# Patient Record
Sex: FEMALE | Race: WHITE | NOT HISPANIC OR LATINO | Employment: FULL TIME | URBAN - METROPOLITAN AREA
[De-identification: names, ages, dates, MRNs, and addresses within clinical notes are randomized per-mention and may not be internally consistent; named-entity substitution may affect disease eponyms.]

---

## 2017-01-13 ENCOUNTER — HISTORICAL (OUTPATIENT)
Dept: LAB | Facility: HOSPITAL | Age: 47
End: 2017-01-13

## 2017-01-18 ENCOUNTER — HISTORICAL (OUTPATIENT)
Dept: LAB | Facility: HOSPITAL | Age: 47
End: 2017-01-18

## 2017-02-02 ENCOUNTER — HISTORICAL (OUTPATIENT)
Dept: LAB | Facility: HOSPITAL | Age: 47
End: 2017-02-02

## 2017-04-04 ENCOUNTER — HISTORICAL (OUTPATIENT)
Dept: RADIOLOGY | Facility: HOSPITAL | Age: 47
End: 2017-04-04

## 2017-05-01 ENCOUNTER — HISTORICAL (OUTPATIENT)
Dept: RADIOLOGY | Facility: HOSPITAL | Age: 47
End: 2017-05-01

## 2017-06-20 ENCOUNTER — HISTORICAL (OUTPATIENT)
Dept: LAB | Facility: HOSPITAL | Age: 47
End: 2017-06-20

## 2017-06-20 LAB
ALBUMIN SERPL-MCNC: 3.6 GM/DL (ref 3.4–5)
ALBUMIN/GLOB SERPL: 1.1 RATIO (ref 1.1–2)
ALP SERPL-CCNC: 67 UNIT/L (ref 38–126)
ALT SERPL-CCNC: 28 UNIT/L (ref 12–78)
AST SERPL-CCNC: 14 UNIT/L (ref 15–37)
BILIRUB SERPL-MCNC: 0.5 MG/DL (ref 0.2–1)
BILIRUBIN DIRECT+TOT PNL SERPL-MCNC: 0.1 MG/DL (ref 0–0.5)
BILIRUBIN DIRECT+TOT PNL SERPL-MCNC: 0.4 MG/DL (ref 0–0.8)
BUN SERPL-MCNC: 13 MG/DL (ref 7–18)
CALCIUM SERPL-MCNC: 9.2 MG/DL (ref 8.5–10.1)
CHLORIDE SERPL-SCNC: 105 MMOL/L (ref 98–107)
CO2 SERPL-SCNC: 24 MMOL/L (ref 21–32)
CREAT SERPL-MCNC: 0.79 MG/DL (ref 0.55–1.02)
GLOBULIN SER-MCNC: 3.2 GM/DL (ref 2.4–3.5)
GLUCOSE SERPL-MCNC: 101 MG/DL (ref 74–106)
H PYLORI AB SER IA-ACNC: NEGATIVE
POTASSIUM SERPL-SCNC: 4.5 MMOL/L (ref 3.5–5.1)
PROT SERPL-MCNC: 6.8 GM/DL (ref 6.4–8.2)
SODIUM SERPL-SCNC: 141 MMOL/L (ref 136–145)

## 2018-03-28 ENCOUNTER — HISTORICAL (OUTPATIENT)
Dept: RADIOLOGY | Facility: HOSPITAL | Age: 48
End: 2018-03-28

## 2018-05-29 LAB
BILIRUB SERPL-MCNC: NEGATIVE MG/DL
BLOOD URINE, POC: NEGATIVE
CLARITY, POC UA: CLEAR
COLOR, POC UA: YELLOW
GLUCOSE UR QL STRIP: NEGATIVE
KETONES UR QL STRIP: NEGATIVE
LEUKOCYTE EST, POC UA: NEGATIVE
NITRITE, POC UA: NEGATIVE
PH, POC UA: 6
PROTEIN, POC: NEGATIVE
SPECIFIC GRAVITY, POC UA: 1.02
UROBILINOGEN, POC UA: NORMAL

## 2020-02-20 ENCOUNTER — HISTORICAL (OUTPATIENT)
Dept: ADMINISTRATIVE | Facility: HOSPITAL | Age: 50
End: 2020-02-20

## 2020-02-20 LAB
INR PPP: 1.1 (ref 0–1.3)
PROTHROMBIN TIME: 13.7 SECOND(S) (ref 11.1–13.7)

## 2020-02-21 ENCOUNTER — HISTORICAL (OUTPATIENT)
Dept: ADMINISTRATIVE | Facility: HOSPITAL | Age: 50
End: 2020-02-21

## 2020-02-21 LAB
INR PPP: 1.3 (ref 0–1.3)
PROTHROMBIN TIME: 15.6 SECOND(S) (ref 11.1–13.7)

## 2020-02-22 ENCOUNTER — HOSPITAL ENCOUNTER (OUTPATIENT)
Dept: ADMINISTRATIVE | Facility: HOSPITAL | Age: 50
End: 2020-02-24
Attending: INTERNAL MEDICINE | Admitting: INTERNAL MEDICINE

## 2020-02-22 LAB
ABS NEUT (OLG): 4.65 X10(3)/MCL (ref 2.1–9.2)
ALBUMIN SERPL-MCNC: 3.6 GM/DL (ref 3.4–5)
ALBUMIN/GLOB SERPL: 1 RATIO (ref 1.1–2)
ALP SERPL-CCNC: 64 UNIT/L (ref 38–126)
ALT SERPL-CCNC: 69 UNIT/L (ref 12–78)
AST SERPL-CCNC: 76 UNIT/L (ref 15–37)
BASOPHILS # BLD AUTO: 0.1 X10(3)/MCL (ref 0–0.2)
BASOPHILS NFR BLD AUTO: 1 %
BILIRUB SERPL-MCNC: 0.2 MG/DL (ref 0.2–1)
BILIRUBIN DIRECT+TOT PNL SERPL-MCNC: 0.1 MG/DL (ref 0–0.5)
BILIRUBIN DIRECT+TOT PNL SERPL-MCNC: 0.1 MG/DL (ref 0–0.8)
BUN SERPL-MCNC: 9 MG/DL (ref 7–18)
CALCIUM SERPL-MCNC: 8.3 MG/DL (ref 8.5–10.1)
CHLORIDE SERPL-SCNC: 108 MMOL/L (ref 98–107)
CO2 SERPL-SCNC: 25 MMOL/L (ref 21–32)
CREAT SERPL-MCNC: 0.69 MG/DL (ref 0.55–1.02)
EOSINOPHIL # BLD AUTO: 0.1 X10(3)/MCL (ref 0–0.9)
EOSINOPHIL NFR BLD AUTO: 1 %
ERYTHROCYTE [DISTWIDTH] IN BLOOD BY AUTOMATED COUNT: 14 % (ref 11.5–17)
GLOBULIN SER-MCNC: 3.7 GM/DL (ref 2.4–3.5)
GLUCOSE SERPL-MCNC: 90 MG/DL (ref 74–106)
HCT VFR BLD AUTO: 47.8 % (ref 37–47)
HGB BLD-MCNC: 15.3 GM/DL (ref 12–16)
LYMPHOCYTES # BLD AUTO: 1.6 X10(3)/MCL (ref 0.6–4.6)
LYMPHOCYTES NFR BLD AUTO: 23 %
MCH RBC QN AUTO: 29.5 PG (ref 27–31)
MCHC RBC AUTO-ENTMCNC: 32 GM/DL (ref 33–36)
MCV RBC AUTO: 92.1 FL (ref 80–94)
MONOCYTES # BLD AUTO: 0.7 X10(3)/MCL (ref 0.1–1.3)
MONOCYTES NFR BLD AUTO: 10 %
NEUTROPHILS # BLD AUTO: 4.65 X10(3)/MCL (ref 2.1–9.2)
NEUTROPHILS NFR BLD AUTO: 65 %
PLATELET # BLD AUTO: 298 X10(3)/MCL (ref 130–400)
PMV BLD AUTO: 11.8 FL (ref 9.4–12.4)
POC TROPONIN: 0.32 NG/ML (ref 0–0.08)
POTASSIUM SERPL-SCNC: 4.1 MMOL/L (ref 3.5–5.1)
PROT SERPL-MCNC: 7.3 GM/DL (ref 6.4–8.2)
RBC # BLD AUTO: 5.19 X10(6)/MCL (ref 4.2–5.4)
SODIUM SERPL-SCNC: 140 MMOL/L (ref 136–145)
TROPONIN I SERPL-MCNC: 0.46 NG/ML (ref 0.02–0.49)
TROPONIN I SERPL-MCNC: 0.52 NG/ML (ref 0.02–0.49)
WBC # SPEC AUTO: 7.2 X10(3)/MCL (ref 4.5–11.5)

## 2020-02-23 LAB — TROPONIN I SERPL-MCNC: 0.34 NG/ML (ref 0.02–0.49)

## 2020-02-24 LAB
ABS NEUT (OLG): 4.31 X10(3)/MCL (ref 2.1–9.2)
BASOPHILS # BLD AUTO: 0.1 X10(3)/MCL (ref 0–0.2)
BASOPHILS NFR BLD AUTO: 1 %
BUN SERPL-MCNC: 11 MG/DL (ref 7–18)
CALCIUM SERPL-MCNC: 8.3 MG/DL (ref 8.5–10.1)
CHLORIDE SERPL-SCNC: 106 MMOL/L (ref 98–107)
CO2 SERPL-SCNC: 25 MMOL/L (ref 21–32)
CREAT SERPL-MCNC: 0.51 MG/DL (ref 0.55–1.02)
CREAT/UREA NIT SERPL: 21.6
EOSINOPHIL # BLD AUTO: 0.1 X10(3)/MCL (ref 0–0.9)
EOSINOPHIL NFR BLD AUTO: 1 %
ERYTHROCYTE [DISTWIDTH] IN BLOOD BY AUTOMATED COUNT: 13.7 % (ref 11.5–17)
GLUCOSE SERPL-MCNC: 92 MG/DL (ref 74–106)
HCT VFR BLD AUTO: 40.4 % (ref 37–47)
HGB BLD-MCNC: 12.9 GM/DL (ref 12–16)
INR PPP: 1.5 (ref 0–1.3)
LYMPHOCYTES # BLD AUTO: 1.7 X10(3)/MCL (ref 0.6–4.6)
LYMPHOCYTES NFR BLD AUTO: 25 %
MCH RBC QN AUTO: 29.1 PG (ref 27–31)
MCHC RBC AUTO-ENTMCNC: 31.9 GM/DL (ref 33–36)
MCV RBC AUTO: 91.2 FL (ref 80–94)
MONOCYTES # BLD AUTO: 0.6 X10(3)/MCL (ref 0.1–1.3)
MONOCYTES NFR BLD AUTO: 9 %
NEUTROPHILS # BLD AUTO: 4.31 X10(3)/MCL (ref 2.1–9.2)
NEUTROPHILS NFR BLD AUTO: 64 %
PLATELET # BLD AUTO: 254 X10(3)/MCL (ref 130–400)
PMV BLD AUTO: 11.9 FL (ref 9.4–12.4)
POTASSIUM SERPL-SCNC: 4.1 MMOL/L (ref 3.5–5.1)
PROTHROMBIN TIME: 17.2 SECOND(S) (ref 11.1–13.7)
RBC # BLD AUTO: 4.43 X10(6)/MCL (ref 4.2–5.4)
SODIUM SERPL-SCNC: 140 MMOL/L (ref 136–145)
WBC # SPEC AUTO: 6.7 X10(3)/MCL (ref 4.5–11.5)

## 2020-02-26 ENCOUNTER — HISTORICAL (OUTPATIENT)
Dept: ADMINISTRATIVE | Facility: HOSPITAL | Age: 50
End: 2020-02-26

## 2020-02-26 LAB
INR PPP: 1.3 (ref 0–1.3)
PROTHROMBIN TIME: 15.3 SECOND(S) (ref 11.1–13.7)

## 2020-02-28 ENCOUNTER — HISTORICAL (OUTPATIENT)
Dept: ADMINISTRATIVE | Facility: HOSPITAL | Age: 50
End: 2020-02-28

## 2020-02-28 LAB
INR PPP: 1.5 (ref 0–1.3)
PROTHROMBIN TIME: 17.6 SECOND(S) (ref 11.1–13.7)

## 2020-02-29 ENCOUNTER — HISTORICAL (OUTPATIENT)
Dept: LAB | Facility: HOSPITAL | Age: 50
End: 2020-02-29

## 2020-02-29 LAB
INR PPP: 2 (ref 2–3)
PROTHROMBIN TIME: 22.3 SEC (ref 11.7–14.5)

## 2020-03-02 ENCOUNTER — HISTORICAL (OUTPATIENT)
Dept: ADMINISTRATIVE | Facility: HOSPITAL | Age: 50
End: 2020-03-02

## 2020-03-02 LAB
INR PPP: 2.1 (ref 0–1.3)
PROTHROMBIN TIME: 23.3 SECOND(S) (ref 11.1–13.7)

## 2020-03-05 ENCOUNTER — HISTORICAL (OUTPATIENT)
Dept: ADMINISTRATIVE | Facility: HOSPITAL | Age: 50
End: 2020-03-05

## 2020-03-05 LAB
INR PPP: 2.2 (ref 0–1.3)
PROTHROMBIN TIME: 24 SECOND(S) (ref 11.1–13.7)

## 2020-03-09 ENCOUNTER — HISTORICAL (OUTPATIENT)
Dept: ADMINISTRATIVE | Facility: HOSPITAL | Age: 50
End: 2020-03-09

## 2020-03-09 LAB
INR PPP: 2.9 (ref 0–1.3)
PROTHROMBIN TIME: 29.3 SECOND(S) (ref 11.1–13.7)

## 2020-03-12 ENCOUNTER — HISTORICAL (OUTPATIENT)
Dept: ADMINISTRATIVE | Facility: HOSPITAL | Age: 50
End: 2020-03-12

## 2020-03-12 LAB
INR PPP: 2.5 (ref 0–1.3)
PROTHROMBIN TIME: 26 SECOND(S) (ref 11.1–13.7)

## 2020-03-19 ENCOUNTER — HISTORICAL (OUTPATIENT)
Dept: ADMINISTRATIVE | Facility: HOSPITAL | Age: 50
End: 2020-03-19

## 2020-03-19 LAB
INR PPP: 2.6 (ref 0–1.3)
PROTHROMBIN TIME: 27.1 SECOND(S) (ref 11.1–13.7)

## 2020-04-09 ENCOUNTER — HISTORICAL (OUTPATIENT)
Dept: ADMINISTRATIVE | Facility: HOSPITAL | Age: 50
End: 2020-04-09

## 2020-04-09 LAB
INR PPP: 2.1 (ref 0–1.3)
PROTHROMBIN TIME: 23 SECOND(S) (ref 11.1–13.7)

## 2020-06-17 ENCOUNTER — HOSPITAL ENCOUNTER (OUTPATIENT)
Dept: ADMINISTRATIVE | Facility: HOSPITAL | Age: 50
End: 2020-06-18
Attending: INTERNAL MEDICINE | Admitting: INTERNAL MEDICINE

## 2020-06-17 LAB
ABS NEUT (OLG): 3.6 X10(3)/MCL (ref 2.1–9.2)
ALBUMIN SERPL-MCNC: 3.9 GM/DL (ref 3.5–5)
ALBUMIN/GLOB SERPL: 1.3 RATIO (ref 1.1–2)
ALP SERPL-CCNC: 51 UNIT/L (ref 40–150)
ALT SERPL-CCNC: 34 UNIT/L (ref 0–55)
APTT PPP: 37.5 SECOND(S) (ref 23.2–33.7)
AST SERPL-CCNC: 32 UNIT/L (ref 5–34)
BASOPHILS # BLD AUTO: 0.1 X10(3)/MCL (ref 0–0.2)
BASOPHILS NFR BLD AUTO: 1 %
BILIRUB SERPL-MCNC: 0.4 MG/DL
BILIRUBIN DIRECT+TOT PNL SERPL-MCNC: 0.2 MG/DL (ref 0–0.5)
BILIRUBIN DIRECT+TOT PNL SERPL-MCNC: 0.2 MG/DL (ref 0–0.8)
BUN SERPL-MCNC: 7.2 MG/DL (ref 9.8–20.1)
CALCIUM SERPL-MCNC: 9 MG/DL (ref 8.4–10.2)
CHLORIDE SERPL-SCNC: 108 MMOL/L (ref 98–107)
CO2 SERPL-SCNC: 21 MMOL/L (ref 22–29)
CREAT SERPL-MCNC: 0.66 MG/DL (ref 0.55–1.02)
EOSINOPHIL # BLD AUTO: 0.1 X10(3)/MCL (ref 0–0.9)
EOSINOPHIL NFR BLD AUTO: 1 %
ERYTHROCYTE [DISTWIDTH] IN BLOOD BY AUTOMATED COUNT: 15.8 % (ref 11.5–17)
GLOBULIN SER-MCNC: 3 GM/DL (ref 2.4–3.5)
GLUCOSE SERPL-MCNC: 108 MG/DL (ref 74–100)
HCT VFR BLD AUTO: 44 % (ref 37–47)
HGB BLD-MCNC: 14.1 GM/DL (ref 12–16)
INR PPP: 1.1 (ref 0–1.3)
LYMPHOCYTES # BLD AUTO: 1.6 X10(3)/MCL (ref 0.6–4.6)
LYMPHOCYTES NFR BLD AUTO: 28 %
MCH RBC QN AUTO: 29.9 PG (ref 27–31)
MCHC RBC AUTO-ENTMCNC: 32 GM/DL (ref 33–36)
MCV RBC AUTO: 93.2 FL (ref 80–94)
MONOCYTES # BLD AUTO: 0.4 X10(3)/MCL (ref 0.1–1.3)
MONOCYTES NFR BLD AUTO: 7 %
NEUTROPHILS # BLD AUTO: 3.6 X10(3)/MCL (ref 2.1–9.2)
NEUTROPHILS NFR BLD AUTO: 62 %
PLATELET # BLD AUTO: 255 X10(3)/MCL (ref 130–400)
PMV BLD AUTO: 12.6 FL (ref 9.4–12.4)
POTASSIUM SERPL-SCNC: 4.2 MMOL/L (ref 3.5–5.1)
PROT SERPL-MCNC: 6.9 GM/DL (ref 6.4–8.3)
PROTHROMBIN TIME: 14.1 SECOND(S) (ref 11.1–13.7)
RBC # BLD AUTO: 4.72 X10(6)/MCL (ref 4.2–5.4)
SODIUM SERPL-SCNC: 138 MMOL/L (ref 136–145)
TROPONIN I SERPL-MCNC: 0 NG/ML (ref 0–0.04)
WBC # SPEC AUTO: 5.8 X10(3)/MCL (ref 4.5–11.5)

## 2020-06-18 LAB
T4 FREE SERPL-MCNC: 1.09 NG/DL (ref 0.7–1.48)
TROPONIN I SERPL-MCNC: 0 NG/ML (ref 0–0.04)
TSH SERPL-ACNC: 0.78 UIU/ML (ref 0.35–4.94)

## 2020-06-22 ENCOUNTER — HISTORICAL (OUTPATIENT)
Dept: CARDIOLOGY | Facility: HOSPITAL | Age: 50
End: 2020-06-22

## 2020-07-27 ENCOUNTER — HOSPITAL ENCOUNTER (OUTPATIENT)
Dept: MEDSURG UNIT | Facility: HOSPITAL | Age: 50
End: 2020-07-29
Attending: INTERNAL MEDICINE | Admitting: INTERNAL MEDICINE

## 2020-07-27 LAB
ABS NEUT (OLG): 2.45 X10(3)/MCL (ref 2.1–9.2)
ALBUMIN SERPL-MCNC: 3.8 GM/DL (ref 3.5–5)
ALBUMIN/GLOB SERPL: 1.1 RATIO (ref 1.1–2)
ALP SERPL-CCNC: 52 UNIT/L (ref 40–150)
ALT SERPL-CCNC: 24 UNIT/L (ref 0–55)
APTT PPP: 24.6 SECOND(S) (ref 23.2–33.7)
AST SERPL-CCNC: 33 UNIT/L (ref 5–34)
BASOPHILS # BLD AUTO: 0.1 X10(3)/MCL (ref 0–0.2)
BASOPHILS NFR BLD AUTO: 2 %
BILIRUB SERPL-MCNC: 0.4 MG/DL
BILIRUBIN DIRECT+TOT PNL SERPL-MCNC: 0.1 MG/DL (ref 0–0.5)
BILIRUBIN DIRECT+TOT PNL SERPL-MCNC: 0.3 MG/DL (ref 0–0.8)
BUN SERPL-MCNC: 9 MG/DL (ref 9.8–20.1)
CALCIUM SERPL-MCNC: 9.1 MG/DL (ref 8.4–10.2)
CHLORIDE SERPL-SCNC: 107 MMOL/L (ref 98–107)
CO2 SERPL-SCNC: 23 MMOL/L (ref 22–29)
CREAT SERPL-MCNC: 0.78 MG/DL (ref 0.55–1.02)
EOSINOPHIL # BLD AUTO: 0.1 X10(3)/MCL (ref 0–0.9)
EOSINOPHIL NFR BLD AUTO: 2 %
ERYTHROCYTE [DISTWIDTH] IN BLOOD BY AUTOMATED COUNT: 13.8 % (ref 11.5–17)
GLOBULIN SER-MCNC: 3.5 GM/DL (ref 2.4–3.5)
GLUCOSE SERPL-MCNC: 116 MG/DL (ref 74–100)
HCT VFR BLD AUTO: 45 % (ref 37–47)
HGB BLD-MCNC: 15.2 GM/DL (ref 12–16)
INR PPP: 0.9 (ref 0–1.3)
LYMPHOCYTES # BLD AUTO: 1.8 X10(3)/MCL (ref 0.6–4.6)
LYMPHOCYTES NFR BLD AUTO: 38 %
MCH RBC QN AUTO: 30.8 PG (ref 27–31)
MCHC RBC AUTO-ENTMCNC: 33.8 GM/DL (ref 33–36)
MCV RBC AUTO: 91.1 FL (ref 80–94)
MONOCYTES # BLD AUTO: 0.3 X10(3)/MCL (ref 0.1–1.3)
MONOCYTES NFR BLD AUTO: 7 %
NEUTROPHILS # BLD AUTO: 2.45 X10(3)/MCL (ref 2.1–9.2)
NEUTROPHILS NFR BLD AUTO: 51 %
PLATELET # BLD AUTO: 246 X10(3)/MCL (ref 130–400)
PMV BLD AUTO: 12.2 FL (ref 9.4–12.4)
POC TROPONIN: 0 NG/ML (ref 0–0.08)
POTASSIUM SERPL-SCNC: 5 MMOL/L (ref 3.5–5.1)
PROT SERPL-MCNC: 7.3 GM/DL (ref 6.4–8.3)
PROTHROMBIN TIME: 12 SECOND(S) (ref 11.1–13.7)
RBC # BLD AUTO: 4.94 X10(6)/MCL (ref 4.2–5.4)
SODIUM SERPL-SCNC: 139 MMOL/L (ref 136–145)
TROPONIN I SERPL-MCNC: 0.02 NG/ML (ref 0–0.04)
WBC # SPEC AUTO: 4.8 X10(3)/MCL (ref 4.5–11.5)

## 2020-07-28 LAB
CK MB SERPL-MCNC: 0.7 NG/ML
CK MB SERPL-MCNC: 0.8 NG/ML
CK SERPL-CCNC: 45 U/L (ref 29–168)
CK SERPL-CCNC: 49 U/L (ref 29–168)
TROPONIN I SERPL-MCNC: 0.01 NG/ML (ref 0–0.04)
TROPONIN I SERPL-MCNC: <0.01 NG/ML (ref 0–0.04)

## 2021-05-04 ENCOUNTER — HISTORICAL (OUTPATIENT)
Dept: ADMINISTRATIVE | Facility: HOSPITAL | Age: 51
End: 2021-05-04

## 2021-05-04 LAB
FLUAV AG UPPER RESP QL IA.RAPID: NEGATIVE
FLUBV AG UPPER RESP QL IA.RAPID: NEGATIVE
SARS-COV-2 RNA RESP QL NAA+PROBE: NOT DETECTED

## 2021-10-11 ENCOUNTER — HISTORICAL (OUTPATIENT)
Dept: URGENT CARE | Facility: CLINIC | Age: 51
End: 2021-10-11

## 2021-10-11 LAB
BILIRUB SERPL-MCNC: NORMAL MG/DL
BLOOD URINE, POC: NORMAL
CLARITY, POC UA: NORMAL
COLOR, POC UA: NORMAL
GLUCOSE UR QL STRIP: NEGATIVE
KETONES UR QL STRIP: NEGATIVE
LEUKOCYTE EST, POC UA: NORMAL
NITRITE, POC UA: POSITIVE
PH, POC UA: 7
PROTEIN, POC: NORMAL
SPECIFIC GRAVITY, POC UA: 1.01
UROBILINOGEN, POC UA: NORMAL

## 2021-10-21 ENCOUNTER — HISTORICAL (OUTPATIENT)
Dept: ADMINISTRATIVE | Facility: HOSPITAL | Age: 51
End: 2021-10-21

## 2021-10-21 LAB
APPEARANCE, UA: CLEAR
BACTERIA SPEC CULT: NORMAL /HPF
BILIRUB SERPL-MCNC: NORMAL MG/DL
BILIRUB UR QL STRIP: NEGATIVE
BLOOD URINE, POC: NEGATIVE
CLARITY, POC UA: CLEAR
COLOR UR: NORMAL
COLOR, POC UA: NORMAL
GLUCOSE (UA): NEGATIVE
GLUCOSE UR QL STRIP: NEGATIVE
HGB UR QL STRIP: NEGATIVE
KETONES UR QL STRIP: NEGATIVE
KETONES UR QL STRIP: NEGATIVE
LEUKOCYTE EST, POC UA: NEGATIVE
LEUKOCYTE ESTERASE UR QL STRIP: NEGATIVE
NITRITE UR QL STRIP: NEGATIVE
NITRITE, POC UA: NEGATIVE
PH UR STRIP: 5.5 [PH] (ref 5–9)
PH, POC UA: 5
PROT UR QL STRIP: NEGATIVE
PROTEIN, POC: NEGATIVE
RBC #/AREA URNS HPF: NORMAL /[HPF]
SP GR UR STRIP: 1.02 (ref 1–1.03)
SPECIFIC GRAVITY, POC UA: 1.02
SQUAMOUS EPITHELIAL, UA: NORMAL /HPF (ref 0–4)
UROBILINOGEN UR STRIP-ACNC: 0.2
UROBILINOGEN, POC UA: NORMAL
WBC #/AREA URNS HPF: NORMAL /[HPF]

## 2021-11-20 ENCOUNTER — HISTORICAL (OUTPATIENT)
Dept: ADMINISTRATIVE | Facility: HOSPITAL | Age: 51
End: 2021-11-20

## 2021-11-20 LAB — SARS-COV-2 RNA RESP QL NAA+PROBE: NEGATIVE

## 2022-02-08 ENCOUNTER — HISTORICAL (OUTPATIENT)
Dept: ADMINISTRATIVE | Facility: HOSPITAL | Age: 52
End: 2022-02-08

## 2022-04-11 ENCOUNTER — HISTORICAL (OUTPATIENT)
Dept: ADMINISTRATIVE | Facility: HOSPITAL | Age: 52
End: 2022-04-11
Payer: COMMERCIAL

## 2022-04-28 VITALS
HEIGHT: 65 IN | WEIGHT: 200.63 LBS | OXYGEN SATURATION: 99 % | DIASTOLIC BLOOD PRESSURE: 73 MMHG | SYSTOLIC BLOOD PRESSURE: 133 MMHG | BODY MASS INDEX: 33.43 KG/M2

## 2022-04-30 NOTE — ED PROVIDER NOTES
Patient:   Yandy Chaudhry            MRN: [id number]            FIN: [id number]               Age:   50 years     Sex:  Female     :  1970   Associated Diagnoses:   Acute chest pain   Author:   Leann GARCIA, Noah PEREZ      Basic Information   Time seen: Date & time 2020 15:50:00.   History source: Patient.   Arrival mode: Private vehicle, wheelchair.   Additional information: Chief Complaint from Nursing Triage Note : Chief Complaint   2020 15:46 CDT      Chief Complaint           C/o CP to lefft side of chest and left shoulder blade around 1400 today after taking a shower with nausea and SOB. Hx of MI in February. Sees Madhav. Has cardiac stent. No relief with two doses of nitro.  .      History of Present Illness   The patient presents with 51 yo female with hx of MI in february presents wtih left side of chest pain radiating to left shoulder blade around 1400 today. Complains of SOB and nausea. Had stent placement, on plavix. Took 2 nitro without relief. Cardiology. Dr. Corey. FANNY Jackman and History of end STEMI, ischemic cardiomyopathy, hypertension, hyperlipidemia, hypothyroidism, admitted 2020 after she developed chest pain during a stress test sent for an angiogram.  The onset was 3.5  hours ago.  The course/duration of symptoms is constant.  Location: Left chest. Radiating pain: left shoulder. The character of symptoms is sharp.  The degree at present is 7 /10.  The exacerbating factor is none.  Risk factors consist of coronary artery disease.  Prior episodes: coronary artery disease.  Associated symptoms: shortness of breath, denies nausea and denies vomiting.        Review of Systems   Constitutional symptoms:  Negative except as documented in HPI.   Skin symptoms:  Negative except as documented in HPI.   Eye symptoms:  Negative except as documented in HPI.   ENMT symptoms:  Negative except as documented in HPI.   Respiratory symptoms:  Negative except as  documented in HPI.   Cardiovascular symptoms:  Negative except as documented in HPI.   Gastrointestinal symptoms:  Negative except as documented in HPI.   Genitourinary symptoms:  Negative except as documented in HPI.   Musculoskeletal symptoms:  Negative except as documented in HPI.   Neurologic symptoms:  Negative except as documented in HPI.      Health Status   Allergies:    Allergic Reactions (Selected)  No Known Allergies,    Allergies (1) Active Reaction  No Known Allergies None Documented  .   Medications:  (Selected)   Inpatient Medications  Ordered  Zofran 2 mg/mL injectable solution: 4 mg, form: Injection, IV Push, Once, first dose 05/29/18 17:26:00 CDT, stop date 05/29/18 17:26:00 CDT, STAT  Prescriptions  Prescribed  Norvasc 5 mg oral tablet: 5 mg = 1 tab(s), Oral, Daily, # 30 tab(s), 0 Refill(s), [id number], 165, cm, Height/Length Dosing, 06/17/20 23:21:00 CDT, 89.7, kg, Weight Dosing, 06/17/20 23:21:00 CDT  Plavix 75 mg oral tablet: 75 mg = 1 tab(s), Oral, Daily, # 30 tab(s), 6 Refill(s), [id number], 165, cm, Height/Length Dosing, 02/16/20 6:28:00 CST, 93.8, kg, Weight Dosing, 02/16/20 6:28:00 CST  Ranexa 500 mg oral tablet, extended release: 500 mg = 1 tab(s), Oral, BID, # 60 tab(s), 6 Refill(s), [id number], 164, cm, Height/Length Dosing, 06/19/20 15:28:00 CDT, 90, kg, Weight Dosing, 06/19/20 15:28:00 CDT  aspirin 81 mg oral Delayed Release (EC) tablet: 81 mg = 1 tab(s), Oral, Daily, # 30 tab(s), 0 Refill(s), [id number], 165, cm, Height/Length Dosing, 02/16/20 6:28:00 CST, 93.8, kg, Weight Dosing, 02/16/20 6:28:00 CST  atorvastatin 80 mg oral tablet: 80 mg = 1 tab(s), Oral, Daily, # 30 tab(s), 6 Refill(s), Pharmacy: [id number], 165, cm, Height/Length Dosing, 02/16/20 6:28:00 CST, 93.8, kg, Weight Dosing, 02/16/20 6:28:00 CST  nitroglycerin 0.4 mg sublingual TAB: 0.4 mg = 1 tab(s), SL, q5min, PRN PRN angina, # 100 tab(s),  3 Refill(s), Pharmacy: [facility]acy #[id number], 165, cm, Height/Length Dosing, 20 6:28:00 CST, 93.8, kg, Weight Dosing, 20 6:28:00 CST  Documented Medications  Documented  FLUoxetine 40 mg oral capsule: 40 mg = 1 cap(s), Oral, BID, 0 Refill(s)  LEVOTHYROXINE 75 MCG TABLET: 75 mcg = 1 tab(s), Oral, Daily  Pantoprazole 40 mg ORAL EC-Tablet: 40 mg = 1 tab(s), Oral, Daily  losartan 25 mg oral tablet: 25 mg = 1 tab(s), Oral, Daily, # 30 tab(s), 0 Refill(s)  oxybutynin 10 mg/24 hr oral tablet, extended release: 10 mg = 1 tab(s), Oral, Daily, # 30 tab(s), 0 Refill(s).      Past Medical/ Family/ Social History   Medical history:    Active  Anxiety ([id number])  HTN (hypertension) ([id number])  Anemia ([id number]).   Surgical history:    Cholecystectomy ([id number]) in the month of 2017 at 47 Years.  Hysterectomy Laparoscopic Robotic Assist (.) on 2013 at 43 Years.  Comments:  2013 9:26 SUMAYA Conner RN, Lizeth CALDERON  auto-populated from documented surgical case  ankle surgery.  Comments:  2013 12:09 CST - Jeansonne RN, Jamie L.  left   section ([id number]).  Comments:  2013 12:09 CST - Jeansonne RN, Jamie L.  x2  laproscopy.  Comments:  2013 12:09 CST - Jeansonne RN, Jamie L.  x2  D&C - Dilatation and curettage ([id number]).  COLONOSCOPY..   Family history:    Pancreatic cancer.  Mother ()  .   Social history:    Social & Psychosocial Habits    Alcohol  2013  Use: Current    Type: Wine    Frequency: 3-5 times per week    2020  Use: Current    Frequency: 3-5 times per week    2020  Use: Current    Frequency: 3-5 times per week    Has alcohol use interfered with work or home life? No    Has anyone been hurt or at risk by your drinking? No    Concerns about alcohol use in household: No    Substance Use  2013 Risk Assessment: Denies Substance Abuse    2017  Use: Never    2020  Use:  Never    06/19/2020  Use: Never    Has drug use interfered with your work or home life? No    Concerns about substance abuse in household: No    Tobacco  12/26/2013 Risk Assessment: Denies Tobacco Use    05/31/2019  Use: Never (less than 100 in l    Patient Wants Consult For Cessation Counseling N/A    02/22/2020  Use: Never (less than 100 in l    Patient Wants Consult For Cessation Counseling N/A    06/17/2020  Use: Never (less than 100 in l    Patient Wants Consult For Cessation Counseling N/A    06/19/2020  Use: Never (less than 100 in l    Patient Wants Consult For Cessation Counseling No    06/19/2020  Use: Never (less than 100 in l    Patient Wants Consult For Cessation Counseling N/A    06/19/2020  Use: Never (less than 100 in l    Patient Wants Consult For Cessation Counseling N/A    Concerns about tobacco use in household: No    Smokeless tobacco use: Never    07/27/2020  Use: Never (less than 100 in l    Patient Wants Consult For Cessation Counseling N/A    Abuse/Neglect  02/22/2020  SHX Any signs of abuse or neglect No    06/17/2020  SHX Any signs of abuse or neglect No    Feels unsafe at home: No    Safe place to go: Yes    06/19/2020  SHX Any signs of abuse or neglect No    06/19/2020  SHX Any signs of abuse or neglect No    06/19/2020  SHX Any signs of abuse or neglect No    Feels unsafe at home: No    07/27/2020  SHX Any signs of abuse or neglect No  .   Problem list:    Active Problems (4)  Anemia   Anxiety   HTN (hypertension)   Obesity   .      Physical Examination               Vital Signs      Vital Signs (last 24 hrs)_____  Last Charted___________  Temp Oral     36.5 DegC  (JUL 27 15:46)  Heart Rate Peripheral   H 102bpm  (JUL 27 15:46)  Resp Rate         H 26br/min  (JUL 27 15:46)  SBP      121 mmHg  (JUL 27 15:46)  DBP      90 mmHg  (JUL 27 15:46)  SpO2      99 %  (JUL 27 15:46)  .   Oxygen saturation.   General:  Alert, no acute distress.    Skin:  Warm, dry.    Head:  Normocephalic.   Eye    Cardiovascular:  Regular rate and rhythm, Normal peripheral perfusion.    Respiratory:  Lungs are clear to auscultation, respirations are non-labored, breath sounds are equal, Symmetrical chest wall expansion.    Gastrointestinal:  Soft, Nontender, Non distended, Normal bowel sounds, No organomegaly, Guarding: Negative, Rebound: Negative.    Musculoskeletal:  No deformity.   Neurological:  No focal neurological deficit observed, normal speech observed.    Psychiatric:  Cooperative, appropriate mood & affect.       Medical Decision Making   Differential Diagnosis:  Myocardial infarction, non-ST elevation myocardial infarction, unstable angina, angina, anxiety, atypical chest pain, pneumonia, gastroesophageal reflux disease, pleurisy, chest wall pain, congestive heart failure not pulmonary embolism,  not aortic dissection.    Rationale:  Chest pain with shortness of breath feels similar to her previous episodes of chest pain except more lateralized to the left today.   Orders  Launch Orders   Laboratory:  PTT (Order): Stat collect, 7/27/2020 15:52 CDT, Blood, Lab Collect, Print Label By Order Location, 7/27/2020 15:52 CDT  PT (Order): Stat collect, 7/27/2020 15:52 CDT, Blood, Lab Collect, Print Label By Order Location, 7/27/2020 15:52 CDT  POC iSTAT ER Troponin request (Order): Blood, Stat collect, 7/27/2020 15:52 CDT by Mulu Jerome Lab Collect, Print Label By Order Location  Troponin-I (Order): Stat collect, 7/27/2020 15:52 CDT, Blood, Lab Collect, Print Label By Order Location, 7/27/2020 15:52 CDT  CMP (Order): Stat collect, 7/27/2020 15:52 CDT, Blood, Lab Collect, Print Label By Order Location, 7/27/2020 15:52 CDT  CBC w/ Auto Diff (Order): Now collect, 7/27/2020 15:52 CDT, Blood, Lab Collect, Print Label By Order Location, 7/27/2020 15:52 CDT  Radiology:  XR Chest 1 View (Order): Stat, 7/27/2020 15:52 CDT, Chest Pain, None, Stretcher, Patient Has IV?, Rad Type, Not Scheduled  Cardiology:  EKG (Order):  7/27/2020 15:52 CDT, Stat, Once, 7/27/2020 15:52 CDT, Stretcher, Patient Has IV, -1, -1, 7/27/2020 15:52 CDT.   Electrocardiogram:  Time 7/27/2020 15:49:00, rate 93, normal sinus rhythm, Compared to 6/19/2020 no ST elevation or depression.    Results review:  Lab results : Lab View   7/27/2020 16:07 CDT      POC Troponin              0.00 ng/mL    7/27/2020 15:53 CDT      Sodium Lvl                139 mmol/L                             Potassium Lvl             5.0 mmol/L                             Chloride                  107 mmol/L                             CO2                       23 mmol/L                             Calcium Lvl               9.1 mg/dL                             Glucose Lvl               116 mg/dL  HI                             BUN                       9.0 mg/dL  LOW                             Creatinine                0.78 mg/dL                             eGFR-AA                   >60  NA                             eGFR-YONI                  >60  NA                             Bili Total                0.4 mg/dL                             Bili Direct               0.1 mg/dL                             Bili Indirect             0.30 mg/dL                             AST                       33 unit/L                             ALT                       24 unit/L                             Alk Phos                  52 unit/L                             Total Protein             7.3 gm/dL                             Albumin Lvl               3.8 gm/dL                             Globulin                  3.5 gm/dL                             A/G Ratio                 1.1 ratio                             Troponin-I                0.016 ng/mL                             PT                        12.0 second(s)                             INR                       0.9                             PTT                       24.6 second(s)                             WBC                        4.8 x10(3)/mcL                             RBC                       4.94 x10(6)/mcL                             Hgb                       15.2 gm/dL                             Hct                       45.0 %                             Platelet                  246 x10(3)/mcL                             MCV                       91.1 fL                             MCH                       30.8 pg                             MCHC                      33.8 gm/dL                             RDW                       13.8 %                             MPV                       12.2 fL                             Abs Neut                  2.45 x10(3)/mcL                             Neutro Auto               51 %  NA                             Lymph Auto                38 %  NA                             Mono Auto                 7 %  NA                             Eos Auto                  2 %  NA                             Abs Eos                   0.1 x10(3)/mcL                             Basophil Auto             2 %  NA                             Abs Neutro                2.45 x10(3)/mcL                             Abs Lymph                 1.8 x10(3)/mcL                             Abs Mono                  0.3 x10(3)/mcL                             Abs Baso                  0.1 x10(3)/mcL  .   Chest X-Ray:  No acute disease process, interpretation by Emergency Physician.       Impression and Plan   Diagnosis   Acute chest pain ([id number] R07.9)      Calls-Consults   -  Hal BAIG, [place], Discussed with the [facility] of [facility].  They will admit and rule her out.    Plan   Condition: Improved, Stable.    Disposition: Place in Observation Telemetry Unit.    Counseled: Patient, Regarding diagnosis, Regarding diagnostic results, Regarding treatment plan, Patient indicated understanding of instructions.    Notes

## 2022-04-30 NOTE — ED PROVIDER NOTES
Patient:   Yandy Chaudhry            MRN: [id number]            FIN: [id number]               Age:   50 years     Sex:  Female     :  1970   Associated Diagnoses:   Unstable angina; Chest pain, rule out acute myocardial infarction   Author:   Christina Estrada MD      Basic Information   Time seen: Date & time 2020 15:08:00.   History source: Patient.   Arrival mode: Private vehicle, walking.   History limitation: None.   Additional information: Patient's physician(s): Curly Corey MD.      History of Present Illness   The patient presents with 51 yo female presents with worsening chest pain over the past 5 days. Reports left side chest pain radiating to L arm since today. SOB worse with walking. Took 3 SL nitro today with minimal relief. Hx of MI with stent placement in Feb. FANNY Jackman and     I, Dr. Estrada, assumed care of this patient at 1707.    51 y/o CF with hx of HTN, MI, and cardiac stents placed in February presents to ED with complaints of worsening chest pain. It began on Saturday with SOB and chest pressure which was relived with NTG. Yesterday, she reports the pain returned and today it was left chest stabbing pain that radiated into the left jaw and left arm. She took three rounds of NTG prior to arrival. She states the pain is milder than pervious cardiac problems. She denies vomiting.    .  The onset was 2020 .  The course/duration of symptoms is worsening.  Location: Left chest. Radiating pain: left arm.  left side of the jaw. The character of symptoms is heaviness.  The degree at onset was moderate.  The degree at maximum was severe.  The degree at present is moderate.  The exacerbating factor is none.  The relieving factor is none.  Risk factors consist of coronary artery disease and hypertension.  Prior episodes: cardiac and coronary artery disease.  Therapy today Nitroglycerin.  Associated symptoms: shortness of breath, nausea, denies vomiting and  denies diaphoresis.        Review of Systems   Constitutional symptoms:  No fever, no chills.    Skin symptoms:  No jaundice, no rash.    Eye symptoms:  Vision unchanged, No blurred vision,    Respiratory symptoms:  Shortness of breath, no orthopnea, no cough, no sputum production.    Cardiovascular symptoms:  Chest pain, left chest, pressure, radiating to the jaw, radiating to the left arm, no palpitations, no diaphoresis, no peripheral edema.    Gastrointestinal symptoms:  Nausea, no abdominal pain, no vomiting, no diarrhea, no constipation.    Genitourinary symptoms:  No dysuria, no hematuria.    Neurologic symptoms:  No headache, no dizziness.    Hematologic/Lymphatic symptoms:  Bleeding tendency negative,    Allergy/immunologic symptoms:  No impaired immunity,              Additional review of systems information: All other systems reviewed and otherwise negative.      Health Status   Allergies:    Allergic Reactions (Selected)  No Known Allergies.   Medications:  (Selected)   Inpatient Medications  Ordered  Zofran 2 mg/mL injectable solution: 4 mg, form: Injection, IV Push, Once, first dose 05/29/18 17:26:00 CDT, stop date 05/29/18 17:26:00 CDT, STAT  Prescriptions  Prescribed  Coumadin 5 mg oral tablet: 5 mg = 1 tab(s), Oral, Daily, # 30 tab(s), 3 Refill(s), Pharmacy: [facility]/pharmacy #[id number], 165, cm, Height/Length Dosing, 02/16/20 6:28:00 CST, 93.8, kg, Weight Dosing, 02/16/20 6:28:00 CST  Entresto 24 mg-26 mg oral tablet: 1 tab(s), Oral, BID, # 60 tab(s), 6 Refill(s), Pharmacy: [facility]/pharmacy #[id number], 165, cm, Height/Length Dosing, 02/16/20 6:28:00 CST, 93.8, kg, Weight Dosing, 02/16/20 6:28:00 CST  Lasix 20 mg oral tablet: 20 mg = 1 tab(s), Oral, Daily, PRN PRN shortness of breath or wheezing, # 30 tab(s), 2 Refill(s), Pharmacy: [facility]/pharmacy #[id number], 165, cm, Height/Length Dosing, 02/16/20 6:28:00 CST, 93.8, kg, Weight Dosing, 02/16/20 6:28:00 CST  Plavix 75 mg oral tablet: 75 mg = 1 tab(s), Oral, Daily, # 30 tab(s),  6 Refill(s), [id number], 165, cm, Height/Length Dosing, 02/16/20 6:28:00 CST, 93.8, kg, Weight Dosing, 02/16/20 6:28:00 CST  Ranexa 500 mg oral tablet, extended release: 500 mg = 1 tab(s), Oral, BID, # 60 tab(s), 3 Refill(s), [id number], 165, cm, Height/Length Dosing, 02/22/20 22:06:00 CST, 92, kg, Weight Dosing, 02/22/20 22:06:00 CST  Toprol-XL 25 mg oral tablet, extended release: 25 mg = 1 tab(s), Oral, Daily, # 30 tab(s), 6 Refill(s), [id number], 165, cm, Height/Length Dosing, 02/16/20 6:28:00 CST, 93.8, kg, Weight Dosing, 02/16/20 6:28:00 CST  aspirin 81 mg oral Delayed Release (EC) tablet: 81 mg = 1 tab(s), Oral, Daily, # 30 tab(s), 0 Refill(s), [id number], 165, cm, Height/Length Dosing, 02/16/20 6:28:00 CST, 93.8, kg, Weight Dosing, 02/16/20 6:28:00 CST  atorvastatin 80 mg oral tablet: 80 mg = 1 tab(s), Oral, Daily, # 30 tab(s), 6 Refill(s), [id number], 165, cm, Height/Length Dosing, 02/16/20 6:28:00 CST, 93.8, kg, Weight Dosing, 02/16/20 6:28:00 CST  nitroglycerin 0.4 mg sublingual TAB: 0.4 mg = 1 tab(s), SL, q5min, PRN PRN angina, # 100 tab(s), 3 Refill(s), [id number], 165, cm, Height/Length Dosing, 02/16/20 6:28:00 CST, 93.8, kg, Weight Dosing, 02/16/20 6:28:00 CST  Documented Medications  Documented  FLUOXETINE HCL 60 MG TABLET: 60 mg = 1 tab(s), Oral, Daily  LEVOTHYROXINE 75 MCG TABLET: 75 mcg = 1 tab(s), Oral, Daily  Pantoprazole 40 mg ORAL EC-Tablet: 40 mg = 1 tab(s), Oral, Daily  oxybutynin 5 mg oral tablet: 5 mg = 1 tab(s), Oral, Daily.      Past Medical/ Family/ Social History   Medical history:    Active  Anxiety ([id number])  HTN (hypertension) ([id number])  Anemia ([id number]).   Surgical history:    Cholecystectomy ([id number]) in the month of 8/2017 at 47 Years.  Hysterectomy Laparoscopic Robotic Assist (.) on 12/27/2013 at 43  Years.  Comments:  2013 9:26 SUMAYA Conner RN, Lizeth CALDERON  auto-populated from documented surgical case  ankle surgery.  Comments:  2013 12:09 CST - Jeansonne RN, Jamie L.  left   section ([id number]).  Comments:  2013 12:09 CST - Jeansonne RN, Jamie L.  x2  laproscopy.  Comments:  2013 12:09 CST - Jeansonne RN, Jamie L.  x2  D&C - Dilatation and curettage ([id number]).  COLONOSCOPY..   Family history:    Pancreatic cancer.  Mother ()  .   Social history: Alcohol use: Occasionally, Drug use: Denies.   Problem list:    Active Problems (4)  Anemia   Anxiety   HTN (hypertension)   Obesity   .      Physical Examination               Vital Signs   Vital Signs   2020 15:06 CDT      Temperature Oral          36.5 DegC                             Temperature Oral (calculated)             97.70 DegF                             Peripheral Pulse Rate     86 bpm                             SpO2                      98 %                             Oxygen Therapy            Room air                             Systolic Blood Pressure   163 mmHg  HI                             Diastolic Blood Pressure  98 mmHg  HI  .               Per nurse's notes.   Basic Oxygen Information   2020 17:15 CDT      SpO2                      97 %  General:  Alert, no acute distress   Neurological:  Alert and oriented to person, place, time, and situation   Skin:  Warm, dry   Head:  Normocephalic, atraumatic   Neck:  Supple, trachea midline   Eye:  Normal conjunctiva   Ears, nose, mouth and throat:  Oral mucosa moist   Cardiovascular:  Regular rate and rhythm, No murmur, Normal peripheral perfusion, No edema   Respiratory:  Lungs are clear to auscultation, respirations are non-labored   Gastrointestinal:  Soft, Nontender, Non distended, Normal bowel sounds   Psychiatric:  Cooperative      Medical Decision Making   Rationale:  Patient with fluctuating chest pain over the weekend, now with typical anginal  features associated shortness of breath, radiation to her left neck and left posterior shoulder.  No overt ischemic ECG changes.  Initial set of cardiac enzymes are negative.  With Nitropaste, her chest pain is improving.  Discussed with her cardiology group who agrees with admission, trying cardiac enzymes overnight, nothing by mouth in case of possible angiography tomorrow.  Per discussion with cardiology, hold further anticoagulation unless troponin turns positive..   Documents reviewed:  Emergency department nurses' notes.   Orders  Launch Order Profile (Selected)   Inpatient Orders  Ordered  Nitro-Bid 2% transdermal oint: 1 in, form: Ointment, TOP, [id number] ([id number]), first dose 20 17:11:00 CDT  Ordered (Collected)  POC BNP iSTAT request: BLOOD, ROUTINE collect, Collected, 20 15:33:03 CDT, Stop date 20 15:33:00 CDT, Lab Collect, Print Label By Order Location  POC iSTAT ER Troponin request: BLOOD, STAT collect, Collected, 20 15:33:03 CDT, Stop date 20 15:33:00 CDT, Lab Collect, Print Label By Order Location  Ordered (In-Lab)  Troponin-I: STAT collect, 20 15:33:03 CDT, BLOOD, Collected, Stop date 20 15:33:00 CDT, Lab Collect  Completed  Automated Diff: NOW collect, 20 15:33:00 CDT, Blood, Collected, Stop date 20 15:33:00 CDT, Lab Collect, Print Label By Order Location, 20 15:10:00 CDT  CBC w/ Auto Diff: NOW collect, 20 15:33:03 CDT, BLOOD, Collected, Stop date 20 15:33:00 CDT, Lab Collect  CMP: STAT collect, 20 15:33:03 CDT, BLOOD, Collected, Stop date 20 15:33:00 CDT, Lab Collect  EK20 15:10:00 CDT, Stat, Once, 20 15:10:00 CDT, Stretcher, Patient Has IV, -1, -1, 20 15:10:00 CDT  Estimated Glomerular Filtration Rate: STAT collect, 20 15:33:00 CDT, Blood, Collected, Stop date 20 15:33:00 CDT, Lab Collect, Print Label By Order Location, 20 15:10:00 CDT  PT: STAT collect, 20 15:33:03  CDT, BLOOD, Collected, Stop date 06/17/20 15:33:00 CDT, Lab Collect  PTT: STAT collect, 06/17/20 15:33:03 CDT, BLOOD, Collected, Stop date 06/17/20 15:33:00 CDT, Lab Collect  XR Chest 1 View: Stat, 06/17/20 15:10:00 CDT, Chest Pain, None, Stretcher, Patient Has IV?, Rad Type, Not Scheduled, 06/17/20 15:10:00 CDT  aspirin 81 mg oral tablet, CHEWABLE: 324 mg, form: Tab-Chew, Oral, Once, first dose 06/17/20 17:11:00 CDT, stop date 06/17/20 17:11:00 CDT, STAT, 4 chew tab = 5 grain ASA  aspirin: 325 mg, 1 tab(s), Tab, N/A, Once, Stop date 06/17/20 17:14:20 CDT, Physician Stop, 06/17/20 17:14:20 CDT  nitroglycerin: 1 in, Ointment, N/A, Once, Stop date 06/17/20 17:14:05 CDT, Physician Stop, 06/17/20 17:14:05 CDT.   Electrocardiogram:  Time 6/17/2020 15:03:00, rate 94, normal sinus rhythm, no ectopy, normal RI & QRS intervals, EP Interp, STT segments Non specific changes, No STEMI.    Results review:  Lab results : Lab View   6/17/2020 15:33 CDT      Sodium Lvl                138 mmol/L                             Potassium Lvl             4.2 mmol/L                             Chloride                  108 mmol/L  HI                             CO2                       21 mmol/L  LOW                             Calcium Lvl               9.0 mg/dL                             Glucose Lvl               108 mg/dL  HI                             BUN                       7.2 mg/dL  LOW                             Creatinine                0.66 mg/dL                             eGFR-AA                   >60  NA                             eGFR-YONI                  >60  NA                             Bili Total                0.4 mg/dL                             Bili Direct               0.2 mg/dL                             Bili Indirect             0.20 mg/dL                             AST                       32 unit/L                             ALT                       34 unit/L                             Alk Phos                   51 unit/L                             Total Protein             6.9 gm/dL                             Albumin Lvl               3.9 gm/dL                             Globulin                  3.0 gm/dL                             A/G Ratio                 1.3 ratio                             Troponin-I                0.000 ng/mL                             PT                        14.1 second(s)  HI                             INR                       1.1                             PTT                       37.5 second(s)  HI                             WBC                       5.8 x10(3)/mcL                             RBC                       4.72 x10(6)/mcL                             Hgb                       14.1 gm/dL                             Hct                       44.0 %                             Platelet                  255 x10(3)/mcL                             MCV                       93.2 fL                             MCH                       29.9 pg                             MCHC                      32.0 gm/dL  LOW                             RDW                       15.8 %                             MPV                       12.6 fL  HI                             Abs Neut                  3.60 x10(3)/mcL                             Neutro Auto               62 %  NA                             Lymph Auto                28 %  NA                             Mono Auto                 7 %  NA                             Eos Auto                  1 %  NA                             Abs Eos                   0.1 x10(3)/mcL                             Basophil Auto             1 %  NA                             Abs Neutro                3.60 x10(3)/mcL                             Abs Lymph                 1.6 x10(3)/mcL                             Abs Mono                  0.4 x10(3)/mcL                             Abs Baso                  0.1 x10(3)/mcL  .   Radiology  results:  Rad Results (ST)  < 12 hrs   Accession: [id number]  Order: XR Chest 1 View  Report Dt/Tm: 06/17/2020 15:25  Report:      CLINICAL:  Chest pain.     COMPARISON: February 22, 2020.     FINDINGS:  Cardiopericardial silhouette is within normal limits.   Lungs are without dense focal or segmental consolidation, congestion,  pleural effusion or pneumothorax.       IMPRESSION:     No acute cardiopulmonary process identified.         .      Reexamination/ Reevaluation   Vital signs   results included from flowsheet : Vital Signs   6/17/2020 17:25 CDT      Systolic Blood Pressure   156 mmHg  HI                             Diastolic Blood Pressure  80 mmHg     Course: improving.   Pain status: decreased.      Impression and Plan   Diagnosis   Unstable angina (TFR91-LP I20.0)   Chest pain, rule out acute myocardial infarction (XSD78-VK R07.9)   Plan   Condition: Improved.    Disposition: Admit time  6/17/2020 18:23:00, Place in Observation Telemetry Unit, Madhav GARCIA, Curly, case discussed with Boone Hong NP.    Counseled: Patient, Regarding diagnosis, Regarding diagnostic results, Regarding treatment plan, Patient indicated understanding of instructions.    Notes: IGilda, acted solely as a scribe for and in the presence of Dr. Estrada who performed the service.   , I, Christina Estrada, have independently performed the history, physical, medical decision making and procedures as documented above and agree with the scribe's documentation. .

## 2022-04-30 NOTE — ED PROVIDER NOTES
Patient:   Yandy Chaudhry            MRN: [id number]            FIN: [id number]               Age:   50 years     Sex:  Female     :  1970   Associated Diagnoses:   Acute chest pain   Author:   George ISIDRO MD, Neil WATERS      Basic Information   Time seen: Date & time 2020 15:17:00.   History source: Patient.   Arrival mode: Private vehicle, walking.   History limitation: None.   Additional information: Patient's physician(s): Curly Corey MD, Chief Complaint from Nursing Triage Note : Chief Complaint   2020 15:10 CST      Chief Complaint           reports severe midline CP this afternoon while walking. states pain radiates between shoulder blades. also C/O SOB. 2 SL nitro PTA that initally helped but have worn off. currently takes 81mg ASA daily. MI last week, angio gram w/ stent placement.  .      History of Present Illness   The patient presents with chest pain, 50-year-old white female with history of recent MI last week followed by [facility] was stent placed last week.  Presents with midsternal sharp chest pain radiating through to back while walking took 2 Soma one nitro with relief.  Complains of nausea no vomiting no sweating.  Essie Crook NP, SORT NOTE and   I, Dr. Vazquez, assumed care of this patient at 1551.    50 year old female with history of HTN, HLD, anxiety, CAD, and MI presents to ED c/o central chest tightness that started suddenly at 1345 today. Patient states pain radiates to L upper chest and L shoulder. Patient also reports nausea and fatigue. Patient had MI x1 week ago and had angio with stent placement. She reports pain feels exactly like MI. Patient took x2 SL NTG pta with initial relief but states pain has increased. Patient states she felt fine this morning. She denies vomiting, constipation, or hematochezia. Patient currently Rx 81mg ASA, Plavix, Lovenox, and Coumadin. .  The onset was 2020 13:45:00  and abrupt.  The course/duration of symptoms is  constant, not improving.  Location: central. Radiating pain: L upper chest and L shoulder. The character of symptoms is tightness.  The degree at onset was moderate.  The degree at maximum was moderate.  The degree at present is moderate, 8 /10.  The exacerbating factor is none.  The relieving factor is nitroglycerin (initial relief).  Risk factors consist of coronary artery disease, hypertension, hyperlipidemia and recent surgery.  Prior episodes: MI x1 week ago.  Therapy today Nitroglycerin and Aspirin.  Associated symptoms: nausea and denies vomiting.        Review of Systems   Constitutional symptoms:  Fatigue, no fever, no chills, no sweats, no weakness.    Skin symptoms:  No rash,    Eye symptoms:  No recent vision problems,    ENMT symptoms:  No ear pain,    Respiratory symptoms:  No shortness of breath, no orthopnea.    Cardiovascular symptoms:  Chest pain, central, tightness, No palpitations,    Gastrointestinal symptoms:  Nausea, no abdominal pain, no vomiting, no diarrhea, no constipation, no rectal bleeding.    Genitourinary symptoms:  No dysuria, no hematuria.    Musculoskeletal symptoms:  No back pain, no Muscle pain.    Psychiatric symptoms:  No anxiety, no depression.    Allergy/immunologic symptoms:  No seasonal allergies, no food allergies.              Additional review of systems information: All other systems reviewed and otherwise negative.      Health Status   Allergies:    Allergic Reactions (Selected)  No Known Allergies.   Medications: Per nurse's notes,   81mg ASA  Plavix  Lovenox  Coumadin.      Past Medical/ Family/ Social History   Medical history:    Active  Anemia ([id number])  Anxiety ([id number])  HTN (hypertension) ([id number]),   HLD  MI  CAD.   Surgical history:    Cholecystectomy ([id number]) in the month of 8/2017 at 47 Years.  Hysterectomy Laparoscopic Robotic Assist (.) on 12/27/2013 at 43 Years.  Comments:  12/27/2013 9:26 CST  Flako Conner RN, Lizeth CALDERON  auto-populated from documented surgical case  ankle surgery.  Comments:  2013 12:09 CST - Jeansonne RN, Jamie L.  left   section ([id number]).  Comments:  2013 12:09 CST - Jeansonne RN, Jamie L.  x2  COLONOSCOPY.  D&C - Dilatation and curettage ([id number]).  laproscopy.  Comments:  2013 12:09 CST - Jeansonne RN, Jamie L.  x2,   angio with stent placement.   Family history:    Mother:  ()  Cause of Death: cancer  Age at Death: 66 years.   Pancreatic cancer.  .   Social history: Alcohol use: Occasionally, Tobacco use: Denies, Drug use: Denies, Occupation: Unemployed, Family/social situation: .      Physical Examination               Vital Signs   Vital Signs   2020 15:10 CST      Temperature Oral          36.4 DegC                             Temperature Oral (calculated)             97.52 DegF                             Peripheral Pulse Rate     69 bpm                             Respiratory Rate          18 br/min                             SpO2                      100 %                             Oxygen Therapy            Room air                             Systolic Blood Pressure   146 mmHg  HI                             Diastolic Blood Pressure  91 mmHg  HI  .   Measurements   2020 15:10 CST      Weight Dosing             129.5 kg                             Weight Measured and Calculated in Lbs     285.50 lb                             Weight Estimated          129.5 kg                             Height/Length Dosing      165 cm                             Height/Length Estimated   165 cm                             Body Mass Index Estimated 47.57 kg/m2  .   Basic Oxygen Information   2020 15:10 CST      SpO2                      100 %                             Oxygen Therapy            Room air  .   General:  Alert, no acute distress, anxious,   appears moderately uncomfortable.    Skin:  Warm, pink, intact, moist, no rash.  "   Head:  Normocephalic, atraumatic.    Cardiovascular:  Regular rate and rhythm, No murmur, Normal peripheral perfusion, No edema.    Respiratory:  Lungs are clear to auscultation, respirations are non-labored, breath sounds are equal, Symmetrical chest wall expansion.    Gastrointestinal:  Soft, Nontender, Non distended, Normal bowel sounds.    Musculoskeletal:  Normal ROM, normal strength.    Neurological:  Alert and oriented to person, place, time, and situation, No focal neurological deficit observed, CN II-XII intact, normal sensory observed, normal motor observed, normal speech observed.    Lymphatics:  No lymphadenopathy.   Psychiatric:  Cooperative, appropriate mood & affect, normal judgment.       Medical Decision Making   Documents reviewed:  Emergency department nurses' notes.   Orders  Launch Order Profile (Selected)   Inpatient Orders  Ordered  30 Day Readmission: 02/22/20 15:13:29 CST, Stop date 02/22/20 15:13:29 CST, "This patient has had an inpatient, observation, outpatient bedded or emergency visit within the last 30 days."  aspirin 81 mg oral tablet, CHEWABLE: 162 mg, form: Tab-Chew, Oral, Daily, first dose 02/22/20 15:15:00 CST, STAT  Ordered (In-Lab)  POC iSTAT ER Troponin request: Blood, Stat collect, 02/22/20 15:15:00 CST by Armaan FERREIRA NP, Essie POLLARD, Stop date 02/22/20 15:16:00 CST, Lab Collect, Print Label By Order Location  Completed  Automated Diff: Stat collect, 02/22/20 15:28:00 CST, Blood, Collected, Stop date 02/22/20 15:28:00 CST, Lab Collect, Print Label By Order Location, 02/22/20 15:15:00 CST  CBC - includes Diff: Stat collect, 02/22/20 15:15:00 CST, Blood, Stop date 02/22/20 15:16:00 CST, Lab Collect, Print Label By Order Location, 02/22/20 15:15:00 CST  CMP: Stat collect, 02/22/20 15:15:00 CST, Blood, Stop date 02/22/20 15:16:00 CST, Lab Collect, Print Label By Order Location, 02/22/20 15:15:00 CST  CXR 1 View: Routine, 02/22/20 15:15:00 CST, Angina, None, Stretcher, Patient Has " IV?, Rad Type, Not Scheduled, 20 15:15:00 CST  EK20 15:15:00 CST, Stat, Once, 20 15:15:00 CST, Stretcher, Patient Has IV, -1, -1, 20 15:15:00 CST  Estimated Glomerular Filtration Rate: Stat collect, 20 15:28:00 CST, Blood, Collected, Stop date 20 15:28:00 CST, Lab Collect, Print Label By Order Location, 20 15:15:00 CST  POC Istat ER Troponin: Blood, Stat collect, Collected, 20 15:33:01 CST  Troponin-I: Stat collect, 20 15:15:00 CST, Blood, Stop date 20 15:16:00 CST, Lab Collect, Print Label By Order Location, 20 15:15:00 CST  aspirin 81 mg oral tablet, CHEWABLE: 324 mg, form: Tab-Chew, Oral, Once, first dose 20 15:43:00 CST, stop date 20 15:43:00 CST, STAT, 4 chew tab = 5 grain ASA  aspirin: 81 mg, 1 tab(s), Tab-Chew, N/A, Once, Stop date 20 15:22:50 CST, Physician Stop, 20 15:22:50 CST  aspirin: 81 mg, 1 tab(s), Tab-Chew, N/A, Once, Stop date 20 15:39:39 CST, Physician Stop, 20 15:39:39 CST  nitroglycerin 0.4 mg sublingual TAB: 0.4 mg, form: Tab-SL, SL, q5min, Order duration: 3 dose(s), first dose 20 15:54:00 CST, stop date 20 16:08:00 CST, STAT  nitroglycerin: 0.4 mg, 1 tab(s), Tab-SL, N/A, Once, Stop date 20 15:55:05 CST, Physician Stop, 20 15:55:05 CST  nitroglycerin: 1 in, Ointment, N/A, Once, Stop date 20 16:08:39 CST, Physician Stop, 20 16:08:39 CST.   Electrocardiogram:  Time 2020 15:11:00, rate 62, normal sinus rhythm, No ST-T changes, no ectopy, normal UT & QRS intervals, EP Interp.    Results review:  Lab results : Lab View   2020 15:33 CST      POC Troponin              0.32 ng/mL  HI    2020 15:28 CST      Sodium Lvl                140 mmol/L                             Potassium Lvl             4.1 mmol/L                             Chloride                  108 mmol/L  HI                             CO2                       25.0 mmol/L                              Calcium Lvl               8.3 mg/dL  LOW                             Glucose Lvl               90 mg/dL                             BUN                       9.0 mg/dL                             Creatinine                0.69 mg/dL                             eGFR-AA                   >60 mL/min/1.73 m2  NA                             eGFR-YONI                  >60 mL/min/1.73 m2  NA                             Bili Total                0.2 mg/dL                             Bili Direct               0.10 mg/dL                             Bili Indirect             0.10 mg/dL                             AST                       76 unit/L  HI                             ALT                       69 unit/L                             Alk Phos                  64 unit/L                             Total Protein             7.3 gm/dL                             Albumin Lvl               3.60 gm/dL                             Globulin                  3.70 gm/dL  HI                             A/G Ratio                 1.0 ratio  LOW                             Troponin-I                0.52 ng/mL  CRIT                             WBC                       7.2 x10(3)/mcL                             RBC                       5.19 x10(6)/mcL                             Hgb                       15.3 gm/dL                             Hct                       47.8 %  HI                             Platelet                  298 x10(3)/mcL                             MCV                       92.1 fL                             MCH                       29.5 pg                             MCHC                      32.0 gm/dL  LOW                             RDW                       14.0 %                             MPV                       11.8 fL                             Abs Neut                  4.65 x10(3)/mcL                             Neutro Auto               65 %  NA                              Lymph Auto                23 %  NA                             Mono Auto                 10 %  NA                             Eos Auto                  1 %  NA                             Abs Eos                   0.1 x10(3)/mcL                             Basophil Auto             1 %  NA                             Abs Neutro                4.65 x10(3)/mcL                             Abs Lymph                 1.6 x10(3)/mcL                             Abs Mono                  0.7 x10(3)/mcL                             Abs Baso                  0.1 x10(3)/mcL    2/21/2020 9:04 CST       PT                        15.6 second(s)  HI                             INR                       1.3  .   Radiology results:  Rad Results (ST)  < 12 hrs   Accession: [id number]  Order: XR Chest 1 View  Report Dt/Tm: 02/22/2020 15:33  Report:      CLINICAL:  Angina.     COMPARISON: February 16, 2020.     FINDINGS:  Cardiopericardial silhouette is within normal limits.   Lungs are without dense focal or segmental consolidation, congestion,  pleural effusion or pneumothorax.       IMPRESSION:     No acute cardiopulmonary process identified.         .      Reexamination/ Reevaluation   Time: 2/22/2020 18:01:00 .   Interventions: With aspirin and nitroglycerin pain from an 8 to a 3 was given Nitropaste and small dose of morphine pain went to a 0-1.  Discussed case with Betty with [facility] will admit as observation, Minor elevation in troponin troponin was significantly elevated within the last week unsure if it is an acute elevation or carry over from  her myocardial infarction.      Impression and Plan   Diagnosis   Acute chest pain ([id number] R07.9)      Calls-Consults   -  susanna ([facility]) admit.   Plan   Condition: Improved, Stable.    Disposition: Place in Observation Unit.    Counseled: Patient, Family, Regarding diagnosis, Regarding diagnostic results, Regarding treatment plan, Regarding prescription, Patient indicated  understanding of instructions.    Notes: I, Jose Antonio Rodriguez, acted solely as a scribe for and in the presence of Dr. Vazquez who performed the service.,       This scribes note accurately reflects the work done by me I have reviewed the note and personally performed a history and physical and agree with all the documentation and findings.

## 2022-05-04 NOTE — HISTORICAL [facility] CERNER
This is a historical note converted from Ulysses. Formatting and pictures may have been removed.  Please reference [employer] for original formatting and attached multimedia. Admit and Discharge Dates  Admit Date: 06/17/2020  Discharge Date: 06/18/2020  Physicians  Attending Physician - Madhav GARCIA, Curly  Admitting Physician - Madhav GARCIA, Curly  Primary Care Physician - Mattie GARCIA , Fabian STAFFORD  Discharge Diagnosis  Chest pain?[id number]  Chest pain, rule out acute myocardial infarction?R07.9  Unstable angina?I20.0  Surgical Procedures  No procedures recorded for this visit.  Immunizations  No immunizations recorded for this visit.  Admission Information  Mrs. Chaudhry is a 49 y/o female, who is known to [facility] and follows with Dr. Corey outpatient. She has a PMH of NSTEMI, ICMO (improved), HTN, HLD, Hypothyroidism, Depression, Urinary Incontinence. The patient presented to [facility] on 2[facility]16.20 with complaints of chest pressure she was noted to have an NSTEMI (Trop peak: 20.7) and underwent a LHC the same day. ?LHC revealed 99% stenosis to proximal LAD, CECILLE placed in proximal LAD, with LVEF 30%. ?Patient was discharged on 2.19.20. ?Patient re-presented to ER on 2/22/2020 with complaints of midsternal chest pain. Observed overnight and discharged with downtrending troponin that were attributed to her NSTEMI from a week prior. She comes to the ER on 6.17.20 at the direction of [facility]. She reports 1 week hx of intermittent midsternal chest pain that was squeezing 6-8/10. The pain would radiate to her neck and left arm. Associated nausea but no vomiting or diaphoresis. All times relieved by nitroglycerin. She reports that from February until 3 weeks ago she was doing well. 3 weeks ago began to have fatigue and Dr. Corey stopped Ranexa. 2 weeks ago patient had extreme fatigue with HR in 40s, Metoprolol was stopped and Holter monitor placed for 48 hours. She was wearing the Holter at the time of  one episode of her recent chest pain on ?6.13.20 when walking hills in [place], [place]. She has appointment tomorrow with Dr. Corey for Holter monitor results. EKG revealed no change from baseline and troponin was trended overnight x3 and all were 0.00. Patient currently feels well and is resting in hospital bed comfortably.?  Hospital Course  Monitored overnight.? Vital signs remained stable.? Heart rate in the 60s consistently off of beta-blocker.? Troponins trended?x3 and remained 0.00. Initial EKG showed questionable ST depressions V3, V4, V5. We will repeat the EKG prior to discharge. Patient has hypothyroidism and on Levothyroxine 75 mcg daily, reports TFTs not done in approximately 1 year. Will check TFTs prior to discharge. Suspect angina like symptoms are a combination of being off Metoprolol and HR may have been above goal for her CAD. Also, BP has been above goal during her stay on only Losartan 25 mg daily. Suspect elevated BP contributing to angina symptoms. Will add Norvasc 5 mg daily to her medication regimen for discharge.  Significant Findings  Negative troponin x3  Initial EKG showed questionable 1 mm ST depressions V3, V4, V5  Time Spent on discharge  >31 minutes  Objective  Vitals & Measurements  T:?36.7? ?C (Oral)? TMIN:?36.4? ?C (Oral)? TMAX:?36.7? ?C (Oral)? HR:?62(Peripheral)? RR:?20? BP:?162/84? SpO2:?96%? WT:?89.7?kg? BMI:?32.95?  Physical Exam  General: ?No acute distress. ?  Eye: ?Extraocular movements are intact,?No icterus. ?  HENT: ?Normocephalic, Oral mucosa is moist. ?  Neck: ?Supple, No carotid bruit, No jugular venous distention, No lymphadenopathy. ?  Respiratory: ?Lungs are clear to auscultation, Respirations are non-labored, Breath sounds are equal, Symmetrical chest wall expansion. ?  Cardiovascular: ?Normal rate, Regular rhythm, No murmur, No edema. ?  Gastrointestinal: ?Soft, Non-tender, Non-distended. ?  Genitourinary: ?No costovertebral angle tenderness. ?  Integumentary:  ?Warm, Dry, Intact. ?  Neurologic: ?Alert, Oriented. ?  Psychiatric: ?Cooperative.??  Patient Discharge Condition  Stable and Improved  Discharge Disposition  Discharge to home, [facility] as stated to the right  New Medication Norvasc 5 mg daily  F/u with Dr. Corey tomorrow with previously scheduled appointment.   Discharge Medication Reconciliation  Prescribed  amLODIPine (Norvasc 5 mg oral tablet)?5 mg, Oral, Daily  Continue  FLUoxetine (FLUoxetine 40 mg oral capsule)?40 mg, Oral, BID  aspirin (aspirin 81 mg oral Delayed Release (EC) tablet)?81 mg, Oral, Daily  atorvastatin (atorvastatin 80 mg oral tablet)?80 mg, Oral, Daily  clopidogrel (Plavix 75 mg oral tablet)?75 mg, Oral, Daily  levothyroxine (LEVOTHYROXINE 75 MCG TABLET)?75 mcg, Oral, Daily  losartan (losartan 25 mg oral tablet)?25 mg, Oral, Daily  nitroglycerin (nitroglycerin 0.4 mg sublingual TAB)?0.4 mg, SL, q5min, PRN angina  oxybutynin (oxybutynin 10 mg/24 hr oral tablet, extended release)?10 mg, Oral, Daily  pantoprazole (Pantoprazole 40 mg ORAL EC-Tablet)?40 mg, Oral, Daily  Education and Orders Provided  Angina, Easy-to-Read  Follow up  Curly Corey  Report to Emergency Department if symptoms return or worsen      TSH 0.7808, Free T4 1.09

## 2022-06-22 ENCOUNTER — HOSPITAL ENCOUNTER (EMERGENCY)
Facility: HOSPITAL | Age: 52
Discharge: HOME | End: 2022-06-22
Attending: EMERGENCY MEDICINE
Payer: COMMERCIAL

## 2022-06-22 VITALS
BODY MASS INDEX: 34.16 KG/M2 | OXYGEN SATURATION: 97 % | HEIGHT: 65 IN | DIASTOLIC BLOOD PRESSURE: 86 MMHG | SYSTOLIC BLOOD PRESSURE: 137 MMHG | TEMPERATURE: 98 F | WEIGHT: 205 LBS | HEART RATE: 94 BPM | RESPIRATION RATE: 18 BRPM

## 2022-06-22 DIAGNOSIS — N39.0 URINARY TRACT INFECTION WITHOUT HEMATURIA, SITE UNSPECIFIED: Primary | ICD-10-CM

## 2022-06-22 DIAGNOSIS — K59.00 CONSTIPATION, UNSPECIFIED CONSTIPATION TYPE: ICD-10-CM

## 2022-06-22 DIAGNOSIS — R10.9 LEFT FLANK PAIN: ICD-10-CM

## 2022-06-22 LAB
ALBUMIN SERPL-MCNC: 4 GM/DL (ref 3.5–5)
ALBUMIN/GLOB SERPL: 1.3 RATIO (ref 1.1–2)
ALP SERPL-CCNC: 82 UNIT/L (ref 40–150)
ALT SERPL-CCNC: 29 UNIT/L (ref 0–55)
APPEARANCE UR: CLEAR
AST SERPL-CCNC: 32 UNIT/L (ref 5–34)
BACTERIA #/AREA URNS AUTO: ABNORMAL /HPF
BASOPHILS # BLD AUTO: 0.08 X10(3)/MCL (ref 0–0.2)
BASOPHILS NFR BLD AUTO: 1.4 %
BILIRUB UR QL STRIP.AUTO: NEGATIVE MG/DL
BILIRUBIN DIRECT+TOT PNL SERPL-MCNC: 0.6 MG/DL
BUN SERPL-MCNC: 10.1 MG/DL (ref 9.8–20.1)
CALCIUM SERPL-MCNC: 10.1 MG/DL (ref 8.4–10.2)
CHLORIDE SERPL-SCNC: 107 MMOL/L (ref 98–107)
CO2 SERPL-SCNC: 25 MMOL/L (ref 22–29)
COLOR UR AUTO: ABNORMAL
CREAT SERPL-MCNC: 0.78 MG/DL (ref 0.55–1.02)
EOSINOPHIL # BLD AUTO: 0.1 X10(3)/MCL (ref 0–0.9)
EOSINOPHIL NFR BLD AUTO: 1.8 %
ERYTHROCYTE [DISTWIDTH] IN BLOOD BY AUTOMATED COUNT: 14.8 % (ref 11.5–17)
GLOBULIN SER-MCNC: 3 GM/DL (ref 2.4–3.5)
GLUCOSE SERPL-MCNC: 121 MG/DL (ref 74–100)
GLUCOSE UR QL STRIP.AUTO: NEGATIVE MG/DL
HCT VFR BLD AUTO: 46.1 % (ref 37–47)
HGB BLD-MCNC: 14.7 GM/DL (ref 12–16)
IMM GRANULOCYTES # BLD AUTO: 0.01 X10(3)/MCL (ref 0–0.02)
IMM GRANULOCYTES NFR BLD AUTO: 0.2 % (ref 0–0.43)
KETONES UR QL STRIP.AUTO: NEGATIVE MG/DL
LEUKOCYTE ESTERASE UR QL STRIP.AUTO: NEGATIVE UNIT/L
LYMPHOCYTES # BLD AUTO: 1.83 X10(3)/MCL (ref 0.6–4.6)
LYMPHOCYTES NFR BLD AUTO: 32.5 %
MCH RBC QN AUTO: 28.5 PG (ref 27–31)
MCHC RBC AUTO-ENTMCNC: 31.9 MG/DL (ref 33–36)
MCV RBC AUTO: 89.3 FL (ref 80–94)
MONOCYTES # BLD AUTO: 0.5 X10(3)/MCL (ref 0.1–1.3)
MONOCYTES NFR BLD AUTO: 8.9 %
NEUTROPHILS # BLD AUTO: 3.1 X10(3)/MCL (ref 2.1–9.2)
NEUTROPHILS NFR BLD AUTO: 55.2 %
NITRITE UR QL STRIP.AUTO: POSITIVE
NRBC BLD AUTO-RTO: 0 %
PH UR STRIP.AUTO: 6 [PH]
PLATELET # BLD AUTO: 270 X10(3)/MCL (ref 130–400)
PMV BLD AUTO: 12.4 FL (ref 9.4–12.4)
POTASSIUM SERPL-SCNC: 4.5 MMOL/L (ref 3.5–5.1)
PROT SERPL-MCNC: 7 GM/DL (ref 6.4–8.3)
PROT UR QL STRIP.AUTO: NEGATIVE MG/DL
RBC # BLD AUTO: 5.16 X10(6)/MCL (ref 4.2–5.4)
RBC #/AREA URNS AUTO: <5 /HPF
RBC UR QL AUTO: NEGATIVE UNIT/L
SODIUM SERPL-SCNC: 139 MMOL/L (ref 136–145)
SP GR UR STRIP.AUTO: 1.01 (ref 1–1.03)
SQUAMOUS #/AREA URNS AUTO: 7 /HPF
UROBILINOGEN UR STRIP-ACNC: 1 MG/DL
WBC # SPEC AUTO: 5.6 X10(3)/MCL (ref 4.5–11.5)
WBC #/AREA URNS AUTO: <5 /HPF

## 2022-06-22 PROCEDURE — 96361 HYDRATE IV INFUSION ADD-ON: CPT

## 2022-06-22 PROCEDURE — 80053 COMPREHEN METABOLIC PANEL: CPT | Performed by: PHYSICIAN ASSISTANT

## 2022-06-22 PROCEDURE — 36415 COLL VENOUS BLD VENIPUNCTURE: CPT | Performed by: PHYSICIAN ASSISTANT

## 2022-06-22 PROCEDURE — 85025 COMPLETE CBC W/AUTO DIFF WBC: CPT | Performed by: PHYSICIAN ASSISTANT

## 2022-06-22 PROCEDURE — 96374 THER/PROPH/DIAG INJ IV PUSH: CPT

## 2022-06-22 PROCEDURE — 63600175 PHARM REV CODE 636 W HCPCS: Performed by: NURSE PRACTITIONER

## 2022-06-22 PROCEDURE — 81001 URINALYSIS AUTO W/SCOPE: CPT | Performed by: PHYSICIAN ASSISTANT

## 2022-06-22 PROCEDURE — 63600175 PHARM REV CODE 636 W HCPCS: Performed by: PHYSICIAN ASSISTANT

## 2022-06-22 PROCEDURE — 25000003 PHARM REV CODE 250: Performed by: PHYSICIAN ASSISTANT

## 2022-06-22 PROCEDURE — 99285 EMERGENCY DEPT VISIT HI MDM: CPT | Mod: 25

## 2022-06-22 PROCEDURE — 96375 TX/PRO/DX INJ NEW DRUG ADDON: CPT

## 2022-06-22 RX ORDER — LACTULOSE 10 G/15ML
10 SOLUTION ORAL; RECTAL 3 TIMES DAILY
Qty: 135 ML | Refills: 0 | Status: SHIPPED | OUTPATIENT
Start: 2022-06-22 | End: 2022-06-25

## 2022-06-22 RX ORDER — KETOROLAC TROMETHAMINE 30 MG/ML
15 INJECTION, SOLUTION INTRAMUSCULAR; INTRAVENOUS
Status: COMPLETED | OUTPATIENT
Start: 2022-06-22 | End: 2022-06-22

## 2022-06-22 RX ORDER — CEFDINIR 300 MG/1
300 CAPSULE ORAL 2 TIMES DAILY
Qty: 20 CAPSULE | Refills: 0 | Status: SHIPPED | OUTPATIENT
Start: 2022-06-22 | End: 2022-07-02

## 2022-06-22 RX ORDER — MORPHINE SULFATE 4 MG/ML
4 INJECTION, SOLUTION INTRAMUSCULAR; INTRAVENOUS
Status: COMPLETED | OUTPATIENT
Start: 2022-06-22 | End: 2022-06-22

## 2022-06-22 RX ORDER — ONDANSETRON 2 MG/ML
4 INJECTION INTRAMUSCULAR; INTRAVENOUS
Status: COMPLETED | OUTPATIENT
Start: 2022-06-22 | End: 2022-06-22

## 2022-06-22 RX ADMIN — KETOROLAC TROMETHAMINE 15 MG: 30 INJECTION, SOLUTION INTRAMUSCULAR at 02:06

## 2022-06-22 RX ADMIN — SODIUM CHLORIDE 1000 ML: 9 INJECTION, SOLUTION INTRAVENOUS at 02:06

## 2022-06-22 RX ADMIN — ONDANSETRON 4 MG: 2 INJECTION INTRAMUSCULAR; INTRAVENOUS at 02:06

## 2022-06-22 RX ADMIN — MORPHINE SULFATE 4 MG: 4 INJECTION INTRAVENOUS at 06:06

## 2022-06-22 NOTE — ED PROVIDER NOTES
Encounter Date: 6/22/2022       History     Chief Complaint   Patient presents with    Back Pain     Pt complains of left lower back pain. Onset last night. States pain radiates to left lower abd and is intermittent intense. Also complains of dysuria and intermittent trickling urination. Denies fever. Nausea without vomiting.      Patient is a 52-year-old female  that presents with left flank pain that has been present today. Associated symptoms dysuria. Surrounding information is none. Exacerbated by nothing. Relieved by nothing. Patient treatment prior to arrival none. Risk factors include none. Other history pertaining to this complaint nothing.       The history is provided by the patient. No  was used.     Review of patient's allergies indicates:  No Known Allergies  Past Medical History:   Diagnosis Date    Coronary artery disease     Hypertension     Myocardial infarction      Past Surgical History:   Procedure Laterality Date    CHOLECYSTECTOMY      CORONARY ANGIOPLASTY WITH STENT PLACEMENT       History reviewed. No pertinent family history.  Social History     Tobacco Use    Smoking status: Never Smoker    Smokeless tobacco: Never Used   Substance Use Topics    Alcohol use: Not Currently    Drug use: Not Currently     Review of Systems   Constitutional: Negative for fever.   Respiratory: Negative for cough and shortness of breath.    Cardiovascular: Negative for chest pain.   Gastrointestinal: Negative for abdominal pain.        Left flank pain   Genitourinary: Positive for dysuria. Negative for difficulty urinating.   Musculoskeletal: Negative for gait problem.   Skin: Negative for color change.   Neurological: Negative for dizziness, speech difficulty and headaches.   Psychiatric/Behavioral: Negative for hallucinations and suicidal ideas.   All other systems reviewed and are negative.      Physical Exam     Initial Vitals [06/22/22 1415]   BP Pulse Resp Temp SpO2   137/86 94  18 97.5 °F (36.4 °C) 97 %      MAP       --         Physical Exam    Nursing note and vitals reviewed.  Constitutional: She appears well-developed and well-nourished.   HENT:   Head: Normocephalic.   Eyes: EOM are normal.   Neck:   Normal range of motion.  Cardiovascular: Normal rate, regular rhythm, normal heart sounds and intact distal pulses.   Pulmonary/Chest: Breath sounds normal. No respiratory distress.   Abdominal: Abdomen is soft. Bowel sounds are normal. There is no abdominal tenderness.   Musculoskeletal:         General: Normal range of motion.      Cervical back: Normal range of motion.     Neurological: She is alert and oriented to person, place, and time. She has normal strength.   Skin: Skin is warm and dry.   Psychiatric: She has a normal mood and affect. Her behavior is normal. Judgment and thought content normal.         ED Course   Procedures  Labs Reviewed   COMPREHENSIVE METABOLIC PANEL - Abnormal; Notable for the following components:       Result Value    Glucose Level 121 (*)     All other components within normal limits   URINALYSIS, REFLEX TO URINE CULTURE - Abnormal; Notable for the following components:    Color, UA Dark Yellow (*)     Nitrites, UA Positive (*)     All other components within normal limits   CBC WITH DIFFERENTIAL - Abnormal; Notable for the following components:    MCHC 31.9 (*)     All other components within normal limits   URINALYSIS, MICROSCOPIC - Abnormal; Notable for the following components:    Squamous Epithelial Cells, UA 7 (*)     All other components within normal limits   CBC W/ AUTO DIFFERENTIAL    Narrative:     The following orders were created for panel order CBC auto differential.  Procedure                               Abnormality         Status                     ---------                               -----------         ------                     CBC with Differential[[id number]]        Abnormal            Final result                 Please view  results for these tests on the individual orders.          Imaging Results          CT Renal Stone Study ABD Pelvis WO (Final result)  Result time 06/22/22 18:24:09    Final result by Segun Calvillo MD (06/22/22 18:24:09)                 Impression:      No acute abnormality within the abdomen or pelvis within the limits of a noncontrast exam.  Mild constipation is not excluded.    The bladder wall is poorly visualized, however appears somewhat prominent or hazy.  Correlate with urinalysis.      Electronically signed by: Segun Calvillo  Date:    06/22/2022  Time:    18:24             Narrative:    EXAMINATION:  CT RENAL STONE STUDY ABD PELVIS WO    CLINICAL HISTORY:  Flank pain, kidney stone suspected;    TECHNIQUE:  Multidetector non-contrast axial CT images of the abdomen and pelvis were obtained with coronal and sagittal reconstructions.    Automatic exposure control was utilized to reduce the patient's radiation dose.    DLP= 443    COMPARISON:  No prior imaging available for comparison.    FINDINGS:  01. HEPATOBILIARY: No focal hepatic lesion is identified, however evaluation is limited secondary to lack of IV contrast. The gallbladder is surgically absent.    02. SPLEEN: Normal    03. PANCREAS: No focal masses or ductal dilatation.    04. ADRENALS: No adrenal nodules.    05. KIDNEYS: The right kidney demonstrates no stone, hydronephrosis, or hydroureter. No focal mass identified. The left kidney demonstrates no stone, hydronephrosis, or hydroureter. No focal mass identified.    06. LYMPHADENOPATHY/RETROPERITONEUM: There is no retroperitoneal lymphadenopathy. The abdominal aorta is normal in course and caliber.    07. BOWEL: No acute bowel related abnormalities. No evidence of appendiceal inflammation.  Mild amount of stool scattered throughout the colon.    08. PELVIC VISCERA: The bladder wall is poorly visualized, however appears somewhat prominent or hazy.    09. PELVIC LYMPH NODES: No  lymphadenopathy.    10. PERITONEUM/ABDOMINAL WALL: No ascites or implant.    11. SKELETAL: No aggressive appearing lytic/blastic lesion. No acute fractures, subluxations or dislocations.    12. LUNG BASES: The visualized lungs are unremarkable.                                 Medications   sodium chloride 0.9% bolus 1,000 mL (0 mLs Intravenous Stopped 6/22/22 1540)   ondansetron injection 4 mg (4 mg Intravenous Given 6/22/22 1437)   ketorolac injection 15 mg (15 mg Intravenous Given 6/22/22 1437)   morphine injection 4 mg (4 mg Intravenous Given 6/22/22 1800)                          Clinical Impression:   Final diagnoses:  [N39.0] Urinary tract infection without hematuria, site unspecified (Primary)  [R10.9] Left flank pain  [K59.00] Constipation, unspecified constipation type          ED Disposition Condition    Discharge Stable        ED Prescriptions     Medication Sig Dispense Start Date End Date Auth. Provider    cefdinir (OMNICEF) 300 MG capsule Take 1 capsule (300 mg total) by mouth 2 (two) times daily. for 10 days 20 capsule 6/22/2022 7/2/2022 SAFIA Umanzor    lactulose (CHRONULAC) 10 gram/15 mL solution Take 15 mLs (10 g total) by mouth 3 (three) times daily. for 3 days 135 mL 6/22/2022 6/25/2022 SAFIA Umanzor        Follow-up Information     Follow up With Specialties Details Why Contact Info    Your Primary Care Provider  Call in 3 days ed follow up            SAFIA Umanzor  06/22/22 7658

## 2022-06-22 NOTE — FIRST PROVIDER EVALUATION
"Medical screening exam completed.  I have conducted a focused provider triage encounter, findings are as follows:    Brief history of present illness:  52-year-old female presenting to the ED for evaluation of left lower back pain radiating around to her lower abdomen over the last day.  Complains of worsening dysuria and intermittent trickling of urination.  Complains of nausea without vomiting    Vitals:    06/22/22 1415   BP: 137/86   BP Location: Left arm   Patient Position: Sitting   Pulse: 94   Resp: 18   Temp: 97.5 °F (36.4 °C)   TempSrc: Oral   SpO2: 97%   Weight: 93 kg (205 lb)   Height: 5' 5" (1.651 m)       Pertinent physical exam:  Patient awake alert oriented.  Brief workup plan:  Labs, UA, IV fluids Toradol    Preliminary workup initiated; this workup will be continued and followed by the physician or advanced practice provider that is assigned to the patient when roomed.  "

## 2022-06-22 NOTE — ED TRIAGE NOTES
Pt complains of left lower back pain. Onset last night. States pain radiates to left lower abd and is intermittent intense. Also complains of dysuria and intermittent trickling urination. Denies fever. Nausea without vomiting.

## 2022-08-01 ENCOUNTER — HOSPITAL ENCOUNTER (OUTPATIENT)
Facility: HOSPITAL | Age: 52
Discharge: HOME | End: 2022-08-02
Attending: EMERGENCY MEDICINE | Admitting: INTERNAL MEDICINE
Payer: COMMERCIAL

## 2022-08-01 DIAGNOSIS — R07.9 CHEST PAIN: ICD-10-CM

## 2022-08-01 DIAGNOSIS — R07.9 CHEST PAIN, UNSPECIFIED TYPE: Primary | ICD-10-CM

## 2022-08-01 LAB
ALBUMIN SERPL-MCNC: 3.8 GM/DL (ref 3.5–5)
ALBUMIN/GLOB SERPL: 1.4 RATIO (ref 1.1–2)
ALP SERPL-CCNC: 69 UNIT/L (ref 40–150)
ALT SERPL-CCNC: 28 UNIT/L (ref 0–55)
AST SERPL-CCNC: 33 UNIT/L (ref 5–34)
BASOPHILS # BLD AUTO: 0.07 X10(3)/MCL (ref 0–0.2)
BASOPHILS NFR BLD AUTO: 1.5 %
BILIRUBIN DIRECT+TOT PNL SERPL-MCNC: 0.4 MG/DL
BUN SERPL-MCNC: 8.8 MG/DL (ref 9.8–20.1)
CALCIUM SERPL-MCNC: 9.6 MG/DL (ref 8.4–10.2)
CHLORIDE SERPL-SCNC: 106 MMOL/L (ref 98–107)
CK MB SERPL-MCNC: 0.4 NG/ML
CK SERPL-CCNC: 43 U/L (ref 29–168)
CO2 SERPL-SCNC: 25 MMOL/L (ref 22–29)
CREAT SERPL-MCNC: 0.82 MG/DL (ref 0.55–1.02)
EOSINOPHIL # BLD AUTO: 0.13 X10(3)/MCL (ref 0–0.9)
EOSINOPHIL NFR BLD AUTO: 2.7 %
ERYTHROCYTE [DISTWIDTH] IN BLOOD BY AUTOMATED COUNT: 14.8 % (ref 11.5–17)
GLOBULIN SER-MCNC: 2.8 GM/DL (ref 2.4–3.5)
GLUCOSE SERPL-MCNC: 107 MG/DL (ref 74–100)
HCT VFR BLD AUTO: 44.6 % (ref 37–47)
HGB BLD-MCNC: 13.8 GM/DL (ref 12–16)
IMM GRANULOCYTES # BLD AUTO: 0.01 X10(3)/MCL (ref 0–0.04)
IMM GRANULOCYTES NFR BLD AUTO: 0.2 %
LYMPHOCYTES # BLD AUTO: 1.9 X10(3)/MCL (ref 0.6–4.6)
LYMPHOCYTES NFR BLD AUTO: 39.5 %
MAGNESIUM SERPL-MCNC: 1.7 MG/DL (ref 1.6–2.6)
MCH RBC QN AUTO: 28.2 PG (ref 27–31)
MCHC RBC AUTO-ENTMCNC: 30.9 MG/DL (ref 33–36)
MCV RBC AUTO: 91.2 FL (ref 80–94)
MONOCYTES # BLD AUTO: 0.47 X10(3)/MCL (ref 0.1–1.3)
MONOCYTES NFR BLD AUTO: 9.8 %
NEUTROPHILS # BLD AUTO: 2.2 X10(3)/MCL (ref 2.1–9.2)
NEUTROPHILS NFR BLD AUTO: 46.3 %
NRBC BLD AUTO-RTO: 0 %
PLATELET # BLD AUTO: 237 X10(3)/MCL (ref 130–400)
PMV BLD AUTO: 12.3 FL (ref 7.4–10.4)
POTASSIUM SERPL-SCNC: 4.6 MMOL/L (ref 3.5–5.1)
PROT SERPL-MCNC: 6.6 GM/DL (ref 6.4–8.3)
RBC # BLD AUTO: 4.89 X10(6)/MCL (ref 4.2–5.4)
SODIUM SERPL-SCNC: 142 MMOL/L (ref 136–145)
TROPONIN I SERPL-MCNC: <0.01 NG/ML (ref 0–0.04)
TROPONIN I SERPL-MCNC: <0.01 NG/ML (ref 0–0.04)
WBC # SPEC AUTO: 4.8 X10(3)/MCL (ref 4.5–11.5)

## 2022-08-01 PROCEDURE — 96375 TX/PRO/DX INJ NEW DRUG ADDON: CPT

## 2022-08-01 PROCEDURE — 63600175 PHARM REV CODE 636 W HCPCS: Performed by: NURSE PRACTITIONER

## 2022-08-01 PROCEDURE — 99285 EMERGENCY DEPT VISIT HI MDM: CPT | Mod: 25

## 2022-08-01 PROCEDURE — 84484 ASSAY OF TROPONIN QUANT: CPT | Performed by: NURSE PRACTITIONER

## 2022-08-01 PROCEDURE — 83735 ASSAY OF MAGNESIUM: CPT | Performed by: NURSE PRACTITIONER

## 2022-08-01 PROCEDURE — 25000003 PHARM REV CODE 250: Performed by: NURSE PRACTITIONER

## 2022-08-01 PROCEDURE — 93010 EKG 12-LEAD: ICD-10-PCS | Mod: ,,, | Performed by: INTERNAL MEDICINE

## 2022-08-01 PROCEDURE — 36415 COLL VENOUS BLD VENIPUNCTURE: CPT | Performed by: NURSE PRACTITIONER

## 2022-08-01 PROCEDURE — 85025 COMPLETE CBC W/AUTO DIFF WBC: CPT | Performed by: NURSE PRACTITIONER

## 2022-08-01 PROCEDURE — 96365 THER/PROPH/DIAG IV INF INIT: CPT

## 2022-08-01 PROCEDURE — 80053 COMPREHEN METABOLIC PANEL: CPT | Performed by: NURSE PRACTITIONER

## 2022-08-01 PROCEDURE — 93005 ELECTROCARDIOGRAM TRACING: CPT

## 2022-08-01 PROCEDURE — G0378 HOSPITAL OBSERVATION PER HR: HCPCS

## 2022-08-01 PROCEDURE — 93010 ELECTROCARDIOGRAM REPORT: CPT | Mod: ,,, | Performed by: INTERNAL MEDICINE

## 2022-08-01 PROCEDURE — 82550 ASSAY OF CK (CPK): CPT | Performed by: NURSE PRACTITIONER

## 2022-08-01 PROCEDURE — 82553 CREATINE MB FRACTION: CPT | Performed by: NURSE PRACTITIONER

## 2022-08-01 RX ORDER — OXYBUTYNIN CHLORIDE 5 MG/1
10 TABLET ORAL DAILY PRN
Status: DISCONTINUED | OUTPATIENT
Start: 2022-08-01 | End: 2022-08-02 | Stop reason: HOSPADM

## 2022-08-01 RX ORDER — NALTREXONE HYDROCHLORIDE 50 MG/1
50 TABLET, FILM COATED ORAL DAILY
COMMUNITY
End: 2023-09-20 | Stop reason: CLARIF

## 2022-08-01 RX ORDER — PANTOPRAZOLE SODIUM 40 MG/1
40 TABLET, DELAYED RELEASE ORAL DAILY
Status: DISCONTINUED | OUTPATIENT
Start: 2022-08-01 | End: 2022-08-02

## 2022-08-01 RX ORDER — RANOLAZINE 500 MG/1
500 TABLET, EXTENDED RELEASE ORAL 2 TIMES DAILY
COMMUNITY
End: 2022-08-02 | Stop reason: SDUPTHER

## 2022-08-01 RX ORDER — NITROGLYCERIN 0.4 MG/1
TABLET SUBLINGUAL
COMMUNITY
Start: 2022-07-19

## 2022-08-01 RX ORDER — ASPIRIN 81 MG/1
81 TABLET ORAL DAILY
COMMUNITY

## 2022-08-01 RX ORDER — PANTOPRAZOLE SODIUM 40 MG/1
40 TABLET, DELAYED RELEASE ORAL DAILY
COMMUNITY
Start: 2022-06-20

## 2022-08-01 RX ORDER — RANOLAZINE 500 MG/1
1000 TABLET, EXTENDED RELEASE ORAL 2 TIMES DAILY
Status: DISCONTINUED | OUTPATIENT
Start: 2022-08-01 | End: 2022-08-02

## 2022-08-01 RX ORDER — ASPIRIN 81 MG/1
81 TABLET ORAL DAILY
Status: DISCONTINUED | OUTPATIENT
Start: 2022-08-02 | End: 2022-08-02 | Stop reason: HOSPADM

## 2022-08-01 RX ORDER — MAGNESIUM SULFATE 1 G/100ML
1 INJECTION INTRAVENOUS
Status: COMPLETED | OUTPATIENT
Start: 2022-08-01 | End: 2022-08-01

## 2022-08-01 RX ORDER — CELECOXIB 100 MG/1
100 CAPSULE ORAL 2 TIMES DAILY PRN
COMMUNITY
Start: 2022-07-13 | End: 2023-09-20 | Stop reason: CLARIF

## 2022-08-01 RX ORDER — LEVOTHYROXINE SODIUM 75 UG/1
75 TABLET ORAL
COMMUNITY

## 2022-08-01 RX ORDER — NITROGLYCERIN 0.4 MG/1
0.4 TABLET SUBLINGUAL EVERY 5 MIN PRN
Status: DISCONTINUED | OUTPATIENT
Start: 2022-08-01 | End: 2022-08-02 | Stop reason: HOSPADM

## 2022-08-01 RX ORDER — FLUOXETINE HYDROCHLORIDE 20 MG/1
40 CAPSULE ORAL 2 TIMES DAILY
Status: DISCONTINUED | OUTPATIENT
Start: 2022-08-01 | End: 2022-08-02

## 2022-08-01 RX ORDER — OXYBUTYNIN CHLORIDE 10 MG/1
10 TABLET, EXTENDED RELEASE ORAL DAILY
COMMUNITY
Start: 2022-06-01

## 2022-08-01 RX ORDER — METOPROLOL SUCCINATE 25 MG/1
25 TABLET, EXTENDED RELEASE ORAL DAILY
COMMUNITY
End: 2022-08-02 | Stop reason: SDUPTHER

## 2022-08-01 RX ORDER — FLUOXETINE HYDROCHLORIDE 40 MG/1
40 CAPSULE ORAL 2 TIMES DAILY
COMMUNITY

## 2022-08-01 RX ORDER — TALC
6 POWDER (GRAM) TOPICAL NIGHTLY PRN
Status: DISCONTINUED | OUTPATIENT
Start: 2022-08-01 | End: 2022-08-02 | Stop reason: HOSPADM

## 2022-08-01 RX ORDER — ASPIRIN 325 MG
325 TABLET, DELAYED RELEASE (ENTERIC COATED) ORAL
Status: COMPLETED | OUTPATIENT
Start: 2022-08-01 | End: 2022-08-01

## 2022-08-01 RX ORDER — ATORVASTATIN CALCIUM 40 MG/1
40 TABLET, FILM COATED ORAL NIGHTLY
Status: DISCONTINUED | OUTPATIENT
Start: 2022-08-01 | End: 2022-08-02 | Stop reason: HOSPADM

## 2022-08-01 RX ORDER — SODIUM CHLORIDE 0.9 % (FLUSH) 0.9 %
10 SYRINGE (ML) INJECTION
Status: DISCONTINUED | OUTPATIENT
Start: 2022-08-01 | End: 2022-08-02 | Stop reason: HOSPADM

## 2022-08-01 RX ORDER — CLOPIDOGREL BISULFATE 75 MG/1
75 TABLET ORAL DAILY
Status: DISCONTINUED | OUTPATIENT
Start: 2022-08-02 | End: 2022-08-02

## 2022-08-01 RX ORDER — MORPHINE SULFATE 4 MG/ML
4 INJECTION, SOLUTION INTRAMUSCULAR; INTRAVENOUS
Status: COMPLETED | OUTPATIENT
Start: 2022-08-01 | End: 2022-08-01

## 2022-08-01 RX ORDER — GABAPENTIN 100 MG/1
100 CAPSULE ORAL NIGHTLY
COMMUNITY
Start: 2022-06-20 | End: 2023-09-20 | Stop reason: CLARIF

## 2022-08-01 RX ORDER — ROSUVASTATIN CALCIUM 20 MG/1
20 TABLET, COATED ORAL DAILY
COMMUNITY
End: 2023-09-20 | Stop reason: CLARIF

## 2022-08-01 RX ORDER — FLUTICASONE PROPIONATE 50 MCG
1 SPRAY, SUSPENSION (ML) NASAL 2 TIMES DAILY
COMMUNITY
Start: 2022-02-10

## 2022-08-01 RX ORDER — METOPROLOL SUCCINATE 25 MG/1
25 TABLET, EXTENDED RELEASE ORAL DAILY
Status: DISCONTINUED | OUTPATIENT
Start: 2022-08-02 | End: 2022-08-01

## 2022-08-01 RX ORDER — CETIRIZINE HYDROCHLORIDE 10 MG/1
10 TABLET ORAL DAILY
COMMUNITY
Start: 2022-03-25 | End: 2023-10-09 | Stop reason: SDUPTHER

## 2022-08-01 RX ADMIN — ATORVASTATIN CALCIUM 40 MG: 40 TABLET, FILM COATED ORAL at 09:08

## 2022-08-01 RX ADMIN — ASPIRIN 325 MG: 325 TABLET, COATED ORAL at 05:08

## 2022-08-01 RX ADMIN — MAGNESIUM SULFATE IN DEXTROSE 1 G: 10 INJECTION, SOLUTION INTRAVENOUS at 07:08

## 2022-08-01 RX ADMIN — FLUOXETINE 40 MG: 20 CAPSULE ORAL at 09:08

## 2022-08-01 RX ADMIN — RANOLAZINE 1000 MG: 500 TABLET, EXTENDED RELEASE ORAL at 09:08

## 2022-08-01 RX ADMIN — MORPHINE SULFATE 4 MG: 4 INJECTION INTRAVENOUS at 06:08

## 2022-08-01 RX ADMIN — PANTOPRAZOLE SODIUM 40 MG: 40 TABLET, DELAYED RELEASE ORAL at 05:08

## 2022-08-01 NOTE — ED PROVIDER NOTES
Encounter Date: 8/1/2022       History     Chief Complaint   Patient presents with    Chest Pain     Pain started 1 hour ago. States fells like something sitting on chest states having sob and nausea, denies fever cough or vomiting. Took 2 nitro at home with no relief. See's yaa MI 2020     Patient is a 52-year-old female  that presents with chest pain that has been present today. Associated symptoms nothing. Surrounding information is history of CAD with stents. Exacerbated by nothing. Relieved by nothing. Patient treatment prior to arrival nitroglycerin and aspirin. Risk factors include CAD. Other history pertaining to this complaint patient did have a heart catheterization in January 2022 no significant disease was noted.       The history is provided by the patient. No  was used.     Review of patient's allergies indicates:  No Known Allergies  Past Medical History:   Diagnosis Date    Coronary artery disease     Hypertension     Myocardial infarction      Past Surgical History:   Procedure Laterality Date    CHOLECYSTECTOMY      CORONARY ANGIOPLASTY WITH STENT PLACEMENT       History reviewed. No pertinent family history.  Social History     Tobacco Use    Smoking status: Never Smoker    Smokeless tobacco: Never Used   Substance Use Topics    Alcohol use: Not Currently    Drug use: Not Currently     Review of Systems   Constitutional: Negative for fever.   Respiratory: Negative for cough and shortness of breath.    Cardiovascular: Positive for chest pain.   Gastrointestinal: Negative for abdominal pain.   Genitourinary: Negative for difficulty urinating and dysuria.   Musculoskeletal: Negative for gait problem.   Skin: Negative for color change.   Neurological: Negative for dizziness, speech difficulty and headaches.   Psychiatric/Behavioral: Negative for hallucinations and suicidal ideas.   All other systems reviewed and are negative.      Physical Exam     Initial Vitals  [08/01/22 1328]   BP Pulse Resp Temp SpO2   116/77 60 20 99.1 °F (37.3 °C) 99 %      MAP       --         Physical Exam    Nursing note and vitals reviewed.  Constitutional: She appears well-developed and well-nourished.   HENT:   Head: Normocephalic.   Eyes: EOM are normal.   Neck:   Normal range of motion.  Cardiovascular: Normal rate, regular rhythm, normal heart sounds and intact distal pulses.   Pulmonary/Chest: Breath sounds normal. No respiratory distress.   Abdominal: Abdomen is soft. Bowel sounds are normal. There is no abdominal tenderness.   Musculoskeletal:         General: Normal range of motion.      Cervical back: Normal range of motion.     Neurological: She is alert and oriented to person, place, and time. She has normal strength.   Skin: Skin is warm and dry.   Psychiatric: She has a normal mood and affect. Her behavior is normal. Judgment and thought content normal.         ED Course   Procedures  Labs Reviewed   CBC WITH DIFFERENTIAL - Abnormal; Notable for the following components:       Result Value    MCHC 30.9 (*)     MPV 12.3 (*)     All other components within normal limits   COMPREHENSIVE METABOLIC PANEL - Abnormal; Notable for the following components:    Glucose Level 107 (*)     Blood Urea Nitrogen 8.8 (*)     All other components within normal limits   MAGNESIUM - Normal   CK-MB - Normal   CK - Normal   TROPONIN I - Normal   TROPONIN I     EKG Readings: (Independently Interpreted)   Rhythm: Normal Sinus Rhythm. Heart Rate: 64. Ectopy: No Ectopy. Conduction: Normal. ST Segments: Normal ST Segments. T Waves: Normal. Axis: Normal. Clinical Impression: Normal Sinus Rhythm     ECG Results          EKG 12-lead (Final result)  Result time 08/01/22 15:58:09    Final result by Interface, Lab [facility] (08/01/22 15:58:09)                 Narrative:    Test Reason : R07.9,    Vent. Rate : 064 BPM     Atrial Rate : 064 BPM     P-R Int : 138 ms          QRS Dur : 082 ms      QT Int : 438 ms        P-R-T Axes : 045 039 051 degrees     QTc Int : 451 ms    Normal sinus rhythm  Nonspecific ST and T wave abnormality  Abnormal ECG  Confirmed by Chris Wyatt MD ([id number]) on 8/1/2022 3:57:59 PM    Referred By:             Confirmed By:Chris Wyatt MD                            Imaging Results          X-Ray Chest PA And Lateral (Final result)  Result time 08/01/22 14:00:10    Final result by Osei Pugh MD (08/01/22 14:00:10)                 Impression:      No acute thoracic abnormality.    No significant interval change.      Electronically signed by: Osei Pugh  Date:    08/01/2022  Time:    14:00             Narrative:    EXAMINATION:  XR CHEST PA AND LATERAL    CLINICAL HISTORY:  chest pain;    COMPARISON:  02/08/2022.    FINDINGS:  No focal consolidations, pleural effusions or pneumothoraces.    Cardiomediastinal silhouette within normal limits.    No acute bony pathology.    Soft tissues within normal limits.                                 Medications   aspirin EC tablet 325 mg (325 mg Oral Given 8/1/22 7044)     Medical Decision Making:   History:   Old Medical Records: I decided to obtain old medical records.  Old Records Summarized: records from previous admission(s) and records from another hospital.  Clinical Tests:   Lab Tests: Reviewed  The following lab test(s) were unremarkable: CBC, CMP and Troponin  Radiological Study: Reviewed  Medical Tests: Reviewed    Additional MDM:   Heart Score:    History:          Moderately suspicious.  ECG:             Normal  Age:               45-65 years  Risk factors: >= 3 risk factors or history of atherosclerotic disease  Troponin:       Less than or equal to normal limit  Final Score: 4      DAKOTA Score:   Age over 65:                                    0.00   > or = to 3 CAD risk factors:           1.00  Established CAD:                            1.00  > or = to 2 anginal events in the past 24 hours: 1.00  Use of ASA in past 7 days:               1.00  Elevated Enzymes:                         0.00  ST Depression > or = to 0.05 mV:  0.00  DAKOTA score: 4          ED Course as of 08/01/22 1739   Mon Aug 01, 2022   1716 Dr Arlette blanc [CL]   1724 EKG obtained due to HR 30's intermittent. PVC's with compensatory pause [CL]   1732 RT excepts admit. Obs tele and trend enzymes  [CL]      ED Course User Index  [CL] SAFIA Umanzor             Clinical Impression:   Final diagnoses:  [R07.9] Chest pain  [R07.9] Chest pain, unspecified type (Primary)          ED Disposition Condition    Observation               SAFIA Umanzor  08/01/22 6161

## 2022-08-01 NOTE — FIRST PROVIDER EVALUATION
Medical screening exam completed.  I have conducted a focused provider triage encounter, findings are as follows:    Brief history of present illness: 51 y/o female who presents with chest pain/pressure for about an hour. States has taken 2 rounds of nitro without relief. Some sob and nausea. No vomiting. Feels a squeezing type sensation around her chest. Cardiologist is dr. mon    Vitals:    08/01/22 1328   BP: 116/77   Pulse: 60   Resp: 20   Temp: 99.1 °F (37.3 °C)   TempSrc: Oral   SpO2: 99%       Pertinent physical exam:  Alert, nonlabored, ambulatory, cooperative    Brief workup plan:  Labs, ekg, xray.    Preliminary workup initiated; this workup will be continued and followed by the physician or advanced practice provider that is assigned to the patient when roomed.

## 2022-08-02 VITALS
OXYGEN SATURATION: 96 % | HEART RATE: 62 BPM | DIASTOLIC BLOOD PRESSURE: 105 MMHG | TEMPERATURE: 99 F | RESPIRATION RATE: 20 BRPM | SYSTOLIC BLOOD PRESSURE: 164 MMHG

## 2022-08-02 PROBLEM — R07.9 CHEST PAIN: Status: ACTIVE | Noted: 2022-08-02

## 2022-08-02 LAB
BASOPHILS # BLD AUTO: 0.06 X10(3)/MCL (ref 0–0.2)
BASOPHILS NFR BLD AUTO: 1 %
EOSINOPHIL # BLD AUTO: 0.12 X10(3)/MCL (ref 0–0.9)
EOSINOPHIL NFR BLD AUTO: 2.1 %
ERYTHROCYTE [DISTWIDTH] IN BLOOD BY AUTOMATED COUNT: 14.9 % (ref 11.5–17)
HCT VFR BLD AUTO: 40.7 % (ref 37–47)
HGB BLD-MCNC: 13.4 GM/DL (ref 12–16)
IMM GRANULOCYTES # BLD AUTO: 0.01 X10(3)/MCL (ref 0–0.04)
IMM GRANULOCYTES NFR BLD AUTO: 0.2 %
LYMPHOCYTES # BLD AUTO: 2.2 X10(3)/MCL (ref 0.6–4.6)
LYMPHOCYTES NFR BLD AUTO: 38.5 %
MCH RBC QN AUTO: 28.7 PG (ref 27–31)
MCHC RBC AUTO-ENTMCNC: 32.9 MG/DL (ref 33–36)
MCV RBC AUTO: 87.2 FL (ref 80–94)
MONOCYTES # BLD AUTO: 0.66 X10(3)/MCL (ref 0.1–1.3)
MONOCYTES NFR BLD AUTO: 11.5 %
NEUTROPHILS # BLD AUTO: 2.7 X10(3)/MCL (ref 2.1–9.2)
NEUTROPHILS NFR BLD AUTO: 46.7 %
NRBC BLD AUTO-RTO: 0 %
PLATELET # BLD AUTO: 206 X10(3)/MCL (ref 130–400)
PMV BLD AUTO: 12.2 FL (ref 7.4–10.4)
RBC # BLD AUTO: 4.67 X10(6)/MCL (ref 4.2–5.4)
TROPONIN I SERPL-MCNC: <0.01 NG/ML (ref 0–0.04)
TROPONIN I SERPL-MCNC: <0.01 NG/ML (ref 0–0.04)
WBC # SPEC AUTO: 5.7 X10(3)/MCL (ref 4.5–11.5)

## 2022-08-02 PROCEDURE — 25000003 PHARM REV CODE 250: Performed by: NURSE PRACTITIONER

## 2022-08-02 PROCEDURE — 84484 ASSAY OF TROPONIN QUANT: CPT | Performed by: NURSE PRACTITIONER

## 2022-08-02 PROCEDURE — G0378 HOSPITAL OBSERVATION PER HR: HCPCS

## 2022-08-02 PROCEDURE — 25000242 PHARM REV CODE 250 ALT 637 W/ HCPCS: Performed by: NURSE PRACTITIONER

## 2022-08-02 PROCEDURE — 85025 COMPLETE CBC W/AUTO DIFF WBC: CPT | Performed by: NURSE PRACTITIONER

## 2022-08-02 PROCEDURE — 25000003 PHARM REV CODE 250

## 2022-08-02 PROCEDURE — 36415 COLL VENOUS BLD VENIPUNCTURE: CPT | Performed by: NURSE PRACTITIONER

## 2022-08-02 RX ORDER — ASPIRIN 81 MG/1
81 TABLET ORAL DAILY
Status: DISCONTINUED | OUTPATIENT
Start: 2022-08-02 | End: 2022-08-02 | Stop reason: HOSPADM

## 2022-08-02 RX ORDER — GABAPENTIN 100 MG/1
100 CAPSULE ORAL NIGHTLY
Status: DISCONTINUED | OUTPATIENT
Start: 2022-08-02 | End: 2022-08-02 | Stop reason: HOSPADM

## 2022-08-02 RX ORDER — AMLODIPINE BESYLATE 2.5 MG/1
2.5 TABLET ORAL DAILY
Status: DISCONTINUED | OUTPATIENT
Start: 2022-08-02 | End: 2022-08-02 | Stop reason: HOSPADM

## 2022-08-02 RX ORDER — RANOLAZINE 500 MG/1
1000 TABLET, EXTENDED RELEASE ORAL 2 TIMES DAILY
Status: DISCONTINUED | OUTPATIENT
Start: 2022-08-02 | End: 2022-08-02 | Stop reason: HOSPADM

## 2022-08-02 RX ORDER — FLUTICASONE PROPIONATE 50 MCG
1 SPRAY, SUSPENSION (ML) NASAL 2 TIMES DAILY
Status: DISCONTINUED | OUTPATIENT
Start: 2022-08-02 | End: 2022-08-02 | Stop reason: HOSPADM

## 2022-08-02 RX ORDER — METOPROLOL SUCCINATE 25 MG/1
50 TABLET, EXTENDED RELEASE ORAL DAILY
Qty: 30 TABLET | Refills: 11 | Status: SHIPPED | OUTPATIENT
Start: 2022-08-02 | End: 2023-10-08

## 2022-08-02 RX ORDER — CETIRIZINE HYDROCHLORIDE 10 MG/1
10 TABLET ORAL DAILY
Status: DISCONTINUED | OUTPATIENT
Start: 2022-08-02 | End: 2022-08-02 | Stop reason: HOSPADM

## 2022-08-02 RX ORDER — METOPROLOL SUCCINATE 50 MG/1
50 TABLET, EXTENDED RELEASE ORAL DAILY
Status: DISCONTINUED | OUTPATIENT
Start: 2022-08-02 | End: 2022-08-02 | Stop reason: HOSPADM

## 2022-08-02 RX ORDER — CELECOXIB 100 MG/1
100 CAPSULE ORAL 2 TIMES DAILY PRN
Status: DISCONTINUED | OUTPATIENT
Start: 2022-08-02 | End: 2022-08-02 | Stop reason: HOSPADM

## 2022-08-02 RX ORDER — FLUOXETINE HYDROCHLORIDE 20 MG/1
40 CAPSULE ORAL 2 TIMES DAILY
Status: DISCONTINUED | OUTPATIENT
Start: 2022-08-02 | End: 2022-08-02 | Stop reason: HOSPADM

## 2022-08-02 RX ORDER — BUTALBITAL, ACETAMINOPHEN AND CAFFEINE 50; 325; 40 MG/1; MG/1; MG/1
1 TABLET ORAL EVERY 4 HOURS PRN
Status: DISCONTINUED | OUTPATIENT
Start: 2022-08-02 | End: 2022-08-02 | Stop reason: HOSPADM

## 2022-08-02 RX ORDER — AMLODIPINE BESYLATE 2.5 MG/1
2.5 TABLET ORAL DAILY
Qty: 30 TABLET | Refills: 11 | Status: SHIPPED | OUTPATIENT
Start: 2022-08-02 | End: 2023-08-23

## 2022-08-02 RX ORDER — PANTOPRAZOLE SODIUM 40 MG/1
40 TABLET, DELAYED RELEASE ORAL DAILY
Status: DISCONTINUED | OUTPATIENT
Start: 2022-08-02 | End: 2022-08-02 | Stop reason: HOSPADM

## 2022-08-02 RX ORDER — RANOLAZINE 500 MG/1
1000 TABLET, EXTENDED RELEASE ORAL 2 TIMES DAILY
Qty: 60 TABLET | Refills: 11 | Status: SHIPPED | OUTPATIENT
Start: 2022-08-02 | End: 2023-08-23

## 2022-08-02 RX ADMIN — LEVOTHYROXINE SODIUM 75 MCG: 25 TABLET ORAL at 05:08

## 2022-08-02 RX ADMIN — METOPROLOL SUCCINATE 50 MG: 50 TABLET, EXTENDED RELEASE ORAL at 01:08

## 2022-08-02 RX ADMIN — NITROGLYCERIN: 20 OINTMENT TOPICAL at 06:08

## 2022-08-02 RX ADMIN — NITROGLYCERIN 0.4 MG: 0.4 TABLET, ORALLY DISINTEGRATING SUBLINGUAL at 05:08

## 2022-08-02 RX ADMIN — FLUOXETINE 40 MG: 20 CAPSULE ORAL at 09:08

## 2022-08-02 RX ADMIN — PANTOPRAZOLE SODIUM 40 MG: 40 TABLET, DELAYED RELEASE ORAL at 09:08

## 2022-08-02 RX ADMIN — BUTALBITAL, ACETAMINOPHEN AND CAFFEINE 1 TABLET: 50; 325; 40 TABLET ORAL at 10:08

## 2022-08-02 RX ADMIN — AMLODIPINE BESYLATE 2.5 MG: 2.5 TABLET ORAL at 01:08

## 2022-08-02 RX ADMIN — RANOLAZINE 1000 MG: 500 TABLET, EXTENDED RELEASE ORAL at 09:08

## 2022-08-02 RX ADMIN — ASPIRIN 81 MG: 81 TABLET, COATED ORAL at 09:08

## 2022-08-02 NOTE — PROGRESS NOTES
Met with patient to conduct initial cm dc planning assessment. Patient lives at [address] [place] [address] with her  and 2 children. There is running water and electricity in the home. There are no steps to enter the home and no stairs within. Emergency contact  Alex [phone]. PH [phone]. She is not currently working. She completes all ADLs independently including driving. Her  will be transporting her home upon dc. PCP Fabian Phelps. No agency involvement. She uses a BP cuff at home. No glasses or hearing aids. No  hx or VA benefits. She is able to afford food and meds and fills with [facility] on [address].  She does not have a living will, POA or DNR. She denied known needs prior to dc.

## 2022-08-02 NOTE — DISCHARGE SUMMARY
[facility]  Emergency Dept  Cardiology  Discharge Summary      Patient Name: Yandy Chaudhry  MRN: [id number]  Admission Date: 8/1/2022  Hospital Length of Stay: 0 days  Discharge Date and Time:  08/02/2022 12:29 PM  Attending Physician: Deondre Sifuentes MD    Discharging Provider: SAFIA Renner  Primary Care Physician: Fabian Phelps MD    HPI:  Ms. Huffman is a 51y/o female, followed by Dr. Chong, with PMHx significant for CAD/PCI, chronic SHF with recovered EF, HTN, HLD, and fibromyalgia who presented to ED with c/o nonexertional chest pressure occurring while laying in bed unrelieved with nitroglycerin. No exacerbating factors. Denies associated symptoms or radiation. In ED she was treated with NTG sl and NTG paste. Workup included Troponin negative X 4. BMP/CBC unremarkable. EKG with nonspecific ST/T wave abnormalities. Patient currently reported migraine headache from NTG and requesting fioricet.        Indwelling Lines/Drains at time of discharge:  Lines/Drains/Airways     None                 Significant Diagnostic Studies: Labs:   BMP:   Recent Labs   Lab 08/01/22  1354      K 4.6   CO2 25   BUN 8.8*   CREATININE 0.82   CALCIUM 9.6   MG 1.70   , CBC   Recent Labs   Lab 08/01/22  1354 08/02/22  0328   WBC 4.8 5.7   HGB 13.8 13.4   HCT 44.6 40.7    206    and Troponin   Recent Labs   Lab 08/01/22  2051 08/02/22  0328 08/02/22  0825   TROPONINI <0.010 <0.010 <0.010     Chest pain  -- troponin negative x 4  --EKG- SR with nonspecific ST/T wave changes  Migraine  Native CAD  --C 01/22: pLAD 20% stenosis. Patent stent. RCA- 20-30% stenosis  HTN  HLD  Chronic SHF  --recovered EF 55-65%     Plan:  ACS ruled out. Continue toprol 50mg daily and ranexa 1000 mg bid. No Imdur due to migraines. Episodes of bradycardia prohibits uptitration of antianginal. Add Norvasc 2.5mg daily  Continue aspirin and crestor.  OK to dc home and f/u Dr. Chong    Discharged  Condition: stable    Disposition: Home or Self Care    Follow Up:   Follow-up Information     Mau Chong MD Follow up.    Specialties: Cardiovascular Disease, Cardiology  Why: 7-10days post discharge  Contact information:  Ankit AZUL [id number] [phone]                       Patient Instructions:      Diet Cardiac     Activity as tolerated     Medications:  Reconciled Home Medications:      Medication List      START taking these medications    amLODIPine 2.5 MG tablet  Commonly known as: NORVASC  Take 1 tablet (2.5 mg total) by mouth once daily.        CHANGE how you take these medications    metoprolol succinate 25 MG 24 hr tablet  Commonly known as: TOPROL-XL  Take 2 tablets (50 mg total) by mouth once daily.  What changed: how much to take     ranolazine 500 MG Tb12  Commonly known as: RANEXA  Take 2 tablets (1,000 mg total) by mouth 2 (two) times daily.  What changed: how much to take        CONTINUE taking these medications    aspirin 81 MG EC tablet  Commonly known as: ECOTRIN  Take 81 mg by mouth once daily.     celecoxib 100 MG capsule  Commonly known as: CeleBREX  Take 100 mg by mouth 2 (two) times daily as needed.     cetirizine 10 MG tablet  Commonly known as: ZYRTEC  Take 10 mg by mouth once daily.     FLUoxetine 40 MG capsule  Take 40 mg by mouth 2 (two) times daily.     fluticasone propionate 50 mcg/actuation nasal spray  Commonly known as: FLONASE  1 spray by Each Nostril route 2 (two) times daily. Takes as needed     gabapentin 100 MG capsule  Commonly known as: NEURONTIN  Take 100 mg by mouth nightly.     levothyroxine 75 MCG tablet  Commonly known as: SYNTHROID  Take 75 mcg by mouth before breakfast.     naltrexone 50 mg tablet  Commonly known as: DEPADE  Take 50 mg by mouth once daily.     nitroGLYCERIN 0.4 MG SL tablet  Commonly known as: NITROSTAT  PLEASE SEE ATTACHED FOR DETAILED DIRECTIONS     oxybutynin 10 MG 24 hr tablet  Commonly known as: DITROPAN-XL  Take 10  mg by mouth once daily. As needed     pantoprazole 40 MG tablet  Commonly known as: PROTONIX  Take 40 mg by mouth once daily.     rosuvastatin 20 MG tablet  Commonly known as: CRESTOR  Take 20 mg by mouth once daily.            Time spent on the discharge of patient: 30 minutes    SAFIA Renner  Cardiology  [facility] - Emergency Dept

## 2022-08-02 NOTE — H&P
[facility] - Emergency Dept  Cardiology  History and Physical     Patient Name: Yandy Chaudhry  MRN: [id number]  Admission Date: 8/1/2022  Code Status: Full Code   Attending Provider: Deondre Sifuentes MD   Primary Care Physician: Fabian Phelps MD  Principal Problem:<principal problem not specified>    Patient information was obtained from patient and ER records.     Subjective:     Chief Complaint:      HPI:   Ms. Huffman is a 53y/o female, followed by Dr. Chong, with PMHx significant for CAD/PCI, chronic SHF with recovered EF, HTN, HLD, and fibromyalgia who presented to ED with c/o nonexertional chest pressure occurring while laying in bed unrelieved with nitroglycerin. No exacerbating factors. Denies associated symptoms or radiation. In ED she was treated with NTG sl and NTG paste. Workup included Troponin negative X 4. BMP/CBC unremarkable. EKG with nonspecific ST/T wave abnormalities. Patient currently reported migraine headache from NTG and requesting fioricet.       PMH: COVID 19, CAD/PCI, chronic SHF with recovered EF, HTN, HLD, and fibromyalgia   PSH: hysterectomy, colonoscopy, cholecystectomy, ankle surgery, left heart catheterization  Family History: Mother- hyperlipidemia, HTN  Social History: never smoker.     Previous Cardiac Diagnostics:   TTE 01/21/2022:   LVEF 55-65%. Normal LV cavity size/function. Normal LA/RA cavity size. RV cavity mildly dilated. Mild MR. Trace TR.     C 01/21/22:  LM- normal. Bifurcates into large LAD and Cx artery.  LAD proximal 20% stenosis. Stent patent. Diagonal branches small and appears ok  Cx normal.  RCA very large and dominant with 20-30% stenosis    TTE 02/17/2020:  Definity used to enhance myocardial border definition and to rule out LV thrombus to rule out LV thrombus.  LVEF approximately 40%.  Hypokinesis of mid to distal anterior, anteroapical apical and inferior apical segments.  Small apical thrombus.  Normal RV size with  preserved RV function.  Mild MR.  Trace TR the RVSP of 28mmHg.  Trace VT    No current facility-administered medications on file prior to encounter.     Current Outpatient Medications on File Prior to Encounter   Medication Sig    aspirin (ECOTRIN) 81 MG EC tablet Take 81 mg by mouth once daily.    celecoxib (CELEBREX) 100 MG capsule Take 100 mg by mouth 2 (two) times daily as needed.    cetirizine (ZYRTEC) 10 MG tablet Take 10 mg by mouth once daily.    FLUoxetine 40 MG capsule Take 40 mg by mouth 2 (two) times daily.    fluticasone propionate (FLONASE) 50 mcg/actuation nasal spray 1 spray by Each Nostril route 2 (two) times daily. Takes as needed    gabapentin (NEURONTIN) 100 MG capsule Take 100 mg by mouth nightly.    levothyroxine (SYNTHROID) 75 MCG tablet Take 75 mcg by mouth before breakfast.    metoprolol succinate (TOPROL-XL) 25 MG 24 hr tablet Take 25 mg by mouth once daily.    naltrexone (DEPADE) 50 mg tablet Take 50 mg by mouth once daily.    nitroGLYCERIN (NITROSTAT) 0.4 MG SL tablet PLEASE SEE ATTACHED FOR DETAILED DIRECTIONS    oxybutynin (DITROPAN-XL) 10 MG 24 hr tablet Take 10 mg by mouth once daily. As needed    pantoprazole (PROTONIX) 40 MG tablet Take 40 mg by mouth once daily.    ranolazine (RANEXA) 500 MG Tb12 Take 500 mg by mouth 2 (two) times daily.    rosuvastatin (CRESTOR) 20 MG tablet Take 20 mg by mouth once daily.   Review of Systems   Constitutional: Negative.   Eyes: Positive for photophobia.   Respiratory: Negative.    Skin: Negative.    Gastrointestinal: Negative.    Genitourinary: Negative.    Neurological: Positive for headaches.   Psychiatric/Behavioral: Negative.      Objective:     Vital Signs (Most Recent):  Temp: 99.1 °F (37.3 °C) (08/01/22 1328)  Pulse: 65 (08/02/22 1138)  Resp: 20 (08/02/22 1138)  BP: (!) 149/89 (08/02/22 1138)  SpO2: 97 % (08/02/22 1138) Vital Signs (24h Range):  Temp:  [99.1 °F (37.3 °C)] 99.1 °F (37.3 °C)  Pulse:  [51-66] 65  Resp:  [11-20]  20  SpO2:  [95 %-100 %] 97 %  BP: (104-159)/(58-93) 149/89        There is no height or weight on file to calculate BMI.    SpO2: 97 %  O2 Device (Oxygen Therapy): room air    No intake or output data in the 24 hours ending 08/02/22 1202    Lines/Drains/Airways     Peripheral Intravenous Line  Duration                Peripheral IV - Single Lumen 08/01/22 1711 20 G Left Forearm <1 day                Physical Exam  HENT:      Mouth/Throat:      Mouth: Mucous membranes are moist.   Cardiovascular:      Rate and Rhythm: Regular rhythm.      Pulses: Normal pulses.      Comments: PVCs  Pulmonary:      Effort: Pulmonary effort is normal.      Breath sounds: Normal breath sounds.   Musculoskeletal:         General: Normal range of motion.   Skin:     General: Skin is warm.   Neurological:      General: No focal deficit present.      Mental Status: She is alert.   Psychiatric:         Mood and Affect: Mood normal.         Significant Labs:   BMP:   Recent Labs   Lab 08/01/22  1354      K 4.6   CO2 25   BUN 8.8*   CREATININE 0.82   CALCIUM 9.6   MG 1.70   , CBC   Recent Labs   Lab 08/01/22  1354 08/02/22  0328   WBC 4.8 5.7   HGB 13.8 13.4   HCT 44.6 40.7    206    and Troponin   Recent Labs   Lab 08/01/22  2051 08/02/22  0328 08/02/22  0825   TROPONINI <0.010 <0.010 <0.010       Significant Imaging: X-Ray: CXR: X-Ray Chest 1 View (CXR): No results found for this visit on 08/01/22. and X-Ray Chest PA and Lateral (CXR):   Results for orders placed or performed during the hospital encounter of 08/01/22   X-Ray Chest PA And Lateral    Narrative    EXAMINATION:  XR CHEST PA AND LATERAL    CLINICAL HISTORY:  chest pain;    COMPARISON:  02/08/2022.    FINDINGS:  No focal consolidations, pleural effusions or pneumothoraces.    Cardiomediastinal silhouette within normal limits.    No acute bony pathology.    Soft tissues within normal limits.      Impression    No acute thoracic abnormality.    No significant interval  change.      Electronically signed by: Osei Pugh  Date:    08/01/2022  Time:    14:00     Assessment and Plan:   Chest pain  -- troponin negative x 4  --EKG- SR with nonspecific ST/T wave changes  Migraine  Native CAD  --[facility] 01/22: pLAD 20% stenosis. Patent stent. RCA- 20-30% stenosis  HTN  HLD  Chronic SHF  --recovered EF 55-65%    Plan:  ACS ruled out. Continue toprol 50mg daily and ranexa 1000 mg bid. No Imdur due to migraines. Episodes of bradycardia prohibits uptitration of antianginal. Add Norvasc 2.5mg daily  Continue aspirin and crestor.  OK to dc home and f/u Dr. Chong        VTE Risk Mitigation (From admission, onward)         Ordered     IP VTE HIGH RISK PATIENT  Once         08/01/22 1741     Place ABBIE hose  Until discontinued         08/01/22 1741                Mary Grace Monzon, [facility]  Cardiology  [facility] - Emergency Dept  08/02/2022 12:02 PM      I have seen and examined patient my self. I agree with  [place] NP. H and P, physical exam and assessment.  Mrs Huffman is a 51 yo female with PMHx of CAD s/p PCI, HTN, HLD, obesity, migraine headaches and fibromyalgia admitted to hospital with atypical chest pain. Patient reports she has not feeling well, she reports start having chest discomfort while laying in bed, states pain didn't have any exacerbating factors and has some pleuritic characteristics, she also reports it was different from previous episodes of NSTEMI, currently chest pain free but reports has a migraine headache. Cardiac biomarkers are negative and EKG is unremarkable.   She had a [facility] few months ago that documented patent stents. Echocardiogram also documents normal LVEF.   Recommend continue current medications including dual antiplatelet therapy, high potency statins and BB. I will add amlodipine and discharge patient home with instructions to follow with her cardiologist and primary care physician. Please call me with any questions.

## 2022-09-21 ENCOUNTER — HISTORICAL (OUTPATIENT)
Dept: ADMINISTRATIVE | Facility: HOSPITAL | Age: 52
End: 2022-09-21
Payer: COMMERCIAL

## 2022-12-04 ENCOUNTER — HOSPITAL ENCOUNTER (EMERGENCY)
Facility: HOSPITAL | Age: 52
Discharge: HOME | End: 2022-12-04
Attending: STUDENT IN AN ORGANIZED HEALTH CARE EDUCATION/TRAINING PROGRAM
Payer: COMMERCIAL

## 2022-12-04 VITALS
RESPIRATION RATE: 18 BRPM | WEIGHT: 190 LBS | SYSTOLIC BLOOD PRESSURE: 140 MMHG | HEART RATE: 51 BPM | OXYGEN SATURATION: 96 % | HEIGHT: 65 IN | DIASTOLIC BLOOD PRESSURE: 79 MMHG | TEMPERATURE: 98 F | BODY MASS INDEX: 31.65 KG/M2

## 2022-12-04 DIAGNOSIS — R53.1 GENERALIZED WEAKNESS: ICD-10-CM

## 2022-12-04 DIAGNOSIS — R42 DIZZINESS: ICD-10-CM

## 2022-12-04 DIAGNOSIS — R20.0 NUMBNESS: ICD-10-CM

## 2022-12-04 DIAGNOSIS — R51.9 NONINTRACTABLE HEADACHE, UNSPECIFIED CHRONICITY PATTERN, UNSPECIFIED HEADACHE TYPE: Primary | ICD-10-CM

## 2022-12-04 LAB
ALBUMIN SERPL-MCNC: 3.8 GM/DL (ref 3.5–5)
ALBUMIN/GLOB SERPL: 1.3 RATIO (ref 1.1–2)
ALP SERPL-CCNC: 69 UNIT/L (ref 40–150)
ALT SERPL-CCNC: 22 UNIT/L (ref 0–55)
APPEARANCE UR: CLEAR
AST SERPL-CCNC: 28 UNIT/L (ref 5–34)
BACTERIA #/AREA URNS AUTO: NORMAL /HPF
BASOPHILS # BLD AUTO: 0.09 X10(3)/MCL (ref 0–0.2)
BASOPHILS NFR BLD AUTO: 1.7 %
BILIRUB UR QL STRIP.AUTO: NEGATIVE MG/DL
BILIRUBIN DIRECT+TOT PNL SERPL-MCNC: 0.3 MG/DL
BUN SERPL-MCNC: 7.4 MG/DL (ref 9.8–20.1)
CALCIUM SERPL-MCNC: 9.5 MG/DL (ref 8.4–10.2)
CHLORIDE SERPL-SCNC: 109 MMOL/L (ref 98–107)
CO2 SERPL-SCNC: 21 MMOL/L (ref 22–29)
COLOR UR AUTO: YELLOW
CREAT SERPL-MCNC: 0.76 MG/DL (ref 0.55–1.02)
EOSINOPHIL # BLD AUTO: 0.11 X10(3)/MCL (ref 0–0.9)
EOSINOPHIL NFR BLD AUTO: 2.1 %
ERYTHROCYTE [DISTWIDTH] IN BLOOD BY AUTOMATED COUNT: 18.5 % (ref 11.5–17)
GFR SERPLBLD CREATININE-BSD FMLA CKD-EPI: >60 MLS/MIN/1.73/M2
GLOBULIN SER-MCNC: 2.9 GM/DL (ref 2.4–3.5)
GLUCOSE SERPL-MCNC: 107 MG/DL (ref 74–100)
GLUCOSE UR QL STRIP.AUTO: NEGATIVE MG/DL
HCT VFR BLD AUTO: 41.8 % (ref 37–47)
HGB BLD-MCNC: 13.6 GM/DL (ref 12–16)
IMM GRANULOCYTES # BLD AUTO: 0.01 X10(3)/MCL (ref 0–0.04)
IMM GRANULOCYTES NFR BLD AUTO: 0.2 %
INR BLD: 0.97 (ref 0–1.3)
KETONES UR QL STRIP.AUTO: ABNORMAL MG/DL
LEUKOCYTE ESTERASE UR QL STRIP.AUTO: NEGATIVE UNIT/L
LYMPHOCYTES # BLD AUTO: 2.03 X10(3)/MCL (ref 0.6–4.6)
LYMPHOCYTES NFR BLD AUTO: 37.9 %
MAGNESIUM SERPL-MCNC: 1.7 MG/DL (ref 1.6–2.6)
MCH RBC QN AUTO: 27.4 PG (ref 27–31)
MCHC RBC AUTO-ENTMCNC: 32.5 MG/DL (ref 33–36)
MCV RBC AUTO: 84.3 FL (ref 80–94)
MONOCYTES # BLD AUTO: 0.53 X10(3)/MCL (ref 0.1–1.3)
MONOCYTES NFR BLD AUTO: 9.9 %
NEUTROPHILS # BLD AUTO: 2.6 X10(3)/MCL (ref 2.1–9.2)
NEUTROPHILS NFR BLD AUTO: 48.2 %
NITRITE UR QL STRIP.AUTO: NEGATIVE
NRBC BLD AUTO-RTO: 0 %
PH UR STRIP.AUTO: 5.5 [PH]
PLATELET # BLD AUTO: 300 X10(3)/MCL (ref 130–400)
PMV BLD AUTO: 11.5 FL (ref 7.4–10.4)
POTASSIUM SERPL-SCNC: 4.1 MMOL/L (ref 3.5–5.1)
PROT SERPL-MCNC: 6.7 GM/DL (ref 6.4–8.3)
PROT UR QL STRIP.AUTO: NEGATIVE MG/DL
PROTHROMBIN TIME: 12.8 SECONDS (ref 12.5–14.5)
RBC # BLD AUTO: 4.96 X10(6)/MCL (ref 4.2–5.4)
RBC #/AREA URNS AUTO: <5 /HPF
RBC UR QL AUTO: NEGATIVE UNIT/L
SODIUM SERPL-SCNC: 140 MMOL/L (ref 136–145)
SP GR UR STRIP.AUTO: 1.02 (ref 1–1.03)
SQUAMOUS #/AREA URNS AUTO: <5 /HPF
TROPONIN I SERPL-MCNC: <0.01 NG/ML (ref 0–0.04)
TSH SERPL-ACNC: 1.08 UIU/ML (ref 0.35–4.94)
UROBILINOGEN UR STRIP-ACNC: 1 MG/DL
WBC # SPEC AUTO: 5.4 X10(3)/MCL (ref 4.5–11.5)
WBC #/AREA URNS AUTO: <5 /HPF

## 2022-12-04 PROCEDURE — 85025 COMPLETE CBC W/AUTO DIFF WBC: CPT | Performed by: PHYSICIAN ASSISTANT

## 2022-12-04 PROCEDURE — 93010 ELECTROCARDIOGRAM REPORT: CPT | Mod: ,,, | Performed by: STUDENT IN AN ORGANIZED HEALTH CARE EDUCATION/TRAINING PROGRAM

## 2022-12-04 PROCEDURE — 93010 EKG 12-LEAD: ICD-10-PCS | Mod: ,,, | Performed by: STUDENT IN AN ORGANIZED HEALTH CARE EDUCATION/TRAINING PROGRAM

## 2022-12-04 PROCEDURE — 84484 ASSAY OF TROPONIN QUANT: CPT | Performed by: PHYSICIAN ASSISTANT

## 2022-12-04 PROCEDURE — 85610 PROTHROMBIN TIME: CPT | Performed by: PHYSICIAN ASSISTANT

## 2022-12-04 PROCEDURE — 63600175 PHARM REV CODE 636 W HCPCS: Performed by: STUDENT IN AN ORGANIZED HEALTH CARE EDUCATION/TRAINING PROGRAM

## 2022-12-04 PROCEDURE — 99285 EMERGENCY DEPT VISIT HI MDM: CPT | Mod: 25

## 2022-12-04 PROCEDURE — 84443 ASSAY THYROID STIM HORMONE: CPT | Performed by: PHYSICIAN ASSISTANT

## 2022-12-04 PROCEDURE — 83735 ASSAY OF MAGNESIUM: CPT | Performed by: PHYSICIAN ASSISTANT

## 2022-12-04 PROCEDURE — 80053 COMPREHEN METABOLIC PANEL: CPT | Performed by: PHYSICIAN ASSISTANT

## 2022-12-04 PROCEDURE — 93005 ELECTROCARDIOGRAM TRACING: CPT

## 2022-12-04 PROCEDURE — 81003 URINALYSIS AUTO W/O SCOPE: CPT | Performed by: PHYSICIAN ASSISTANT

## 2022-12-04 PROCEDURE — 81001 URINALYSIS AUTO W/SCOPE: CPT | Performed by: PHYSICIAN ASSISTANT

## 2022-12-04 RX ORDER — DIPHENHYDRAMINE HYDROCHLORIDE 50 MG/ML
25 INJECTION INTRAMUSCULAR; INTRAVENOUS
Status: DISCONTINUED | OUTPATIENT
Start: 2022-12-04 | End: 2022-12-04

## 2022-12-04 RX ORDER — ACETAMINOPHEN 10 MG/ML
1000 INJECTION, SOLUTION INTRAVENOUS ONCE
Status: COMPLETED | OUTPATIENT
Start: 2022-12-04 | End: 2022-12-04

## 2022-12-04 RX ORDER — PROCHLORPERAZINE EDISYLATE 5 MG/ML
10 INJECTION INTRAMUSCULAR; INTRAVENOUS
Status: DISCONTINUED | OUTPATIENT
Start: 2022-12-04 | End: 2022-12-04

## 2022-12-04 RX ADMIN — ACETAMINOPHEN 1000 MG: 10 INJECTION, SOLUTION INTRAVENOUS at 04:12

## 2022-12-04 NOTE — ED NOTES
Discharge instructions, return precautions, and follow up information provided to pt. Pt verbalizes understanding. All questions answered. Pt is GCS 15, equal unlabored respirations. Pt ambulated to exit with steady gait accompanied by .

## 2022-12-04 NOTE — FIRST PROVIDER EVALUATION
"Medical screening examination initiated.  I have conducted a focused provider triage encounter, findings are as follows:    Brief history of present illness:  51 yo female presents to ED for evaluation of lip tingling and numbness all over. States unable to stand. Patient currently being treated for inflammation of iris for the past several days. Complains of headaches and dizziness.     Vitals:    12/04/22 1340   BP: (!) 150/80   Pulse: 64   Resp: 18   Temp: 98.1 °F (36.7 °C)   SpO2: 97%   Weight: 86.2 kg (190 lb)   Height: 5' 5" (1.651 m)       Pertinent physical exam:  Patient is awake and alert and oriented.  Ambulatory to triage.  In no acute distress.      Brief workup plan:  Labs, UA, CT head, EKG    Preliminary workup initiated; this workup will be continued and followed by the physician or advanced practice provider that is assigned to the patient when roomed.  "

## 2022-12-04 NOTE — ED PROVIDER NOTES
Encounter Date: 12/4/2022    SCRIBE #1 NOTE: I, Sheryl Arriaga, am scribing for, and in the presence of,  Toribio Wang IV, MD. I have scribed the following portions of the note - Other sections scribed: HPI,ROS,PE.     History     Chief Complaint   Patient presents with    medical problem     C/o  lip tingling, over all numbness, and unable to stand approximately 45 min PTA.. Pt reports an episode yesterday. Seen at Dr. Adams on Thursday, reports worsening vision over the past 10 days. Dx with iris inflammation and Rx ophthalmic steroids. C/o headache that began 2 months ago, sees Dr. Mendoza. Ambulated to triage without problems.      Mrs. Yousif is a 52-year-old female with past medical history of CAD s/p stents, CHF with recovered EF, HTN, and fibromyalgia presenting to ED c/o pins and needle sensation onset today. Pt states she was putting lights on the tree when sudden tingling to the lips came on radiating down the legs. States she felt like she was going to fall therefore went down on to her knees, states she was unable to move for ~30 seconds. 45 minutes later, pt states she just didn't feel right and notes of a similar episode yesterday. Pt also reports of worsening visual disturbances with eye pain on going for some time, states she was recently seen by Dr. Adams; dx with ocular inflammation and put on occular topical steroids.     The history is provided by the patient. No  was used.   Neurologic Problem  The primary symptoms include headaches. Primary symptoms do not include fever, nausea or vomiting. The symptoms began several weeks ago. The symptoms are worsening.   The headache began more than 2 days ago. Headache is a recurrent problem. The headache is associated with eye pain.   Medical issues also include hypertension.   Review of patient's allergies indicates:  No Known Allergies  Past Medical History:   Diagnosis Date    Coronary artery disease     Hypertension     Myocardial  infarction      Past Surgical History:   Procedure Laterality Date    CHOLECYSTECTOMY      CORONARY ANGIOPLASTY WITH STENT PLACEMENT       No family history on file.  Social History     Tobacco Use    Smoking status: Never    Smokeless tobacco: Never   Substance Use Topics    Alcohol use: Not Currently    Drug use: Not Currently     Review of Systems   Constitutional:  Negative for chills and fever.   HENT:  Negative for congestion, rhinorrhea and sore throat.    Eyes:  Positive for pain and visual disturbance.   Respiratory:  Negative for cough and shortness of breath.    Cardiovascular:  Negative for chest pain and leg swelling.   Gastrointestinal:  Negative for abdominal pain, nausea and vomiting.   Genitourinary:  Negative for dysuria, hematuria, vaginal bleeding and vaginal discharge.   Musculoskeletal:  Negative for joint swelling.   Skin:  Negative for rash.   Neurological:  Positive for light-headedness, numbness and headaches.        Pins and needles sensation    Psychiatric/Behavioral:  Negative for confusion.      Physical Exam     Initial Vitals [12/04/22 1340]   BP Pulse Resp Temp SpO2   (!) 150/80 64 18 98.1 °F (36.7 °C) 97 %      MAP       --         Physical Exam    Nursing note and vitals reviewed.  Constitutional: She is not diaphoretic. No distress.   HENT:   Head: Normocephalic and atraumatic.   Eyes: EOM are normal. Pupils are equal, round, and reactive to light.   Neck: Neck supple.   Normal range of motion.  Cardiovascular:  Normal rate and regular rhythm.           No murmur heard.  Pulmonary/Chest: Breath sounds normal. No respiratory distress. She has no wheezes. She has no rales.   Abdominal: Abdomen is soft. She exhibits no distension. There is no abdominal tenderness.   Musculoskeletal:         General: Normal range of motion.      Cervical back: Normal range of motion and neck supple.     Neurological: She is alert and oriented to person, place, and time. She has normal strength.    Skin: Skin is warm. No rash noted.   Psychiatric: She has a normal mood and affect.       ED Course   Procedures  Labs Reviewed   COMPREHENSIVE METABOLIC PANEL - Abnormal; Notable for the following components:       Result Value    Chloride 109 (*)     Carbon Dioxide 21 (*)     Glucose Level 107 (*)     Blood Urea Nitrogen 7.4 (*)     All other components within normal limits   URINALYSIS, REFLEX TO URINE CULTURE - Abnormal; Notable for the following components:    Ketones, UA Trace (*)     All other components within normal limits   CBC WITH DIFFERENTIAL - Abnormal; Notable for the following components:    MCHC 32.5 (*)     RDW 18.5 (*)     MPV 11.5 (*)     All other components within normal limits   TROPONIN I - Normal   TSH - Normal   MAGNESIUM - Normal   PROTIME-INR - Normal   URINALYSIS, MICROSCOPIC - Normal   CBC W/ AUTO DIFFERENTIAL    Narrative:     The following orders were created for panel order CBC auto differential.  Procedure                               Abnormality         Status                     ---------                               -----------         ------                     CBC with Differential[[id number]]        Abnormal            Final result                 Please view results for these tests on the individual orders.     EKG Readings: (Independently Interpreted)   Initial Reading: No STEMI. Rhythm: Sinus Bradycardia. Heart Rate: 50bpm. Ectopy: No Ectopy. Conduction: Normal. ST Segments: Normal ST Segments. T Waves: Normal.   EKG done at 1338:      Imaging Results              CT Head Without Contrast (Final result)  Result time 12/04/22 14:05:43      Final result by Segun Calvillo MD (12/04/22 14:05:43)                   Impression:      No acute intracranial abnormality identified.  Findings of mild microvascular ischemic disease.      Electronically signed by: Segun Calvillo  Date:    12/04/2022  Time:    14:05               Narrative:    EXAMINATION:  CT HEAD WITHOUT  CONTRAST    CLINICAL HISTORY:  Dizziness, persistent/recurrent, cardiac or vascular cause suspected;    TECHNIQUE:  Low dose axial images were obtained through the head.  Coronal and sagittal reformations were also performed. Contrast was not administered.    Automatic exposure control was utilized to reduce the patient's radiation dose.    DLP= 1061    COMPARISON:  None.    FINDINGS:  No acute intracranial hemorrhage, edema or mass. No acute parenchymal abnormality.    Mild cerebral atrophy with concordant ventricular enlargement.    There is normal gray white differentiation.    The osseous structures are normal.    The mastoid air cells are clear.    The auditory canals are patent bilaterally.    The globes and orbital contents are normal bilaterally.    The visualized maxillary, ethmoid and sphenoid sinuses are clear.                                    X-Rays:   Independently Interpreted Readings:   Head CT: No hemorrhage.   Medications   acetaminophen 1,000 mg/100 mL (10 mg/mL) injection 1,000 mg (0 mg Intravenous Stopped 12/4/22 0108)     Medical Decision Making:   History:   Old Medical Records: I decided to obtain old medical records.  Initial Assessment:   51 yo presenitng after episode of generalized weakness, numbness to whole body  Awake and alert in ED, no neuro deficits  Chronic blurry vision and eye pain - follows with Dr. Adams ophthalmology  Workup here reassuring, patient feels back to normal  Normal labs, head CT  WIll discharge with PCP , ophthalmology follow up  Return precautions given   Differential Diagnosis:   Electrolyte derangement, ICH, arrhythmia, seizure, syncope, infection   Independently Interpreted Test(s):   I have ordered and independently interpreted X-rays - see prior notes.  I have ordered and independently interpreted EKG Reading(s) - see prior notes  Clinical Tests:   Lab Tests: Ordered and Reviewed  Radiological Study: Ordered and Reviewed  Medical Tests: Ordered and  Reviewed  ED Management:  Observation           Scribe Attestation:   Scribe #1: I performed the above scribed service and the documentation accurately describes the services I performed. I attest to the accuracy of the note.    Attending Attestation:           Physician Attestation for Scribe:  Physician Attestation Statement for Scribe #1: Toribio MIRELES IV, MD, reviewed documentation, as scribed by Sheryl Arriaga in my presence, and it is both accurate and complete.           ED Course as of 12/04/22 1801   Sun Dec 04, 2022   1705 Headache improved - patient has not had any further episodes of what happened earlier. Neuro intact, well appearing. Her vision she reports has steadily been getting worse, no sudden changes today. She is following with dr man ophthalmology. Will discharge at this time. Return precautions given.  [AC]      ED Course User Index  [AC] Toribio Wang IV, MD                 Clinical Impression:   Final diagnoses:  [R42] Dizziness  [R51.9] Nonintractable headache, unspecified chronicity pattern, unspecified headache type (Primary)  [R20.0] Numbness  [R53.1] Generalized weakness        ED Disposition Condition    Discharge Stable          ED Prescriptions    None       Follow-up Information       Follow up With Specialties Details Why Contact Info    Fabian Phelps MD Internal Medicine Schedule an appointment as soon as possible for a visit   [address]  [facility] [id number] [phone]      Tu Man MD Ophthalmology Schedule an appointment as soon as possible for a visit   [address]  [address]  [facility] [address] [phone]      [facility] - Emergency Dept Emergency Medicine Go to  If symptoms worsen [address] [facility] [id number] [phone]        Toribio MIRELES MD personally performed the history, PE, MDM, and procedures as documented above and agree with the scribe's documentation.        Toribio Wang IV, MD  12/04/22  2853

## 2023-04-05 ENCOUNTER — HOSPITAL ENCOUNTER (EMERGENCY)
Facility: HOSPITAL | Age: 53
Discharge: HOME | End: 2023-04-05
Attending: EMERGENCY MEDICINE
Payer: COMMERCIAL

## 2023-04-05 VITALS
SYSTOLIC BLOOD PRESSURE: 165 MMHG | OXYGEN SATURATION: 96 % | RESPIRATION RATE: 17 BRPM | HEART RATE: 63 BPM | BODY MASS INDEX: 34.16 KG/M2 | DIASTOLIC BLOOD PRESSURE: 94 MMHG | TEMPERATURE: 98 F | HEIGHT: 65 IN | WEIGHT: 205 LBS

## 2023-04-05 DIAGNOSIS — R07.9 CHEST PAIN: Primary | ICD-10-CM

## 2023-04-05 DIAGNOSIS — M45.9 ANKYLOSING SPONDYLITIS, UNSPECIFIED SITE OF SPINE: ICD-10-CM

## 2023-04-05 LAB
ALBUMIN SERPL-MCNC: 4 G/DL (ref 3.5–5)
ALBUMIN/GLOB SERPL: 1.3 RATIO (ref 1.1–2)
ALP SERPL-CCNC: 71 UNIT/L (ref 40–150)
ALT SERPL-CCNC: 19 UNIT/L (ref 0–55)
AST SERPL-CCNC: 24 UNIT/L (ref 5–34)
BASOPHILS # BLD AUTO: 0.06 X10(3)/MCL (ref 0–0.2)
BASOPHILS NFR BLD AUTO: 0.8 %
BILIRUBIN DIRECT+TOT PNL SERPL-MCNC: 0.4 MG/DL
BNP BLD-MCNC: 239.7 PG/ML
BUN SERPL-MCNC: 11.8 MG/DL (ref 9.8–20.1)
CALCIUM SERPL-MCNC: 9.8 MG/DL (ref 8.4–10.2)
CHLORIDE SERPL-SCNC: 107 MMOL/L (ref 98–107)
CO2 SERPL-SCNC: 23 MMOL/L (ref 22–29)
CREAT SERPL-MCNC: 0.66 MG/DL (ref 0.55–1.02)
EOSINOPHIL # BLD AUTO: 0.07 X10(3)/MCL (ref 0–0.9)
EOSINOPHIL NFR BLD AUTO: 1 %
ERYTHROCYTE [DISTWIDTH] IN BLOOD BY AUTOMATED COUNT: 15 % (ref 11.5–17)
GFR SERPLBLD CREATININE-BSD FMLA CKD-EPI: >60 MLS/MIN/1.73/M2
GLOBULIN SER-MCNC: 3.2 GM/DL (ref 2.4–3.5)
GLUCOSE SERPL-MCNC: 97 MG/DL (ref 74–100)
HCT VFR BLD AUTO: 40 % (ref 37–47)
HGB BLD-MCNC: 13.1 G/DL (ref 12–16)
IMM GRANULOCYTES # BLD AUTO: 0.01 X10(3)/MCL (ref 0–0.04)
IMM GRANULOCYTES NFR BLD AUTO: 0.1 %
LYMPHOCYTES # BLD AUTO: 1.63 X10(3)/MCL (ref 0.6–4.6)
LYMPHOCYTES NFR BLD AUTO: 22.7 %
MCH RBC QN AUTO: 27.5 PG (ref 27–31)
MCHC RBC AUTO-ENTMCNC: 32.8 G/DL (ref 33–36)
MCV RBC AUTO: 84 FL (ref 80–94)
MONOCYTES # BLD AUTO: 0.65 X10(3)/MCL (ref 0.1–1.3)
MONOCYTES NFR BLD AUTO: 9.1 %
NEUTROPHILS # BLD AUTO: 4.75 X10(3)/MCL (ref 2.1–9.2)
NEUTROPHILS NFR BLD AUTO: 66.3 %
NRBC BLD AUTO-RTO: 0 %
PLATELET # BLD AUTO: 281 X10(3)/MCL (ref 130–400)
PMV BLD AUTO: 12.5 FL (ref 7.4–10.4)
POTASSIUM SERPL-SCNC: 4.2 MMOL/L (ref 3.5–5.1)
PROT SERPL-MCNC: 7.2 GM/DL (ref 6.4–8.3)
RBC # BLD AUTO: 4.76 X10(6)/MCL (ref 4.2–5.4)
SODIUM SERPL-SCNC: 139 MMOL/L (ref 136–145)
TROPONIN I SERPL-MCNC: <0.01 NG/ML (ref 0–0.04)
TROPONIN I SERPL-MCNC: <0.01 NG/ML (ref 0–0.04)
WBC # SPEC AUTO: 7.2 X10(3)/MCL (ref 4.5–11.5)

## 2023-04-05 PROCEDURE — 84484 ASSAY OF TROPONIN QUANT: CPT | Performed by: EMERGENCY MEDICINE

## 2023-04-05 PROCEDURE — 96374 THER/PROPH/DIAG INJ IV PUSH: CPT

## 2023-04-05 PROCEDURE — 93005 ELECTROCARDIOGRAM TRACING: CPT

## 2023-04-05 PROCEDURE — 63600175 PHARM REV CODE 636 W HCPCS: Performed by: EMERGENCY MEDICINE

## 2023-04-05 PROCEDURE — 80053 COMPREHEN METABOLIC PANEL: CPT | Performed by: NURSE PRACTITIONER

## 2023-04-05 PROCEDURE — 84484 ASSAY OF TROPONIN QUANT: CPT | Performed by: NURSE PRACTITIONER

## 2023-04-05 PROCEDURE — 99285 EMERGENCY DEPT VISIT HI MDM: CPT | Mod: 25

## 2023-04-05 PROCEDURE — 96375 TX/PRO/DX INJ NEW DRUG ADDON: CPT

## 2023-04-05 PROCEDURE — 83880 ASSAY OF NATRIURETIC PEPTIDE: CPT | Performed by: NURSE PRACTITIONER

## 2023-04-05 PROCEDURE — 93010 ELECTROCARDIOGRAM REPORT: CPT | Mod: ,,, | Performed by: INTERNAL MEDICINE

## 2023-04-05 PROCEDURE — 85025 COMPLETE CBC W/AUTO DIFF WBC: CPT | Performed by: NURSE PRACTITIONER

## 2023-04-05 PROCEDURE — 93010 EKG 12-LEAD: ICD-10-PCS | Mod: ,,, | Performed by: INTERNAL MEDICINE

## 2023-04-05 RX ORDER — KETOROLAC TROMETHAMINE 30 MG/ML
30 INJECTION, SOLUTION INTRAMUSCULAR; INTRAVENOUS
Status: COMPLETED | OUTPATIENT
Start: 2023-04-05 | End: 2023-04-05

## 2023-04-05 RX ORDER — METHYLPREDNISOLONE SOD SUCC 125 MG
125 VIAL (EA) INJECTION
Status: COMPLETED | OUTPATIENT
Start: 2023-04-05 | End: 2023-04-05

## 2023-04-05 RX ADMIN — METHYLPREDNISOLONE SODIUM SUCCINATE 125 MG: 125 INJECTION, POWDER, FOR SOLUTION INTRAMUSCULAR; INTRAVENOUS at 05:04

## 2023-04-05 RX ADMIN — KETOROLAC TROMETHAMINE 30 MG: 30 INJECTION, SOLUTION INTRAMUSCULAR; INTRAVENOUS at 05:04

## 2023-04-05 NOTE — DISCHARGE INSTRUCTIONS
I would like you to follow-up with your physician team including your primary doctor, your cardiologist as well as Dr. Vasques.  Return to the emergency department for any worsening, other problems or concerns

## 2023-04-05 NOTE — FIRST PROVIDER EVALUATION
Medical screening examination initiated.  I have conducted a focused provider triage encounter, findings are as follows:    Brief history of present illness:  Patient states chest pain x 1 hour. States SOB. States no relief after Nitro x 2.     There were no vitals filed for this visit.    Pertinent physical exam:  Awake, alert, ambulatory      Brief workup plan:  Labs, EKG, Imaging    Preliminary workup initiated; this workup will be continued and followed by the physician or advanced practice provider that is assigned to the patient when roomed.

## 2023-04-05 NOTE — ED PROVIDER NOTES
Encounter Date: 4/5/2023    SCRIBE #1 NOTE: I, Tayler Balderas, am scribing for, and in the presence of,  Zenia Major MD. I have scribed the following portions of the note - the EKG reading. Other sections scribed: HPI, ROS, PE, MDM.     History     Chief Complaint   Patient presents with    Chest Pain     C/o chest pain that began this morning. Associated sx include shoulder blade pain, LE numbness, SOB, and nausea. Pt tried 2 NTG at home without relief. Hx of MI in 2020 with stent placement by Dr. Chong. Pt reports pain similar to MI.     53 year old white female with a history of coronary artery disease with stent placement in 2020, depression, hypertension as well as being diagnosed with ankylosing spondylitis by her rheumatologist DR Vasques.  She has been seeing him for a year for neck and back pains.  She states that the diagnosis is fairly recent and she is to start Humira as soon as it comes in.  She also says that she will be having neck surgery with Dr. Darling in the next few weeks.  Patient stated that this morning at 11 a.m. timeframe she began to have sharp pains to her sternum that are worse with pressing and moving it and has not gone away. It is NOT c/w same pain when she had her stent 3 years ago (contrary to triage note, and I questioned her about it several times).  She states her  told her she was having a panic attack because she has been concerned about all of her neck and back pains and the recent autoimmune diagnosis.  She states that over the past week she is noted more pains in her lower back and sometimes her legs feel numb.  She actually went to see Dr. Vasques about it yesterday and he gave her a steroid shot but she does not find that it is helping yet and she admits it is making her anxious.  No shortness of breath.  No fevers or chills.      Pt's PCP is Fabian Phelps MD.  Pt's rheumatologist is Sridhar Vasques MD.  Cards: [facility]    The history is  provided by the patient. No  was used.   Chest Pain  The current episode started today. Chest pain occurs constantly. The chest pain is improving. The quality of the pain is described as sharp. The pain does not radiate.   Associated symptoms include numbness.   Her past medical history is significant for CAD, hypertension and MI.   Review of patient's allergies indicates:  No Known Allergies  Past Medical History:   Diagnosis Date    Coronary artery disease     Hypertension     Myocardial infarction      Past Surgical History:   Procedure Laterality Date    CHOLECYSTECTOMY      CORONARY ANGIOPLASTY WITH STENT PLACEMENT       No family history on file.  Social History     Tobacco Use    Smoking status: Never    Smokeless tobacco: Never   Substance Use Topics    Alcohol use: Not Currently    Drug use: Not Currently     Review of Systems   Constitutional: Negative.    Eyes: Negative.    Respiratory: Negative.     Cardiovascular:  Positive for chest pain.   Gastrointestinal: Negative.    Genitourinary: Negative.    Musculoskeletal:  Positive for back pain and neck pain.   Skin: Negative.    Neurological:  Positive for numbness.        [place] feels like she has brain fog   Psychiatric/Behavioral:  The patient is nervous/anxious.      Physical Exam     Initial Vitals [04/05/23 1250]   BP Pulse Resp Temp SpO2   (!) 166/80 61 19 98.4 °F (36.9 °C) 99 %      MAP       --         Physical Exam    Constitutional: She appears well-developed and well-nourished. She is not diaphoretic. No distress.   HENT:   Head: Normocephalic and atraumatic.   Eyes: Conjunctivae and EOM are normal. Pupils are equal, round, and reactive to light.   Neck: Neck supple.   Normal range of motion.  Cardiovascular:  Normal rate, regular rhythm and intact distal pulses.           Pulmonary/Chest: Breath sounds normal.   Abdominal: Abdomen is soft. Bowel sounds are normal.   Genitourinary:    Rectum normal.   Rectum:      No  abnormal anal tone.      Genitourinary Comments: Rectal exam performed. Normal rectal sensation.      Musculoskeletal:         General: Normal range of motion.        Arms:       Cervical back: Normal range of motion and neck supple.      Comments: Midsternal location of her chest pain.  It is reproducible and she flinches and states that is how it felt like when it was palpated     Neurological: She is alert and oriented to person, place, and time. She has normal strength and normal reflexes. GCS eye subscore is 4. GCS verbal subscore is 5. GCS motor subscore is 6.   5/5 strength of upper and lower extremities.    Skin: Skin is warm and dry. Capillary refill takes less than 2 seconds.   Psychiatric: She has a normal mood and affect.       ED Course   Procedures  Labs Reviewed   B-TYPE NATRIURETIC PEPTIDE - Abnormal; Notable for the following components:       Result Value    Natriuretic Peptide 239.7 (*)     All other components within normal limits   CBC WITH DIFFERENTIAL - Abnormal; Notable for the following components:    MCHC 32.8 (*)     MPV 12.5 (*)     All other components within normal limits   TROPONIN I - Normal   TROPONIN I - Normal   COMPREHENSIVE METABOLIC PANEL   CBC W/ AUTO DIFFERENTIAL    Narrative:     The following orders were created for panel order CBC Auto Differential.  Procedure                               Abnormality         Status                     ---------                               -----------         ------                     CBC with Differential[[id number]]        Abnormal            Final result                 Please view results for these tests on the individual orders.     EKG Readings: (Independently Interpreted)   Initial Reading: No STEMI. Previous EKG: Compared with most recent EKG Rhythm: Normal Sinus Rhythm. Heart Rate: 63. Ectopy: No Ectopy. Conduction: Normal. ST Segments: Normal ST Segments. T Waves: Normal. Clinical Impression: Normal Sinus Rhythm   EKG performed  at 1251 on 4/5/2023. No changes from last EKG performed on 12/4/2022.      Imaging Results              X-Ray Chest PA And Lateral (Final result)  Result time 04/05/23 13:09:02      Final result by Segun Calvillo MD (04/05/23 13:09:02)                   Impression:      No acute cardiopulmonary process.      Electronically signed by: Segun Calvillo  Date:    04/05/2023  Time:    13:09               Narrative:    EXAMINATION:  XR CHEST PA AND LATERAL    CLINICAL HISTORY:  chest pain;    TECHNIQUE:  Two views of the chest    COMPARISON:  08/01/2022    FINDINGS:  No focal opacification, pleural effusion, or pneumothorax.    The cardiomediastinal silhouette is within normal limits.    No acute osseous abnormality.                                     Medical Decision Making  Amount and/or Complexity of Data Reviewed  External Data Reviewed: labs, radiology, ECG and notes.  Labs: ordered.  Radiology: ordered and independent interpretation performed.         Medications   ketorolac injection 30 mg (30 mg Intravenous Given 4/5/23 1730)   methylPREDNISolone sodium succinate injection 125 mg (125 mg Intravenous Given 4/5/23 1730)     Medical Decision Making:   History:   Old Medical Records: I decided to obtain old medical records.  Clinical Tests:   Lab Tests: Ordered and Reviewed  Radiological Study: Reviewed  Medical Tests: Ordered and Reviewed  ED Management:  Patient had been waiting for a bed in the emergency department but her workup had begun while she was in triage.  When I picked up the chart her troponin have returned 4 hours ago and was normal.  After talking with her and examining her and looking at her normal EKG and normal troponin and her seemingly musculoskeletal type of chest wall pain that she says is not consistent with her prior cardiac issue where she had to have the stent .  Because it has been 4 hours I am going to go ahead and repeat a troponin.  She is extremely concerned about her recent  autoimmune diagnosis of ankylosing spondylitis.  She is upset that she is feeling more pain in her body and numbness sometimes in her legs which she did see Dr. Vasques about.  Is aware and gave her shot of steroids but she is a bit anxious because it is not better yet.  She does not have bowel or bladder incontinence.  Her rectal tone is normal.  I do not feel she needs urgent imaging of her lumbar spine.  I would like to give her Toradol and a Solu-Medrol injection here now.        Scribe Attestation:   Scribe #1: I performed the above scribed service and the documentation accurately describes the services I performed. I attest to the accuracy of the note.    Attending Attestation:           Physician Attestation for Scribe:  Physician Attestation Statement for Scribe #1: I, Zenia Major MD, reviewed documentation, as scribed by Tayler Balderas in my presence, and it is both accurate and complete.           ED Course as of 04/05/23 1858 Wed Apr 05, 2023   1849 5 hr trop is neg.  She says that she does feel better after the medications.  Her  is now at the bedside and they are both comfortable with being discharged home. [RD]      ED Course User Index  [RD] Zenia Major MD                 Clinical Impression:   Final diagnoses:  [R07.9] Chest pain (Primary)  [M45.9] Ankylosing spondylitis, unspecified site of spine        ED Disposition Condition    Discharge Good          ED Prescriptions    None       Follow-up Information       Follow up With Specialties Details Why Contact Info    Fabian Phelps MD Internal Medicine   [address]  [facility] [address]  [phone]               Zenia Major MD  04/05/23 1859

## 2023-05-14 ENCOUNTER — HOSPITAL ENCOUNTER (EMERGENCY)
Facility: HOSPITAL | Age: 53
Discharge: HOME | End: 2023-05-14
Attending: EMERGENCY MEDICINE
Payer: COMMERCIAL

## 2023-05-14 ENCOUNTER — OFFICE VISIT (OUTPATIENT)
Dept: URGENT CARE | Facility: CLINIC | Age: 53
End: 2023-05-14
Payer: COMMERCIAL

## 2023-05-14 VITALS
OXYGEN SATURATION: 96 % | RESPIRATION RATE: 20 BRPM | WEIGHT: 205 LBS | BODY MASS INDEX: 34.11 KG/M2 | SYSTOLIC BLOOD PRESSURE: 157 MMHG | HEART RATE: 84 BPM | DIASTOLIC BLOOD PRESSURE: 92 MMHG | TEMPERATURE: 98 F

## 2023-05-14 VITALS
DIASTOLIC BLOOD PRESSURE: 84 MMHG | BODY MASS INDEX: 34.16 KG/M2 | OXYGEN SATURATION: 97 % | WEIGHT: 205 LBS | TEMPERATURE: 99 F | HEART RATE: 99 BPM | RESPIRATION RATE: 17 BRPM | HEIGHT: 65 IN | SYSTOLIC BLOOD PRESSURE: 141 MMHG

## 2023-05-14 DIAGNOSIS — R05.9 COUGH, UNSPECIFIED TYPE: Primary | ICD-10-CM

## 2023-05-14 DIAGNOSIS — J20.9 ACUTE BRONCHITIS, UNSPECIFIED ORGANISM: Primary | ICD-10-CM

## 2023-05-14 DIAGNOSIS — R07.89 ATYPICAL CHEST PAIN: ICD-10-CM

## 2023-05-14 DIAGNOSIS — R94.31 ABNORMAL EKG: ICD-10-CM

## 2023-05-14 DIAGNOSIS — R07.89 CHEST DISCOMFORT: ICD-10-CM

## 2023-05-14 LAB
ALBUMIN SERPL-MCNC: 3.8 G/DL (ref 3.5–5)
ALBUMIN/GLOB SERPL: 1.1 RATIO (ref 1.1–2)
ALP SERPL-CCNC: 79 UNIT/L (ref 40–150)
ALT SERPL-CCNC: 22 UNIT/L (ref 0–55)
AST SERPL-CCNC: 26 UNIT/L (ref 5–34)
BASOPHILS # BLD AUTO: 0.1 X10(3)/MCL
BASOPHILS NFR BLD AUTO: 1.2 %
BILIRUBIN DIRECT+TOT PNL SERPL-MCNC: 0.4 MG/DL
BNP BLD-MCNC: 18.5 PG/ML
BUN SERPL-MCNC: 10.6 MG/DL (ref 9.8–20.1)
CALCIUM SERPL-MCNC: 9.9 MG/DL (ref 8.4–10.2)
CHLORIDE SERPL-SCNC: 110 MMOL/L (ref 98–107)
CO2 SERPL-SCNC: 20 MMOL/L (ref 22–29)
CREAT SERPL-MCNC: 0.71 MG/DL (ref 0.55–1.02)
CTP QC/QA: YES
EOSINOPHIL # BLD AUTO: 0.13 X10(3)/MCL (ref 0–0.9)
EOSINOPHIL NFR BLD AUTO: 1.6 %
ERYTHROCYTE [DISTWIDTH] IN BLOOD BY AUTOMATED COUNT: 17 % (ref 11.5–17)
FLUAV AG UPPER RESP QL IA.RAPID: NOT DETECTED
FLUBV AG UPPER RESP QL IA.RAPID: NOT DETECTED
GFR SERPLBLD CREATININE-BSD FMLA CKD-EPI: >60 MLS/MIN/1.73/M2
GLOBULIN SER-MCNC: 3.5 GM/DL (ref 2.4–3.5)
GLUCOSE SERPL-MCNC: 97 MG/DL (ref 74–100)
HCT VFR BLD AUTO: 42.3 % (ref 37–47)
HGB BLD-MCNC: 13.5 G/DL (ref 12–16)
IMM GRANULOCYTES # BLD AUTO: 0.02 X10(3)/MCL (ref 0–0.04)
IMM GRANULOCYTES NFR BLD AUTO: 0.2 %
LYMPHOCYTES # BLD AUTO: 2.63 X10(3)/MCL (ref 0.6–4.6)
LYMPHOCYTES NFR BLD AUTO: 32.8 %
MCH RBC QN AUTO: 27.2 PG (ref 27–31)
MCHC RBC AUTO-ENTMCNC: 31.9 G/DL (ref 33–36)
MCV RBC AUTO: 85.1 FL (ref 80–94)
MONOCYTES # BLD AUTO: 0.68 X10(3)/MCL (ref 0.1–1.3)
MONOCYTES NFR BLD AUTO: 8.5 %
NEUTROPHILS # BLD AUTO: 4.45 X10(3)/MCL (ref 2.1–9.2)
NEUTROPHILS NFR BLD AUTO: 55.7 %
NRBC BLD AUTO-RTO: 0 %
PLATELET # BLD AUTO: 344 X10(3)/MCL (ref 130–400)
PMV BLD AUTO: 10.8 FL (ref 7.4–10.4)
POTASSIUM SERPL-SCNC: 3.9 MMOL/L (ref 3.5–5.1)
PROT SERPL-MCNC: 7.3 GM/DL (ref 6.4–8.3)
RBC # BLD AUTO: 4.97 X10(6)/MCL (ref 4.2–5.4)
SARS-COV-2 RDRP RESP QL NAA+PROBE: NEGATIVE
SARS-COV-2 RNA RESP QL NAA+PROBE: NOT DETECTED
SODIUM SERPL-SCNC: 140 MMOL/L (ref 136–145)
TROPONIN I SERPL-MCNC: 0.02 NG/ML (ref 0–0.04)
WBC # SPEC AUTO: 8.01 X10(3)/MCL (ref 4.5–11.5)

## 2023-05-14 PROCEDURE — 99213 PR OFFICE/OUTPT VISIT, EST, LEVL III, 20-29 MIN: ICD-10-PCS | Mod: ,,, | Performed by: FAMILY MEDICINE

## 2023-05-14 PROCEDURE — 85025 COMPLETE CBC W/AUTO DIFF WBC: CPT | Performed by: NURSE PRACTITIONER

## 2023-05-14 PROCEDURE — 99213 OFFICE O/P EST LOW 20 MIN: CPT | Mod: ,,, | Performed by: FAMILY MEDICINE

## 2023-05-14 PROCEDURE — 87635 SARS-COV-2 COVID-19 AMP PRB: CPT | Mod: QW,,, | Performed by: FAMILY MEDICINE

## 2023-05-14 PROCEDURE — 80053 COMPREHEN METABOLIC PANEL: CPT | Performed by: NURSE PRACTITIONER

## 2023-05-14 PROCEDURE — 99284 EMERGENCY DEPT VISIT MOD MDM: CPT | Mod: 25

## 2023-05-14 PROCEDURE — 83880 ASSAY OF NATRIURETIC PEPTIDE: CPT | Performed by: EMERGENCY MEDICINE

## 2023-05-14 PROCEDURE — 84484 ASSAY OF TROPONIN QUANT: CPT | Performed by: NURSE PRACTITIONER

## 2023-05-14 PROCEDURE — 0240U COVID/FLU A&B PCR: CPT | Performed by: NURSE PRACTITIONER

## 2023-05-14 PROCEDURE — 87635: ICD-10-PCS | Mod: QW,,, | Performed by: FAMILY MEDICINE

## 2023-05-14 PROCEDURE — 96374 THER/PROPH/DIAG INJ IV PUSH: CPT

## 2023-05-14 PROCEDURE — 63600175 PHARM REV CODE 636 W HCPCS: Performed by: EMERGENCY MEDICINE

## 2023-05-14 PROCEDURE — 93005 ELECTROCARDIOGRAM TRACING: CPT

## 2023-05-14 RX ORDER — BENZONATATE 200 MG/1
200 CAPSULE ORAL 3 TIMES DAILY PRN
Qty: 30 CAPSULE | Refills: 0 | Status: SHIPPED | OUTPATIENT
Start: 2023-05-14 | End: 2023-05-24

## 2023-05-14 RX ORDER — PREDNISONE 20 MG/1
60 TABLET ORAL DAILY
Qty: 15 TABLET | Refills: 0 | Status: SHIPPED | OUTPATIENT
Start: 2023-05-14 | End: 2023-05-19

## 2023-05-14 RX ORDER — EVOLOCUMAB 140 MG/ML
INJECTION, SOLUTION SUBCUTANEOUS
COMMUNITY
Start: 2023-04-03

## 2023-05-14 RX ORDER — KETOROLAC TROMETHAMINE 30 MG/ML
30 INJECTION, SOLUTION INTRAMUSCULAR; INTRAVENOUS
Status: COMPLETED | OUTPATIENT
Start: 2023-05-14 | End: 2023-05-14

## 2023-05-14 RX ADMIN — KETOROLAC TROMETHAMINE 30 MG: 30 INJECTION, SOLUTION INTRAMUSCULAR; INTRAVENOUS at 04:05

## 2023-05-14 NOTE — ED PROVIDER NOTES
"Encounter Date: 5/14/2023    SCRIBE #1 NOTE: I, Rupa Lugo, am scribing for, and in the presence of,  Jermaine Ramos MD. I have scribed the following portions of the note - Other sections scribed: HPI, ROS, PE.     History     Chief Complaint   Patient presents with    Chest Pain    Shortness of Breath     Pt presents c/o chest "congestion" with cough/sob/subjective fever. Onset Wednesday. PMH ACDF 12 days post op.       53 year old female with history of HTN, HLD, MI, CAD, and thyroid disease presents to the ED for chest pain. Pt reports that she has been feeling bad for a few days, stating that her chest pain feels like congestion more than when she had an MI. She went to urgent care today and they told her to come to the ED after she had an abnormal EKG. She also complains of fever and cough, and notes that she had a neck fusion 12 days ago.     The history is provided by the patient. No  was used.   Chest Pain  The current episode started several days ago. Chest pain occurs constantly. The chest pain is unchanged. The quality of the pain is described as heavy. The pain does not radiate. Primary symptoms include a fever and cough.   Review of patient's allergies indicates:  No Known Allergies  Past Medical History:   Diagnosis Date    Acid reflux     Anemia, unspecified     Anxiety     Coronary artery disease     Hyperlipidemia     Hypertension     Migraines     Myocardial infarction     Thrombus     apical    Thyroid disease      Past Surgical History:   Procedure Laterality Date    ANKLE SURGERY Left     ANTERIOR CERVICAL DISCECTOMY W/ FUSION      CHOLECYSTECTOMY      CORONARY ANGIOPLASTY WITH STENT PLACEMENT      DILATION AND CURETTAGE OF UTERUS      HYSTERECTOMY       Family History   Problem Relation Age of Onset    No Known Problems Mother     No Known Problems Father     No Known Problems Sister     No Known Problems Brother      Social History     Tobacco Use    Smoking " status: Never    Smokeless tobacco: Never   Substance Use Topics    Alcohol use: Not Currently    Drug use: Never     Review of Systems   Constitutional:  Positive for fever.   Respiratory:  Positive for cough.    Cardiovascular:  Positive for chest pain.     Physical Exam     Initial Vitals [05/14/23 1534]   BP Pulse Resp Temp SpO2   (!) 142/87 77 20 98.1 °F (36.7 °C) 95 %      MAP       --         Physical Exam    Nursing note and vitals reviewed.  Constitutional: No distress.   HENT:   Head: Normocephalic and atraumatic.   Right Ear: Tympanic membrane normal.   Left Ear: Tympanic membrane normal.   Mouth/Throat: Oropharynx is clear and moist. No oropharyngeal exudate or posterior oropharyngeal erythema.   Eyes: EOM are normal.   Neck: Trachea normal. Neck supple. Carotid bruit is not present. No JVD present.   Normal range of motion.  Cardiovascular:  Normal rate and regular rhythm.           No murmur heard.  Pulmonary/Chest: Breath sounds normal. No respiratory distress. She exhibits no tenderness.   Abdominal: Abdomen is soft. Bowel sounds are normal. She exhibits no distension. There is no abdominal tenderness.   Musculoskeletal:         General: Normal range of motion.      Cervical back: Normal range of motion and neck supple.      Lumbar back: Normal. Normal range of motion.     Neurological: She is alert and oriented to person, place, and time. She has normal strength. No cranial nerve deficit or sensory deficit.   Skin:   Healing incision to right anterior neck with no surrounding erythema or drainage.    Psychiatric: She has a normal mood and affect. Judgment normal.       ED Course   Procedures  Labs Reviewed   COMPREHENSIVE METABOLIC PANEL - Abnormal; Notable for the following components:       Result Value    Chloride 110 (*)     Carbon Dioxide 20 (*)     All other components within normal limits   CBC WITH DIFFERENTIAL - Abnormal; Notable for the following components:    MCHC 31.9 (*)     MPV 10.8  (*)     All other components within normal limits   TROPONIN I - Normal   COVID/FLU A&B PCR - Normal    Narrative:     The Xpert Xpress SARS-CoV-2/FLU/RSV plus is a rapid, multiplexed real-time PCR test intended for the simultaneous qualitative detection and differentiation of SARS-CoV-2, Influenza A, Influenza B, and respiratory syncytial virus (RSV) viral RNA in either nasopharyngeal swab or nasal swab specimens.         B-TYPE NATRIURETIC PEPTIDE - Normal   CBC W/ AUTO DIFFERENTIAL    Narrative:     The following orders were created for panel order CBC Auto Differential.  Procedure                               Abnormality         Status                     ---------                               -----------         ------                     CBC with Differential[[id number]]        Abnormal            Final result                 Please view results for these tests on the individual orders.          Imaging Results              X-Ray Chest PA And Lateral (Final result)  Result time 05/14/23 15:45:18      Final result by Poonam Mae MD (05/14/23 15:45:18)                   Impression:      No acute cardiopulmonary abnormality.      Electronically signed by: Poonam Mae  Date:    05/14/2023  Time:    15:45               Narrative:    EXAMINATION:  XR CHEST PA AND LATERAL    CLINICAL HISTORY:  Cough;    TECHNIQUE:  Two views of the chest    COMPARISON:  04/05/2023    FINDINGS:  LINES AND TUBES: None    MEDIASTINUM AND CLAIRE: The cardiac silhouette is normal.    LUNGS: No lobar consolidation. No edema.    PLEURA:No pleural effusion. No pneumothorax.    BONES: Postop ACDF.                                       Medications   ketorolac injection 30 mg (30 mg Intravenous Given 5/14/23 1620)                Attending Attestation:           Physician Attestation for Scribe:  Physician Attestation Statement for Scribe #1: I, Jermaine Ramos MD, reviewed documentation, as scribed by Rupa Lugo in my  presence, and it is both accurate and complete.       Medical Decision Making      Differential diagnosis includes but is not limited to atelectasis, PNA, flu, COVID, bronchitis, CHF    Amount and/or Complexity of Data Reviewed  External Data Reviewed: notes.  Labs: ordered.  Radiology: ordered and independent interpretation performed.  ECG/medicine tests: ordered and independent interpretation performed.    Labs/CXR all unremarkable.  Will treat with antitussive and steroids for acute bronchitis.  Does not appear to be a post-op complication.                     Clinical Impression:   Final diagnoses:  [R94.31] Abnormal EKG  [R07.89] Atypical chest pain  [J20.9] Acute bronchitis, unspecified organism (Primary)        ED Disposition Condition    Discharge Stable          ED Prescriptions       Medication Sig Dispense Start Date End Date Auth. Provider    predniSONE (DELTASONE) 20 MG tablet Take 3 tablets (60 mg total) by mouth once daily. for 5 days 15 tablet 5/14/2023 5/19/2023 Jermaine Ramos MD    benzonatate (TESSALON) 200 MG capsule Take 1 capsule (200 mg total) by mouth 3 (three) times daily as needed for Cough. 30 capsule 5/14/2023 5/24/2023 Jermaine Ramos MD          Follow-up Information       Follow up With Specialties Details Why Contact Info    Follow up with your primary MD in 3-5 days if not improved.  Return to ED for worsening symptoms.                 Jermaine Ramos MD  05/14/23 [id number]

## 2023-05-14 NOTE — FIRST PROVIDER EVALUATION
Medical screening examination initiated.  I have conducted a focused provider triage encounter, findings are as follows:    Brief history of present illness:  Patient states coughing and chest congestion x 5 days. States that she is 2 weeks post ACDF surgery. States that she was seen at Urgent Care and had an EKG done and was told that the EKG was abnormal and to report to the ED. Hx. Of MI and stents.     There were no vitals filed for this visit.    Pertinent physical exam:  Awake, alert, ambulatory      Brief workup plan:  Labs, EKG, Imaging.     Preliminary workup initiated; this workup will be continued and followed by the physician or advanced practice provider that is assigned to the patient when roomed.

## 2023-05-14 NOTE — PROGRESS NOTES
Patient ID: [id number]     Chief Complaint: upper respiratory tract infection symptoms    History of Present Illness:     Yandy Chaudhry is a 53 y.o. female  who presents today for symptoms of Cough (Chest congestion, cough - 12 days post 3 level ACDF surgery )    [age] year old female with a history of is CAD (2020 MI & stent), hypertension, hyperlipidemia, presents cough and chest tightness or chest heaviness in the center of her chest,  12 days after having a 3 level ACDF surgery.  This chest discomfort is unrelated to body position, inspiration, coughing, or exertion.  It is also not radiating.  She reports that she does not feel right.  This chest discomfort and the cough both started 3 days ago.  She reports temperatures of a little over 100, but reports that anything over 98 for her is a fever.    Pt denies experiencing any chills, nausea, vomiting, difficulty breathing, dysphagia, or neck stiffness.    Past Medical History:     ----------------------------  Acid reflux  Anemia, unspecified  Anxiety  Coronary artery disease  Hyperlipidemia  Hypertension  Migraines  Myocardial infarction  Thrombus      Comment:  apical  Thyroid disease     Past Surgical History:   Procedure Laterality Date    ANKLE SURGERY Left     ANTERIOR CERVICAL DISCECTOMY W/ FUSION      CHOLECYSTECTOMY      CORONARY ANGIOPLASTY WITH STENT PLACEMENT      DILATION AND CURETTAGE OF UTERUS      HYSTERECTOMY         Review of patient's allergies indicates:  No Known Allergies    Outpatient Medications Marked as Taking for the 5/14/23 encounter (Office Visit) with Cristian Max MD   Medication Sig Dispense Refill    adalimumab (HUMIRA,CF, PEN SUBQ) Inject into the skin.      aspirin (ECOTRIN) 81 MG EC tablet Take 81 mg by mouth once daily.      cetirizine (ZYRTEC) 10 MG tablet Take 10 mg by mouth once daily.      FLUoxetine 40 MG capsule Take 40 mg by mouth 2 (two) times daily.      fluticasone propionate (FLONASE)  "50 mcg/actuation nasal spray 1 spray by Each Nostril route 2 (two) times daily. Takes as needed      gabapentin (NEURONTIN) 100 MG capsule Take 100 mg by mouth nightly.      levothyroxine (SYNTHROID) 75 MCG tablet Take 75 mcg by mouth before breakfast.      metoprolol succinate (TOPROL-XL) 25 MG 24 hr tablet Take 2 tablets (50 mg total) by mouth once daily. 30 tablet 11    naltrexone (DEPADE) 50 mg tablet Take 50 mg by mouth once daily.      nitroGLYCERIN (NITROSTAT) 0.4 MG SL tablet PLEASE SEE ATTACHED FOR DETAILED DIRECTIONS      oxybutynin (DITROPAN-XL) 10 MG 24 hr tablet Take 10 mg by mouth once daily. As needed      pantoprazole (PROTONIX) 40 MG tablet Take 40 mg by mouth once daily.      REPATHA SURECLICK 140 mg/mL PnIj SMARTSIG:pre-filled pen syringe SUB-Q Every 2 Weeks      rosuvastatin (CRESTOR) 20 MG tablet Take 20 mg by mouth once daily.         Social History     Socioeconomic History    Marital status:    Tobacco Use    Smoking status: Never    Smokeless tobacco: Never   Substance and Sexual Activity    Alcohol use: Not Currently    Drug use: Never        Family History   Problem Relation Age of Onset    No Known Problems Mother     No Known Problems Father     No Known Problems Sister     No Known Problems Brother         Subjective:     Review of Systems   Constitutional:  Negative for chills, fever and malaise/fatigue.   HENT:  Positive for congestion. Negative for ear discharge, ear pain, sinus pain and sore throat.    Respiratory:  Positive for cough. Negative for sputum production, shortness of breath, wheezing and stridor.    Gastrointestinal:  Negative for abdominal pain, diarrhea, nausea and vomiting.   Genitourinary:  Negative for dysuria, frequency and urgency.   Musculoskeletal:  Negative for neck pain.   Skin:  Negative for rash.   Neurological:  Negative for headaches.     Objective:     BP (!) 141/84   Pulse 99   Temp 99.3 °F (37.4 °C)   Resp 17   Ht 5' 5" (1.651 m)   Wt 93 kg " (205 lb)   SpO2 97%   BMI 34.11 kg/m²     Physical Exam  Constitutional:       General: She is not in acute distress.     Appearance: Normal appearance. She is not ill-appearing or toxic-appearing.   HENT:      Head: Normocephalic and atraumatic.      Right Ear: Tympanic membrane and ear canal normal.      Left Ear: Tympanic membrane and ear canal normal.      Nose: No congestion or rhinorrhea.      Mouth/Throat:      Pharynx: Oropharynx is clear. No oropharyngeal exudate or posterior oropharyngeal erythema.   Eyes:      General:         Right eye: No discharge.         Left eye: No discharge.      Extraocular Movements: Extraocular movements intact.      Conjunctiva/sclera: Conjunctivae normal.   Cardiovascular:      Rate and Rhythm: Normal rate and regular rhythm.      Heart sounds: Normal heart sounds. No murmur heard.    No gallop.   Pulmonary:      Effort: Pulmonary effort is normal. No respiratory distress.      Breath sounds: Normal breath sounds. No stridor. No wheezing, rhonchi or rales.   Chest:      Chest wall: No tenderness.   Musculoskeletal:      Cervical back: No rigidity or tenderness.   Lymphadenopathy:      Cervical: No cervical adenopathy.   Neurological:      Mental Status: She is alert and oriented to person, place, and time. Mental status is at baseline.   Psychiatric:         Mood and Affect: Mood normal.         Behavior: Behavior normal.       Assessment & Plan:       ICD-10-CM ICD-9-CM   1. Cough, unspecified type  R05.9 786.2   2. Chest discomfort  R07.89 786.59        1. Cough, unspecified type  -     POCT COVID-19 Rapid Screening    2. Chest discomfort  -     EKG 12-lead  -     Refer to Emergency Dept.       Three 12 lead EKG passes were made.  The 1st and the 3rd were electronically read as probable inferior infarction, and the 2nd was normal.  I do not see any obvious ST segment changes in any lead.  Comparison with EKGs from last month and 2022 shows some QRS axis changes on lead  3 and AVF.  Discussed with patient these findings.  Patient opted to report to the emergency room out of an abundance of caution given her history and symptoms.    Covid negative.     Scanned EKGs can be found on the media tab.

## 2023-08-23 ENCOUNTER — OFFICE VISIT (OUTPATIENT)
Dept: URGENT CARE | Facility: CLINIC | Age: 53
End: 2023-08-23
Payer: COMMERCIAL

## 2023-08-23 VITALS
HEIGHT: 65 IN | WEIGHT: 205 LBS | TEMPERATURE: 98 F | SYSTOLIC BLOOD PRESSURE: 154 MMHG | DIASTOLIC BLOOD PRESSURE: 93 MMHG | HEART RATE: 69 BPM | OXYGEN SATURATION: 99 % | RESPIRATION RATE: 17 BRPM | BODY MASS INDEX: 34.16 KG/M2

## 2023-08-23 DIAGNOSIS — J01.40 ACUTE NON-RECURRENT PANSINUSITIS: Primary | ICD-10-CM

## 2023-08-23 PROCEDURE — 99214 PR OFFICE/OUTPT VISIT, EST, LEVL IV, 30-39 MIN: ICD-10-PCS | Mod: ,,, | Performed by: FAMILY MEDICINE

## 2023-08-23 PROCEDURE — 99214 OFFICE O/P EST MOD 30 MIN: CPT | Mod: ,,, | Performed by: FAMILY MEDICINE

## 2023-08-23 RX ORDER — PREDNISONE 20 MG/1
20 TABLET ORAL 2 TIMES DAILY
Qty: 6 TABLET | Refills: 0 | Status: SHIPPED | OUTPATIENT
Start: 2023-08-23 | End: 2023-08-26

## 2023-08-23 RX ORDER — AMOXICILLIN AND CLAVULANATE POTASSIUM 875; 125 MG/1; MG/1
1 TABLET, FILM COATED ORAL EVERY 12 HOURS
Qty: 14 TABLET | Refills: 0 | Status: SHIPPED | OUTPATIENT
Start: 2023-08-23 | End: 2023-08-30

## 2023-08-23 NOTE — PROGRESS NOTES
"Subjective:      Patient ID: Yandy Chaudhry is a 53 y.o. female.    Vitals:  height is 5' 5" (1.651 m) and weight is 93 kg (205 lb). Her temperature is 98.3 °F (36.8 °C). Her blood pressure is 154/93 (abnormal) and her pulse is 69. Her respiration is 17 and oxygen saturation is 99%.     Chief Complaint: Ear Problem (X last night-rt side ear pain, low grade temp- 100.1, rt side neck pain, runny nose/X 1 week:  sinus congestion)    1 week of sinusitis and rhinorrhea now with otitis and elevated temp Tmax 100.  No chest pain or Sob.          Constitution: Negative for chills, fatigue and fever.   HENT:  Positive for ear pain and sinus pressure. Negative for congestion and trouble swallowing.    Neck: Negative for neck pain and neck stiffness.   Cardiovascular:  Negative for chest pain, leg swelling and sob on exertion.   Respiratory:  Positive for cough. Negative for chest tightness, shortness of breath and wheezing.    Neurological:  Negative for dizziness, disorientation and altered mental status.   Psychiatric/Behavioral:  Negative for altered mental status and disorientation.       Objective:     Physical Exam   Constitutional: She is oriented to person, place, and time. She appears well-developed.  Non-toxic appearance. No distress.   HENT:   Head: Normocephalic.   Ears:   Right Ear: External ear normal.   Left Ear: External ear normal.      Comments: Bulging B Tms without erythema.   Nose: Rhinorrhea present.   Mouth/Throat: Uvula is midline and mucous membranes are normal. No uvula swelling. Posterior oropharyngeal erythema and cobblestoning present. No oropharyngeal exudate or posterior oropharyngeal edema. Tonsils are 0 on the right. Tonsils are 0 on the left. No tonsillar exudate.   Eyes: Pupils are equal, round, and reactive to light. Right eye exhibits no discharge. Left eye exhibits no discharge.   Neck: Neck supple. No tracheal deviation present.   Cardiovascular: Normal rate, regular rhythm " and normal heart sounds.   No murmur heard.  Pulmonary/Chest: Effort normal and breath sounds normal. No stridor. No respiratory distress. She has no wheezes.   Lymphadenopathy:     She has cervical adenopathy.   Neurological: no focal deficit. She is alert and oriented to person, place, and time.   Skin: Skin is warm and dry.   Psychiatric: Mood and thought content normal.   Nursing note and vitals reviewed.      Assessment:     1. Acute non-recurrent pansinusitis        Plan:       Acute non-recurrent pansinusitis  -     predniSONE (DELTASONE) 20 MG tablet; Take 1 tablet (20 mg total) by mouth 2 (two) times daily. for 3 days  Dispense: 6 tablet; Refill: 0  -     amoxicillin-clavulanate 875-125mg (AUGMENTIN) 875-125 mg per tablet; Take 1 tablet by mouth every 12 (twelve) hours. for 7 days  Dispense: 14 tablet; Refill: 0

## 2023-08-23 NOTE — PATIENT INSTRUCTIONS
Flonase and saline nasal spray twice a day, antihistamine at bedtime.  Force fluids.  Augmentin twice a day with food.  In place of ibuprofen you can take Prednisone twice a day with food. Cough may linger a few weeks but should not have fever, chest pain, or shortness of breath.

## 2023-08-29 ENCOUNTER — OFFICE VISIT (OUTPATIENT)
Dept: URGENT CARE | Facility: CLINIC | Age: 53
End: 2023-08-29
Payer: COMMERCIAL

## 2023-08-29 VITALS
BODY MASS INDEX: 34.16 KG/M2 | HEIGHT: 65 IN | HEART RATE: 67 BPM | WEIGHT: 205 LBS | SYSTOLIC BLOOD PRESSURE: 160 MMHG | TEMPERATURE: 99 F | OXYGEN SATURATION: 98 % | RESPIRATION RATE: 16 BRPM | DIASTOLIC BLOOD PRESSURE: 95 MMHG

## 2023-08-29 DIAGNOSIS — I10 HYPERTENSION, UNSPECIFIED TYPE: ICD-10-CM

## 2023-08-29 DIAGNOSIS — H65.21 RIGHT CHRONIC SEROUS OTITIS MEDIA: Primary | ICD-10-CM

## 2023-08-29 DIAGNOSIS — K59.00 CONSTIPATION, UNSPECIFIED CONSTIPATION TYPE: ICD-10-CM

## 2023-08-29 PROCEDURE — 99213 OFFICE O/P EST LOW 20 MIN: CPT | Mod: ,,, | Performed by: FAMILY MEDICINE

## 2023-08-29 PROCEDURE — 99213 PR OFFICE/OUTPT VISIT, EST, LEVL III, 20-29 MIN: ICD-10-PCS | Mod: ,,, | Performed by: FAMILY MEDICINE

## 2023-08-29 NOTE — PROGRESS NOTES
"Subjective:      Patient ID: Yandy Chaudhry is a 53 y.o. female.    Vitals:  height is 5' 5" (1.651 m) and weight is 93 kg (205 lb). Her temperature is 98.8 °F (37.1 °C). Her blood pressure is 160/95 (abnormal) and her pulse is 67. Her respiration is 16 and oxygen saturation is 98%.     Chief Complaint: Ear Problem (Rt ear pain , lymph node pain - seen 8/23 and not better ) and Abdominal Pain (Lower abdominal pain x yesterday )    HPI:  53-year-old female returns to clinic with concerns of right ear pressure.  Seen in the clinic a week ago completed taking Augmentin.  Complains of lower abdominal discomfort, concerns of constipation.  No vomiting.  No fever.    ROS :  Constitutional : No fever  Neck : No pain  HEENT : No sore throat, No difficulty swallowing.  Right ear pain, left ear no pain.  Respiratory : No coughing, no shortness of breath  Cardiovascular : No chest pain  Gastrointestinal:  Lower abdominal pain, no nausea or vomiting.  Concerns of constipation +  Integumentary : No skin rash  Neurological : No tingling, numbness or weakness   Objective:     Physical Exam  General : Alert and Oriented, No apparent distress, afebrile, appears comfortable sitting in the chair, clear speech and appropriate communication  Neck - supple  HENT : Oropharynx no redness or swelling.  Bilateral TMs intact, left TM mild to moderate fluid, light reflex present.  Right TM moderate fluid, appears cloudy, no redness bilateral  Respiratory : Bilateral equal breath sounds, nonlabored respirations  Cardiovascular : Rate, rhythm regular, normal volume pulse, no murmur  Gastrointestinal: Full abdomen, soft, lower quadrants pressure to palpate, no guarding or rigidity  Integumentary : Warm, Dry and no rash    Assessment:     1. Right chronic serous otitis media    2. Hypertension, unspecified type    3. Constipation, unspecified constipation type      Plan:   Discussed in detail on physical findings, clinical diagnosis.  " Considering the chronicity encouraged to rotate antihistamine of choice over-the-counter every day along with Flonase for 2-3 weeks.  With persistent symptoms may need ENT evaluation for possible tubes.  Voiced understanding.  Recently completed Augmentin as prescribed.  Call or return to clinic for any questions.  Avoid sudden change in moments.  Tylenol for pain and discomfort.  Blood pressure elevated in the clinic, history of hypertension, currently on medications.  Encouraged to monitor the blood pressure and maintain the log  For constipation, trial of MiraLax over-the-counter.  Hydration, increase fiber in the diet.  Call or return to clinic for any questions.  ER precautions    Right chronic serous otitis media    Hypertension, unspecified type    Constipation, unspecified constipation type

## 2023-08-29 NOTE — PATIENT INSTRUCTIONS
Discussed in detail on physical findings, clinical diagnosis.  Considering the chronicity encouraged to rotate antihistamine of choice over-the-counter every day along with Flonase for 2-3 weeks.  With persistent symptoms may need ENT evaluation for possible tubes.  Voiced understanding.  Recently completed Augmentin as prescribed.  Call or return to clinic for any questions.  Avoid sudden change in moments.  Tylenol for pain and discomfort.  Blood pressure elevated in the clinic, history of hypertension, currently on medications.  Encouraged to monitor the blood pressure and maintain the log  For constipation, trial of MiraLax over-the-counter.  Hydration, increase fiber in the diet.  Call or return to clinic for any questions.  ER precautions

## 2023-09-19 ENCOUNTER — HOSPITAL ENCOUNTER (OUTPATIENT)
Facility: HOSPITAL | Age: 53
Discharge: HOME | End: 2023-09-20
Attending: STUDENT IN AN ORGANIZED HEALTH CARE EDUCATION/TRAINING PROGRAM | Admitting: INTERNAL MEDICINE
Payer: COMMERCIAL

## 2023-09-19 DIAGNOSIS — G45.9 TIA (TRANSIENT ISCHEMIC ATTACK): Primary | ICD-10-CM

## 2023-09-19 DIAGNOSIS — R29.898 LEFT ARM WEAKNESS: ICD-10-CM

## 2023-09-19 DIAGNOSIS — I63.9 CVA (CEREBRAL VASCULAR ACCIDENT): ICD-10-CM

## 2023-09-19 DIAGNOSIS — R42 DIZZINESS: ICD-10-CM

## 2023-09-19 LAB
ALBUMIN SERPL-MCNC: 4 G/DL (ref 3.5–5)
ALBUMIN/GLOB SERPL: 1.3 RATIO (ref 1.1–2)
ALP SERPL-CCNC: 72 UNIT/L (ref 40–150)
ALT SERPL-CCNC: 25 UNIT/L (ref 0–55)
APPEARANCE UR: ABNORMAL
AST SERPL-CCNC: 34 UNIT/L (ref 5–34)
BACTERIA #/AREA URNS AUTO: ABNORMAL /HPF
BASOPHILS # BLD AUTO: 0.11 X10(3)/MCL
BASOPHILS NFR BLD AUTO: 1.8 %
BILIRUB SERPL-MCNC: 0.3 MG/DL
BILIRUB UR QL STRIP.AUTO: NEGATIVE
BUN SERPL-MCNC: 8.3 MG/DL (ref 9.8–20.1)
CALCIUM SERPL-MCNC: 9.7 MG/DL (ref 8.4–10.2)
CAOX CRY URNS QL MICRO: ABNORMAL /HPF
CHLORIDE SERPL-SCNC: 110 MMOL/L (ref 98–107)
CO2 SERPL-SCNC: 22 MMOL/L (ref 22–29)
COLOR UR: YELLOW
CREAT SERPL-MCNC: 0.7 MG/DL (ref 0.55–1.02)
EOSINOPHIL # BLD AUTO: 0.39 X10(3)/MCL (ref 0–0.9)
EOSINOPHIL NFR BLD AUTO: 6.3 %
ERYTHROCYTE [DISTWIDTH] IN BLOOD BY AUTOMATED COUNT: 16.5 % (ref 11.5–17)
GFR SERPLBLD CREATININE-BSD FMLA CKD-EPI: >60 MLS/MIN/1.73/M2
GLOBULIN SER-MCNC: 3.2 GM/DL (ref 2.4–3.5)
GLUCOSE SERPL-MCNC: 116 MG/DL (ref 74–100)
GLUCOSE UR QL STRIP.AUTO: NEGATIVE
HCT VFR BLD AUTO: 41.6 % (ref 37–47)
HGB BLD-MCNC: 13.1 G/DL (ref 12–16)
IMM GRANULOCYTES # BLD AUTO: 0 X10(3)/MCL (ref 0–0.04)
IMM GRANULOCYTES NFR BLD AUTO: 0 %
KETONES UR QL STRIP.AUTO: ABNORMAL
LEUKOCYTE ESTERASE UR QL STRIP.AUTO: NEGATIVE
LYMPHOCYTES # BLD AUTO: 3.14 X10(3)/MCL (ref 0.6–4.6)
LYMPHOCYTES NFR BLD AUTO: 50.8 %
MCH RBC QN AUTO: 25.9 PG (ref 27–31)
MCHC RBC AUTO-ENTMCNC: 31.5 G/DL (ref 33–36)
MCV RBC AUTO: 82.4 FL (ref 80–94)
MONOCYTES # BLD AUTO: 0.6 X10(3)/MCL (ref 0.1–1.3)
MONOCYTES NFR BLD AUTO: 9.7 %
MUCOUS THREADS URNS QL MICRO: ABNORMAL /LPF
NEUTROPHILS # BLD AUTO: 1.94 X10(3)/MCL (ref 2.1–9.2)
NEUTROPHILS NFR BLD AUTO: 31.4 %
NITRITE UR QL STRIP.AUTO: NEGATIVE
NRBC BLD AUTO-RTO: 0 %
PH UR STRIP.AUTO: 5.5 [PH]
PLATELET # BLD AUTO: 268 X10(3)/MCL (ref 130–400)
PMV BLD AUTO: 12.2 FL (ref 7.4–10.4)
POTASSIUM SERPL-SCNC: 4.4 MMOL/L (ref 3.5–5.1)
PROT SERPL-MCNC: 7.2 GM/DL (ref 6.4–8.3)
PROT UR QL STRIP.AUTO: NEGATIVE
RBC # BLD AUTO: 5.05 X10(6)/MCL (ref 4.2–5.4)
RBC #/AREA URNS AUTO: ABNORMAL /HPF
RBC UR QL AUTO: NEGATIVE
SODIUM SERPL-SCNC: 140 MMOL/L (ref 136–145)
SP GR UR STRIP.AUTO: 1.03 (ref 1–1.03)
SQUAMOUS #/AREA URNS AUTO: ABNORMAL /HPF
TROPONIN I SERPL-MCNC: <0.01 NG/ML (ref 0–0.04)
UROBILINOGEN UR STRIP-ACNC: 0.2
WBC # SPEC AUTO: 6.18 X10(3)/MCL (ref 4.5–11.5)
WBC #/AREA URNS AUTO: ABNORMAL /HPF

## 2023-09-19 PROCEDURE — 25000003 PHARM REV CODE 250: Performed by: INTERNAL MEDICINE

## 2023-09-19 PROCEDURE — 93005 ELECTROCARDIOGRAM TRACING: CPT

## 2023-09-19 PROCEDURE — 84443 ASSAY THYROID STIM HORMONE: CPT | Performed by: INTERNAL MEDICINE

## 2023-09-19 PROCEDURE — G0378 HOSPITAL OBSERVATION PER HR: HCPCS

## 2023-09-19 PROCEDURE — 99285 EMERGENCY DEPT VISIT HI MDM: CPT | Mod: 25

## 2023-09-19 PROCEDURE — 93010 ELECTROCARDIOGRAM REPORT: CPT | Mod: ,,, | Performed by: INTERNAL MEDICINE

## 2023-09-19 PROCEDURE — 81001 URINALYSIS AUTO W/SCOPE: CPT | Performed by: PHYSICIAN ASSISTANT

## 2023-09-19 PROCEDURE — 84484 ASSAY OF TROPONIN QUANT: CPT | Performed by: PHYSICIAN ASSISTANT

## 2023-09-19 PROCEDURE — 80061 LIPID PANEL: CPT | Performed by: INTERNAL MEDICINE

## 2023-09-19 PROCEDURE — 93010 EKG 12-LEAD: ICD-10-PCS | Mod: ,,, | Performed by: INTERNAL MEDICINE

## 2023-09-19 PROCEDURE — 11000001 HC ACUTE MED/SURG PRIVATE ROOM

## 2023-09-19 PROCEDURE — 25000003 PHARM REV CODE 250: Performed by: STUDENT IN AN ORGANIZED HEALTH CARE EDUCATION/TRAINING PROGRAM

## 2023-09-19 PROCEDURE — 85025 COMPLETE CBC W/AUTO DIFF WBC: CPT | Performed by: PHYSICIAN ASSISTANT

## 2023-09-19 PROCEDURE — 80053 COMPREHEN METABOLIC PANEL: CPT | Performed by: PHYSICIAN ASSISTANT

## 2023-09-19 RX ORDER — ACETAMINOPHEN 325 MG/1
650 TABLET ORAL EVERY 6 HOURS PRN
Status: DISCONTINUED | OUTPATIENT
Start: 2023-09-19 | End: 2023-09-20 | Stop reason: HOSPADM

## 2023-09-19 RX ORDER — ASPIRIN 325 MG
325 TABLET, DELAYED RELEASE (ENTERIC COATED) ORAL
Status: COMPLETED | OUTPATIENT
Start: 2023-09-19 | End: 2023-09-19

## 2023-09-19 RX ADMIN — ASPIRIN 325 MG: 325 TABLET, COATED ORAL at 09:09

## 2023-09-19 RX ADMIN — ACETAMINOPHEN 650 MG: 325 TABLET, FILM COATED ORAL at 10:09

## 2023-09-19 NOTE — FIRST PROVIDER EVALUATION
"Medical screening examination initiated.  I have conducted a focused provider triage encounter, findings are as follows:    Chief Complaint   Patient presents with    Hypertension     Elevated blood pressure with headache and left arm weakness at 1130, pt reports arm weakness is better. Dr Chong sent here for evaluation.        Brief history of present illness:  53 y.o. female presents to the E.D. with c/o elevated blood pressure with headaches and left arm weakness at 1130. She reports this has improved. Dr. Chong is cardiologist, sent here for evaluation. Has a history of one cardiac stent    Vitals:    09/19/23 1824   BP: (!) 165/94   Pulse: 65   Resp: 20   Temp: 98.6 °F (37 °C)   TempSrc: Oral   SpO2: 95%   Weight: 93 kg (205 lb)   Height: 5' 5" (1.651 m)       Pertinent physical exam:  Awake, Alert, Oriented, Non labored breathing       Brief workup plan:  labs, ekg, ct     Preliminary workup initiated; this workup will be continued and followed by the physician or advanced practice provider that is assigned to the patient when roomed.  "

## 2023-09-19 NOTE — Clinical Note
Diagnosis: Dizziness [522227]   Future Attending Provider: DENA NAVARRO [[id number]]   Admitting Provider:: LOUIE ONEIL [[id number]]

## 2023-09-20 VITALS
DIASTOLIC BLOOD PRESSURE: 79 MMHG | TEMPERATURE: 98 F | SYSTOLIC BLOOD PRESSURE: 174 MMHG | BODY MASS INDEX: 34.16 KG/M2 | WEIGHT: 205 LBS | HEIGHT: 65 IN | RESPIRATION RATE: 18 BRPM | HEART RATE: 63 BPM | OXYGEN SATURATION: 96 %

## 2023-09-20 PROBLEM — R29.898 LEFT ARM WEAKNESS: Status: RESOLVED | Noted: 2023-09-20 | Resolved: 2023-09-20

## 2023-09-20 PROBLEM — G45.9 TIA (TRANSIENT ISCHEMIC ATTACK): Status: ACTIVE | Noted: 2023-09-20

## 2023-09-20 PROBLEM — R29.898 LEFT ARM WEAKNESS: Status: ACTIVE | Noted: 2023-09-20

## 2023-09-20 LAB
APTT PPP: 23.6 SECONDS (ref 23.2–33.7)
AV INDEX (PROSTH): 0.86
AV MEAN GRADIENT: 3 MMHG
AV PEAK GRADIENT: 6 MMHG
AV VALVE AREA BY VELOCITY RATIO: 2.78 CM²
AV VALVE AREA: 2.7 CM²
AV VELOCITY RATIO: 0.88
BSA FOR ECHO PROCEDURE: 2.06 M2
CHOLEST SERPL-MCNC: 144 MG/DL
CHOLEST/HDLC SERPL: 2 {RATIO} (ref 0–5)
CK MB SERPL-MCNC: <1 NG/ML
CV ECHO LV RWT: 0.37 CM
DOP CALC AO PEAK VEL: 1.21 M/S
DOP CALC AO VTI: 28.1 CM
DOP CALC LVOT AREA: 3.1 CM2
DOP CALC LVOT DIAMETER: 2 CM
DOP CALC LVOT PEAK VEL: 1.07 M/S
DOP CALC LVOT STROKE VOLUME: 75.99 CM3
DOP CALC MV VTI: 33.8 CM
DOP CALCLVOT PEAK VEL VTI: 24.2 CM
E WAVE DECELERATION TIME: 240 MSEC
E/A RATIO: 0.76
E/E' RATIO: 8 M/S
ECHO LV POSTERIOR WALL: 1 CM (ref 0.6–1.1)
EST. AVERAGE GLUCOSE BLD GHB EST-MCNC: 116.9 MG/DL
FRACTIONAL SHORTENING: 41 % (ref 28–44)
HBA1C MFR BLD: 5.7 %
HDLC SERPL-MCNC: 91 MG/DL (ref 35–60)
INR PPP: 0.9
INTERVENTRICULAR SEPTUM: 1 CM (ref 0.6–1.1)
LDLC SERPL CALC-MCNC: 7 MG/DL (ref 50–140)
LEFT ATRIUM SIZE: 3.5 CM
LEFT ATRIUM VOLUME INDEX MOD: 28.4 ML/M2
LEFT ATRIUM VOLUME MOD: 56.8 CM3
LEFT CCA DIST DIAS: 21 CM/S
LEFT CCA DIST SYS: 92 CM/S
LEFT CCA PROX DIAS: 24 CM/S
LEFT CCA PROX SYS: 110 CM/S
LEFT ECA DIAS: 9 CM/S
LEFT ECA SYS: 106 CM/S
LEFT ICA DIST DIAS: 43 CM/S
LEFT ICA DIST SYS: 84 CM/S
LEFT ICA MID DIAS: 39 CM/S
LEFT ICA MID SYS: 121 CM/S
LEFT ICA PROX DIAS: 31 CM/S
LEFT ICA PROX SYS: 109 CM/S
LEFT INTERNAL DIMENSION IN SYSTOLE: 3.2 CM (ref 2.1–4)
LEFT VENTRICLE DIASTOLIC VOLUME INDEX: 70.5 ML/M2
LEFT VENTRICLE DIASTOLIC VOLUME: 141 ML
LEFT VENTRICLE MASS INDEX: 103 G/M2
LEFT VENTRICLE SYSTOLIC VOLUME INDEX: 20.5 ML/M2
LEFT VENTRICLE SYSTOLIC VOLUME: 41 ML
LEFT VENTRICULAR INTERNAL DIMENSION IN DIASTOLE: 5.4 CM (ref 3.5–6)
LEFT VENTRICULAR MASS: 206.74 G
LEFT VERTEBRAL DIAS: 12 CM/S
LEFT VERTEBRAL SYS: 63 CM/S
LV LATERAL E/E' RATIO: 6.91 M/S
LV SEPTAL E/E' RATIO: 9.5 M/S
LVOT MG: 2 MMHG
LVOT MV: 0.72 CM/S
MV MEAN GRADIENT: 2 MMHG
MV PEAK A VEL: 1 M/S
MV PEAK E VEL: 0.76 M/S
MV PEAK GRADIENT: 3 MMHG
MV STENOSIS PRESSURE HALF TIME: 70 MS
MV VALVE AREA BY CONTINUITY EQUATION: 2.25 CM2
MV VALVE AREA P 1/2 METHOD: 3.14 CM2
OHS CV CAROTID RIGHT ICA EDV HIGHEST: 31
OHS CV CAROTID ULTRASOUND LEFT ICA/CCA RATIO: 1.32
OHS CV CAROTID ULTRASOUND RIGHT ICA/CCA RATIO: 1.18
OHS CV PV CAROTID LEFT HIGHEST CCA: 110
OHS CV PV CAROTID LEFT HIGHEST ICA: 121
OHS CV PV CAROTID RIGHT HIGHEST CCA: 90
OHS CV PV CAROTID RIGHT HIGHEST ICA: 104
OHS CV US CAROTID LEFT HIGHEST EDV: 43
OHS LV EJECTION FRACTION SIMPSONS BIPLANE MOD: 62 %
PROTHROMBIN TIME: 11.8 SECONDS (ref 12.5–14.5)
PV PEAK GRADIENT: 3 MMHG
PV PEAK VELOCITY: 0.9 M/S
RIGHT CCA DIST DIAS: 24 CM/S
RIGHT CCA DIST SYS: 88 CM/S
RIGHT CCA PROX DIAS: 15 CM/S
RIGHT CCA PROX SYS: 90 CM/S
RIGHT ECA DIAS: 4 CM/S
RIGHT ECA SYS: 93 CM/S
RIGHT ICA DIST DIAS: 19 CM/S
RIGHT ICA DIST SYS: 67 CM/S
RIGHT ICA MID DIAS: 23 CM/S
RIGHT ICA MID SYS: 89 CM/S
RIGHT ICA PROX DIAS: 31 CM/S
RIGHT ICA PROX SYS: 104 CM/S
RIGHT VERTEBRAL DIAS: 18 CM/S
RIGHT VERTEBRAL SYS: 105 CM/S
TDI LATERAL: 0.11 M/S
TDI SEPTAL: 0.08 M/S
TDI: 0.1 M/S
TRICUSPID ANNULAR PLANE SYSTOLIC EXCURSION: 2.06 CM
TRIGL SERPL-MCNC: 230 MG/DL (ref 37–140)
TROPONIN I SERPL-MCNC: <0.01 NG/ML (ref 0–0.04)
TSH SERPL-ACNC: 3.73 UIU/ML (ref 0.35–4.94)
VLDLC SERPL CALC-MCNC: 46 MG/DL
Z-SCORE OF LEFT VENTRICULAR DIMENSION IN END DIASTOLE: -0.77
Z-SCORE OF LEFT VENTRICULAR DIMENSION IN END SYSTOLE: -0.88

## 2023-09-20 PROCEDURE — 85610 PROTHROMBIN TIME: CPT | Performed by: INTERNAL MEDICINE

## 2023-09-20 PROCEDURE — 97162 PT EVAL MOD COMPLEX 30 MIN: CPT

## 2023-09-20 PROCEDURE — 92523 SPEECH SOUND LANG COMPREHEN: CPT

## 2023-09-20 PROCEDURE — 97165 OT EVAL LOW COMPLEX 30 MIN: CPT

## 2023-09-20 PROCEDURE — 85730 THROMBOPLASTIN TIME PARTIAL: CPT | Performed by: INTERNAL MEDICINE

## 2023-09-20 PROCEDURE — 99204 PR OFFICE/OUTPT VISIT, NEW, LEVL IV, 45-59 MIN: ICD-10-PCS | Mod: ,,, | Performed by: PSYCHIATRY & NEUROLOGY

## 2023-09-20 PROCEDURE — G0378 HOSPITAL OBSERVATION PER HR: HCPCS

## 2023-09-20 PROCEDURE — 25000003 PHARM REV CODE 250: Performed by: INTERNAL MEDICINE

## 2023-09-20 PROCEDURE — 25000003 PHARM REV CODE 250: Performed by: NURSE PRACTITIONER

## 2023-09-20 PROCEDURE — 82553 CREATINE MB FRACTION: CPT | Performed by: INTERNAL MEDICINE

## 2023-09-20 PROCEDURE — 83036 HEMOGLOBIN GLYCOSYLATED A1C: CPT | Performed by: NURSE PRACTITIONER

## 2023-09-20 PROCEDURE — 84484 ASSAY OF TROPONIN QUANT: CPT | Performed by: INTERNAL MEDICINE

## 2023-09-20 PROCEDURE — 99204 OFFICE O/P NEW MOD 45 MIN: CPT | Mod: ,,, | Performed by: PSYCHIATRY & NEUROLOGY

## 2023-09-20 PROCEDURE — 63600175 PHARM REV CODE 636 W HCPCS: Performed by: NURSE PRACTITIONER

## 2023-09-20 RX ORDER — SODIUM CHLORIDE 0.9 % (FLUSH) 0.9 %
10 SYRINGE (ML) INJECTION
Status: DISCONTINUED | OUTPATIENT
Start: 2023-09-20 | End: 2023-09-20 | Stop reason: HOSPADM

## 2023-09-20 RX ORDER — TIZANIDINE 4 MG/1
4 TABLET ORAL EVERY 8 HOURS PRN
COMMUNITY

## 2023-09-20 RX ORDER — CLOPIDOGREL BISULFATE 75 MG/1
75 TABLET ORAL DAILY
Qty: 30 TABLET | Refills: 3 | Status: SHIPPED | OUTPATIENT
Start: 2023-09-20 | End: 2023-11-17

## 2023-09-20 RX ORDER — LABETALOL HYDROCHLORIDE 5 MG/ML
10 INJECTION, SOLUTION INTRAVENOUS
Status: DISCONTINUED | OUTPATIENT
Start: 2023-09-20 | End: 2023-09-20 | Stop reason: HOSPADM

## 2023-09-20 RX ORDER — FLUOXETINE HYDROCHLORIDE 20 MG/1
40 CAPSULE ORAL 2 TIMES DAILY
Status: DISCONTINUED | OUTPATIENT
Start: 2023-09-20 | End: 2023-09-20 | Stop reason: HOSPADM

## 2023-09-20 RX ORDER — CLOPIDOGREL BISULFATE 75 MG/1
75 TABLET ORAL DAILY
Status: DISCONTINUED | OUTPATIENT
Start: 2023-09-20 | End: 2023-09-20

## 2023-09-20 RX ORDER — METOPROLOL SUCCINATE 50 MG/1
50 TABLET, EXTENDED RELEASE ORAL DAILY
Status: DISCONTINUED | OUTPATIENT
Start: 2023-09-20 | End: 2023-09-20 | Stop reason: HOSPADM

## 2023-09-20 RX ORDER — OXYBUTYNIN CHLORIDE 5 MG/1
5 TABLET ORAL DAILY
Status: DISCONTINUED | OUTPATIENT
Start: 2023-09-20 | End: 2023-09-20 | Stop reason: HOSPADM

## 2023-09-20 RX ORDER — GABAPENTIN 600 MG/1
600 TABLET ORAL 3 TIMES DAILY
COMMUNITY
End: 2023-11-17

## 2023-09-20 RX ORDER — CLOPIDOGREL BISULFATE 75 MG/1
75 TABLET ORAL DAILY
Qty: 21 TABLET | Refills: 0 | Status: SHIPPED | OUTPATIENT
Start: 2023-09-20 | End: 2023-09-20 | Stop reason: SDUPTHER

## 2023-09-20 RX ORDER — ATORVASTATIN CALCIUM 40 MG/1
80 TABLET, FILM COATED ORAL DAILY
Status: DISCONTINUED | OUTPATIENT
Start: 2023-09-20 | End: 2023-09-20 | Stop reason: HOSPADM

## 2023-09-20 RX ORDER — ROPINIROLE 0.5 MG/1
0.5 TABLET, FILM COATED ORAL NIGHTLY
COMMUNITY

## 2023-09-20 RX ORDER — ATORVASTATIN CALCIUM 40 MG/1
40 TABLET, FILM COATED ORAL DAILY
Status: DISCONTINUED | OUTPATIENT
Start: 2023-09-20 | End: 2023-09-20

## 2023-09-20 RX ORDER — ONDANSETRON 2 MG/ML
4 INJECTION INTRAMUSCULAR; INTRAVENOUS EVERY 6 HOURS PRN
Status: DISCONTINUED | OUTPATIENT
Start: 2023-09-20 | End: 2023-09-20 | Stop reason: HOSPADM

## 2023-09-20 RX ORDER — ROSUVASTATIN CALCIUM 40 MG/1
40 TABLET, COATED ORAL DAILY
COMMUNITY

## 2023-09-20 RX ORDER — ENOXAPARIN SODIUM 100 MG/ML
40 INJECTION SUBCUTANEOUS EVERY 24 HOURS
Status: DISCONTINUED | OUTPATIENT
Start: 2023-09-20 | End: 2023-09-20 | Stop reason: HOSPADM

## 2023-09-20 RX ORDER — SODIUM CHLORIDE 9 MG/ML
INJECTION, SOLUTION INTRAVENOUS CONTINUOUS
Status: DISCONTINUED | OUTPATIENT
Start: 2023-09-20 | End: 2023-09-20 | Stop reason: HOSPADM

## 2023-09-20 RX ORDER — ASPIRIN 325 MG
325 TABLET, DELAYED RELEASE (ENTERIC COATED) ORAL DAILY
Status: DISCONTINUED | OUTPATIENT
Start: 2023-09-20 | End: 2023-09-20 | Stop reason: HOSPADM

## 2023-09-20 RX ORDER — CLOPIDOGREL BISULFATE 75 MG/1
75 TABLET ORAL DAILY
Status: DISCONTINUED | OUTPATIENT
Start: 2023-09-20 | End: 2023-09-20 | Stop reason: HOSPADM

## 2023-09-20 RX ADMIN — FLUOXETINE 40 MG: 20 CAPSULE ORAL at 08:09

## 2023-09-20 RX ADMIN — ASPIRIN 325 MG: 325 TABLET, COATED ORAL at 08:09

## 2023-09-20 RX ADMIN — SODIUM CHLORIDE: 9 INJECTION, SOLUTION INTRAVENOUS at 02:09

## 2023-09-20 RX ADMIN — ACETAMINOPHEN 650 MG: 325 TABLET, FILM COATED ORAL at 09:09

## 2023-09-20 RX ADMIN — ONDANSETRON 4 MG: 2 INJECTION INTRAMUSCULAR; INTRAVENOUS at 02:09

## 2023-09-20 RX ADMIN — CLOPIDOGREL BISULFATE 75 MG: 75 TABLET ORAL at 01:09

## 2023-09-20 RX ADMIN — ATORVASTATIN CALCIUM 80 MG: 40 TABLET, FILM COATED ORAL at 08:09

## 2023-09-20 RX ADMIN — METOPROLOL SUCCINATE 50 MG: 50 TABLET, EXTENDED RELEASE ORAL at 08:09

## 2023-09-20 RX ADMIN — LEVOTHYROXINE SODIUM 75 MCG: 25 TABLET ORAL at 05:09

## 2023-09-20 NOTE — PT/OT/SLP EVAL
Physical Therapy Evaluation and Discharge Note    Patient Name:  Yandy Chaudhry   MRN:  [id number]    Recommendations:     Discharge Recommendations: home  Discharge Equipment Recommendations: none   Barriers to discharge: None    Assessment:     Yandy Chaudhry is a 53 y.o. female admitted with a medical diagnosis of TIA (transient ischemic attack). .  At this time, patient is functioning at their prior level of function and does not require further acute PT services.     Recent Surgery: * No surgery found *      Plan:     During this hospitalization, patient does not require further acute PT services.  Please re-consult if situation changes.      Subjective     Chief Complaint: headache  Patient/Family Comments/goals: home  Pain/Comfort:  Pain Rating 1: 5/10  Location 1: head  Pain Addressed 1: Distraction  Pain Rating Post-Intervention 1: 5/10    Patients cultural, spiritual, Confucianism conflicts given the current situation:      Living Environment:  Pt lives with her family in a [facility] with no LD  Prior to admission, patients level of function was independent, works as .  Equipment used at home: none.  DME owned (not currently used): none.  Upon discharge, patient will have assistance from [facility].    Objective:     Communicated with RN prior to session.  Patient found HOB elevated with telemetry, blood pressure cuff, pulse ox (continuous) upon PT entry to room.    General Precautions: Standard,      Orthopedic Precautions:    Braces:    Respiratory Status: Room air  Blood Pressure: 170/78    Exams:  Cognitive Exam:  Patient is oriented to Person, Place, Time, and Situation  Fine Motor Coordination:    -       Intact  Rapid alternating ankle DF/PF  RLE Strength: WNL  LLE Strength: WNL    Functional Mobility:  Bed Mobility:     Supine to Sit: independence  Transfers:     Sit to Stand:  supervision with no AD  Gait: 200ft with SBA, reported some feelings of instability. Walked with  excessive supination.     AM-PAC 6 CLICK MOBILITY  Total Score:24       Treatment and Education:      Patient and spouse were provided with verbal education education regarding role of PT in acute setting, PT POC, discharge recs, mobility in hospital, safety precautions.  .  Understanding was verbalized.     Patient left HOB elevated with all lines intact, call button in reach, and RN notified.    GOALS:   Multidisciplinary Problems       Physical Therapy Goals          Problem: Physical Therapy    Goal Priority Disciplines Outcome Goal Variances Interventions   Physical Therapy Goal     PT, PT/OT Ongoing, Progressing     Description: Goals to be met by: 10/20/23     Patient will increase functional independence with mobility by performin. Sit to stand transfer with [place]  2. Gait  x 250 feet with [place] using No Assistive Device.                          History:     Past Medical History:   Diagnosis Date    Acid reflux     Anemia, unspecified     Anxiety     Coronary artery disease     Hyperlipidemia     Hypertension     Migraines     Myocardial infarction     Thrombus     apical    Thyroid disease        Past Surgical History:   Procedure Laterality Date    ANKLE SURGERY Left     ANTERIOR CERVICAL DISCECTOMY W/ FUSION      CHOLECYSTECTOMY      CORONARY ANGIOPLASTY WITH STENT PLACEMENT      DILATION AND CURETTAGE OF UTERUS      HYSTERECTOMY         Time Tracking:     PT Received On: 23  PT Start Time: 1047     PT Stop Time: 1059  PT Total Time (min): 12 min     Billable Minutes: Evaluation MOD      2023

## 2023-09-20 NOTE — CONSULTS
[facility] - Emergency Dept  Neurology  Consult Note    Patient Name: Yandy Chaudhry  MRN: [id number]  Admission Date: 9/19/2023  Hospital Length of Stay: 1 days  Code Status: Full Code   Attending Provider: Ephraim Soares MD   Consulting Provider: Rosanne Urbano NP  Primary Care Physician: Fabian Phelps MD  Principal Problem:Left arm weakness    Inpatient consult to Vascular (Stroke) Neurology  Consult performed by: Rosanne Urbano NP  Consult ordered by: Jermaine Chen MD         Subjective:     Chief Complaint:  Left sided numbness/weakness      HPI:   53 year old female with a past medical history of HTN, CAD, hypothyroidism, and anxiety presented to ED on 9/19 for left sided numbness/weakness and elevated BP. She reported her blood pressure is usually well controlled, but it was elevated at home over the last few days despite BP medications. She reported she was at work yesterday when she didn't feel well, she had a headache. SCHMIDT did not feel like a migraine. She went to lay down and had left arm tingling/numbness then left arm weakness and left facial tingling. She reports the episode lasted about 2 hours and resolved spontaneously. Upon arrival to ED, CT head was negative. /94. Neurology was consulted for stroke workup.          Past Medical History:   Diagnosis Date    Acid reflux     Anemia, unspecified     Anxiety     Coronary artery disease     Hyperlipidemia     Hypertension     Migraines     Myocardial infarction     Thrombus     apical    Thyroid disease        Past Surgical History:   Procedure Laterality Date    ANKLE SURGERY Left     ANTERIOR CERVICAL DISCECTOMY W/ FUSION      CHOLECYSTECTOMY      CORONARY ANGIOPLASTY WITH STENT PLACEMENT      DILATION AND CURETTAGE OF UTERUS      HYSTERECTOMY         Review of patient's allergies indicates:  No Known Allergies    Current Neurological Medications:     No current facility-administered medications on  file prior to encounter.     Current Outpatient Medications on File Prior to Encounter   Medication Sig    adalimumab (HUMIRA,CF, PEN SUBQ) Inject 0.4 mcg into the skin. Every two weeks.    aspirin (ECOTRIN) 81 MG EC tablet Take 81 mg by mouth once daily.    FLUoxetine 40 MG capsule Take 40 mg by mouth 2 (two) times daily.    gabapentin (NEURONTIN) 600 MG tablet Take 600 mg by mouth 3 (three) times daily.    levothyroxine (SYNTHROID) 75 MCG tablet Take 75 mcg by mouth before breakfast.    metoprolol succinate (TOPROL-XL) 25 MG 24 hr tablet Take 2 tablets (50 mg total) by mouth once daily. (Patient taking differently: Take 25 mg by mouth once daily.)    oxybutynin (DITROPAN-XL) 10 MG 24 hr tablet Take 10 mg by mouth once daily. As needed    pantoprazole (PROTONIX) 40 MG tablet Take 40 mg by mouth once daily.    REPATHA SURECLICK 140 mg/mL PnIj SMARTSIG:pre-filled pen syringe SUB-Q Every 2 Weeks    rOPINIRole (REQUIP) 0.5 MG tablet Take 0.5 mg by mouth every evening.    rosuvastatin (CRESTOR) 40 MG Tab Take 40 mg by mouth once daily.    tiZANidine (ZANAFLEX) 4 MG tablet Take 4 mg by mouth every 8 (eight) hours as needed.    cetirizine (ZYRTEC) 10 MG tablet Take 10 mg by mouth once daily.    fluticasone propionate (FLONASE) 50 mcg/actuation nasal spray 1 spray by Each Nostril route 2 (two) times daily. Takes as needed    nitroGLYCERIN (NITROSTAT) 0.4 MG SL tablet PLEASE SEE ATTACHED FOR DETAILED DIRECTIONS    [DISCONTINUED] celecoxib (CELEBREX) 100 MG capsule Take 100 mg by mouth 2 (two) times daily as needed.    [DISCONTINUED] gabapentin (NEURONTIN) 100 MG capsule Take 100 mg by mouth nightly.    [DISCONTINUED] naltrexone (DEPADE) 50 mg tablet Take 50 mg by mouth once daily.    [DISCONTINUED] rosuvastatin (CRESTOR) 20 MG tablet Take 20 mg by mouth once daily.     Family History       Problem Relation (Age of Onset)    No Known Problems Mother, Father, Sister, Brother          Tobacco Use    Smoking status: Never      Passive exposure: Never    Smokeless tobacco: Never   Substance and Sexual Activity    Alcohol use: Not Currently    Drug use: Never    Sexual activity: Not on file     Review of Systems   Neurological:  Positive for headaches (resolved). Negative for dizziness, tremors, seizures, syncope, facial asymmetry, speech difficulty, weakness (LUE weakness (resolved)), light-headedness and numbness (resolved).     Objective:     Vital Signs (Most Recent):  Temp: 97.6 °F (36.4 °C) (09/20/23 0054)  Pulse: 68 (09/20/23 0802)  Resp: 14 (09/20/23 0802)  BP: (!) 174/79 (09/20/23 0802)  SpO2: 98 % (09/20/23 0802) Vital Signs (24h Range):  Temp:  [97.6 °F (36.4 °C)-98.6 °F (37 °C)] 97.6 °F (36.4 °C)  Pulse:  [59-71] 68  Resp:  [11-22] 14  SpO2:  [95 %-98 %] 98 %  BP: (155-186)/(74-94) 174/79     Weight: 93 kg (205 lb)  Body mass index is 34.11 kg/m².     Physical Exam  Vitals and nursing note reviewed.   Constitutional:       General: She is not in acute distress.     Appearance: Normal appearance. She is not toxic-appearing.   HENT:      Head: Normocephalic.   Eyes:      General: No visual field deficit.     Extraocular Movements:      Right eye: Normal extraocular motion and no nystagmus.      Left eye: Normal extraocular motion and no nystagmus.      Pupils: Pupils are equal, round, and reactive to light.   Cardiovascular:      Rate and Rhythm: Normal rate.   Pulmonary:      Effort: Pulmonary effort is normal.      Breath sounds: Normal breath sounds.   Musculoskeletal:         General: No swelling. Normal range of motion.      Cervical back: Normal range of motion.      Right lower leg: No edema.      Left lower leg: No edema.   Skin:     General: Skin is warm and dry.   Neurological:      General: No focal deficit present.      Mental Status: She is alert and oriented to person, place, and time.      Cranial Nerves: No dysarthria or facial asymmetry.      Sensory: Sensation is intact. No sensory deficit.      Motor: Motor  function is intact. No weakness, tremor, atrophy, abnormal muscle tone, seizure activity or pronator drift.      Coordination: Coordination normal. Finger-Nose-Finger Test and Heel to Shin Test normal.   Psychiatric:         Attention and Perception: Attention normal.         Mood and Affect: Affect normal.         Speech: Speech normal.         Behavior: Behavior normal.          NEUROLOGICAL EXAMINATION:     MENTAL STATUS   Oriented to person, place, and time.   Speech: speech is normal     CRANIAL NERVES     CN III, IV, VI   Pupils are equal, round, and reactive to light.    GAIT AND COORDINATION      Coordination   Finger to nose coordination: normal      Significant Labs:   Recent Lab Results  (Last 5 results in the past 24 hours)        09/20/23  0916   09/20/23  0559   09/20/23  0317   09/19/23  1958   09/19/23  1841        Albumin/Globulin Ratio       1.3         Albumin       4.0         Alkaline Phosphatase       72         ALT       25         Ao peak elizabeth 1.21               Ao VTI 28.10               Appearance, UA         Cloudy       aPTT     23.6  Comment: For Minimal Heparin Infusion, the goal aPTT 64-85 seconds corresponds to an anti-Xa of 0.3-0.5.    For Low Intensity and High Intensity Heparin, the goal aPTT  seconds corresponds to an anti-Xa of 0.3-0.7           AST       34         AV valve area 2.70               PAIGE by Velocity Ratio 2.78               AV mean gradient 3               AV index (prosthetic) 0.86               AV peak gradient 6               AV Velocity Ratio 0.88               Bacteria, UA         None Seen       Baso #       0.11         Basophil %       1.8         BILIRUBIN TOTAL       0.3         Bilirubin, UA         Negative       BSA 2.06               BUN       8.3         Ca Oxalate Krista, UA         Moderate       Calcium       9.7         Chloride       110         Cholesterol       144         CO2       22         Color, UA         Yellow       CPK MB      <1.0           Creatinine       0.70         Left Ventricle Relative Wall Thickness 0.37               E/A ratio 0.76               E/E' ratio 8.00               eGFR       >60         Eos #       0.39         Eosinophil %       6.3         Estimated Avg Glucose   116.9             E wave deceleration time 240.00               FS 41               Globulin, Total       3.2         Glucose       116         Glucose, UA         Negative       HDL       91         Hematocrit       41.6         Hemoglobin       13.1         Hemoglobin A1C External   5.7             Immature Grans (Abs)       0.00         Immature Granulocytes       0.0         INR     0.9           IVSd 1.00               Ketones, UA         Trace       LA size 3.50               LA volume 56.80               LA Volume Index (Mod) 28.4               LVOT area 3.1               LDL Cholesterol External       7.00         Leukocytes, UA         Negative       LV LATERAL E/E' RATIO 6.91               LV SEPTAL E/E' RATIO 9.50               LV EDV .00               LV Diastolic Volume Index 70.50               LVIDd 5.40               LVIDs 3.20               LV mass 206.74               LV Mass Index 103               Left Ventricular Outflow Tract Mean Gradient 2.00               Left Ventricular Outflow Tract Mean Velocity 0.72               LVOT diameter 2.00               LVOT peak elizabeth 1.07               LVOT stroke volume 75.99               LVOT peak VTI 24.20               LV ESV BP 41.00               LV Systolic Volume Index 20.5               Lymph #       3.14         LYMPH %       50.8         MCH       25.9         MCHC       31.5         MCV       82.4         Mean e' 0.10               Mono #       0.60         Mono %       9.7         MPV       12.2         Mucous, UA         Trace       MV valve area p 1/2 method 3.14               MV valve area by continuity eq 2.25               MV mean gradient 2               MV peak gradient 3                MV Peak A Merlin 1.00               MV Peak E Merlin 0.76               MV stenosis pressure 1/2 time 70.00               MV VTI 33.8               Neut #       1.94         Neut %       31.4         NITRITE UA         Negative       nRBC       0.0         Occult Blood UA         Negative       Ch[place]'s Biplane MOD Ejection Fraction 62               pH, UA         5.5       Platelets       268         Potassium       4.4         PROTEIN TOTAL       7.2         Protein, UA         Negative       Protime     11.8           PV peak gradient 3               PV PEAK VELOCITY 0.90               Posterior Wall 1.00               RBC       5.05         RBC, UA         3-5       RDW       16.5         Sodium       140         Specific Gravity,UA         1.032       Squam Epithel, UA         8-12       TAPSE 2.06               TDI SEPTAL 0.08               TDI LATERAL 0.11               Thyroid Stimulating Hormone       3.730         Total Cholesterol/HDL Ratio       2         Triglycerides       230         Troponin I     <0.010   <0.010         Urobilinogen, UA         0.2       Very Low Density Lipoprotein       46         WBC, UA         0-2       WBC       6.18         ZLVIDD -0.77               ZLVIDS -0.88                                      Significant Imaging: I have reviewed all pertinent imaging results/findings within the past 24 hours.    Assessment and Plan:     TIA (transient ischemic attack)  Left arm weakness/numbness, left face numbness (resolved)-?TIA    Was on aspirin at home, could consider adding Plavix for 21 days, will discuss with MD during rounds   RF for stroke: HTN, CAD, HLD   Therapy evals     Stroke workup:   -MRI brain: negative  -CT Head: negative  -CTA head and neck: negative for flow limiting stenosis or LVO  -TSH: 3.730  -ECHO:     There was agitated saline (bubble) used. Study is negative for shunt.    Left Ventricle: The left ventricle is normal in size. Normal wall thickness.  Normal wall motion. There is normal systolic function with a visually estimated ejection fraction of 60 - 65%. Grade I diastolic dysfunction.    Right Ventricle: Normal right ventricular cavity size. Systolic function is normal.    Aortic Valve: The aortic valve is a trileaflet valve. There is no stenosis. Aortic valve peak velocity is 1.21 m/s. Mean gradient is 3 mmHg. There is no significant regurgitation.    Mitral Valve: There is no stenosis. The mean pressure gradient across the mitral valve is 2 mmHg at a heart rate of  bpm. There is trace regurgitation.    Tricuspid Valve: There is no stenosis. There is trace regurgitation.    Pulmonic Valve: There is no stenosis.  -LDL: 7   -CUS: negative              VTE Risk Mitigation (From admission, onward)           Ordered     enoxaparin injection 40 mg  Daily         09/20/23 0003     IP VTE HIGH RISK PATIENT  Once         09/20/23 0003     Place sequential compression device  Until discontinued         09/20/23 0003                    Thank you for your consult.  Further recommendations to follow from MD Rosanne Urbano NP  Neurology  [facility] - Emergency Dept      I have seen/examined the patient.  NP was scribe.  I agree with the findings unless outlined below:    Teodoro Ashby MD  [facility]       I reviewed imaging/labs with patient in er  Exam is normal  H/o infrequent migraine and NONE with neuro deficit    Mental Status: Alert and oriented x3. Language is fluent with good comprehension.    Cranial Nerve: Pupils are equal, round, and reactive to light. Visual fields are intact to confrontation. Normal fundi. Ocular movements are intact. Face is symmetric at rest and with activation with intact sensation throughout. Hearing intact to finger rub bilaterally. Muscles of tongue and palate activate symmetrically. No dysarthria. Strength is full in sternocleidomastoid and trapezius bilaterally.    Motor: Muscle bulk and tone  are normal. Strength is 5/5 in all four extremities both proximally and distally. Intact fine motor movements bilaterally. There is no pronator drift or satelliting on arm roll.    Sensory: Sensation is intact to light touch, pinprick, vibration, and proprioception throughout. Romberg is negative.    Reflexes: 2+ and symmetric at the biceps, triceps, brachioradialis, patella, and Achilles bilaterally. Plantar response is flexor bilaterally.    Coordination: No dysmetria on finger-nose-finger or heel-knee-shin. Normal rapid alternating movements. Fast finger tapping with normal amplitude and speed.    Gait: Narrow based with normal stride length and good arm swing bilaterally. Able to walk on heels, toes, and in tandem.    Mri neg  Carotid/echo normal    Imp  TIA  Continue statin  Asa 325  Add plavix x 90 days  Ok to dc home today

## 2023-09-20 NOTE — PT/OT/SLP EVAL
Occupational Therapy   Evaluation and Discharge Note    Name: Yandy Chaudhry  MRN: [id number]  Admitting Diagnosis: TIA (transient ischemic attack)  Recent Surgery: * No surgery found *      Recommendations:     Discharge Recommendations: home  Discharge Equipment Recommendations: none  Barriers to discharge:       Assessment:     Yandy Chaudhry is a 53 y.o. female with a medical diagnosis of TIA (transient ischemic attack). At this time, patient is functioning at their prior level of function and does not require further acute OT services.     Plan:     During this hospitalization, patient does not require further acute OT services.  Please re-consult if situation changes.    Plan of Care Reviewed with: patient    Subjective         Occupational Profile:  Living Environment: pt lives with  and daughter in 1 story home with tub shower   Previous level of function: indep   Roles and Routines: drives/parents/   Equipment Used at Home: none  Assistance upon Discharge:      Pain/Comfort:  Pain Rating 1: 5/10  Location 1: head  Pain Addressed 1: Reposition    Patients cultural, spiritual, Sabianist conflicts given the current situation:      Objective:     OT communicated with nrsg prior to session.      Patient was found HOB elevated with   upon OT entry to room.    General Precautions: Standard,    Orthopedic Precautions:    Braces:      Vital Signs: Blood Pressure: 170/78  HR: 67    Bed Mobility:    Patient completed Supine to Sit with independence  Patient completed Sit to Supine with independence    Functional Mobility/Transfers:  Patient completed Sit <> Stand Transfer with independence  with  no assistive device   Functional Mobility: no LOB    Activities of Daily Living:  Lower Body Dressing: independence    Toileting: independence      AMPAC 6 Click ADL:  AMPAC Total Score:      Functional Cognition:  Intact  Orientation: oriented to Person, Place, Time, and  Situation    Visual Perceptual Skills:  Intact    Upper Extremity Function:  Right Upper Extremity:   WFL  Coordination: WFL  Sensation: WFL    Left Upper Extremity:  WFL  Coordination: WFL  Sensation: WFL    Balance:   Intact    No pressure related skin breakdown noted at this time       Patient Education:  Patient provided with verbal education education regarding OT role/goals/POC, fall prevention, and safety awareness.  Understanding was verbalized.     Patient left HOB elevated with all lines intact and call button in reach    GOALS:   Multidisciplinary Problems       Occupational Therapy Goals       Not on file                    History:     Past Medical History:   Diagnosis Date    Acid reflux     Anemia, unspecified     Anxiety     Coronary artery disease     Hyperlipidemia     Hypertension     Migraines     Myocardial infarction     Thrombus     apical    Thyroid disease          Past Surgical History:   Procedure Laterality Date    ANKLE SURGERY Left     ANTERIOR CERVICAL DISCECTOMY W/ FUSION      CHOLECYSTECTOMY      CORONARY ANGIOPLASTY WITH STENT PLACEMENT      DILATION AND CURETTAGE OF UTERUS      HYSTERECTOMY         Time Tracking:     OT Date of Treatment:    OT Start Time: 1048  OT Stop Time: 1100  OT Total Time (min): 12 min    Billable Minutes:Evaluation Low Complexity     9/20/2023

## 2023-09-20 NOTE — DISCHARGE SUMMARY
[facility] Hospital Medicine Discharge Summary    Admit Date: 9/19/2023  Discharge Date and Time: 9/20/20231:03 PM  Admitting Physician:  Team  Discharging Physician: Ephraim Soares MD.  Primary Care Physician: Fabian Phelps MD  Consults: Neurology    Discharge Diagnoses:  TIA  Hypertension  Dyslipidemia  Hypothyroidism  Known CAD with history of coronary stent 2020    Hospital Course:   53 yr old female whose history includes HTN, CAD, hypothyroidism, anxiety. Reports her blood pressure has been elevated last few days (170/100) when usually well controlled. This morning she woke up with a headache which persisted even after taking her B/P meds. Developed weakness and tingling of left arm and left facial numbness, which lasted two hours before completely resolving. Denies any confusion, syncope, chest pain or SOB. Initial B/P 165/94. Head CT w/o negative for acute processes. ASA 325mg given in ED. At the time of my exam patient AAO x 3 and no complaints.   MRI brain negative for any acute intracranial abnormality  Received aspirin 325 mg, neurology consulted  Echocardiogram shows EF 60-65%, grade 1 diastolic dysfunction  Bilateral carotid ultrasound shows less than 50% stenosis  Patient's symptoms has completely resolved.  Neurology evaluated the patient, cleared patient for discharge home.  Recommended Plavix 75 mg daily.  Patient to follow up in stroke clinic in 2 weeks  Passed bedside swallow, start cardiac diet    Pt was seen and examined on the day of discharge  Vitals:  VITAL SIGNS: 24 HRS MIN & MAX LAST   Temp  Min: 97.6 °F (36.4 °C)  Max: 98.6 °F (37 °C) 97.6 °F (36.4 °C)   BP  Min: 155/81  Max: 186/74 (!) 174/79   Pulse  Min: 59  Max: 71  68   Resp  Min: 11  Max: 22 14   SpO2  Min: 95 %  Max: 98 % 98 %       Physical Exam:  Vitals reviewed.  General: In no acute distress, afebrile  Chest: Clear to auscultation bilaterally  Heart: S1, S2, no appreciable murmur  Abdomen:  Soft, nontender, BS +  MSK: Warm, no lower extremity edema, no clubbing or cyanosis  Neurologic: Alert and oriented x4, moving all extremities with good strength     Recent Labs   Lab 09/19/23 1958   WBC 6.18   RBC 5.05   HGB 13.1   HCT 41.6   MCV 82.4   MCH 25.9*   MCHC 31.5*   RDW 16.5      MPV 12.2*       Recent Labs   Lab 09/19/23 1958      K 4.4   CO2 22   BUN 8.3*   CREATININE 0.70   CALCIUM 9.7   ALBUMIN 4.0   ALKPHOS 72   ALT 25   AST 34   BILITOT 0.3        Microbiology Results (last 7 days)       ** No results found for the last 168 hours. **             Echo    There was agitated saline (bubble) used. Study is negative for shunt.    Left Ventricle: The left ventricle is normal in size. Normal wall   thickness. Normal wall motion. There is normal systolic function with a   visually estimated ejection fraction of 60 - 65%. Grade I diastolic   dysfunction.    Right Ventricle: Normal right ventricular cavity size. Systolic   function is normal.    Aortic Valve: The aortic valve is a trileaflet valve. There is no   stenosis. Aortic valve peak velocity is 1.21 m/s. Mean gradient is 3 mmHg.   There is no significant regurgitation.    Mitral Valve: There is no stenosis. The mean pressure gradient across   the mitral valve is 2 mmHg at a heart rate of  bpm. There is trace   regurgitation.    Tricuspid Valve: There is no stenosis. There is trace regurgitation.    Pulmonic Valve: There is no stenosis.  MRI BRAIN WITHOUT CONTRAST  Narrative: EXAMINATION:  MRI BRAIN WITHOUT CONTRAST    CLINICAL HISTORY:  Stroke, follow up;    TECHNIQUE:  Multiplanar MRI sequences were performed of the brain without contrast.    COMPARISON:  CT brain September 19, 2023.    FINDINGS:  Gray matter and white matter signal characteristics are unremarkable.  There are no    foci of abnormal increased or decreased signal intensity throughout the cerebral hemispheres, cerebellum, basal ganglia or brainstem.  There is cerebral  and cerebellar cortical volume loss.  Gradient echo sequences demonstrate no evidence of de phasing artifact to suggest hemorrhagic byproducts. No evidence of diffusion restriction or ADC map signal drop out to suggest acute infarct.  There is empty sella.    The cerebellar tonsils are normally positioned. There is no acute intracranial hemorrhage, hydrocephalus, midline shift or mass effect. No acute extra axial fluid collections identified. The mastoid air cells are clear.  Impression: No acute intracranial findings identified.    No significant discrepancy with overnight report.    .    Electronically signed by: Doug Salazar  Date:    09/20/2023  Time:    07:42         Medication List        START taking these medications      clopidogreL 75 mg tablet  Commonly known as: PLAVIX  Take 1 tablet (75 mg total) by mouth once daily.            CHANGE how you take these medications      metoprolol succinate 25 MG 24 hr tablet  Commonly known as: TOPROL-XL  Take 2 tablets (50 mg total) by mouth once daily.  What changed: how much to take            CONTINUE taking these medications      aspirin 81 MG EC tablet  Commonly known as: ECOTRIN     cetirizine 10 MG tablet  Commonly known as: ZYRTEC     FLUoxetine 40 MG capsule     fluticasone propionate 50 mcg/actuation nasal spray  Commonly known as: FLONASE     gabapentin 600 MG tablet  Commonly known as: NEURONTIN     HUMIRA(CF) PEN SUBQ     levothyroxine 75 MCG tablet  Commonly known as: SYNTHROID     nitroGLYCERIN 0.4 MG SL tablet  Commonly known as: NITROSTAT     oxybutynin 10 MG 24 hr tablet  Commonly known as: DITROPAN-XL     pantoprazole 40 MG tablet  Commonly known as: PROTONIX     REPATHA SURECLICK 140 mg/mL Pnij  Generic drug: evolocumab     rOPINIRole 0.5 MG tablet  Commonly known as: REQUIP     rosuvastatin 40 MG Tab  Commonly known as: CRESTOR     tiZANidine 4 MG tablet  Commonly known as: ZANAFLEX               Where to Get Your Medications        These  medications were sent to [facility]/pharm[facility] #[id number] - [facility] - [address]  [address] , [place] [address]      Phone: [phone]   clopidogreL 75 mg tablet          Explained in detail to the patient about the discharge plan, medications, and follow-up visits. Pt understands and agrees with the treatment plan  Discharge Disposition: Home or Self Care   Discharged Condition: stable  Diet-   Dietary Orders (From admission, onward)       Start     Ordered    09/20/23 1126  Diet heart healthy  Diet effective now         09/20/23 1125                   Medications Per DC med rec  Activities as tolerated   Follow-up Information       Fabian Phelps MD. Schedule an appointment as soon as possible for a visit in 2 week(s).    Specialty: Internal Medicine  Why: post discharge  Contact information:  [address]  [place] [id number] [phone]               Yury Navarro MD. Schedule an appointment as soon as possible for a visit in 2 week(s).    Specialties: Neurology, Interventional Neurology  Why: post discharge, ote pt on palvix x 21 days only  Contact information:  [facility] [address]  [address]  [place] [id number] [phone]                           For further questions contact hospitalist office    Discharge time 34 minutes    For worsening symptoms, chest pain, shortness of breath, increased abdominal pain, high grade fever, stroke or stroke like symptoms, immediately go to the nearest Emergency Room or call 911 as soon as possible.      Ephraim Soares MD  Department of Hospital Medicine   [facility]   09/20/2023 1:03 PM

## 2023-09-20 NOTE — H&P
[facility] Hospital Medicine - H&P Note    Patient Name: Yandy Chaudhry  : 1970  MRN: [id number]  PCP: Fabian Phelps MD  Admitting Physician:   Admission Class: IP- Inpatient   Code status: --    Allergies   Patient has no known allergies.    Chief Complaint   Headache, left arm numbness/tingling    History of Present Illness   53 yr old female whose history includes HTN, CAD, hypothyroidism, anxiety. Reports her blood pressure has been elevated last few days (170/100) when usually well controlled. This morning she woke up with a headache which persisted even after taking her B/P meds. Developed weakness and tingling of left arm and left facial numbness, which lasted two hours before completely resolving. Denies any confusion, syncope, chest pain or SOB. Initial B/P 165/94. Head CT w/o negative for acute processes. ASA 325mg given in ED. At the time of my exam patient AAO x 3 and no complaints.     ROS   Except as documented, all other systems reviewed and negative     Past Medical History   Hypertension  CAD  Dyslipidemia  Hypothyroidism  GERD  Migraines  Anxiety  Apical thrombus  Ankylosing spondylitis - on Humira    Past Surgical History   Cholecystectomy  Hysterectomy  Coronary stent -   Left ankle fracture repair  Cervical disectomy and fusion    Social History     Social History     Tobacco Use    Smoking status: Never     Passive exposure: Never    Smokeless tobacco: Never   Substance Use Topics    Alcohol use: Not Currently        Family History   Reviewed and negative    Home Medications     Prior to Admission medications    Medication Sig Start Date End Date Taking? Authorizing Provider   adalimumab (HUMIRA,CF, PEN SUBQ) Inject into the skin.    Provider, Historical   aspirin (ECOTRIN) 81 MG EC tablet Take 81 mg by mouth once daily.    Provider, Historical   cetirizine (ZYRTEC) 10 MG tablet Take 10 mg by mouth once daily. 3/25/22   Provider,  Historical   FLUoxetine 40 MG capsule Take 40 mg by mouth 2 (two) times daily.    Provider, Historical   fluticasone propionate (FLONASE) 50 mcg/actuation nasal spray 1 spray by Each Nostril route 2 (two) times daily. Takes as needed 2/10/22   Provider, Historical   gabapentin (NEURONTIN) 600 MG tablet Take 600 mg by mouth 3 (three) times daily.    Provider, Historical   levothyroxine (SYNTHROID) 75 MCG tablet Take 75 mcg by mouth before breakfast.    Provider, Historical   metoprolol succinate (TOPROL-XL) 25 MG 24 hr tablet Take 2 tablets (50 mg total) by mouth once daily. 8/2/22   Mary Grace Monzon, SAFIA   nitroGLYCERIN (NITROSTAT) 0.4 MG SL tablet PLEASE SEE ATTACHED FOR DETAILED DIRECTIONS 7/19/22   Provider, Historical   oxybutynin (DITROPAN-XL) 10 MG 24 hr tablet Take 10 mg by mouth once daily. As needed 6/1/22   Provider, Historical   pantoprazole (PROTONIX) 40 MG tablet Take 40 mg by mouth once daily. 6/20/22   Provider, Historical   REPATHA SURECLICK 140 mg/mL PnIj SMARTSIG:pre-filled pen syringe SUB-Q Every 2 Weeks 4/3/23   Provider, Historical   rOPINIRole (REQUIP) 0.5 MG tablet Take 0.5 mg by mouth every evening.    Provider, Historical   rosuvastatin (CRESTOR) 40 MG Tab Take 40 mg by mouth once daily.    Provider, Historical   tiZANidine (ZANAFLEX) 4 MG tablet Take 4 mg by mouth every 8 (eight) hours as needed.    Provider, Historical   celecoxib (CELEBREX) 100 MG capsule Take 100 mg by mouth 2 (two) times daily as needed. 7/13/22 9/20/23  Provider, Historical   gabapentin (NEURONTIN) 100 MG capsule Take 100 mg by mouth nightly. 6/20/22 9/20/23  Provider, Historical   naltrexone (DEPADE) 50 mg tablet Take 50 mg by mouth once daily.  9/20/23  Provider, Historical   rosuvastatin (CRESTOR) 20 MG tablet Take 20 mg by mouth once daily.  9/20/23  Provider, Historical        Physical Exam   Vital Signs  Temp:  [98.6 °F (37 °C)]   Pulse:  [59-65]   Resp:  [20-22]   BP: (163-186)/(74-94)   SpO2:  [95 %-98 %]   "  General: Appears comfortable  HEENT: NC/AT  Neck:  No JVD  Chest: CTABL  CVS: Regular rhythm. Normal S1/S2.  Abdomen: nondistended, normoactive BS, soft and non-tender.  MSK: No obvious deformity or joint swelling  Skin: Warm and dry  Neuro: AAOx3, no focal neurological deficit  Psych: Cooperative    Labs     Recent Labs     09/19/23 1958   WBC 6.18   RBC 5.05   HGB 13.1   HCT 41.6   MCV 82.4   MCH 25.9*   MCHC 31.5*   RDW 16.5        No results for input(s): "PROTIME", "INR", "PTT", "D-DIMER", "FERRITIN", "IRON", "TRANS", "TIBC", "LABIRON", "[id number]", "FOLATE", "LDH", "HAPTOGLOBIN", "RETICCNTAUTO", "RETABS", "PERIPSMEAREV" in the last 72 hours.   Recent Labs     09/19/23 1958      K 4.4   CHLORIDE 110*   CO2 22   BUN 8.3*   CREATININE 0.70   EGFRNORACEVR >60   GLUCOSE 116*   CALCIUM 9.7   ALBUMIN 4.0   GLOBULIN 3.2   ALKPHOS 72   ALT 25   AST 34   BILITOT 0.3   CHOL 144   TRIG 230*   LDL 7.00*   HDL 91*   TSH 3.730     No results for input(s): "LACTIC" in the last 72 hours.  Recent Labs     09/19/23 1958   TROPONINI <0.010        Microbiology Results (last 7 days)       ** No results found for the last 168 hours. **           Imaging   CT Head Without Contrast  Narrative: EXAMINATION:  CT HEAD WITHOUT CONTRAST    CLINICAL HISTORY:  Neuro deficit, acute, stroke suspected;    TECHNIQUE:  Sequential axial images were performed of the brain without contrast.    Dose product length of 1218 mGycm. Automated exposure control was utilized to minimize radiation dose.    COMPARISON:  December 4, 2022.    FINDINGS:  There is no intracranial mass effect, midline shift, hydrocephalus or hemorrhage. There is no sulcal effacement or low attenuation changes to suggest recent large vessel territory infarction. The ventricular system and sulcal markings prominence is consistent with atrophy. There is no acute extra axial fluid collection. Visualized paranasal sinuses are clear without mucosal thickening, " polypoidal abnormality or air-fluid levels. Mastoid air cells aeration is optimal.  Impression: No acute intracranial findings identified.    Electronically signed by: Doug Salazar  Date:    09/19/2023  Time:    20:45    Assessment & Plan   TIA - R/O CVA  - MRI brain, ECHO, carotid US pending  - ASA 325mg daily  - Neuro consult  - PT/OT/ST  - NPO for now except meds    Hypertension   - allow permissive hypertension for now    Dyslipidemia  - LDL 46  - continue statin    Hypothyroidism  - TSH 3.73  - home synthroid resumed    CAD - coronary stent 2020 - stable    VTE Prophylaxis:  Tamara Hopkins, [facility] have discussed this patients case with Dr. Chen who agrees with the diagnosis and treatment plan.      ________________________________________________________________________  I, Dr. Jermaine Chen assumed care of this patient.  For the patient encounter, I performed the substantive portion of the visit, I reviewed the NPPA documentation, treatment plan, and medical decision making.  I had face to face time with this patient.  I have personally reviewed the labs and test results that are presently available. I have reviewed or attempted to review medical records based upon their availability. If patient was admitted under observational status it is with my approval.      53-year-old female presents with 2 hours of left upper extremity heaviness, paresthesia and left lower facial paresthesia.  Currently on exam she is no appreciable focal neurologic deficits.  She was given aspirin and admitted under stroke protocol with consultation to Neurology.\    Time seen: 12:20AM 9/20/23  Critical care time: 35 min; Critical care diagnosis:  Jermaine Chen MD

## 2023-09-20 NOTE — ASSESSMENT & PLAN NOTE
Left arm weakness/numbness, left face numbness (resolved)-?TIA    Was on aspirin at home, could consider adding Plavix for 21 days, will discuss with MD during rounds   RF for stroke: HTN, CAD, HLD   Therapy evals     Stroke workup:   -MRI brain: negative  -CT Head: negative  -CTA head and neck: negative for flow limiting stenosis or LVO  -TSH: 3.730  -ECHO:     There was agitated saline (bubble) used. Study is negative for shunt.    Left Ventricle: The left ventricle is normal in size. Normal wall thickness. Normal wall motion. There is normal systolic function with a visually estimated ejection fraction of 60 - 65%. Grade I diastolic dysfunction.    Right Ventricle: Normal right ventricular cavity size. Systolic function is normal.    Aortic Valve: The aortic valve is a trileaflet valve. There is no stenosis. Aortic valve peak velocity is 1.21 m/s. Mean gradient is 3 mmHg. There is no significant regurgitation.    Mitral Valve: There is no stenosis. The mean pressure gradient across the mitral valve is 2 mmHg at a heart rate of  bpm. There is trace regurgitation.    Tricuspid Valve: There is no stenosis. There is trace regurgitation.    Pulmonic Valve: There is no stenosis.  -LDL: 7   -CUS: negative

## 2023-09-20 NOTE — SUBJECTIVE & OBJECTIVE
Past Medical History:   Diagnosis Date    Acid reflux     Anemia, unspecified     Anxiety     Coronary artery disease     Hyperlipidemia     Hypertension     Migraines     Myocardial infarction     Thrombus     apical    Thyroid disease        Past Surgical History:   Procedure Laterality Date    ANKLE SURGERY Left     ANTERIOR CERVICAL DISCECTOMY W/ FUSION      CHOLECYSTECTOMY      CORONARY ANGIOPLASTY WITH STENT PLACEMENT      DILATION AND CURETTAGE OF UTERUS      HYSTERECTOMY         Review of patient's allergies indicates:  No Known Allergies    Current Neurological Medications:     No current facility-administered medications on file prior to encounter.     Current Outpatient Medications on File Prior to Encounter   Medication Sig    adalimumab (HUMIRA,CF, PEN SUBQ) Inject 0.4 mcg into the skin. Every two weeks.    aspirin (ECOTRIN) 81 MG EC tablet Take 81 mg by mouth once daily.    FLUoxetine 40 MG capsule Take 40 mg by mouth 2 (two) times daily.    gabapentin (NEURONTIN) 600 MG tablet Take 600 mg by mouth 3 (three) times daily.    levothyroxine (SYNTHROID) 75 MCG tablet Take 75 mcg by mouth before breakfast.    metoprolol succinate (TOPROL-XL) 25 MG 24 hr tablet Take 2 tablets (50 mg total) by mouth once daily. (Patient taking differently: Take 25 mg by mouth once daily.)    oxybutynin (DITROPAN-XL) 10 MG 24 hr tablet Take 10 mg by mouth once daily. As needed    pantoprazole (PROTONIX) 40 MG tablet Take 40 mg by mouth once daily.    REPATHA SURECLICK 140 mg/mL PnIj SMARTSIG:pre-filled pen syringe SUB-Q Every 2 Weeks    rOPINIRole (REQUIP) 0.5 MG tablet Take 0.5 mg by mouth every evening.    rosuvastatin (CRESTOR) 40 MG Tab Take 40 mg by mouth once daily.    tiZANidine (ZANAFLEX) 4 MG tablet Take 4 mg by mouth every 8 (eight) hours as needed.    cetirizine (ZYRTEC) 10 MG tablet Take 10 mg by mouth once daily.    fluticasone propionate (FLONASE) 50 mcg/actuation nasal spray 1 spray by Each Nostril route 2 (two)  times daily. Takes as needed    nitroGLYCERIN (NITROSTAT) 0.4 MG SL tablet PLEASE SEE ATTACHED FOR DETAILED DIRECTIONS    [DISCONTINUED] celecoxib (CELEBREX) 100 MG capsule Take 100 mg by mouth 2 (two) times daily as needed.    [DISCONTINUED] gabapentin (NEURONTIN) 100 MG capsule Take 100 mg by mouth nightly.    [DISCONTINUED] naltrexone (DEPADE) 50 mg tablet Take 50 mg by mouth once daily.    [DISCONTINUED] rosuvastatin (CRESTOR) 20 MG tablet Take 20 mg by mouth once daily.     Family History       Problem Relation (Age of Onset)    No Known Problems Mother, Father, Sister, Brother          Tobacco Use    Smoking status: Never     Passive exposure: Never    Smokeless tobacco: Never   Substance and Sexual Activity    Alcohol use: Not Currently    Drug use: Never    Sexual activity: Not on file     Review of Systems   Neurological:  Positive for headaches (resolved). Negative for dizziness, tremors, seizures, syncope, facial asymmetry, speech difficulty, weakness (LUE weakness (resolved)), light-headedness and numbness (resolved).     Objective:     Vital Signs (Most Recent):  Temp: 97.6 °F (36.4 °C) (09/20/23 0054)  Pulse: 68 (09/20/23 0802)  Resp: 14 (09/20/23 0802)  BP: (!) 174/79 (09/20/23 0802)  SpO2: 98 % (09/20/23 0802) Vital Signs (24h Range):  Temp:  [97.6 °F (36.4 °C)-98.6 °F (37 °C)] 97.6 °F (36.4 °C)  Pulse:  [59-71] 68  Resp:  [11-22] 14  SpO2:  [95 %-98 %] 98 %  BP: (155-186)/(74-94) 174/79     Weight: 93 kg (205 lb)  Body mass index is 34.11 kg/m².     Physical Exam  Vitals and nursing note reviewed.   Constitutional:       General: She is not in acute distress.     Appearance: Normal appearance. She is not toxic-appearing.   HENT:      Head: Normocephalic.   Eyes:      General: No visual field deficit.     Extraocular Movements:      Right eye: Normal extraocular motion and no nystagmus.      Left eye: Normal extraocular motion and no nystagmus.      Pupils: Pupils are equal, round, and reactive to  light.   Cardiovascular:      Rate and Rhythm: Normal rate.   Pulmonary:      Effort: Pulmonary effort is normal.      Breath sounds: Normal breath sounds.   Musculoskeletal:         General: No swelling. Normal range of motion.      Cervical back: Normal range of motion.      Right lower leg: No edema.      Left lower leg: No edema.   Skin:     General: Skin is warm and dry.   Neurological:      General: No focal deficit present.      Mental Status: She is alert and oriented to person, place, and time.      Cranial Nerves: No dysarthria or facial asymmetry.      Sensory: Sensation is intact. No sensory deficit.      Motor: Motor function is intact. No weakness, tremor, atrophy, abnormal muscle tone, seizure activity or pronator drift.      Coordination: Coordination normal. Finger-Nose-Finger Test and Heel to Shin Test normal.   Psychiatric:         Attention and Perception: Attention normal.         Mood and Affect: Affect normal.         Speech: Speech normal.         Behavior: Behavior normal.          NEUROLOGICAL EXAMINATION:     MENTAL STATUS   Oriented to person, place, and time.   Speech: speech is normal     CRANIAL NERVES     CN III, IV, VI   Pupils are equal, round, and reactive to light.    GAIT AND COORDINATION      Coordination   Finger to nose coordination: normal      Significant Labs:   Recent Lab Results  (Last 5 results in the past 24 hours)        09/20/23  0916   09/20/23  0559   09/20/23  0317   09/19/23  1958   09/19/23  1841        Albumin/Globulin Ratio       1.3         Albumin       4.0         Alkaline Phosphatase       72         ALT       25         Ao peak elizabeth 1.21               Ao VTI 28.10               Appearance, UA         Cloudy       aPTT     23.6  Comment: For Minimal Heparin Infusion, the goal aPTT 64-85 seconds corresponds to an anti-Xa of 0.3-0.5.    For Low Intensity and High Intensity Heparin, the goal aPTT  seconds corresponds to an anti-Xa of 0.3-0.7           AST        34         AV valve area 2.70               PAIGE by Velocity Ratio 2.78               AV mean gradient 3               AV index (prosthetic) 0.86               AV peak gradient 6               AV Velocity Ratio 0.88               Bacteria, UA         None Seen       Baso #       0.11         Basophil %       1.8         BILIRUBIN TOTAL       0.3         Bilirubin, UA         Negative       BSA 2.06               BUN       8.3         Ca Oxalate Krista, UA         Moderate       Calcium       9.7         Chloride       110         Cholesterol       144         CO2       22         Color, UA         Yellow       CPK MB     <1.0           Creatinine       0.70         Left Ventricle Relative Wall Thickness 0.37               E/A ratio 0.76               E/E' ratio 8.00               eGFR       >60         Eos #       0.39         Eosinophil %       6.3         Estimated Avg Glucose   116.9             E wave deceleration time 240.00               FS 41               Globulin, Total       3.2         Glucose       116         Glucose, UA         Negative       HDL       91         Hematocrit       41.6         Hemoglobin       13.1         Hemoglobin A1C External   5.7             Immature Grans (Abs)       0.00         Immature Granulocytes       0.0         INR     0.9           IVSd 1.00               Ketones, UA         Trace       LA size 3.50               LA volume 56.80               LA Volume Index (Mod) 28.4               LVOT area 3.1               LDL Cholesterol External       7.00         Leukocytes, UA         Negative       LV LATERAL E/E' RATIO 6.91               LV SEPTAL E/E' RATIO 9.50               LV EDV .00               LV Diastolic Volume Index 70.50               LVIDd 5.40               LVIDs 3.20               LV mass 206.74               LV Mass Index 103               Left Ventricular Outflow Tract Mean Gradient 2.00               Left Ventricular Outflow Tract Mean Velocity 0.72                LVOT diameter 2.00               LVOT peak merlin 1.07               LVOT stroke volume 75.99               LVOT peak VTI 24.20               LV ESV BP 41.00               LV Systolic Volume Index 20.5               Lymph #       3.14         LYMPH %       50.8         MCH       25.9         MCHC       31.5         MCV       82.4         Mean e' 0.10               Mono #       0.60         Mono %       9.7         MPV       12.2         Mucous, UA         Trace       MV valve area p 1/2 method 3.14               MV valve area by continuity eq 2.25               MV mean gradient 2               MV peak gradient 3               MV Peak A Merlin 1.00               MV Peak E Merlin 0.76               MV stenosis pressure 1/2 time 70.00               MV VTI 33.8               Neut #       1.94         Neut %       31.4         NITRITE UA         Negative       nRBC       0.0         Occult Blood UA         Negative       [place]'s Biplane MOD Ejection Fraction 62               pH, UA         5.5       Platelets       268         Potassium       4.4         PROTEIN TOTAL       7.2         Protein, UA         Negative       Protime     11.8           PV peak gradient 3               PV PEAK VELOCITY 0.90               Posterior Wall 1.00               RBC       5.05         RBC, UA         3-5       RDW       16.5         Sodium       140         Specific Gravity,UA         1.032       Squam Epithel, UA         8-12       TAPSE 2.06               TDI SEPTAL 0.08               TDI LATERAL 0.11               Thyroid Stimulating Hormone       3.730         Total Cholesterol/HDL Ratio       2         Triglycerides       230         Troponin I     <0.010   <0.010         Urobilinogen, UA         0.2       Very Low Density Lipoprotein       46         WBC, UA         0-2       WBC       6.18         ZLVIDD -0.77               ZLVIDS -0.88                                      Significant Imaging: I have reviewed all pertinent  imaging results/findings within the past 24 hours.

## 2023-09-20 NOTE — PT/OT/SLP EVAL
[facility]  Speech Language Pathology Department  Cognitive-Communication Evaluation    Patient Name:  Yandy Chaudhry   MRN:  [id number]    Recommendations:     General recommendations:  SLP intervention not indicated  Communication strategies:  none  Barriers to safe discharge: none    History:     Yandy Chaudhry is a/n 53 y.o. female admitted for left sided numbness/weakness and elevated BP.     Past Medical History:   Diagnosis Date    Acid reflux     Anemia, unspecified     Anxiety     Coronary artery disease     Hyperlipidemia     Hypertension     Migraines     Myocardial infarction     Thrombus     apical    Thyroid disease      Past Surgical History:   Procedure Laterality Date    ANKLE SURGERY Left     ANTERIOR CERVICAL DISCECTOMY W/ FUSION      CHOLECYSTECTOMY      CORONARY ANGIOPLASTY WITH STENT PLACEMENT      DILATION AND CURETTAGE OF UTERUS      HYSTERECTOMY         Previous level of Function  Education:doctorate ( )  Occupation: full time job  Lives: with spouse  Handed: Right  Glasses: yes  Hearing Aids: no    Imaging   Results for orders placed during the hospital encounter of 09/19/23    MRI BRAIN WITHOUT CONTRAST    Narrative  EXAMINATION:  MRI BRAIN WITHOUT CONTRAST    CLINICAL HISTORY:  Stroke, follow up;    TECHNIQUE:  Multiplanar MRI sequences were performed of the brain without contrast.    COMPARISON:  CT brain September 19, 2023.    FINDINGS:  Gray matter and white matter signal characteristics are unremarkable.  There are no    foci of abnormal increased or decreased signal intensity throughout the cerebral hemispheres, cerebellum, basal ganglia or brainstem.  There is cerebral and cerebellar cortical volume loss.  Gradient echo sequences demonstrate no evidence of de phasing artifact to suggest hemorrhagic byproducts. No evidence of diffusion restriction or ADC map signal drop out to suggest acute infarct.  There is empty sella.    The  "cerebellar tonsils are normally positioned. There is no acute intracranial hemorrhage, hydrocephalus, midline shift or mass effect. No acute extra axial fluid collections identified. The mastoid air cells are clear.    Impression  No acute intracranial findings identified.  No significant discrepancy with overnight report.  Electronically signed by: Doug Salazar  Date:    2023  Time:    07:42    Subjective     Patient awake, alert, and oriented x4 .  Patient goals: "to go home"   Spiritual/Cultural/Spiritism Beliefs/Practices that affect care: no  Pain/Comfort: Pain Rating 1: 0/10    Objective:     ORAL MUSCULATURE  Dentition: own teeth  Facial Movement: WFL  Buccal Strength & Mobility: WFL  Mandibular Strength & Mobility: WFL    SPEECH PRODUCTION  Phoneme Production: adequate  Voice Quality: adequate  Voice Production: adequate  Speech Rate: appropriate  Loudness: acceptable  Respiration: WFL for speech  Speech Intelligibility  Known Context: Greater that 90%  Unknown Context: Greater that 90%    AUDITORY COMPREHENSION  Identification:  Body parts: 100%  Objects: 100%  Following Directions:  1-Step: 100%  2-Step: 100%  Yes/No Questions:  Biographical: 100%  Environmental: 100%  Simple: 100%  Complex: 100%    VERBAL EXPRESSION  Automatic Speech:  Days of the week: 100%  Months of the year: 100%  Repetition:  Phonemes: 100%  Single syllable words: 100%  Bi-syllabic words: 100%  Phrases: 100%  Phrase Completion: 100  Confrontation Naming  Body Parts: 100%  Objects: 100%  Wh- Questions:  Object name: 100%  Object function: 100%    COGNITION  Orientation:  Person: yes  Place: yes  Time: yes  Situation: yes   Attention:  Focused: 100%  Sustained: 100%  Memory:  Immediate: 100%  Delayed: 100%  Short Term: 100%  Problem Solving  Functional simple: 100%  Functional complex: 100%  Organization:  Convergent thinkin%  Divergent thinkin  Executive Function:  Attention to detail: 100%  Information processing: " 100%  Plannin%    Assessment:     Pt presents with functional speech and language skills, cognitive linguistic skills note to be at baseline. No skilled SLP intervention is warranted at this time.     Patient Education:     Patient provided with verbal education regarding POC.  Understanding was verbalized.     Time Tracking:     SLP Treatment Date:    23  Speech Start Time:   1000  Speech Stop Time:      1015  Speech Total Time (min):   15    Billable minutes:  Evaluation of Speech Sound Production with Comprehension and Expression, 15 minutes     2023

## 2023-09-20 NOTE — PLAN OF CARE
Problem: Physical Therapy  Goal: Physical Therapy Goal  Description: Goals to be met by: 10/20/23     Patient will increase functional independence with mobility by performin. Sit to stand transfer with [place]  2. Gait  x 250 feet with [place] using No Assistive Device.     Outcome: Ongoing, Progressing

## 2023-09-20 NOTE — ED PROVIDER NOTES
Encounter Date: 9/19/2023    SCRIBE #1 NOTE: I, Purnima Baker, am scribing for, and in the presence of,  Toribio Wang IV, MD. I have scribed the entire note.       History     Chief Complaint   Patient presents with    Hypertension     Elevated blood pressure with headache and left arm weakness at 1130, pt reports arm weakness is better. Dr Chong sent here for evaluation.      53 year old female with a hx of HTN, MI, and CAD presents to the ED for hypertension. Pt states she took her blood pressure medication this morning and notes that her pressure never went down. Pt began experiencing left UE weakness and headache around 1130 today. Pt was seen by Dr. Chong and was recommended to come to the ED for further evaluation. Pt is still experiencing some weakness and headache at this time. Pt denies any LE weakness.     The history is provided by the patient. No  was used.     Review of patient's allergies indicates:  No Known Allergies  Past Medical History:   Diagnosis Date    Acid reflux     Anemia, unspecified     Anxiety     Coronary artery disease     Hyperlipidemia     Hypertension     Migraines     Myocardial infarction     Thrombus     apical    Thyroid disease      Past Surgical History:   Procedure Laterality Date    ANKLE SURGERY Left     ANTERIOR CERVICAL DISCECTOMY W/ FUSION      CHOLECYSTECTOMY      CORONARY ANGIOPLASTY WITH STENT PLACEMENT      DILATION AND CURETTAGE OF UTERUS      HYSTERECTOMY       Family History   Problem Relation Age of Onset    No Known Problems Mother     No Known Problems Father     No Known Problems Sister     No Known Problems Brother      Social History     Tobacco Use    Smoking status: Never     Passive exposure: Never    Smokeless tobacco: Never   Substance Use Topics    Alcohol use: Not Currently    Drug use: Never     Review of Systems   Constitutional:  Negative for chills and fever.   HENT:  Negative for congestion, rhinorrhea and sore throat.     Eyes:  Negative for visual disturbance.   Respiratory:  Negative for cough and shortness of breath.    Cardiovascular:  Negative for chest pain and leg swelling.   Gastrointestinal:  Negative for abdominal pain, nausea and vomiting.   Genitourinary:  Negative for dysuria, hematuria, vaginal bleeding and vaginal discharge.   Musculoskeletal:  Negative for joint swelling.   Skin:  Negative for rash.   Neurological:  Positive for weakness (left UE) and headaches.   Psychiatric/Behavioral:  Negative for confusion.        Physical Exam     Initial Vitals [09/19/23 1824]   BP Pulse Resp Temp SpO2   (!) 165/94 65 20 98.6 °F (37 °C) 95 %      MAP       --         Physical Exam    Nursing note and vitals reviewed.  Constitutional: She is not diaphoretic. No distress.   Cardiovascular:  Normal rate and regular rhythm.           No murmur heard.  Pulmonary/Chest: Breath sounds normal. No respiratory distress. She has no wheezes. She has no rales.   Abdominal: Abdomen is soft. She exhibits no distension. There is no abdominal tenderness.     Neurological: She is alert and oriented to person, place, and time. No cranial nerve deficit or sensory deficit. GCS eye subscore is 4. GCS verbal subscore is 5. GCS motor subscore is 6.   Left UE pronator drift    Psychiatric: She has a normal mood and affect.         ED Course   Procedures  Labs Reviewed   URINALYSIS, REFLEX TO URINE CULTURE - Abnormal; Notable for the following components:       Result Value    Appearance, UA Cloudy (*)     Specific Gravity, UA 1.032 (*)     Ketones, UA Trace (*)     All other components within normal limits   URINALYSIS, MICROSCOPIC - Abnormal; Notable for the following components:    Squamous Epithelial Cells, UA 8-12 (*)     Mucous, UA Trace (*)     Calcium Oxalate Crystals, UA Moderate (*)     All other components within normal limits   COMPREHENSIVE METABOLIC PANEL - Abnormal; Notable for the following components:    Chloride 110 (*)     Glucose  Level 116 (*)     Blood Urea Nitrogen 8.3 (*)     All other components within normal limits   CBC WITH DIFFERENTIAL - Abnormal; Notable for the following components:    MCH 25.9 (*)     MCHC 31.5 (*)     MPV 12.2 (*)     Neut # 1.94 (*)     All other components within normal limits   LIPID PANEL - Abnormal; Notable for the following components:    HDL Cholesterol 91 (*)     Triglyceride 230 (*)     LDL Cholesterol 7.00 (*)     All other components within normal limits   TROPONIN I - Normal   TSH - Normal   CBC W/ AUTO DIFFERENTIAL    Narrative:     The following orders were created for panel order CBC auto differential.  Procedure                               Abnormality         Status                     ---------                               -----------         ------                     CBC with Differential[[id number]]       Abnormal            Final result                 Please view results for these tests on the individual orders.   URINALYSIS, REFLEX TO URINE CULTURE   TROPONIN I   CK-MB   APTT   PROTIME-INR     EKG Readings: (Independently Interpreted)   Initial Reading: No STEMI.   EKG NSR at rate of 63, normal axis/intervals, no ischemic changes, time is 1823       Imaging Results              MRI BRAIN WITHOUT CONTRAST (Preliminary result)  Result time 09/20/23 01:42:49      Preliminary result by Kyree France Jr., MD (09/20/23 01:42:49)                   Narrative:    START OF REPORT:  Technique: Multiplanar, multisequence magnetic resonance imaging of the brain was performed without intravenous contrast.    Comparison: Correlation is with CT study dated2023-09-19 20:28:34.    Clinical history: Stroke follow up.    Findings:  Hemorrhage: No acute intracranial hemorrhage is identified.  Stroke: No abnormal signal is identified on the diffusion images to suggest acute infarct.  Extra axial spaces: The ventricles sulci and basal cisterns are within normal limits.  Cerebral, cerebellar, and  brainstem parenchyma: Within normal limits.  Cranial nerves: Normal.  Herniation: None.  Calvarium: Within normal limits.  Vascular system: Normal flow voids.  Visualized paranasal sinuses: Normal.  Visualized orbits: The visualized orbits appear unremarkable.  Sella and skull base: Normal.  Temporal bones and mastoids: Within normal limits.      Impression:  1. No abnormal signal is identified on the diffusion images to suggest acute infarct.  2. No acute intracranial process is identified. Details and other findings as discussed above.                                         CT Head Without Contrast (Final result)  Result time 09/19/23 20:45:36      Final result by Doug Salazar MD (09/19/23 20:45:36)                   Impression:      No acute intracranial findings identified.      Electronically signed by: Doug Salazar  Date:    09/19/2023  Time:    20:45               Narrative:    EXAMINATION:  CT HEAD WITHOUT CONTRAST    CLINICAL HISTORY:  Neuro deficit, acute, stroke suspected;    TECHNIQUE:  Sequential axial images were performed of the brain without contrast.    Dose product length of 1218 mGycm. Automated exposure control was utilized to minimize radiation dose.    COMPARISON:  December 4, 2022.    FINDINGS:  There is no intracranial mass effect, midline shift, hydrocephalus or hemorrhage. There is no sulcal effacement or low attenuation changes to suggest recent large vessel territory infarction. The ventricular system and sulcal markings prominence is consistent with atrophy. There is no acute extra axial fluid collection. Visualized paranasal sinuses are clear without mucosal thickening, polypoidal abnormality or air-fluid levels. Mastoid air cells aeration is optimal.                                       Medications   acetaminophen tablet 650 mg (650 mg Oral Given 9/19/23 2236)   sodium chloride 0.9% flush 10 mL (has no administration in time range)   labetaloL injection 10 mg (has no  administration in time range)   enoxaparin injection 40 mg (has no administration in time range)   aspirin EC tablet 325 mg (has no administration in time range)   FLUoxetine capsule 40 mg (has no administration in time range)   levothyroxine tablet 75 mcg (has no administration in time range)   metoprolol succinate (TOPROL-XL) 24 hr tablet 50 mg (has no administration in time range)   oxybutynin tablet 5 mg (has no administration in time range)   atorvastatin tablet 40 mg (has no administration in time range)   ondansetron injection 4 mg (4 mg Intravenous Given 9/20/23 0209)   0.9%  NaCl infusion ( Intravenous New Bag 9/20/23 0208)   aspirin EC tablet 325 mg (325 mg Oral Given 9/19/23 2153)       Medical Decision Making  Problems Addressed:  Left arm weakness: acute illness or injury that poses a threat to life or bodily functions    Risk  OTC drugs.  Decision regarding hospitalization.      ED assessment:    52 yo emale with a history of CAD hypertension presenting with left upper extremity numbness that started earlier this morning improving.  also reports that her blood pressure has been elevated today despite compliance with her medications.  Does have some pronator to her left upper extremity on exam otherwise no focal neuro deficits will admit for stroke TIA workup    Differential diagnosis (including but not limited to):   Judging by the patient's chief complaint and pertinent history, the patient has the following possible differential diagnoses, including but not limited to the following.  Some of these are deemed to be lower likelihood and some more likely based on my physical exam and history combined with possible lab work and/or imaging studies.   Please see the pertinent studies, and refer to the HPI.  Some of these diagnoses will take further evaluation to fully rule out, perhaps as an outpatient and the patient was encouraged to follow up when discharged for more comprehensive  evaluation.    Generalized weakness: anemia, dehydration, electrolyte derangement, hypoglycemia, infection, intoxication, respiratory failure, toxic ingestion, ACS, thyroid disease, medications, psychiatric, autoimmune, rhabdomyolysis, myositis, peripheral neuropathy   Focal weakness: CVA, ICH, spinal cord lesion, MS, ALS, guillain Baker, transverse myelitis, myasthenia gravis      ED management:   ASA   Admission     My independent radiology interpretation:   Head CT - no obvious bleed or mass       Amount and/or Complexity of Data Reviewed  Independent historian: none  External data reviewed: notes from previous admissions, notes from previous ED visits, notes from clinic visits, and prescription medications   Risk and benefits of testing: discussed   Labs: ordered and reviewed  Radiology: ordered and independent interpretation performed (see above or ED course)  ECG/medicine tests: ordered and independent interpretation performed (see above or ED course)  Discussion of management or test interpretation with external provider(s): discussed with hospitalist physician   Summary of discussion: will admit     Risk  Decision regarding hospitalization    Critical Care  none    Toribio MIRELES MD personally performed the history, PE, MDM, and procedures as documented above and agree with the scribe's documentation.           Scribe Attestation:   Scribe #1: I performed the above scribed service and the documentation accurately describes the services I performed. I attest to the accuracy of the note.    Attending Attestation:           Physician Attestation for Scribe:  Physician Attestation Statement for Scribe #1: Kathleen MIRELES Adolph B IV, MD, reviewed documentation, as scribed by Purnima Baker in my presence, and it is both accurate and complete.             ED Course as of 09/20/23 0229   Tue Sep 19, 2023   2138 Dr. Chen will admit.  [AC]      ED Course User Index  [AC] Toribio Wang IV, MD                    Clinical  Impression:   Final diagnoses:  [R42] Dizziness  [R29.898] Left arm weakness        ED Disposition Condition    Admit                 Toribio Wang IV, MD  09/20/23 3691

## 2023-09-20 NOTE — HPI
53 year old female with a past medical history of HTN, CAD, hypothyroidism, and anxiety presented to ED on 9/19 for left sided numbness/weakness and elevated BP. She reported her blood pressure is usually well controlled, but it was elevated at home over the last few days despite BP medications. She reported she was at work yesterday when she didn't feel well, she had a headache. SCHMIDT did not feel like a migraine. She went to lay down and had left arm tingling/numbness then left arm weakness and left facial tingling. She reports the episode lasted about 2 hours and resolved spontaneously. Upon arrival to ED, CT head was negative. /94. Neurology was consulted for stroke workup.

## 2023-10-08 ENCOUNTER — HOSPITAL ENCOUNTER (OUTPATIENT)
Facility: HOSPITAL | Age: 53
Discharge: HOME | DRG: 103 | End: 2023-10-09
Attending: EMERGENCY MEDICINE | Admitting: INTERNAL MEDICINE
Payer: COMMERCIAL

## 2023-10-08 DIAGNOSIS — I63.9 STROKE: ICD-10-CM

## 2023-10-08 LAB
ALBUMIN SERPL-MCNC: 3.8 G/DL (ref 3.5–5)
ALBUMIN/GLOB SERPL: 1.3 RATIO (ref 1.1–2)
ALP SERPL-CCNC: 62 UNIT/L (ref 40–150)
ALT SERPL-CCNC: 20 UNIT/L (ref 0–55)
ANION GAP SERPL CALC-SCNC: 19 MMOL/L (ref 8–16)
AST SERPL-CCNC: 30 UNIT/L (ref 5–34)
BASOPHILS # BLD AUTO: 0.12 X10(3)/MCL
BASOPHILS NFR BLD AUTO: 2 %
BILIRUB SERPL-MCNC: 0.3 MG/DL
BUN SERPL-MCNC: 9 MG/DL (ref 6–30)
BUN SERPL-MCNC: 9 MG/DL (ref 9.8–20.1)
CALCIUM SERPL-MCNC: 9.4 MG/DL (ref 8.4–10.2)
CHLORIDE SERPL-SCNC: 104 MMOL/L (ref 95–110)
CHLORIDE SERPL-SCNC: 108 MMOL/L (ref 98–107)
CHOLEST SERPL-MCNC: 114 MG/DL
CHOLEST/HDLC SERPL: 1 {RATIO} (ref 0–5)
CO2 SERPL-SCNC: 23 MMOL/L (ref 22–29)
CREAT SERPL-MCNC: 0.7 MG/DL (ref 0.5–1.4)
CREAT SERPL-MCNC: 0.75 MG/DL (ref 0.55–1.02)
EOSINOPHIL # BLD AUTO: 0.42 X10(3)/MCL (ref 0–0.9)
EOSINOPHIL NFR BLD AUTO: 7 %
ERYTHROCYTE [DISTWIDTH] IN BLOOD BY AUTOMATED COUNT: 16.3 % (ref 11.5–17)
GFR SERPLBLD CREATININE-BSD FMLA CKD-EPI: >60 MLS/MIN/1.73/M2
GLOBULIN SER-MCNC: 2.9 GM/DL (ref 2.4–3.5)
GLUCOSE SERPL-MCNC: 102 MG/DL (ref 70–110)
GLUCOSE SERPL-MCNC: 106 MG/DL (ref 74–100)
GLUCOSE SERPL-MCNC: 109 MG/DL (ref 70–110)
HCT VFR BLD AUTO: 39 % (ref 37–47)
HCT VFR BLD CALC: 40 %PCV (ref 36–54)
HDLC SERPL-MCNC: 78 MG/DL (ref 35–60)
HGB BLD-MCNC: 12.7 G/DL (ref 12–16)
HGB BLD-MCNC: 14 G/DL
IMM GRANULOCYTES # BLD AUTO: 0.01 X10(3)/MCL (ref 0–0.04)
IMM GRANULOCYTES NFR BLD AUTO: 0.2 %
INR PPP: 0.9
LDLC SERPL CALC-MCNC: 18 MG/DL (ref 50–140)
LYMPHOCYTES # BLD AUTO: 2.72 X10(3)/MCL (ref 0.6–4.6)
LYMPHOCYTES NFR BLD AUTO: 45.6 %
MCH RBC QN AUTO: 26.3 PG (ref 27–31)
MCHC RBC AUTO-ENTMCNC: 32.6 G/DL (ref 33–36)
MCV RBC AUTO: 80.7 FL (ref 80–94)
MONOCYTES # BLD AUTO: 0.76 X10(3)/MCL (ref 0.1–1.3)
MONOCYTES NFR BLD AUTO: 12.7 %
NEUTROPHILS # BLD AUTO: 1.94 X10(3)/MCL (ref 2.1–9.2)
NEUTROPHILS NFR BLD AUTO: 32.5 %
NRBC BLD AUTO-RTO: 0 %
PLATELET # BLD AUTO: 256 X10(3)/MCL (ref 130–400)
PMV BLD AUTO: 11.5 FL (ref 7.4–10.4)
POC IONIZED CALCIUM: 1.27 MMOL/L (ref 1.06–1.42)
POC PTINR: 1 (ref 0.9–1.2)
POC PTWBT: 11.5 SEC (ref 9.7–14.3)
POC TCO2 (MEASURED): 23 MMOL/L (ref 23–29)
POCT GLUCOSE: 102 MG/DL (ref 70–110)
POTASSIUM BLD-SCNC: 4 MMOL/L (ref 3.5–5.1)
POTASSIUM SERPL-SCNC: 4 MMOL/L (ref 3.5–5.1)
PROT SERPL-MCNC: 6.7 GM/DL (ref 6.4–8.3)
PROTHROMBIN TIME: 12.1 SECONDS (ref 12.5–14.5)
RBC # BLD AUTO: 4.83 X10(6)/MCL (ref 4.2–5.4)
SAMPLE: ABNORMAL
SAMPLE: NORMAL
SODIUM BLD-SCNC: 142 MMOL/L (ref 136–145)
SODIUM SERPL-SCNC: 140 MMOL/L (ref 136–145)
TRIGL SERPL-MCNC: 89 MG/DL (ref 37–140)
TSH SERPL-ACNC: 0.94 UIU/ML (ref 0.35–4.94)
VLDLC SERPL CALC-MCNC: 18 MG/DL
WBC # SPEC AUTO: 5.97 X10(3)/MCL (ref 4.5–11.5)

## 2023-10-08 PROCEDURE — 99215 OFFICE O/P EST HI 40 MIN: CPT | Mod: ,,, | Performed by: PSYCHIATRY & NEUROLOGY

## 2023-10-08 PROCEDURE — 96374 THER/PROPH/DIAG INJ IV PUSH: CPT

## 2023-10-08 PROCEDURE — 63600175 PHARM REV CODE 636 W HCPCS: Performed by: NURSE PRACTITIONER

## 2023-10-08 PROCEDURE — 25000003 PHARM REV CODE 250: Performed by: EMERGENCY MEDICINE

## 2023-10-08 PROCEDURE — 96372 THER/PROPH/DIAG INJ SC/IM: CPT | Mod: 59 | Performed by: NURSE PRACTITIONER

## 2023-10-08 PROCEDURE — 84443 ASSAY THYROID STIM HORMONE: CPT | Performed by: EMERGENCY MEDICINE

## 2023-10-08 PROCEDURE — G0378 HOSPITAL OBSERVATION PER HR: HCPCS

## 2023-10-08 PROCEDURE — 25000003 PHARM REV CODE 250: Performed by: NURSE PRACTITIONER

## 2023-10-08 PROCEDURE — 85025 COMPLETE CBC W/AUTO DIFF WBC: CPT | Performed by: EMERGENCY MEDICINE

## 2023-10-08 PROCEDURE — 11000001 HC ACUTE MED/SURG PRIVATE ROOM

## 2023-10-08 PROCEDURE — 80053 COMPREHEN METABOLIC PANEL: CPT | Performed by: EMERGENCY MEDICINE

## 2023-10-08 PROCEDURE — 99285 EMERGENCY DEPT VISIT HI MDM: CPT | Mod: 25

## 2023-10-08 PROCEDURE — 82962 GLUCOSE BLOOD TEST: CPT

## 2023-10-08 PROCEDURE — 25500020 PHARM REV CODE 255: Performed by: EMERGENCY MEDICINE

## 2023-10-08 PROCEDURE — 85610 PROTHROMBIN TIME: CPT | Performed by: EMERGENCY MEDICINE

## 2023-10-08 PROCEDURE — 93005 ELECTROCARDIOGRAM TRACING: CPT

## 2023-10-08 PROCEDURE — 99215 PR OFFICE/OUTPT VISIT, EST, LEVL V, 40-54 MIN: ICD-10-PCS | Mod: ,,, | Performed by: PSYCHIATRY & NEUROLOGY

## 2023-10-08 PROCEDURE — 80061 LIPID PANEL: CPT | Performed by: EMERGENCY MEDICINE

## 2023-10-08 PROCEDURE — 80047 BASIC METABLC PNL IONIZED CA: CPT

## 2023-10-08 RX ORDER — SODIUM CHLORIDE 0.9 % (FLUSH) 0.9 %
10 SYRINGE (ML) INJECTION
Status: DISCONTINUED | OUTPATIENT
Start: 2023-10-08 | End: 2023-10-09 | Stop reason: HOSPADM

## 2023-10-08 RX ORDER — CLOPIDOGREL BISULFATE 75 MG/1
75 TABLET ORAL DAILY
Status: DISCONTINUED | OUTPATIENT
Start: 2023-10-09 | End: 2023-10-08

## 2023-10-08 RX ORDER — GABAPENTIN 300 MG/1
600 CAPSULE ORAL 3 TIMES DAILY
Status: DISCONTINUED | OUTPATIENT
Start: 2023-10-09 | End: 2023-10-09 | Stop reason: HOSPADM

## 2023-10-08 RX ORDER — ENOXAPARIN SODIUM 100 MG/ML
40 INJECTION SUBCUTANEOUS EVERY 24 HOURS
Status: DISCONTINUED | OUTPATIENT
Start: 2023-10-08 | End: 2023-10-09 | Stop reason: HOSPADM

## 2023-10-08 RX ORDER — ASPIRIN 81 MG/1
81 TABLET ORAL DAILY
Status: DISCONTINUED | OUTPATIENT
Start: 2023-10-09 | End: 2023-10-08

## 2023-10-08 RX ORDER — CLOPIDOGREL BISULFATE 75 MG/1
75 TABLET ORAL DAILY
Status: DISCONTINUED | OUTPATIENT
Start: 2023-10-09 | End: 2023-10-09 | Stop reason: HOSPADM

## 2023-10-08 RX ORDER — METOPROLOL SUCCINATE 25 MG/1
25 TABLET, EXTENDED RELEASE ORAL 2 TIMES DAILY
COMMUNITY

## 2023-10-08 RX ORDER — ASPIRIN 81 MG/1
81 TABLET ORAL DAILY
Status: DISCONTINUED | OUTPATIENT
Start: 2023-10-09 | End: 2023-10-09 | Stop reason: HOSPADM

## 2023-10-08 RX ORDER — ROPINIROLE 0.25 MG/1
0.5 TABLET, FILM COATED ORAL NIGHTLY
Status: DISCONTINUED | OUTPATIENT
Start: 2023-10-08 | End: 2023-10-09 | Stop reason: HOSPADM

## 2023-10-08 RX ORDER — ATORVASTATIN CALCIUM 40 MG/1
80 TABLET, FILM COATED ORAL DAILY
Status: DISCONTINUED | OUTPATIENT
Start: 2023-10-09 | End: 2023-10-09 | Stop reason: HOSPADM

## 2023-10-08 RX ORDER — METOPROLOL SUCCINATE 25 MG/1
25 TABLET, EXTENDED RELEASE ORAL DAILY
Status: DISCONTINUED | OUTPATIENT
Start: 2023-10-09 | End: 2023-10-09 | Stop reason: HOSPADM

## 2023-10-08 RX ORDER — ASPIRIN 325 MG
325 TABLET ORAL
Status: COMPLETED | OUTPATIENT
Start: 2023-10-08 | End: 2023-10-08

## 2023-10-08 RX ORDER — OXYBUTYNIN CHLORIDE 5 MG/1
5 TABLET ORAL DAILY
Status: DISCONTINUED | OUTPATIENT
Start: 2023-10-09 | End: 2023-10-09 | Stop reason: HOSPADM

## 2023-10-08 RX ORDER — ATORVASTATIN CALCIUM 40 MG/1
40 TABLET, FILM COATED ORAL DAILY
Status: DISCONTINUED | OUTPATIENT
Start: 2023-10-09 | End: 2023-10-08

## 2023-10-08 RX ORDER — PANTOPRAZOLE SODIUM 40 MG/1
40 TABLET, DELAYED RELEASE ORAL DAILY
Status: DISCONTINUED | OUTPATIENT
Start: 2023-10-09 | End: 2023-10-09 | Stop reason: HOSPADM

## 2023-10-08 RX ORDER — LABETALOL HYDROCHLORIDE 5 MG/ML
10 INJECTION, SOLUTION INTRAVENOUS
Status: DISCONTINUED | OUTPATIENT
Start: 2023-10-08 | End: 2023-10-09 | Stop reason: HOSPADM

## 2023-10-08 RX ORDER — BUTALBITAL, ACETAMINOPHEN AND CAFFEINE 50; 325; 40 MG/1; MG/1; MG/1
1 TABLET ORAL EVERY 4 HOURS PRN
Status: DISCONTINUED | OUTPATIENT
Start: 2023-10-09 | End: 2023-10-09

## 2023-10-08 RX ORDER — FLUOXETINE HYDROCHLORIDE 20 MG/1
40 CAPSULE ORAL 2 TIMES DAILY
Status: DISCONTINUED | OUTPATIENT
Start: 2023-10-08 | End: 2023-10-09 | Stop reason: HOSPADM

## 2023-10-08 RX ORDER — ASPIRIN 325 MG
325 TABLET, DELAYED RELEASE (ENTERIC COATED) ORAL DAILY
Status: DISCONTINUED | OUTPATIENT
Start: 2023-10-09 | End: 2023-10-08

## 2023-10-08 RX ADMIN — FLUOXETINE 40 MG: 20 CAPSULE ORAL at 11:10

## 2023-10-08 RX ADMIN — ENOXAPARIN SODIUM 40 MG: 40 INJECTION SUBCUTANEOUS at 06:10

## 2023-10-08 RX ADMIN — ROPINIROLE HYDROCHLORIDE 0.5 MG: 0.25 TABLET, FILM COATED ORAL at 11:10

## 2023-10-08 RX ADMIN — LABETALOL HYDROCHLORIDE 10 MG: 5 INJECTION, SOLUTION INTRAVENOUS at 09:10

## 2023-10-08 RX ADMIN — ASPIRIN 325 MG ORAL TABLET 325 MG: 325 PILL ORAL at 04:10

## 2023-10-08 RX ADMIN — IOHEXOL 90 ML: 350 INJECTION, SOLUTION INTRAVENOUS at 03:10

## 2023-10-08 RX ADMIN — BUTALBITAL, ACETAMINOPHEN, AND CAFFEINE 1 TABLET: 50; 325; 40 TABLET ORAL at 11:10

## 2023-10-08 NOTE — SUBJECTIVE & OBJECTIVE
Past Medical History:   Diagnosis Date    Acid reflux     Anemia, unspecified     Anxiety     Coronary artery disease     Hyperlipidemia     Hypertension     Migraines     Myocardial infarction     Thrombus     apical    Thyroid disease        Past Surgical History:   Procedure Laterality Date    ANKLE SURGERY Left     ANTERIOR CERVICAL DISCECTOMY W/ FUSION      CHOLECYSTECTOMY      CORONARY ANGIOPLASTY WITH STENT PLACEMENT      DILATION AND CURETTAGE OF UTERUS      HYSTERECTOMY         Review of patient's allergies indicates:  No Known Allergies    Current Neurological Medications:     No current facility-administered medications on file prior to encounter.     Current Outpatient Medications on File Prior to Encounter   Medication Sig    adalimumab (HUMIRA,CF, PEN SUBQ) Inject 0.4 mcg into the skin. Every two weeks.    aspirin (ECOTRIN) 81 MG EC tablet Take 81 mg by mouth once daily.    cetirizine (ZYRTEC) 10 MG tablet Take 10 mg by mouth once daily.    clopidogreL (PLAVIX) 75 mg tablet Take 1 tablet (75 mg total) by mouth once daily.    FLUoxetine 40 MG capsule Take 40 mg by mouth 2 (two) times daily.    fluticasone propionate (FLONASE) 50 mcg/actuation nasal spray 1 spray by Each Nostril route 2 (two) times daily. Takes as needed    gabapentin (NEURONTIN) 600 MG tablet Take 600 mg by mouth 3 (three) times daily.    levothyroxine (SYNTHROID) 75 MCG tablet Take 75 mcg by mouth before breakfast.    metoprolol succinate (TOPROL-XL) 25 MG 24 hr tablet Take 2 tablets (50 mg total) by mouth once daily.    nitroGLYCERIN (NITROSTAT) 0.4 MG SL tablet PLEASE SEE ATTACHED FOR DETAILED DIRECTIONS    oxybutynin (DITROPAN-XL) 10 MG 24 hr tablet Take 10 mg by mouth once daily. As needed    pantoprazole (PROTONIX) 40 MG tablet Take 40 mg by mouth once daily.    REPATHA SURECLICK 140 mg/mL Shira SMARTSIG:pre-filled pen syringe SUB-Q Every 2 Weeks    rOPINIRole (REQUIP) 0.5 MG tablet Take 0.5 mg by mouth every evening.     rosuvastatin (CRESTOR) 40 MG Tab Take 40 mg by mouth once daily.    tiZANidine (ZANAFLEX) 4 MG tablet Take 4 mg by mouth every 8 (eight) hours as needed.     Family History       Problem Relation (Age of Onset)    No Known Problems Mother, Father, Sister, Brother          Tobacco Use    Smoking status: Never     Passive exposure: Never    Smokeless tobacco: Never   Substance and Sexual Activity    Alcohol use: Not Currently    Drug use: Never    Sexual activity: Not on file     Review of Systems   All other systems reviewed and are negative.    Objective:     Vital Signs (Most Recent):  Temp: 97.8 °F (36.6 °C) (10/08/23 1446)  Pulse: (!) 59 (10/08/23 1646)  Resp: 19 (10/08/23 1646)  BP: (!) 174/93 (10/08/23 1646)  SpO2: 98 % (10/08/23 1646) Vital Signs (24h Range):  Temp:  [97.8 °F (36.6 °C)] 97.8 °F (36.6 °C)  Pulse:  [59-69] 59  Resp:  [11-19] 19  SpO2:  [98 %-99 %] 98 %  BP: (174-195)/(82-93) 174/93     Weight: (S) 95.1 kg (209 lb 8.8 oz)  Body mass index is 34.87 kg/m².     Physical Exam  Constitutional:       Appearance: Normal appearance. She is obese.   HENT:      Head: Normocephalic and atraumatic.   Eyes:      Extraocular Movements: Extraocular movements intact.      Pupils: Pupils are equal, round, and reactive to light.   Pulmonary:      Effort: Pulmonary effort is normal.   Abdominal:      Palpations: Abdomen is soft.   Musculoskeletal:         General: Normal range of motion.      Cervical back: Normal range of motion and neck supple.   Skin:     General: Skin is warm and dry.   Neurological:      Mental Status: She is alert and oriented to person, place, and time.      Comments: Mild left pronator drift. LUE 4/5, LLE 5/5. RUE 5/5, RLe 5/5. FT normal. Decreased sensation to LUE but not to face. Gait untested.          NEUROLOGICAL EXAMINATION:     MENTAL STATUS   Oriented to person, place, and time.     CRANIAL NERVES     CN III, IV, VI   Pupils are equal, round, and reactive to light.      Significant  Labs: CBC:   Recent Labs   Lab 10/08/23  1502 10/08/23  1521   WBC  --  5.97   HGB  --  12.7   HCT 40 39.0   PLT  --  256     CMP:   Recent Labs   Lab 10/08/23  1521      K 4.0   CO2 23   BUN 9.0*   CREATININE 0.75   CALCIUM 9.4   ALBUMIN 3.8   BILITOT 0.3   ALKPHOS 62   AST 30   ALT 20       Significant Imaging: I have reviewed all pertinent imaging results/findings within the past 24 hours.

## 2023-10-08 NOTE — HPI
Yandy Huffman is a 52 y/o female with a history of hypertension, hyperlipidemia, CAD s/p stents, MI, thyroid migraines, and anxiety who presented to the ER with complaints of left arm weakness, speech difficulty, and SCHMIDT than began 45 minutes prior to arrival. Patient was reportedly ambulating upon arrival. Patient diagnosed with TIA and discharged on Plavix on 9/20/23. Code Fast actibated at 2:45 pm. CT head negative for acute abnormality. TNK not recommended r/t antiplatelet. Did not appear to be LVO. NIH 4. CTA unrevealing. MT not indicated. Per report, she did not take Plavix today. She received one time dose  mg. Patient to be admitted to Hospitalist's and Neurology consulted for further evaluation.  Upon my assessment. Patient tells me that she is compliant with home Plavix and has been taking  mg, which is not on her home list. She tells me that she had a mild HA around 12:45 and began to feel some loss of sensation in the left side of her face which then extended to her left arm, along with some mild weakness. She did not have slurred speech but felt she was having difficulty getting her words out.  states, that he did not notice any facial droop or slurred speech at that time. Symptoms are improving but still persist.

## 2023-10-08 NOTE — ASSESSMENT & PLAN NOTE
Rule out stroke verus TIA    Antithrombotics for secondary stroke prevention: Antiplatelets: Aspirin: 81 mg daily  Clopidogrel: 75 mg daily    Statins for secondary stroke prevention and hyperlipidemia, if present:   Statins: Atorvastatin- 40 mg daily    Aggressive risk factor modification: HTN, HLD, Obesity, CAD     Rehab efforts: The patient has been evaluated by a stroke team provider and the therapy needs have been fully considered based off the presenting complaints and exam findings. The following therapy evaluations are needed: PT evaluate and treat, OT evaluate and treat, SLP evaluate and treat    Diagnostics ordered/pending: MRI head without contrast to assess brain parenchyma    VTE prophylaxis: Mechanical prophylaxis: Place SCDs    BP parameters: TIA: SBP <220 until imaging confirmation of no infarct     Ok to resume home ASA 81 mg and Plavix    I do not see the need to complete entire stroke workup as it was just completed on 9/22. Will get MRI to rule out acute stroke and go from there.    Further recommendations to follow.     Current Results      -CT head negative      -CTA unremarkable      -TSH- 0.935       -LDL-18    Previous stroke workup results  -MRI brain: negative  -CT Head: negative  -CTA head and neck: negative for flow limiting stenosis or LVO  -TSH: 3.730  -ECHO:     There was agitated saline (bubble) used. Study is negative for shunt.    Left Ventricle: The left ventricle is normal in size. Normal wall thickness. Normal wall motion. There is normal systolic function with a visually estimated ejection fraction of 60 - 65%. Grade I diastolic dysfunction.    Right Ventricle: Normal right ventricular cavity size. Systolic function is normal.    Aortic Valve: The aortic valve is a trileaflet valve. There is no stenosis. Aortic valve peak velocity is 1.21 m/s. Mean gradient is 3 mmHg. There is no significant regurgitation.    Mitral Valve: There is no stenosis. The mean pressure gradient  across the mitral valve is 2 mmHg at a heart rate of  bpm. There is trace regurgitation.    Tricuspid Valve: There is no stenosis. There is trace regurgitation.    Pulmonic Valve: There is no stenosis.  -LDL: 7   -CUS: negative    CODE FAST Documentation:    Initiated 14:45  CT head 15:10  TNK Decision 15:22   CTA- 15:53

## 2023-10-08 NOTE — CONSULTS
[facility] - Emergency Dept  Neurology  Consult Note    Patient Name: Yandy Chaudhry  MRN: [id number]  Admission Date: 10/8/2023  Hospital Length of Stay: 0 days  Code Status: Full Code   Attending Provider: Adilia Sharp MD   Consulting Provider: Avis Roca NP  Primary Care Physician: Fabina Phelps MD  Principal Problem:<principal problem not specified>    Inpatient consult to Vascular (Stroke) Neurology  Consult performed by: Avis Roca NP  Consult ordered by: Lex Stanley MD      Inpatient consult to Neurology  Consult performed by: Avis Roca NP  Consult ordered by: Lex Stanley MD         Subjective:     Chief Complaint:    Chief Complaint   Patient presents with    Cerebrovascular Accident     LUE and LLE weakness, expressive aphasia, and L facial asymmetry. . Hx of TIA x2 weeks ago. On Plavix            HPI:   Yandy Huffman is a 52 y/o female with a history of hypertension, hyperlipidemia, CAD s/p stents, MI, thyroid migraines, and anxiety who presented to the ER with complaints of left arm weakness, speech difficulty, and SCHMIDT than began 45 minutes prior to arrival. Patient was reportedly ambulating upon arrival. Patient diagnosed with TIA and discharged on Plavix on 9/20/23. Code Fast actibated at 2:45 pm. CT head negative for acute abnormality. TNK not recommended r/t antiplatelet. Did not appear to be LVO. NIH 4. CTA unrevealing. MT not indicated. Per report, she did not take Plavix today. She received one time dose  mg. Patient to be admitted to Hospitalist's and Neurology consulted for further evaluation.  Upon my assessment. Patient tells me that she is compliant with home Plavix and has been taking  mg, which is not on her home list. She tells me that she had a mild HA around 12:45 and began to feel some loss of sensation in the left side of her face which then extended to her left arm, along with some  mild weakness. She did not have slurred speech but felt she was having difficulty getting her words out.  states, that he did not notice any facial droop or slurred speech at that time. Symptoms are improving but still persist.        Past Medical History:   Diagnosis Date    Acid reflux     Anemia, unspecified     Anxiety     Coronary artery disease     Hyperlipidemia     Hypertension     Migraines     Myocardial infarction     Thrombus     apical    Thyroid disease        Past Surgical History:   Procedure Laterality Date    ANKLE SURGERY Left     ANTERIOR CERVICAL DISCECTOMY W/ FUSION      CHOLECYSTECTOMY      CORONARY ANGIOPLASTY WITH STENT PLACEMENT      DILATION AND CURETTAGE OF UTERUS      HYSTERECTOMY         Review of patient's allergies indicates:  No Known Allergies    Current Neurological Medications:     No current facility-administered medications on file prior to encounter.     Current Outpatient Medications on File Prior to Encounter   Medication Sig    adalimumab (HUMIRA,CF, PEN SUBQ) Inject 0.4 mcg into the skin. Every two weeks.    aspirin (ECOTRIN) 81 MG EC tablet Take 81 mg by mouth once daily.    cetirizine (ZYRTEC) 10 MG tablet Take 10 mg by mouth once daily.    clopidogreL (PLAVIX) 75 mg tablet Take 1 tablet (75 mg total) by mouth once daily.    FLUoxetine 40 MG capsule Take 40 mg by mouth 2 (two) times daily.    fluticasone propionate (FLONASE) 50 mcg/actuation nasal spray 1 spray by Each Nostril route 2 (two) times daily. Takes as needed    gabapentin (NEURONTIN) 600 MG tablet Take 600 mg by mouth 3 (three) times daily.    levothyroxine (SYNTHROID) 75 MCG tablet Take 75 mcg by mouth before breakfast.    metoprolol succinate (TOPROL-XL) 25 MG 24 hr tablet Take 2 tablets (50 mg total) by mouth once daily.    nitroGLYCERIN (NITROSTAT) 0.4 MG SL tablet PLEASE SEE ATTACHED FOR DETAILED DIRECTIONS    oxybutynin (DITROPAN-XL) 10 MG 24 hr tablet Take 10 mg by mouth once daily. As needed     pantoprazole (PROTONIX) 40 MG tablet Take 40 mg by mouth once daily.    REPATHA SURECLICK 140 mg/mL PnIj SMARTSIG:pre-filled pen syringe SUB-Q Every 2 Weeks    rOPINIRole (REQUIP) 0.5 MG tablet Take 0.5 mg by mouth every evening.    rosuvastatin (CRESTOR) 40 MG Tab Take 40 mg by mouth once daily.    tiZANidine (ZANAFLEX) 4 MG tablet Take 4 mg by mouth every 8 (eight) hours as needed.     Family History       Problem Relation (Age of Onset)    No Known Problems Mother, Father, Sister, Brother          Tobacco Use    Smoking status: Never     Passive exposure: Never    Smokeless tobacco: Never   Substance and Sexual Activity    Alcohol use: Not Currently    Drug use: Never    Sexual activity: Not on file     Review of Systems   All other systems reviewed and are negative.    Objective:     Vital Signs (Most Recent):  Temp: 97.8 °F (36.6 °C) (10/08/23 1446)  Pulse: (!) 59 (10/08/23 1646)  Resp: 19 (10/08/23 1646)  BP: (!) 174/93 (10/08/23 1646)  SpO2: 98 % (10/08/23 1646) Vital Signs (24h Range):  Temp:  [97.8 °F (36.6 °C)] 97.8 °F (36.6 °C)  Pulse:  [59-69] 59  Resp:  [11-19] 19  SpO2:  [98 %-99 %] 98 %  BP: (174-195)/(82-93) 174/93     Weight: (S) 95.1 kg (209 lb 8.8 oz)  Body mass index is 34.87 kg/m².     Physical Exam  Constitutional:       Appearance: Normal appearance. She is obese.   HENT:      Head: Normocephalic and atraumatic.   Eyes:      Extraocular Movements: Extraocular movements intact.      Pupils: Pupils are equal, round, and reactive to light.   Pulmonary:      Effort: Pulmonary effort is normal.   Abdominal:      Palpations: Abdomen is soft.   Musculoskeletal:         General: Normal range of motion.      Cervical back: Normal range of motion and neck supple.   Skin:     General: Skin is warm and dry.   Neurological:      Mental Status: She is alert and oriented to person, place, and time.      Comments: Mild left pronator drift. LUE 4/5, LLE 5/5. RUE 5/5, RLe 5/5. FT normal. Decreased sensation to  LUE but not to face. Gait untested.          NEUROLOGICAL EXAMINATION:     MENTAL STATUS   Oriented to person, place, and time.     CRANIAL NERVES     CN III, IV, VI   Pupils are equal, round, and reactive to light.      Significant Labs: CBC:   Recent Labs   Lab 10/08/23  1502 10/08/23  1521   WBC  --  5.97   HGB  --  12.7   HCT 40 39.0   PLT  --  256     CMP:   Recent Labs   Lab 10/08/23  1521      K 4.0   CO2 23   BUN 9.0*   CREATININE 0.75   CALCIUM 9.4   ALBUMIN 3.8   BILITOT 0.3   ALKPHOS 62   AST 30   ALT 20       Significant Imaging: I have reviewed all pertinent imaging results/findings within the past 24 hours.    Assessment and Plan:     TIA (transient ischemic attack)  Rule out stroke verus TIA, versus Complex Migraine    Antithrombotics for secondary stroke prevention: Antiplatelets: Aspirin: 81 mg daily  Clopidogrel: 75 mg daily    Statins for secondary stroke prevention and hyperlipidemia, if present:   Statins: Atorvastatin- 40 mg daily    Aggressive risk factor modification: HTN, HLD, Obesity, CAD     Rehab efforts: The patient has been evaluated by a stroke team provider and the therapy needs have been fully considered based off the presenting complaints and exam findings. The following therapy evaluations are needed: PT evaluate and treat, OT evaluate and treat, SLP evaluate and treat    Diagnostics ordered/pending: MRI head without contrast to assess brain parenchyma    VTE prophylaxis: Mechanical prophylaxis: Place SCDs    BP parameters: TIA: SBP <220 until imaging confirmation of no infarct     Ok to resume home ASA 81 mg and Plavix    I do not see the need to complete entire stroke workup as it was just completed on 9/22. Will get MRI to rule out acute stroke and go from there.    Further recommendations to follow.     Current Results      -CT head negative      -CTA unremarkable      -TSH- 0.935       -LDL-18    Previous stroke workup results  -MRI brain: negative  -CT Head:  negative  -CTA head and neck: negative for flow limiting stenosis or LVO  -TSH: 3.730  -ECHO:     There was agitated saline (bubble) used. Study is negative for shunt.    Left Ventricle: The left ventricle is normal in size. Normal wall thickness. Normal wall motion. There is normal systolic function with a visually estimated ejection fraction of 60 - 65%. Grade I diastolic dysfunction.    Right Ventricle: Normal right ventricular cavity size. Systolic function is normal.    Aortic Valve: The aortic valve is a trileaflet valve. There is no stenosis. Aortic valve peak velocity is 1.21 m/s. Mean gradient is 3 mmHg. There is no significant regurgitation.    Mitral Valve: There is no stenosis. The mean pressure gradient across the mitral valve is 2 mmHg at a heart rate of  bpm. There is trace regurgitation.    Tricuspid Valve: There is no stenosis. There is trace regurgitation.    Pulmonic Valve: There is no stenosis.  -LDL: 7   -CUS: negative    CODE FAST Documentation:    Initiated 14:45  CT head 15:10  TNK Decision 15:22   CTA- 15:53                 VTE Risk Mitigation (From admission, onward)           Ordered     enoxaparin injection 40 mg  Daily         10/08/23 1728     IP VTE HIGH RISK PATIENT  Once         10/08/23 1728     Place sequential compression device  Until discontinued         10/08/23 1728                    Thank you for your consult. Will follow-up with patient. Please contact us if you have any additional questions.    vAis Roca NP  Neurology  [facility] - Emergency Dept

## 2023-10-08 NOTE — ED PROVIDER NOTES
Encounter Date: 10/8/2023    SCRIBE #1 NOTE: I, Gt Grant, am scribing for, and in the presence of,  Dr. Stanley. I have scribed the following portions of the note - Other sections scribed: HPI, ROS, Physical Exam, MDM, Attending.       History     Chief Complaint   Patient presents with    Cerebrovascular Accident     LUE and LLE weakness, expressive aphasia, and L facial asymmetry. . Hx of TIA x2 weeks ago. On Plavix       54 y/o female with history of HTN, HLD, CAD s/p stents, MI, thyroid disease, migraines and anxiety presents to ED for L arm weakness and slurred speech onset about 45 minutes to an hour ago.  Pt also complains of HA.  She was able to ambulate without difficulty prior to arrival here.  Pt was seen here 2 weeks ago for a TIA and says her symptoms are worse today than they were at that time.  She denies leg weakness.  After last admission, patient was put on Plavix.  Patient states she last took Plavix yesterday, she has not taken it yet today.      PCP is Dr. Phelps.    The history is provided by the patient.     Review of patient's allergies indicates:  No Known Allergies  Past Medical History:   Diagnosis Date    Acid reflux     Anemia, unspecified     Anxiety     Coronary artery disease     Hyperlipidemia     Hypertension     Migraines     Myocardial infarction     Thrombus     apical    Thyroid disease      Past Surgical History:   Procedure Laterality Date    ANKLE SURGERY Left     ANTERIOR CERVICAL DISCECTOMY W/ FUSION      CHOLECYSTECTOMY      CORONARY ANGIOPLASTY WITH STENT PLACEMENT      DILATION AND CURETTAGE OF UTERUS      HYSTERECTOMY       Family History   Problem Relation Age of Onset    No Known Problems Mother     No Known Problems Father     No Known Problems Sister     No Known Problems Brother      Social History     Tobacco Use    Smoking status: Never     Passive exposure: Never    Smokeless tobacco: Never   Substance Use Topics    Alcohol use: Not Currently    Drug  use: Never     Review of Systems   Constitutional: Negative.    HENT: Negative.     Respiratory: Negative.     Cardiovascular: Negative.    Gastrointestinal: Negative.    Genitourinary: Negative.    Musculoskeletal: Negative.    Skin: Negative.    Neurological:  Positive for speech difficulty, weakness (L arm) and headaches.       Physical Exam     Initial Vitals   BP Pulse Resp Temp SpO2   10/08/23 1521 10/08/23 1521 10/08/23 1446 10/08/23 1446 10/08/23 1521   (!) 195/82 69 19 97.8 °F (36.6 °C) 99 %      MAP       --                Physical Exam    Nursing note and vitals reviewed.  Constitutional: No distress.   HENT:   Head: Normocephalic and atraumatic.   Eyes: EOM are normal.   Neck: Trachea normal. Neck supple.   Normal range of motion.  Cardiovascular:  Normal rate and regular rhythm.           No murmur heard.  Pulmonary/Chest: Breath sounds normal. No respiratory distress.   Abdominal: Abdomen is soft. Bowel sounds are normal. She exhibits no distension. There is no abdominal tenderness. There is no rebound and no guarding.   Musculoskeletal:         General: Normal range of motion.      Cervical back: Normal range of motion and neck supple.      Lumbar back: Normal range of motion.     Neurological: She is alert and oriented to person, place, and time.   Easily lifts bilateral UE against gravity; motor strength weaker in LUE compared to RUE; 5/5 strength in bilateral LE; no facial droop; no aphasia; no slurred speech    Skin: Skin is warm and dry. No rash noted.   Psychiatric: She has a normal mood and affect.         ED Course   Procedures  Labs Reviewed   COMPREHENSIVE METABOLIC PANEL - Abnormal; Notable for the following components:       Result Value    Chloride 108 (*)     Glucose Level 106 (*)     Blood Urea Nitrogen 9.0 (*)     All other components within normal limits   PROTIME-INR - Abnormal; Notable for the following components:    PT 12.1 (*)     All other components within normal limits   LIPID  PANEL - Abnormal; Notable for the following components:    HDL Cholesterol 78 (*)     LDL Cholesterol 18.00 (*)     All other components within normal limits   CBC WITH DIFFERENTIAL - Abnormal; Notable for the following components:    MCH 26.3 (*)     MCHC 32.6 (*)     MPV 11.5 (*)     Neut # 1.94 (*)     All other components within normal limits   ISTAT CHEM8 - Abnormal; Notable for the following components:    POC Anion Gap 19 (*)     All other components within normal limits   TSH - Normal   CBC W/ AUTO DIFFERENTIAL    Narrative:     The following orders were created for panel order CBC W/ AUTO DIFFERENTIAL.  Procedure                               Abnormality         Status                     ---------                               -----------         ------                     CBC with Differential[[id number]]       Abnormal            Final result                 Please view results for these tests on the individual orders.   POCT GLUCOSE, HAND-HELD DEVICE   ISTAT PROCEDURE   POCT GLUCOSE     EKG Readings: (Independently Interpreted)   Initial Reading: No STEMI. Rhythm: Normal Sinus Rhythm. Heart Rate: 67. Ectopy: No Ectopy. Conduction: Normal. ST Segments: Normal ST Segments. T Waves: Normal. Axis: Normal.     ECG Results              ECG 12 lead (Final result)  Result time 10/08/23 16:21:15      Final result by Interface, Lab In [facility] (10/08/23 16:21:15)                   Narrative:    Test Reason : I63.9,    Vent. Rate : 067 BPM     Atrial Rate : 067 BPM     P-R Int : 160 ms          QRS Dur : 098 ms      QT Int : 454 ms       P-R-T Axes : 063 062 058 degrees     QTc Int : 479 ms    Normal sinus rhythm  Normal ECG  When compared with ECG of 19-SEP-2023 18:23,  No significant change was found  Confirmed by Segun Meyer MD ([id number]) on 10/8/2023 4:21:09 PM    Referred By: [email or web address]   SELF           Confirmed By:Segun Meyer MD                                  Imaging Results              CTA STROKE  MULTI-PHASE (Final result)  Result time 10/08/23 15:53:43      Final result by Doug Salazar MD (10/08/23 15:53:43)                   Impression:      No hemodynamically significant flow-limiting stenosis identified.      Electronically signed by: Doug Salazar  Date:    10/08/2023  Time:    15:53               Narrative:    EXAMINATION:  CTA STROKE MULTI-PHASE    CLINICAL HISTORY:  Neuro deficit, acute, stroke suspected    TECHNIQUE:  Brain imaging was performed from skull base to vertex prior to intravenous contrast. CT Angiogram of head was subsequently performed following intravenous contrast administration.  Coronal and sagittal MPR reconstructions were performed in addition to coronal and sagittal MIP reconstructions.    Dose product length of 2129 mGycm. Automated exposure control was utilized to minimize radiation dose.    COMPARISON:  CT brain without contrast same date.    FINDINGS:  Carotid arteries are assessed in accordance with the NASCET criteria.    Aortic arch is without aneurysmal dilatation or dissection.  Brachiocephalic trunk and the right subclavian artery without significant stenosis.  Portion of the left subclavian artery is not well seen due to adjacent intense venous contrast.  There is no significant stenosis, intraluminal thrombus, dissection or aneurysm prominence of the common carotid arteries.  The proximal portion of right internal carotid artery is remarkable formedial aspect dense calcified plaque which results in up to 40% stenosis.  Distal right internal carotid artery is unremarkable.  Mild calcified plaque of the left proximal internal carotid artery is without significant stenosis.  There are mild calcified plaques which involve the left cavernous portion of the internal carotid artery without significant stenosis.  Flow is seen bilaterally within the anterior cerebral arteries and the middle cerebral artery major branches without significant stenosis.    The right vertebral  artery is hypoplastic.  Flow is present bilaterally within the vertebral arteries without flow-limiting stenosis.  The basilar artery is also patent.  Flow is seen bilaterally within the posterior cerebral arteries.    Multilevel pronounced degenerative changes of the cervical spine.                                       CT HEAD FOR STROKE (Final result)  Result time 10/08/23 15:11:46      Final result by Doug Salazar MD (10/08/23 15:11:46)                   Impression:      No acute intracranial findings identified.    Findings were notified to Dr. Stanley October 8, 2023 at 15:10 hours.      Electronically signed by: Doug Salazar  Date:    10/08/2023  Time:    15:11               Narrative:    EXAMINATION:  CT HEAD FOR STROKE    CLINICAL HISTORY:  Neuro deficit, acute, stroke suspected;    TECHNIQUE:  Sequential axial images were performed of the brain without contrast.    Dose product length of 1362 mGycm. Automated exposure control was utilized to minimize radiation dose.    COMPARISON:  September 19, 2023.    FINDINGS:  There is no intracranial mass effect, midline shift, hydrocephalus or hemorrhage. There is no sulcal effacement or low attenuation changes to suggest recent large vessel territory infarction.  The ventricular system and sulcal markings prominence is consistent with atrophy. There is no acute extra axial fluid collection. Visualized paranasal sinuses are clear without mucosal thickening, polypoidal abnormality or air-fluid levels. Mastoid air cells aeration is optimal.                                       Medications   iohexoL (OMNIPAQUE 350) injection 90 mL (90 mLs Intravenous Given 10/8/23 1530)   aspirin tablet 325 mg (325 mg Oral Given 10/8/23 1611)     Medical Decision Making  As per HPI.  Differential diagnosis:  TIA, CVA, conversion disorder    Amount and/or Complexity of Data Reviewed  Labs: ordered.     Details: Labs are all stable  Radiology: ordered and independent interpretation  performed.  Discussion of management or test interpretation with external provider(s): CT of the head and CTAs of the head and neck showed no acute changes per radiologist.  Patient states her symptoms are improving.  Patient is able to fully lift her left upper extremity against gravity.  Neurology on-call was consulted for code fast.  Patient was given aspirin while in the ER.  Will admit for further workup.    Risk  OTC drugs.  Prescription drug management.            Scribe Attestation:   Scribe #1: I performed the above scribed service and the documentation accurately describes the services I performed. I attest to the accuracy of the note.    Attending Attestation:           Physician Attestation for Scribe:  Physician Attestation Statement for Scribe #1: I, reviewed documentation, as scribed by Gt Grant in my presence, and it is both accurate and complete.             ED Course as of 10/08/23 1701   [place] Oct 08, 2023   1522 Dr. Navarro does not recommend TNK [KG]   1612 Patient remains in no apparent distress, she appears comfortable. [KG]   1657 Cheli FERREIRA, OK to admit [KG]      ED Course User Index  [KG] Lex Stanley MD               Medical Decision Making:   Clinical Tests:   Lab Tests: Ordered and Reviewed  Radiological Study: Ordered and Reviewed  Medical Tests: Ordered and Reviewed      Clinical Impression:   Final diagnoses:  [I63.9] Stroke        ED Disposition Condition    Admit                 Lex Stanley MD  10/08/23 1701

## 2023-10-09 VITALS
RESPIRATION RATE: 16 BRPM | BODY MASS INDEX: 34.87 KG/M2 | SYSTOLIC BLOOD PRESSURE: 146 MMHG | TEMPERATURE: 98 F | DIASTOLIC BLOOD PRESSURE: 74 MMHG | WEIGHT: 209.56 LBS | OXYGEN SATURATION: 97 % | HEART RATE: 76 BPM

## 2023-10-09 PROBLEM — G43.909 MIGRAINE: Status: ACTIVE | Noted: 2023-10-09

## 2023-10-09 LAB
ALBUMIN SERPL-MCNC: 3.9 G/DL (ref 3.5–5)
ALBUMIN/GLOB SERPL: 1.5 RATIO (ref 1.1–2)
ALP SERPL-CCNC: 63 UNIT/L (ref 40–150)
ALT SERPL-CCNC: 21 UNIT/L (ref 0–55)
APTT PPP: 26.2 SECONDS (ref 23.2–33.7)
AST SERPL-CCNC: 34 UNIT/L (ref 5–34)
BASOPHILS # BLD AUTO: 0.11 X10(3)/MCL
BASOPHILS NFR BLD AUTO: 1.4 %
BILIRUB SERPL-MCNC: 0.5 MG/DL
BUN SERPL-MCNC: 8 MG/DL (ref 9.8–20.1)
CALCIUM SERPL-MCNC: 9.4 MG/DL (ref 8.4–10.2)
CHLORIDE SERPL-SCNC: 107 MMOL/L (ref 98–107)
CK MB SERPL-MCNC: <1 NG/ML
CO2 SERPL-SCNC: 24 MMOL/L (ref 22–29)
CREAT SERPL-MCNC: 0.69 MG/DL (ref 0.55–1.02)
EOSINOPHIL # BLD AUTO: 0.34 X10(3)/MCL (ref 0–0.9)
EOSINOPHIL NFR BLD AUTO: 4.2 %
ERYTHROCYTE [DISTWIDTH] IN BLOOD BY AUTOMATED COUNT: 16.4 % (ref 11.5–17)
GFR SERPLBLD CREATININE-BSD FMLA CKD-EPI: >60 MLS/MIN/1.73/M2
GLOBULIN SER-MCNC: 2.6 GM/DL (ref 2.4–3.5)
GLUCOSE SERPL-MCNC: 100 MG/DL (ref 74–100)
HCT VFR BLD AUTO: 39.3 % (ref 37–47)
HGB BLD-MCNC: 12.6 G/DL (ref 12–16)
IMM GRANULOCYTES # BLD AUTO: 0.02 X10(3)/MCL (ref 0–0.04)
IMM GRANULOCYTES NFR BLD AUTO: 0.2 %
INR PPP: 1
LYMPHOCYTES # BLD AUTO: 2.8 X10(3)/MCL (ref 0.6–4.6)
LYMPHOCYTES NFR BLD AUTO: 34.7 %
MAGNESIUM SERPL-MCNC: 1.6 MG/DL (ref 1.6–2.6)
MCH RBC QN AUTO: 25.7 PG (ref 27–31)
MCHC RBC AUTO-ENTMCNC: 32.1 G/DL (ref 33–36)
MCV RBC AUTO: 80.2 FL (ref 80–94)
MONOCYTES # BLD AUTO: 0.79 X10(3)/MCL (ref 0.1–1.3)
MONOCYTES NFR BLD AUTO: 9.8 %
NEUTROPHILS # BLD AUTO: 4 X10(3)/MCL (ref 2.1–9.2)
NEUTROPHILS NFR BLD AUTO: 49.7 %
NRBC BLD AUTO-RTO: 0 %
PHOSPHATE SERPL-MCNC: 3.6 MG/DL (ref 2.3–4.7)
PLATELET # BLD AUTO: 256 X10(3)/MCL (ref 130–400)
PMV BLD AUTO: 11.8 FL (ref 7.4–10.4)
POTASSIUM SERPL-SCNC: 3.8 MMOL/L (ref 3.5–5.1)
PROT SERPL-MCNC: 6.5 GM/DL (ref 6.4–8.3)
PROTHROMBIN TIME: 13 SECONDS (ref 12.5–14.5)
RBC # BLD AUTO: 4.9 X10(6)/MCL (ref 4.2–5.4)
SODIUM SERPL-SCNC: 140 MMOL/L (ref 136–145)
TROPONIN I SERPL-MCNC: <0.01 NG/ML (ref 0–0.04)
WBC # SPEC AUTO: 8.06 X10(3)/MCL (ref 4.5–11.5)

## 2023-10-09 PROCEDURE — 99214 OFFICE O/P EST MOD 30 MIN: CPT | Mod: ,,, | Performed by: PSYCHIATRY & NEUROLOGY

## 2023-10-09 PROCEDURE — 82553 CREATINE MB FRACTION: CPT | Performed by: NURSE PRACTITIONER

## 2023-10-09 PROCEDURE — 92523 SPEECH SOUND LANG COMPREHEN: CPT

## 2023-10-09 PROCEDURE — 25000003 PHARM REV CODE 250: Performed by: INTERNAL MEDICINE

## 2023-10-09 PROCEDURE — 84484 ASSAY OF TROPONIN QUANT: CPT | Performed by: NURSE PRACTITIONER

## 2023-10-09 PROCEDURE — 85730 THROMBOPLASTIN TIME PARTIAL: CPT | Performed by: NURSE PRACTITIONER

## 2023-10-09 PROCEDURE — 96375 TX/PRO/DX INJ NEW DRUG ADDON: CPT

## 2023-10-09 PROCEDURE — 63600175 PHARM REV CODE 636 W HCPCS: Performed by: NURSE PRACTITIONER

## 2023-10-09 PROCEDURE — 25000003 PHARM REV CODE 250: Performed by: NURSE PRACTITIONER

## 2023-10-09 PROCEDURE — 85025 COMPLETE CBC W/AUTO DIFF WBC: CPT | Performed by: NURSE PRACTITIONER

## 2023-10-09 PROCEDURE — 84100 ASSAY OF PHOSPHORUS: CPT | Performed by: NURSE PRACTITIONER

## 2023-10-09 PROCEDURE — 83735 ASSAY OF MAGNESIUM: CPT | Performed by: NURSE PRACTITIONER

## 2023-10-09 PROCEDURE — 85610 PROTHROMBIN TIME: CPT | Performed by: NURSE PRACTITIONER

## 2023-10-09 PROCEDURE — G0378 HOSPITAL OBSERVATION PER HR: HCPCS

## 2023-10-09 PROCEDURE — 80053 COMPREHEN METABOLIC PANEL: CPT | Performed by: NURSE PRACTITIONER

## 2023-10-09 PROCEDURE — 99214 PR OFFICE/OUTPT VISIT, EST, LEVL IV, 30-39 MIN: ICD-10-PCS | Mod: ,,, | Performed by: PSYCHIATRY & NEUROLOGY

## 2023-10-09 RX ORDER — BUTALBITAL, ACETAMINOPHEN AND CAFFEINE 50; 325; 40 MG/1; MG/1; MG/1
2 TABLET ORAL EVERY 6 HOURS PRN
Status: DISCONTINUED | OUTPATIENT
Start: 2023-10-09 | End: 2023-10-09 | Stop reason: HOSPADM

## 2023-10-09 RX ORDER — CETIRIZINE HYDROCHLORIDE 10 MG/1
10 TABLET ORAL DAILY PRN
Start: 2023-10-09

## 2023-10-09 RX ORDER — AMLODIPINE BESYLATE 5 MG/1
5 TABLET ORAL DAILY
Qty: 30 TABLET | Refills: 1 | OUTPATIENT
Start: 2023-10-09 | End: 2023-10-13

## 2023-10-09 RX ORDER — BUTALBITAL, ACETAMINOPHEN AND CAFFEINE 50; 325; 40 MG/1; MG/1; MG/1
1 TABLET ORAL EVERY 6 HOURS PRN
Qty: 10 TABLET | Refills: 0 | Status: SHIPPED | OUTPATIENT
Start: 2023-10-09

## 2023-10-09 RX ORDER — SODIUM CHLORIDE 9 MG/ML
INJECTION, SOLUTION INTRAVENOUS ONCE
Status: COMPLETED | OUTPATIENT
Start: 2023-10-09 | End: 2023-10-09

## 2023-10-09 RX ORDER — PROCHLORPERAZINE EDISYLATE 5 MG/ML
5 INJECTION INTRAMUSCULAR; INTRAVENOUS ONCE
Status: COMPLETED | OUTPATIENT
Start: 2023-10-09 | End: 2023-10-09

## 2023-10-09 RX ORDER — MAGNESIUM SULFATE 1 G/100ML
1 INJECTION INTRAVENOUS ONCE
Status: COMPLETED | OUTPATIENT
Start: 2023-10-09 | End: 2023-10-09

## 2023-10-09 RX ADMIN — FLUOXETINE 40 MG: 20 CAPSULE ORAL at 09:10

## 2023-10-09 RX ADMIN — DEXTROSE MONOHYDRATE 500 MG: 50 INJECTION, SOLUTION INTRAVENOUS at 10:10

## 2023-10-09 RX ADMIN — CLOPIDOGREL BISULFATE 75 MG: 75 TABLET ORAL at 09:10

## 2023-10-09 RX ADMIN — BUTALBITAL, ACETAMINOPHEN, AND CAFFEINE 2 TABLET: 50; 325; 40 TABLET ORAL at 03:10

## 2023-10-09 RX ADMIN — ATORVASTATIN CALCIUM 80 MG: 40 TABLET, FILM COATED ORAL at 09:10

## 2023-10-09 RX ADMIN — GABAPENTIN 600 MG: 300 CAPSULE ORAL at 09:10

## 2023-10-09 RX ADMIN — MAGNESIUM SULFATE IN DEXTROSE 1 G: 10 INJECTION, SOLUTION INTRAVENOUS at 11:10

## 2023-10-09 RX ADMIN — METOPROLOL SUCCINATE 25 MG: 25 TABLET, EXTENDED RELEASE ORAL at 09:10

## 2023-10-09 RX ADMIN — PANTOPRAZOLE SODIUM 40 MG: 40 TABLET, DELAYED RELEASE ORAL at 09:10

## 2023-10-09 RX ADMIN — ASPIRIN 81 MG: 81 TABLET, COATED ORAL at 09:10

## 2023-10-09 RX ADMIN — SODIUM CHLORIDE: 9 INJECTION, SOLUTION INTRAVENOUS at 09:10

## 2023-10-09 RX ADMIN — LEVOTHYROXINE SODIUM 75 MCG: 25 TABLET ORAL at 06:10

## 2023-10-09 RX ADMIN — PROCHLORPERAZINE EDISYLATE 5 MG: 5 INJECTION INTRAMUSCULAR; INTRAVENOUS at 10:10

## 2023-10-09 NOTE — SUBJECTIVE & OBJECTIVE
Subjective:     Interval History:   Called by nursing this am for recurrent symptoms around 8:00 am this morning. Reports HA this am with LUE numbness/tingling. Reports symptoms lasted 2 hours total yesterday and completely resolved. She reports her headaches have become more frequent and increased in intensity. She was seeing Dr. Mendoza outpatient who prescribed gabapentin but it has not been helping.     Current Neurological Medications:     Current Facility-Administered Medications   Medication Dose Route Frequency Provider Last Rate Last Admin    0.9%  NaCl infusion   Intravenous Once Rosanne Urbano NP        aspirin EC tablet 81 mg  81 mg Oral Daily Tamara Beltran FNP        atorvastatin tablet 80 mg  80 mg Oral Daily Tamara Beltran FNP        butalbital-acetaminophen-caffeine -40 mg per tablet 2 tablet  2 tablet Oral Q6H PRN Haley Arnett MD   2 tablet at 10/09/23 0322    clopidogreL tablet 75 mg  75 mg Oral Daily Tamara Beltran FNP        enoxaparin injection 40 mg  40 mg Subcutaneous Daily Amie Shanks FNP   40 mg at 10/08/23 1840    FLUoxetine capsule 40 mg  40 mg Oral BID Tamara Beltran FNP   40 mg at 10/08/23 2345    gabapentin capsule 600 mg  600 mg Oral TID Tamara Beltran FNP        labetaloL injection 10 mg  10 mg Intravenous Q2H PRN Amie Shanks FNP   10 mg at 10/08/23 2153    levothyroxine tablet 75 mcg  75 mcg Oral Before breakfast Tamara Beltran FNP   75 mcg at 10/09/23 0600    magnesium sulfate in dextrose IVPB (premix) 1 g  1 g Intravenous Once Rosanne Urbano NP        metoprolol succinate (TOPROL-XL) 24 hr tablet 25 mg  25 mg Oral Daily Tamara Beltran FNP        oxybutynin tablet 5 mg  5 mg Oral Daily Tamara Beltran FNP        pantoprazole EC tablet 40 mg  40 mg Oral Daily Tamara Beltran FNP        prochlorperazine injection Soln 5 mg  5 mg Intravenous Once Rosanne Urbano NP        rOPINIRole tablet 0.5 mg  0.5  mg Oral QHS Tamara Beltran, SAFIA   0.5 mg at 10/08/23 2635    sodium chloride 0.9% flush 10 mL  10 mL Intravenous PRN Amie Shanks FNP         Current Outpatient Medications   Medication Sig Dispense Refill    adalimumab (HUMIRA,CF, PEN SUBQ) Inject 0.4 mcg into the skin. Every two weeks.      aspirin (ECOTRIN) 81 MG EC tablet Take 81 mg by mouth once daily.      cetirizine (ZYRTEC) 10 MG tablet Take 10 mg by mouth once daily.      clopidogreL (PLAVIX) 75 mg tablet Take 1 tablet (75 mg total) by mouth once daily. 30 tablet 3    FLUoxetine 40 MG capsule Take 40 mg by mouth 2 (two) times daily.      fluticasone propionate (FLONASE) 50 mcg/actuation nasal spray 1 spray by Each Nostril route 2 (two) times daily. Takes as needed      gabapentin (NEURONTIN) 600 MG tablet Take 600 mg by mouth 3 (three) times daily.      levothyroxine (SYNTHROID) 75 MCG tablet Take 75 mcg by mouth before breakfast.      metoprolol succinate (TOPROL-XL) 25 MG 24 hr tablet Take 25 mg by mouth once daily.      nitroGLYCERIN (NITROSTAT) 0.4 MG SL tablet PLEASE SEE ATTACHED FOR DETAILED DIRECTIONS      oxybutynin (DITROPAN-XL) 10 MG 24 hr tablet Take 10 mg by mouth once daily. As needed      pantoprazole (PROTONIX) 40 MG tablet Take 40 mg by mouth once daily.      REPATHA SURECLICK 140 mg/mL PnIj SMARTSIG:pre-filled pen syringe SUB-Q Every 2 Weeks      rOPINIRole (REQUIP) 0.5 MG tablet Take 0.5 mg by mouth every evening.      rosuvastatin (CRESTOR) 40 MG Tab Take 40 mg by mouth once daily.      tiZANidine (ZANAFLEX) 4 MG tablet Take 4 mg by mouth every 8 (eight) hours as needed.         Review of Systems   Neurological:  Positive for numbness and headaches. Negative for dizziness, tremors, seizures, syncope, facial asymmetry, speech difficulty and light-headedness.   All other systems reviewed and are negative.    Objective:     Vital Signs (Most Recent):  Temp: 97.8 °F (36.6 °C) (10/08/23 1446)  Pulse: 65 (10/09/23 0829)  Resp: 17  (10/09/23 0829)  BP: (!) 174/95 (10/09/23 0829)  SpO2: 97 % (10/09/23 0829) Vital Signs (24h Range):  Temp:  [97.8 °F (36.6 °C)] 97.8 °F (36.6 °C)  Pulse:  [58-70] 65  Resp:  [11-20] 17  SpO2:  [96 %-99 %] 97 %  BP: (158-195)/() 174/95     Weight: (S) 95.1 kg (209 lb 8.8 oz)  Body mass index is 34.87 kg/m².     Physical Exam  Vitals and nursing note reviewed.   Constitutional:       General: She is not in acute distress.     Appearance: Normal appearance. She is not toxic-appearing.   HENT:      Head: Normocephalic.   Eyes:      General: No visual field deficit.     Extraocular Movements:      Right eye: Normal extraocular motion and no nystagmus.      Left eye: Normal extraocular motion and no nystagmus.      Pupils: Pupils are equal, round, and reactive to light.   Cardiovascular:      Rate and Rhythm: Normal rate.   Pulmonary:      Effort: Pulmonary effort is normal.      Breath sounds: Normal breath sounds.   Musculoskeletal:         General: No swelling. Normal range of motion.      Cervical back: Normal range of motion.      Right lower leg: No edema.      Left lower leg: No edema.   Skin:     General: Skin is warm and dry.   Neurological:      General: No focal deficit present.      Mental Status: She is alert and oriented to person, place, and time.      Cranial Nerves: No dysarthria or facial asymmetry.      Sensory: Sensory deficit (decreased sensation to LUE, left face) present.      Motor: Pronator drift (mild LUE pronator drift) present. No weakness, tremor, atrophy, abnormal muscle tone or seizure activity.      Coordination: Coordination normal. Finger-Nose-Finger Test normal.   Psychiatric:         Mood and Affect: Affect is flat.         Speech: Speech normal.         Behavior: Behavior normal.          NEUROLOGICAL EXAMINATION:     MENTAL STATUS   Oriented to person, place, and time.   Speech: speech is normal     CRANIAL NERVES     CN III, IV, VI   Pupils are equal, round, and reactive to  light.    GAIT AND COORDINATION      Coordination   Finger to nose coordination: normal      Significant Labs:   Recent Lab Results  (Last 5 results in the past 24 hours)        10/09/23  0356   10/08/23  1521   10/08/23  1502   10/08/23  1502   10/08/23  1446        Albumin/Globulin Ratio 1.5   1.3             Albumin 3.9   3.8             ALP 63   62             ALT 21   20             aPTT 26.2  Comment: For Minimal Heparin Infusion, the goal aPTT 64-85 seconds corresponds to an anti-Xa of 0.3-0.5.    For Low Intensity and High Intensity Heparin, the goal aPTT  seconds corresponds to an anti-Xa of 0.3-0.7               AST 34   30             Baso # 0.11   0.12             Basophil % 1.4   2.0             BILIRUBIN TOTAL 0.5   0.3             BUN 8.0   9.0             Calcium 9.4   9.4             Chloride 107   108             Cholesterol Total   114             CO2 24   23             CPK MB <1.0               Creatinine 0.69   0.75             eGFR >60   >60             Eos # 0.34   0.42             Eosinophil % 4.2   7.0             Globulin, Total 2.6   2.9             Glucose 100   106             HDL   78             Hematocrit 39.3   39.0             Hemoglobin 12.6   12.7             Immature Grans (Abs) 0.02   0.01             Immature Granulocytes 0.2   0.2             INR 1.0   0.9             LDL Cholesterol External   18.00             Lymph # 2.80   2.72             LYMPH % 34.7   45.6             Magnesium  1.60               MCH 25.7   26.3             MCHC 32.1   32.6             MCV 80.2   80.7             Mono # 0.79   0.76             Mono % 9.8   12.7             MPV 11.8   11.5             Neut # 4.00   1.94             Neut % 49.7   32.5             nRBC 0.0   0.0             Phosphorus Level 3.6               Platelet Count 256   256             POC Anion Gap       19         POC BUN       9         POC Chloride       104         POC Creatinine       0.7         POC Glucose        109   102       POC Hematocrit       40         POC HEMOGLOBIN       14         POC Ionized Calcium       1.27         Potassium, Blood Gas       4.0         POC PTINR     1.0           POC PTWBT     11.5           Sodium, Blood Gas       142         POC TCO2 (MEASURED)       23         Potassium 3.8   4.0             PROTEIN TOTAL 6.5   6.7             Protime 13.0   12.1             RBC 4.90   4.83             RDW 16.4   16.3             Sample     VENOUS   VENOUS         Sodium 140   140             Total Cholesterol/HDL Ratio   1             Triglycerides   89             Troponin I <0.010               TSH   0.935             Very Low Density Lipoprotein   18             WBC 8.06   5.97                                    Significant Imaging: I have reviewed all pertinent imaging results/findings within the past 24 hours.

## 2023-10-09 NOTE — H&P
[facility] Hospital Medicine - H&P Note    Patient Name: Yandy Chaudhry  : 1970  MRN: [id number]  PCP: Fabian Phelps MD  Admitting Physician:   Admission Class: IP- Inpatient   Code status: --    Allergies   Patient has no known allergies.    Chief Complaint   Headache, left facial numbess, left arm weak and numb    History of Present Illness   53 yr old female whose history includes HTN, CAD (coronary stent ), Hypothyroidism, and migraines. Patient was discharged on  following a one day admission during which time she was worked up for possible TIA vs CVA. Testing showed no evidence of CVA. MRI brain negative for acute findings. Echocardiogram showed an EF of 60-65% with grade 1 diastolic dysfunction, bilateral carotid artery US showed < 50% stenosis bilaterally. Prior to discharge her symptoms were completely resolved. Discharged home on Plavix which she has been taking.  Today around 12:30 she again developed a headache, left facial numbness, left arm and hand numbness and weakness. Denies any left leg numbness or weakness.   Denies any chest pain or SOB. Repeat head CT without contrast, CTA, and MRI brain again negative for acute intracranial findings or hemodynamically significant flow-limiting stenosis. Labs unremarkable. Blood pressure on arrival 195/82 and has remained elevated. Reports symptoms left arm have improved but persist and continues to have a headache.   ROS   Except as documented, all other systems reviewed and negative     Past Medical History   Hypertension  CAD  Dyslipidemia  Hypothyroidism  GERD  Migraines  Anxiety  Apical thrombus  Ankylosing spondylitis - on Humira      Past Surgical History   Cholecystectomy  Hysterectomy  Coronary stent -   Left ankle fracture repair  Cervical disectomy and fusion      Social History   Denies alcohol, tobacco or illicit drug use.     Family History   Reviewed and negative    Home  Medications     Prior to Admission medications    Medication Sig Start Date End Date Taking? Authorizing Provider   adalimumab (HUMIRA,CF, PEN SUBQ) Inject 0.4 mcg into the skin. Every two weeks.    Provider, Historical   aspirin (ECOTRIN) 81 MG EC tablet Take 81 mg by mouth once daily.    Provider, Historical   cetirizine (ZYRTEC) 10 MG tablet Take 10 mg by mouth once daily. 3/25/22   Provider, Historical   clopidogreL (PLAVIX) 75 mg tablet Take 1 tablet (75 mg total) by mouth once daily. 9/20/23   Ephraim Soares MD   FLUoxetine 40 MG capsule Take 40 mg by mouth 2 (two) times daily.    Provider, Historical   fluticasone propionate (FLONASE) 50 mcg/actuation nasal spray 1 spray by Each Nostril route 2 (two) times daily. Takes as needed 2/10/22   Provider, Historical   gabapentin (NEURONTIN) 600 MG tablet Take 600 mg by mouth 3 (three) times daily.    Provider, Historical   levothyroxine (SYNTHROID) 75 MCG tablet Take 75 mcg by mouth before breakfast.    Provider, Historical   metoprolol succinate (TOPROL-XL) 25 MG 24 hr tablet Take 25 mg by mouth once daily.    Provider, Historical   nitroGLYCERIN (NITROSTAT) 0.4 MG SL tablet PLEASE SEE ATTACHED FOR DETAILED DIRECTIONS 7/19/22   Provider, Historical   oxybutynin (DITROPAN-XL) 10 MG 24 hr tablet Take 10 mg by mouth once daily. As needed 6/1/22   Provider, Historical   pantoprazole (PROTONIX) 40 MG tablet Take 40 mg by mouth once daily. 6/20/22   Provider, Historical   REPATHA SURECLICK 140 mg/mL PnIj SMARTSIG:pre-filled pen syringe SUB-Q Every 2 Weeks 4/3/23   Provider, Historical   rOPINIRole (REQUIP) 0.5 MG tablet Take 0.5 mg by mouth every evening.    Provider, Historical   rosuvastatin (CRESTOR) 40 MG Tab Take 40 mg by mouth once daily.    Provider, Historical   tiZANidine (ZANAFLEX) 4 MG tablet Take 4 mg by mouth every 8 (eight) hours as needed.    Provider, Historical        Mary Grace Monzon, SAFIA        Physical Exam   Vital Signs  Temp:  [97.8 °F (36.6 °C)]  "  Pulse:  [58-69]   Resp:  [11-20]   BP: (165-195)/()   SpO2:  [98 %-99 %]    General: Appears comfortable  HEENT: NC/AT  Neck:  No JVD  Chest: CTABL  CVS: Regular rhythm. Normal S1/S2.  Abdomen: nondistended, normoactive BS, soft and non-tender.  MSK: No obvious deformity or joint swelling  Skin: Warm and dry  Neuro: AAOx3, LUE strength 4/5 and slightly weaker when compared to the right  Psych: Cooperative    Labs     Recent Labs     10/08/23  1502 10/08/23  1521   WBC  --  5.97   RBC  --  4.83   HGB  --  12.7   HCT 40 39.0   MCV  --  80.7   MCH  --  26.3*   MCHC  --  32.6*   RDW  --  16.3   PLT  --  256     Recent Labs     10/08/23  1521   PROTIME 12.1*   INR 0.9      Recent Labs     10/08/23  1521      K 4.0   CHLORIDE 108*   CO2 23   BUN 9.0*   CREATININE 0.75   EGFRNORACEVR >60   GLUCOSE 106*   CALCIUM 9.4   ALBUMIN 3.8   GLOBULIN 2.9   ALKPHOS 62   ALT 20   AST 30   BILITOT 0.3   CHOL 114   TRIG 89   LDL 18.00*   HDL 78*   TSH 0.935     No results for input(s): "LACTIC" in the last 72 hours.  No results for input(s): "CPK", "TROPONINI" in the last 72 hours.     Microbiology Results (last 7 days)       ** No results found for the last 168 hours. **           Imaging   MRI Brain Without Contrast  START OF REPORT:  Technique: Multiplanar, multisequence magnetic resonance imaging of the brain was performed without intravenous contrast.    Comparison: Comparison is with study [id number] 01:09:58. Correlation is with CT study dated2023-10-08 14:56:55.    Clinical history: Cva, slurred speech, ext. weakness/numbness.    Findings: There is no significant interval change.  Hemorrhage: No acute intracranial hemorrhage is identified.  Stroke: No abnormal signal is identified on the diffusion images to suggest acute infarct.  Extra axial spaces: The ventricles sulci and basal cisterns are within normal limits.  Cerebral, cerebellar, and brainstem parenchyma: Within normal limits.  Cranial nerves: " Normal.  Herniation: None.  Calvarium: Within normal limits.  Vascular system: Normal flow voids.  Visualized paranasal sinuses: Normal.  Visualized orbits: The visualized orbits appear unremarkable.  Sella and skull base: The sella is prominent and CSF filled, with the pituitary gland layering along the floor of the sella, suggestive of partial empty sella.  Temporal bones and mastoids: Within normal limits.    Impression:  1. No abnormal signal is identified on the diffusion images to suggest acute infarct.  2. No acute intracranial process is identified. Details and other findings as discussed above.  CTA STROKE MULTI-PHASE  Narrative: EXAMINATION:  CTA STROKE MULTI-PHASE    CLINICAL HISTORY:  Neuro deficit, acute, stroke suspected    TECHNIQUE:  Brain imaging was performed from skull base to vertex prior to intravenous contrast. CT Angiogram of head was subsequently performed following intravenous contrast administration.  Coronal and sagittal MPR reconstructions were performed in addition to coronal and sagittal MIP reconstructions.    Dose product length of 2129 mGycm. Automated exposure control was utilized to minimize radiation dose.    COMPARISON:  CT brain without contrast same date.    FINDINGS:  Carotid arteries are assessed in accordance with the NASCET criteria.    Aortic arch is without aneurysmal dilatation or dissection.  Brachiocephalic trunk and the right subclavian artery without significant stenosis.  Portion of the left subclavian artery is not well seen due to adjacent intense venous contrast.  There is no significant stenosis, intraluminal thrombus, dissection or aneurysm prominence of the common carotid arteries.  The proximal portion of right internal carotid artery is remarkable formedial aspect dense calcified plaque which results in up to 40% stenosis.  Distal right internal carotid artery is unremarkable.  Mild calcified plaque of the left proximal internal carotid artery is without  significant stenosis.  There are mild calcified plaques which involve the left cavernous portion of the internal carotid artery without significant stenosis.  Flow is seen bilaterally within the anterior cerebral arteries and the middle cerebral artery major branches without significant stenosis.    The right vertebral artery is hypoplastic.  Flow is present bilaterally within the vertebral arteries without flow-limiting stenosis.  The basilar artery is also patent.  Flow is seen bilaterally within the posterior cerebral arteries.    Multilevel pronounced degenerative changes of the cervical spine.  Impression: No hemodynamically significant flow-limiting stenosis identified.    Electronically signed by: Doug Salazar  Date:    10/08/2023  Time:    15:53  CT HEAD FOR STROKE  Narrative: EXAMINATION:  CT HEAD FOR STROKE    CLINICAL HISTORY:  Neuro deficit, acute, stroke suspected;    TECHNIQUE:  Sequential axial images were performed of the brain without contrast.    Dose product length of 1362 mGycm. Automated exposure control was utilized to minimize radiation dose.    COMPARISON:  September 19, 2023.    FINDINGS:  There is no intracranial mass effect, midline shift, hydrocephalus or hemorrhage. There is no sulcal effacement or low attenuation changes to suggest recent large vessel territory infarction.  The ventricular system and sulcal markings prominence is consistent with atrophy. There is no acute extra axial fluid collection. Visualized paranasal sinuses are clear without mucosal thickening, polypoidal abnormality or air-fluid levels. Mastoid air cells aeration is optimal.  Impression: No acute intracranial findings identified.    Findings were notified to Dr. Stanley October 8, 2023 at 15:10 hours.    Electronically signed by: Doug Salazar  Date:    10/08/2023  Time:    15:11    Assessment & Plan     Possible TIA  - Head CT w/o and MRI brain show no evidence of acute findings  - Seen by neurology who do not  recommend repeating ECHO and carotid US  - PT/OT/ST  - NPO for now except meds  - continue ASA, Plavix, statin  - LDL at goal    Hypertension  - Only on Toprol XL 25 mg at home  - Labetalol PRN    Headache  - Fioricet PRN    Hypothyroidism  - TSH 0.935  - synthroid resumed    PMH: CAD/stent, Dyslipidemia, GERD. Migraines, Anxiety, Apical thrombus Ankylosing spondylitis - on Humira    VTE Prophylaxis: Tamara Hopkins, CARLAP-BC have discussed this patients case with Dr. Arnett who agrees with the diagnosis and treatment plan.

## 2023-10-09 NOTE — CONSULTS
Inpatient consult to Cardiology  Consult performed by: Mary Grace Monzon FNP  Consult ordered by: Tamara Beltran FNP      [facility] - Emergency Dept    Cardiology  Consult Note    Patient Name: Yandy Chaudhry  MRN: [id number]  Admission Date: 10/8/2023  Hospital Length of Stay: 1 days  Code Status: Full Code   Attending Provider: Haley Arnett MD   Consulting Provider: SAFIA Renner  Primary Care Physician: Fabian Phelps MD  Principal Problem:TIA (transient ischemic attack)    Patient information was obtained from patient, past medical records, and ER records.     Subjective:     Chief Complaint:      HPI:   Ms. Chaudhry is a 54 y/o female, followed by Dr. Chong, recently seen in ED for stroke like symptoms- worked up and negative CVA. She was followed up in clinic with application of [employer] monitor. She presented to Ed on 10/8/23 with c/o left facial numbness, left arm/hand numbness and weakness. Repeat head CT, CTA, and MRI negative for acute intracranial findings. BP on arrival 195/82 which has persisted since admit. [facility] has been consulted for interrogation of external cardiac event monitor.       PMH: TIA, atrial thrombus following MI, Native CAD, chronic SHF, HTN, HLD, hypothyroidism, migraines, ankylosing spondylitis, GERD  PSH: LHC/coronary stent 2020, cervical discectomy and fusion, colonoscopy, ankle surgery, C section,   Family History: Mother- HLD, tachycardia, HTN  Social History: Never smoker, Denies ETOH or illicit drug use    Previous Cardiac Diagnostics:   TTE 09.20.23:  There was agitated saline (bubble) used. Study is negative for shunt.    Left Ventricle: The left ventricle is normal in size. Normal wall thickness. Normal wall motion. There is normal systolic function with a visually estimated ejection fraction of 60 - 65%. Grade I diastolic dysfunction.    Right Ventricle: Normal right ventricular cavity size. Systolic function is normal.     Aortic Valve: The aortic valve is a trileaflet valve. There is no stenosis. Aortic valve peak velocity is 1.21 m/s. Mean gradient is 3 mmHg. There is no significant regurgitation.    Mitral Valve: There is no stenosis. The mean pressure gradient across the mitral valve is 2 mmHg at a heart rate of  bpm. There is trace regurgitation.    Tricuspid Valve: There is no stenosis. There is trace regurgitation.    Pulmonic Valve: There is no stenosis.    CUS 09.20.23:  The right internal carotid artery demonstrated less than 50% stenosis.  The left internal carotid artery demonstrated less than 50% stenosis.  Bilateral vertebral arteries were patent with antegrade flow.    TTE 01/21/2022:   LVEF 55-65%. Normal LV cavity size/function. Normal LA/RA cavity size. RV cavity mildly dilated. Mild MR. Trace TR.      [facility] 01/21/22:  LM- normal. Bifurcates into large LAD and Cx artery.  LAD proximal 20% stenosis. Stent patent. Diagonal branches small and appears ok  Cx normal.  RCA very large and dominant with 20-30% stenosis     TTE 02/17/2020:  Definity used to enhance myocardial border definition and to rule out LV thrombus to rule out LV thrombus.  LVEF approximately 40%.  Hypokinesis of mid to distal anterior, anteroapical apical and inferior apical segments.  Small apical thrombus.  Normal RV size with preserved RV function.  Mild MR.  Trace TR the RVSP of 28mmHg.  Trace PA    Past Medical History:   Diagnosis Date    Acid reflux     Anemia, unspecified     Anxiety     Coronary artery disease     Hyperlipidemia     Hypertension     Migraines     Myocardial infarction     Thrombus     apical    Thyroid disease        Past Surgical History:   Procedure Laterality Date    ANKLE SURGERY Left     ANTERIOR CERVICAL DISCECTOMY W/ FUSION      CHOLECYSTECTOMY      CORONARY ANGIOPLASTY WITH STENT PLACEMENT      DILATION AND CURETTAGE OF UTERUS      HYSTERECTOMY         Review of patient's allergies indicates:  No Known Allergies    No  current facility-administered medications on file prior to encounter.     Current Outpatient Medications on File Prior to Encounter   Medication Sig    adalimumab (HUMIRA,CF, PEN SUBQ) Inject 0.4 mcg into the skin. Every two weeks.    aspirin (ECOTRIN) 81 MG EC tablet Take 81 mg by mouth once daily.    cetirizine (ZYRTEC) 10 MG tablet Take 10 mg by mouth once daily.    clopidogreL (PLAVIX) 75 mg tablet Take 1 tablet (75 mg total) by mouth once daily.    FLUoxetine 40 MG capsule Take 40 mg by mouth 2 (two) times daily.    fluticasone propionate (FLONASE) 50 mcg/actuation nasal spray 1 spray by Each Nostril route 2 (two) times daily. Takes as needed    gabapentin (NEURONTIN) 600 MG tablet Take 600 mg by mouth 3 (three) times daily.    levothyroxine (SYNTHROID) 75 MCG tablet Take 75 mcg by mouth before breakfast.    metoprolol succinate (TOPROL-XL) 25 MG 24 hr tablet Take 25 mg by mouth once daily.    nitroGLYCERIN (NITROSTAT) 0.4 MG SL tablet PLEASE SEE ATTACHED FOR DETAILED DIRECTIONS    oxybutynin (DITROPAN-XL) 10 MG 24 hr tablet Take 10 mg by mouth once daily. As needed    pantoprazole (PROTONIX) 40 MG tablet Take 40 mg by mouth once daily.    REPATHA SURECLICK 140 mg/mL PnIj SMARTSIG:pre-filled pen syringe SUB-Q Every 2 Weeks    rOPINIRole (REQUIP) 0.5 MG tablet Take 0.5 mg by mouth every evening.    rosuvastatin (CRESTOR) 40 MG Tab Take 40 mg by mouth once daily.    tiZANidine (ZANAFLEX) 4 MG tablet Take 4 mg by mouth every 8 (eight) hours as needed.     Family History       Problem Relation (Age of Onset)    No Known Problems Mother, Father, Sister, Brother          Tobacco Use    Smoking status: Never     Passive exposure: Never    Smokeless tobacco: Never   Substance and Sexual Activity    Alcohol use: Not Currently    Drug use: Never    Sexual activity: Not on file       Review of Systems   Constitutional: Negative.    Respiratory: Negative.     Cardiovascular: Negative.    Gastrointestinal: Negative.     Musculoskeletal: Negative.    Skin: Negative.    Neurological:  Positive for headaches.   Psychiatric/Behavioral: Negative.         Objective:     Vital Signs (Most Recent):  Temp: 97.8 °F (36.6 °C) (10/08/23 1446)  Pulse: 67 (10/09/23 0401)  Resp: 15 (10/09/23 0401)  BP: (!) 158/86 (10/09/23 0401)  SpO2: 97 % (10/09/23 0401) Vital Signs (24h Range):  Temp:  [97.8 °F (36.6 °C)] 97.8 °F (36.6 °C)  Pulse:  [58-69] 67  Resp:  [11-20] 15  SpO2:  [97 %-99 %] 97 %  BP: (158-195)/() 158/86     Weight: (S) 95.1 kg (209 lb 8.8 oz)  Body mass index is 34.87 kg/m².    SpO2: 97 %       No intake or output data in the 24 hours ending 10/09/23 0753    Lines/Drains/Airways       Peripheral Intravenous Line  Duration                  Peripheral IV - Single Lumen 10/08/23 1500 20 G Anterior;Left Upper Arm <1 day                    Significant Labs:  Recent Results (from the past 72 hour(s))   POCT glucose    Collection Time: 10/08/23  2:42 PM   Result Value Ref Range    POCT Glucose 102 70 - 110 mg/dL   POCT glucose    Collection Time: 10/08/23  2:46 PM   Result Value Ref Range    POC Glucose 102 70 - 110 MG/DL   ISTAT CHEM8    Collection Time: 10/08/23  3:02 PM   Result Value Ref Range    POC Glucose 109 70 - 110 mg/dL    POC BUN 9 6 - 30 mg/dL    POC Creatinine 0.7 0.5 - 1.4 mg/dL    POC Sodium 142 136 - 145 mmol/L    POC Potassium 4.0 3.5 - 5.1 mmol/L    POC Chloride 104 95 - 110 mmol/L    POC TCO2 (MEASURED) 23 23 - 29 mmol/L    POC Anion Gap 19 (H) 8 - 16 mmol/L    POC Ionized Calcium 1.27 1.06 - 1.42 mmol/L    POC Hematocrit 40 36 - 54 %PCV    POC HEMOGLOBIN 14 g/dL    Sample VENOUS    ISTAT PROCEDURE    Collection Time: 10/08/23  3:02 PM   Result Value Ref Range    POC PTWBT 11.5 9.7 - 14.3 sec    POC PTINR 1.0 0.9 - 1.2    Sample VENOUS    Comprehensive metabolic panel    Collection Time: 10/08/23  3:21 PM   Result Value Ref Range    Sodium Level 140 136 - 145 mmol/L    Potassium Level 4.0 3.5 - 5.1 mmol/L     Chloride 108 (H) 98 - 107 mmol/L    Carbon Dioxide 23 22 - 29 mmol/L    Glucose Level 106 (H) 74 - 100 mg/dL    Blood Urea Nitrogen 9.0 (L) 9.8 - 20.1 mg/dL    Creatinine 0.75 0.55 - 1.02 mg/dL    Calcium Level Total 9.4 8.4 - 10.2 mg/dL    Protein Total 6.7 6.4 - 8.3 gm/dL    Albumin Level 3.8 3.5 - 5.0 g/dL    Globulin 2.9 2.4 - 3.5 gm/dL    Albumin/Globulin Ratio 1.3 1.1 - 2.0 ratio    Bilirubin Total 0.3 <=1.5 mg/dL    Alkaline Phosphatase 62 40 - 150 unit/L    Alanine Aminotransferase 20 0 - 55 unit/L    Aspartate Aminotransferase 30 5 - 34 unit/L    eGFR >60 mls/min/1.73/m2   Protime-INR    Collection Time: 10/08/23  3:21 PM   Result Value Ref Range    PT 12.1 (L) 12.5 - 14.5 seconds    INR 0.9 <=1.3   TSH    Collection Time: 10/08/23  3:21 PM   Result Value Ref Range    TSH 0.935 0.350 - 4.940 uIU/mL   LDL - Lipid Panel    Collection Time: 10/08/23  3:21 PM   Result Value Ref Range    Cholesterol Total 114 <=200 mg/dL    HDL Cholesterol 78 (H) 35 - 60 mg/dL    Triglyceride 89 37 - 140 mg/dL    Cholesterol/HDL Ratio 1 0 - 5    Very Low Density Lipoprotein 18     LDL Cholesterol 18.00 (L) 50.00 - 140.00 mg/dL   CBC with Differential    Collection Time: 10/08/23  3:21 PM   Result Value Ref Range    WBC 5.97 4.50 - 11.50 x10(3)/mcL    RBC 4.83 4.20 - 5.40 x10(6)/mcL    Hgb 12.7 12.0 - 16.0 g/dL    Hct 39.0 37.0 - 47.0 %    MCV 80.7 80.0 - 94.0 fL    MCH 26.3 (L) 27.0 - 31.0 pg    MCHC 32.6 (L) 33.0 - 36.0 g/dL    RDW 16.3 11.5 - 17.0 %    Platelet 256 130 - 400 x10(3)/mcL    MPV 11.5 (H) 7.4 - 10.4 fL    Neut % 32.5 %    Lymph % 45.6 %    Mono % 12.7 %    Eos % 7.0 %    Basophil % 2.0 %    Lymph # 2.72 0.6 - 4.6 x10(3)/mcL    Neut # 1.94 (L) 2.1 - 9.2 x10(3)/mcL    Mono # 0.76 0.1 - 1.3 x10(3)/mcL    Eos # 0.42 0 - 0.9 x10(3)/mcL    Baso # 0.12 <=0.2 x10(3)/mcL    IG# 0.01 0 - 0.04 x10(3)/mcL    IG% 0.2 %    NRBC% 0.0 %   Comprehensive metabolic panel    Collection Time: 10/09/23  3:56 AM   Result Value Ref Range     Sodium Level 140 136 - 145 mmol/L    Potassium Level 3.8 3.5 - 5.1 mmol/L    Chloride 107 98 - 107 mmol/L    Carbon Dioxide 24 22 - 29 mmol/L    Glucose Level 100 74 - 100 mg/dL    Blood Urea Nitrogen 8.0 (L) 9.8 - 20.1 mg/dL    Creatinine 0.69 0.55 - 1.02 mg/dL    Calcium Level Total 9.4 8.4 - 10.2 mg/dL    Protein Total 6.5 6.4 - 8.3 gm/dL    Albumin Level 3.9 3.5 - 5.0 g/dL    Globulin 2.6 2.4 - 3.5 gm/dL    Albumin/Globulin Ratio 1.5 1.1 - 2.0 ratio    Bilirubin Total 0.5 <=1.5 mg/dL    Alkaline Phosphatase 63 40 - 150 unit/L    Alanine Aminotransferase 21 0 - 55 unit/L    Aspartate Aminotransferase 34 5 - 34 unit/L    eGFR >60 mls/min/1.73/m2   Magnesium    Collection Time: 10/09/23  3:56 AM   Result Value Ref Range    Magnesium Level 1.60 1.60 - 2.60 mg/dL   Phosphorus    Collection Time: 10/09/23  3:56 AM   Result Value Ref Range    Phosphorus Level 3.6 2.3 - 4.7 mg/dL   Troponin I    Collection Time: 10/09/23  3:56 AM   Result Value Ref Range    Troponin-I <0.010 0.000 - 0.045 ng/mL   CK-MB    Collection Time: 10/09/23  3:56 AM   Result Value Ref Range    Creatine Kinase MB <1.0 <=3.4 ng/mL   APTT    Collection Time: 10/09/23  3:56 AM   Result Value Ref Range    PTT 26.2 23.2 - 33.7 seconds   Protime-INR    Collection Time: 10/09/23  3:56 AM   Result Value Ref Range    PT 13.0 12.5 - 14.5 seconds    INR 1.0 <=1.3   CBC with Differential    Collection Time: 10/09/23  3:56 AM   Result Value Ref Range    WBC 8.06 4.50 - 11.50 x10(3)/mcL    RBC 4.90 4.20 - 5.40 x10(6)/mcL    Hgb 12.6 12.0 - 16.0 g/dL    Hct 39.3 37.0 - 47.0 %    MCV 80.2 80.0 - 94.0 fL    MCH 25.7 (L) 27.0 - 31.0 pg    MCHC 32.1 (L) 33.0 - 36.0 g/dL    RDW 16.4 11.5 - 17.0 %    Platelet 256 130 - 400 x10(3)/mcL    MPV 11.8 (H) 7.4 - 10.4 fL    Neut % 49.7 %    Lymph % 34.7 %    Mono % 9.8 %    Eos % 4.2 %    Basophil % 1.4 %    Lymph # 2.80 0.6 - 4.6 x10(3)/mcL    Neut # 4.00 2.1 - 9.2 x10(3)/mcL    Mono # 0.79 0.1 - 1.3 x10(3)/mcL    Eos #  0.34 0 - 0.9 x10(3)/mcL    Baso # 0.11 <=0.2 x10(3)/mcL    IG# 0.02 0 - 0.04 x10(3)/mcL    IG% 0.2 %    NRBC% 0.0 %       Significant Imaging:  Imaging Results              MRI Brain Without Contrast (Final result)  Result time 10/09/23 06:47:51      Final result by Doug Salazar MD (10/09/23 06:47:51)                   Impression:    Impression:    1. No abnormal signal is identified on the diffusion images to suggest acute infarct.    2. No acute intracranial process is identified. Details and other findings as discussed above.    No significant discrepancy with overnight report.      Electronically signed by: Doug Salazar  Date:    10/09/2023  Time:    06:47               Narrative:      Technique:Multiplanar, multisequence magnetic resonance imaging of the brain was performed without intravenous contrast.    Comparison:Comparison is with study dated 2023-09-20 01:09:58. Correlation is with CT study dated 2023-10-08 14:56:55.    Clinical history:Cva, slurred speech, ext. weakness/numbness.    Findings:There is no significant interval change.    Hemorrhage:No acute intracranial hemorrhage is identified.    Stroke:No abnormal signal is identified on the diffusion images to suggest acute infarct.    Extra axial spaces:The ventricles sulci and basal cisterns are within normal limits.    Cerebral, cerebellar, and brainstem parenchyma:Within normal limits.    Sella and skull base:The sella is prominent and CSF filled, with the pituitary gland layering along the floor of the sella, suggestive of partial empty sella.    Temporal bones and mastoids:Within normal limits.                        Preliminary result by Doug Salazar MD (10/08/23 22:26:44)                   Narrative:    START OF REPORT:  Technique: Multiplanar, multisequence magnetic resonance imaging of the brain was performed without intravenous contrast.    Comparison: Comparison is with study [id number] 01:09:58. Correlation is with CT study  [id number] 14:56:55.    Clinical history: Cva, slurred speech, ext. weakness/numbness.    Findings: There is no significant interval change.  Hemorrhage: No acute intracranial hemorrhage is identified.  Stroke: No abnormal signal is identified on the diffusion images to suggest acute infarct.  Extra axial spaces: The ventricles sulci and basal cisterns are within normal limits.  Cerebral, cerebellar, and brainstem parenchyma: Within normal limits.  Cranial nerves: Normal.  Herniation: None.  Calvarium: Within normal limits.  Vascular system: Normal flow voids.  Visualized paranasal sinuses: Normal.  Visualized orbits: The visualized orbits appear unremarkable.  Sella and skull base: The sella is prominent and CSF filled, with the pituitary gland layering along the floor of the sella, suggestive of partial empty sella.  Temporal bones and mastoids: Within normal limits.      Impression:  1. No abnormal signal is identified on the diffusion images to suggest acute infarct.  2. No acute intracranial process is identified. Details and other findings as discussed above.                                         CTA STROKE MULTI-PHASE (Final result)  Result time 10/08/23 15:53:43      Final result by Doug Salazar MD (10/08/23 15:53:43)                   Impression:      No hemodynamically significant flow-limiting stenosis identified.      Electronically signed by: Doug Salazar  Date:    10/08/2023  Time:    15:53               Narrative:    EXAMINATION:  CTA STROKE MULTI-PHASE    CLINICAL HISTORY:  Neuro deficit, acute, stroke suspected    TECHNIQUE:  Brain imaging was performed from skull base to vertex prior to intravenous contrast. CT Angiogram of head was subsequently performed following intravenous contrast administration.  Coronal and sagittal MPR reconstructions were performed in addition to coronal and sagittal MIP reconstructions.    Dose product length of 2129 mGycm. Automated exposure control was  utilized to minimize radiation dose.    COMPARISON:  CT brain without contrast same date.    FINDINGS:  Carotid arteries are assessed in accordance with the NASCET criteria.    Aortic arch is without aneurysmal dilatation or dissection.  Brachiocephalic trunk and the right subclavian artery without significant stenosis.  Portion of the left subclavian artery is not well seen due to adjacent intense venous contrast.  There is no significant stenosis, intraluminal thrombus, dissection or aneurysm prominence of the common carotid arteries.  The proximal portion of right internal carotid artery is remarkable formedial aspect dense calcified plaque which results in up to 40% stenosis.  Distal right internal carotid artery is unremarkable.  Mild calcified plaque of the left proximal internal carotid artery is without significant stenosis.  There are mild calcified plaques which involve the left cavernous portion of the internal carotid artery without significant stenosis.  Flow is seen bilaterally within the anterior cerebral arteries and the middle cerebral artery major branches without significant stenosis.    The right vertebral artery is hypoplastic.  Flow is present bilaterally within the vertebral arteries without flow-limiting stenosis.  The basilar artery is also patent.  Flow is seen bilaterally within the posterior cerebral arteries.    Multilevel pronounced degenerative changes of the cervical spine.                                       CT HEAD FOR STROKE (Final result)  Result time 10/08/23 15:11:46      Final result by Doug Salazar MD (10/08/23 15:11:46)                   Impression:      No acute intracranial findings identified.    Findings were notified to Dr. Stanley October 8, 2023 at 15:10 hours.      Electronically signed by: Doug Salazar  Date:    10/08/2023  Time:    15:11               Narrative:    EXAMINATION:  CT HEAD FOR STROKE    CLINICAL HISTORY:  Neuro deficit, acute, stroke  suspected;    TECHNIQUE:  Sequential axial images were performed of the brain without contrast.    Dose product length of 1362 mGycm. Automated exposure control was utilized to minimize radiation dose.    COMPARISON:  September 19, 2023.    FINDINGS:  There is no intracranial mass effect, midline shift, hydrocephalus or hemorrhage. There is no sulcal effacement or low attenuation changes to suggest recent large vessel territory infarction.  The ventricular system and sulcal markings prominence is consistent with atrophy. There is no acute extra axial fluid collection. Visualized paranasal sinuses are clear without mucosal thickening, polypoidal abnormality or air-fluid levels. Mastoid air cells aeration is optimal.                                      EKG:    Results for orders placed or performed during the hospital encounter of 10/08/23   ECG 12 lead    Narrative    Test Reason : I63.9,    Vent. Rate : 067 BPM     Atrial Rate : 067 BPM     P-R Int : 160 ms          QRS Dur : 098 ms      QT Int : 454 ms       P-R-T Axes : 063 062 058 degrees     QTc Int : 479 ms    Normal sinus rhythm  Normal ECG  When compared with ECG of 19-SEP-2023 18:23,  No significant change was found  Confirmed by Segun Meyer MD ([id number]) on 10/8/2023 4:21:09 PM    Referred By: [email or web address]   SELF           Confirmed By:Segun Meyer MD           Physical Exam  Constitutional:       Appearance: She is obese.   Cardiovascular:      Rate and Rhythm: Normal rate and regular rhythm.      Pulses: Normal pulses.      Heart sounds: Normal heart sounds.   Pulmonary:      Effort: Pulmonary effort is normal.      Breath sounds: Normal breath sounds.   Abdominal:      Palpations: Abdomen is soft.   Musculoskeletal:         General: Normal range of motion.   Skin:     General: Skin is warm.      Capillary Refill: Capillary refill takes less than 2 seconds.   Neurological:      General: No focal deficit present.      Mental Status: She is alert.    Psychiatric:         Mood and Affect: Mood normal.         Home Medications:   No current facility-administered medications on file prior to encounter.     Current Outpatient Medications on File Prior to Encounter   Medication Sig Dispense Refill    adalimumab (HUMIRA,CF, PEN SUBQ) Inject 0.4 mcg into the skin. Every two weeks.      aspirin (ECOTRIN) 81 MG EC tablet Take 81 mg by mouth once daily.      cetirizine (ZYRTEC) 10 MG tablet Take 10 mg by mouth once daily.      clopidogreL (PLAVIX) 75 mg tablet Take 1 tablet (75 mg total) by mouth once daily. 30 tablet 3    FLUoxetine 40 MG capsule Take 40 mg by mouth 2 (two) times daily.      fluticasone propionate (FLONASE) 50 mcg/actuation nasal spray 1 spray by Each Nostril route 2 (two) times daily. Takes as needed      gabapentin (NEURONTIN) 600 MG tablet Take 600 mg by mouth 3 (three) times daily.      levothyroxine (SYNTHROID) 75 MCG tablet Take 75 mcg by mouth before breakfast.      metoprolol succinate (TOPROL-XL) 25 MG 24 hr tablet Take 25 mg by mouth once daily.      nitroGLYCERIN (NITROSTAT) 0.4 MG SL tablet PLEASE SEE ATTACHED FOR DETAILED DIRECTIONS      oxybutynin (DITROPAN-XL) 10 MG 24 hr tablet Take 10 mg by mouth once daily. As needed      pantoprazole (PROTONIX) 40 MG tablet Take 40 mg by mouth once daily.      REPATHA SURECLICK 140 mg/mL PnIj SMARTSIG:pre-filled pen syringe SUB-Q Every 2 Weeks      rOPINIRole (REQUIP) 0.5 MG tablet Take 0.5 mg by mouth every evening.      rosuvastatin (CRESTOR) 40 MG Tab Take 40 mg by mouth once daily.      tiZANidine (ZANAFLEX) 4 MG tablet Take 4 mg by mouth every 8 (eight) hours as needed.         Current Inpatient Medications:    Current Facility-Administered Medications:     aspirin EC tablet 81 mg, 81 mg, Oral, Daily, Tamara Beltran FNP    atorvastatin tablet 80 mg, 80 mg, Oral, Daily, Tamara Beltran FNP    butalbital-acetaminophen-caffeine -40 mg per tablet 2 tablet, 2 tablet, Oral, Q6H PRN,  Haley Arnett MD, 2 tablet at 10/09/23 0322    clopidogreL tablet 75 mg, 75 mg, Oral, Daily, Tamara Beltran, FNP    enoxaparin injection 40 mg, 40 mg, Subcutaneous, Daily, Amie Shanks, FNP, 40 mg at 10/08/23 1840    FLUoxetine capsule 40 mg, 40 mg, Oral, BID, Tamara Beltran, FNP, 40 mg at 10/08/23 2345    gabapentin capsule 600 mg, 600 mg, Oral, TID, Tamara Beltran, FNP    labetaloL injection 10 mg, 10 mg, Intravenous, Q2H PRN, Amie Shanks, FNP, 10 mg at 10/08/23 2153    levothyroxine tablet 75 mcg, 75 mcg, Oral, Before breakfast, Tamara Beltran FNP, 75 mcg at 10/09/23 0600    metoprolol succinate (TOPROL-XL) 24 hr tablet 25 mg, 25 mg, Oral, Daily, Tamara Beltran, FNP    oxybutynin tablet 5 mg, 5 mg, Oral, Daily, Tamara Beltran, FNP    pantoprazole EC tablet 40 mg, 40 mg, Oral, Daily, Tamara Beltran, FNP    rOPINIRole tablet 0.5 mg, 0.5 mg, Oral, QHS, Tamara Beltran, CARLAP, 0.5 mg at 10/08/23 2345    sodium chloride 0.9% flush 10 mL, 10 mL, Intravenous, PRN, Amie Shanks, FNP    Current Outpatient Medications:     adalimumab (HUMIRA,CF, PEN SUBQ), Inject 0.4 mcg into the skin. Every two weeks., Disp: , Rfl:     aspirin (ECOTRIN) 81 MG EC tablet, Take 81 mg by mouth once daily., Disp: , Rfl:     cetirizine (ZYRTEC) 10 MG tablet, Take 10 mg by mouth once daily., Disp: , Rfl:     clopidogreL (PLAVIX) 75 mg tablet, Take 1 tablet (75 mg total) by mouth once daily., Disp: 30 tablet, Rfl: 3    FLUoxetine 40 MG capsule, Take 40 mg by mouth 2 (two) times daily., Disp: , Rfl:     fluticasone propionate (FLONASE) 50 mcg/actuation nasal spray, 1 spray by Each Nostril route 2 (two) times daily. Takes as needed, Disp: , Rfl:     gabapentin (NEURONTIN) 600 MG tablet, Take 600 mg by mouth 3 (three) times daily., Disp: , Rfl:     levothyroxine (SYNTHROID) 75 MCG tablet, Take 75 mcg by mouth before breakfast., Disp: , Rfl:     metoprolol succinate (TOPROL-XL) 25 MG 24 hr tablet,  Take 25 mg by mouth once daily., Disp: , Rfl:     nitroGLYCERIN (NITROSTAT) 0.4 MG SL tablet, PLEASE SEE ATTACHED FOR DETAILED DIRECTIONS, Disp: , Rfl:     oxybutynin (DITROPAN-XL) 10 MG 24 hr tablet, Take 10 mg by mouth once daily. As needed, Disp: , Rfl:     pantoprazole (PROTONIX) 40 MG tablet, Take 40 mg by mouth once daily., Disp: , Rfl:     REPATHA SURECLICK 140 mg/mL PnIj, SMARTSIG:pre-filled pen syringe SUB-Q Every 2 Weeks, Disp: , Rfl:     rOPINIRole (REQUIP) 0.5 MG tablet, Take 0.5 mg by mouth every evening., Disp: , Rfl:     rosuvastatin (CRESTOR) 40 MG Tab, Take 40 mg by mouth once daily., Disp: , Rfl:     tiZANidine (ZANAFLEX) 4 MG tablet, Take 4 mg by mouth every 8 (eight) hours as needed., Disp: , Rfl:          VTE Risk Mitigation (From admission, onward)           Ordered     enoxaparin injection 40 mg  Daily         10/08/23 1728     IP VTE HIGH RISK PATIENT  Once         10/08/23 1728     Place sequential compression device  Until discontinued         10/08/23 1728                    Assessment:   TIA  --no acute findings on CT head/MRI brain  --bubble study negative  Uncontrolled HTN  --/82 on admit  HLD  --LDL 18 -- on crestor and repatha  B PALAK  --CUS 9.20.23: < 50% stenosis to bilateral ICAs  Chronic combined systolic and diastolic HF  --EF 60-65% with grade I LVDD (up from 40% EF 2.17.20)  --appears compensated    Plan:   Continue aspirin, plavix, and statin  BP remains above goal- resume metoprolol succinate 25mg daily and Norvasc 5 mg daily  Spoke with [facility]- Preventice heart monitor reviewed- no episodes of afib noted--patient instructed to reapply preventice prior to DC so she can complete 2 week monitoring.     Thank you for your consult. CIS signing off.     Mary Grace Monzon P  Cardiology  [facility] - Emergency Dept  10/09/2023 7:53 AM

## 2023-10-09 NOTE — PROGRESS NOTES
[facility] Dept  Neurology  Progress Note    Patient Name: Yandy Chaudhry  MRN: [id number]  Admission Date: 10/8/2023  Hospital Length of Stay: 1 days  Code Status: Full Code   Attending Provider: Haley Arnett MD  Primary Care Physician: Fabian Phelps MD   Principal Problem:TIA (transient ischemic attack)      Subjective:     Interval History:   Called by nursing this am for recurrent symptoms around 8:00 am this morning. Reports HA this am with LUE numbness/tingling. Reports symptoms lasted 2 hours total yesterday and completely resolved. She reports her headaches have become more frequent and increased in intensity. She was seeing Dr. Mendoza outpatient who prescribed gabapentin but it has not been helping.     Current Neurological Medications:     Current Facility-Administered Medications   Medication Dose Route Frequency Provider Last Rate Last Admin    0.9%  NaCl infusion   Intravenous Once Rosanne Urbano, NP        aspirin EC tablet 81 mg  81 mg Oral Daily Tamara Beltran FNP        atorvastatin tablet 80 mg  80 mg Oral Daily Tamara Beltran FNP        butalbital-acetaminophen-caffeine -40 mg per tablet 2 tablet  2 tablet Oral Q6H PRN Haley Arnett MD   2 tablet at 10/09/23 0322    clopidogreL tablet 75 mg  75 mg Oral Daily Tamara Beltran FNP        enoxaparin injection 40 mg  40 mg Subcutaneous Daily Amie Shanks FNP   40 mg at 10/08/23 1840    FLUoxetine capsule 40 mg  40 mg Oral BID Tamara Beltran FNP   40 mg at 10/08/23 2345    gabapentin capsule 600 mg  600 mg Oral TID Tamara Beltran FNP        labetaloL injection 10 mg  10 mg Intravenous Q2H PRN Amie Shanks FNP   10 mg at 10/08/23 2153    levothyroxine tablet 75 mcg  75 mcg Oral Before breakfast Tamara Beltran FNP   75 mcg at 10/09/23 0600    magnesium sulfate in dextrose IVPB (premix) 1 g  1 g Intravenous Once Rosanne Urbano NP         metoprolol succinate (TOPROL-XL) 24 hr tablet 25 mg  25 mg Oral Daily Ruby Tamara OWUSU, FNP        oxybutynin tablet 5 mg  5 mg Oral Daily Ruby Tamara OWUSU, FNP        pantoprazole EC tablet 40 mg  40 mg Oral Daily Ruby Tamara OWUSU, FNP        prochlorperazine injection Soln 5 mg  5 mg Intravenous Once Yolie, Rosanne W, NP        rOPINIRole tablet 0.5 mg  0.5 mg Oral QHS Ruby Tamara OWUSU, CARLAP   0.5 mg at 10/08/23 2345    sodium chloride 0.9% flush 10 mL  10 mL Intravenous PRN Amie Shanks, CARLAP         Current Outpatient Medications   Medication Sig Dispense Refill    adalimumab (HUMIRA,CF, PEN SUBQ) Inject 0.4 mcg into the skin. Every two weeks.      aspirin (ECOTRIN) 81 MG EC tablet Take 81 mg by mouth once daily.      cetirizine (ZYRTEC) 10 MG tablet Take 10 mg by mouth once daily.      clopidogreL (PLAVIX) 75 mg tablet Take 1 tablet (75 mg total) by mouth once daily. 30 tablet 3    FLUoxetine 40 MG capsule Take 40 mg by mouth 2 (two) times daily.      fluticasone propionate (FLONASE) 50 mcg/actuation nasal spray 1 spray by Each Nostril route 2 (two) times daily. Takes as needed      gabapentin (NEURONTIN) 600 MG tablet Take 600 mg by mouth 3 (three) times daily.      levothyroxine (SYNTHROID) 75 MCG tablet Take 75 mcg by mouth before breakfast.      metoprolol succinate (TOPROL-XL) 25 MG 24 hr tablet Take 25 mg by mouth once daily.      nitroGLYCERIN (NITROSTAT) 0.4 MG SL tablet PLEASE SEE ATTACHED FOR DETAILED DIRECTIONS      oxybutynin (DITROPAN-XL) 10 MG 24 hr tablet Take 10 mg by mouth once daily. As needed      pantoprazole (PROTONIX) 40 MG tablet Take 40 mg by mouth once daily.      REPATHA SURECLICK 140 mg/mL PnIj SMARTSIG:pre-filled pen syringe SUB-Q Every 2 Weeks      rOPINIRole (REQUIP) 0.5 MG tablet Take 0.5 mg by mouth every evening.      rosuvastatin (CRESTOR) 40 MG Tab Take 40 mg by mouth once daily.      tiZANidine (ZANAFLEX) 4 MG tablet Take 4 mg by mouth  every 8 (eight) hours as needed.         Review of Systems   Neurological:  Positive for numbness and headaches. Negative for dizziness, tremors, seizures, syncope, facial asymmetry, speech difficulty and light-headedness.   All other systems reviewed and are negative.    Objective:     Vital Signs (Most Recent):  Temp: 97.8 °F (36.6 °C) (10/08/23 1446)  Pulse: 65 (10/09/23 0829)  Resp: 17 (10/09/23 0829)  BP: (!) 174/95 (10/09/23 0829)  SpO2: 97 % (10/09/23 0829) Vital Signs (24h Range):  Temp:  [97.8 °F (36.6 °C)] 97.8 °F (36.6 °C)  Pulse:  [58-70] 65  Resp:  [11-20] 17  SpO2:  [96 %-99 %] 97 %  BP: (158-195)/() 174/95     Weight: (S) 95.1 kg (209 lb 8.8 oz)  Body mass index is 34.87 kg/m².     Physical Exam  Vitals and nursing note reviewed.   Constitutional:       General: She is not in acute distress.     Appearance: Normal appearance. She is not toxic-appearing.   HENT:      Head: Normocephalic.   Eyes:      General: No visual field deficit.     Extraocular Movements:      Right eye: Normal extraocular motion and no nystagmus.      Left eye: Normal extraocular motion and no nystagmus.      Pupils: Pupils are equal, round, and reactive to light.   Cardiovascular:      Rate and Rhythm: Normal rate.   Pulmonary:      Effort: Pulmonary effort is normal.      Breath sounds: Normal breath sounds.   Musculoskeletal:         General: No swelling. Normal range of motion.      Cervical back: Normal range of motion.      Right lower leg: No edema.      Left lower leg: No edema.   Skin:     General: Skin is warm and dry.   Neurological:      General: No focal deficit present.      Mental Status: She is alert and oriented to person, place, and time.      Cranial Nerves: No dysarthria or facial asymmetry.      Sensory: Sensory deficit (decreased sensation to LUE, left face) present.      Motor: Pronator drift (mild LUE pronator drift) present. No weakness, tremor, atrophy, abnormal muscle tone or seizure activity.       Coordination: Coordination normal. Finger-Nose-Finger Test normal.   Psychiatric:         Mood and Affect: Affect is flat.         Speech: Speech normal.         Behavior: Behavior normal.          NEUROLOGICAL EXAMINATION:     MENTAL STATUS   Oriented to person, place, and time.   Speech: speech is normal     CRANIAL NERVES     CN III, IV, VI   Pupils are equal, round, and reactive to light.    GAIT AND COORDINATION      Coordination   Finger to nose coordination: normal      Significant Labs:   Recent Lab Results  (Last 5 results in the past 24 hours)        10/09/23  0356   10/08/23  1521   10/08/23  1502   10/08/23  1502   10/08/23  1446        Albumin/Globulin Ratio 1.5   1.3             Albumin 3.9   3.8             ALP 63   62             ALT 21   20             aPTT 26.2  Comment: For Minimal Heparin Infusion, the goal aPTT 64-85 seconds corresponds to an anti-Xa of 0.3-0.5.    For Low Intensity and High Intensity Heparin, the goal aPTT  seconds corresponds to an anti-Xa of 0.3-0.7               AST 34   30             Baso # 0.11   0.12             Basophil % 1.4   2.0             BILIRUBIN TOTAL 0.5   0.3             BUN 8.0   9.0             Calcium 9.4   9.4             Chloride 107   108             Cholesterol Total   114             CO2 24   23             CPK MB <1.0               Creatinine 0.69   0.75             eGFR >60   >60             Eos # 0.34   0.42             Eosinophil % 4.2   7.0             Globulin, Total 2.6   2.9             Glucose 100   106             HDL   78             Hematocrit 39.3   39.0             Hemoglobin 12.6   12.7             Immature Grans (Abs) 0.02   0.01             Immature Granulocytes 0.2   0.2             INR 1.0   0.9             LDL Cholesterol External   18.00             Lymph # 2.80   2.72             LYMPH % 34.7   45.6             Magnesium  1.60               MCH 25.7   26.3             MCHC 32.1   32.6             MCV 80.2   80.7              Mono # 0.79   0.76             Mono % 9.8   12.7             MPV 11.8   11.5             Neut # 4.00   1.94             Neut % 49.7   32.5             nRBC 0.0   0.0             Phosphorus Level 3.6               Platelet Count 256   256             POC Anion Gap       19         POC BUN       9         POC Chloride       104         POC Creatinine       0.7         POC Glucose       109   102       POC Hematocrit       40         POC HEMOGLOBIN       14         POC Ionized Calcium       1.27         Potassium, Blood Gas       4.0         POC PTINR     1.0           POC PTWBT     11.5           Sodium, Blood Gas       142         POC TCO2 (MEASURED)       23         Potassium 3.8   4.0             PROTEIN TOTAL 6.5   6.7             Protime 13.0   12.1             RBC 4.90   4.83             RDW 16.4   16.3             Sample     VENOUS   VENOUS         Sodium 140   140             Total Cholesterol/HDL Ratio   1             Triglycerides   89             Troponin I <0.010               TSH   0.935             Very Low Density Lipoprotein   18             WBC 8.06   5.97                                    Significant Imaging: I have reviewed all pertinent imaging results/findings within the past 24 hours.    Assessment and Plan:       Migraine  Complex migraine, less likely TIA     MRI brain negative for acute infarct  CTA head and neck negative for LVO or flow limiting stenosis      She has had recurrent symptoms without stroke evidence on MRI, her symptoms are consistently in conjunction with a severe HA, this is likely complex migraine  -would recommend Nurtec 75 mg once daily as needed for HA  -would recommend continuing aspirin and Plavix  -will do migraine cocktail for now:   --1L IVF bolus  --depacon 500 mg IVPB   --compazine 5 mg   --mag 1 g     Once her HA is better controlled, she can likely be discharged, with a follow up within 2-4 weeks    Previous stroke workup results  -MRI brain: negative  -CT Head:  negative  -CTA head and neck: negative for flow limiting stenosis or LVO  -TSH: 3.730  -ECHO:     There was agitated saline (bubble) used. Study is negative for shunt.    Left Ventricle: The left ventricle is normal in size. Normal wall thickness. Normal wall motion. There is normal systolic function with a visually estimated ejection fraction of 60 - 65%. Grade I diastolic dysfunction.    Right Ventricle: Normal right ventricular cavity size. Systolic function is normal.    Aortic Valve: The aortic valve is a trileaflet valve. There is no stenosis. Aortic valve peak velocity is 1.21 m/s. Mean gradient is 3 mmHg. There is no significant regurgitation.    Mitral Valve: There is no stenosis. The mean pressure gradient across the mitral valve is 2 mmHg at a heart rate of  bpm. There is trace regurgitation.    Tricuspid Valve: There is no stenosis. There is trace regurgitation.    Pulmonic Valve: There is no stenosis.  -LDL: 7   -CUS: negative    Further recommendations to follow from MD      VTE Risk Mitigation (From admission, onward)         Ordered     enoxaparin injection 40 mg  Daily         10/08/23 1728     IP VTE HIGH RISK PATIENT  Once         10/08/23 1728     Place sequential compression device  Until discontinued         10/08/23 1728                Rosanne Urbano NP  Neurology  [facility] - Emergency Dept

## 2023-10-09 NOTE — ASSESSMENT & PLAN NOTE
Complex migraine, less likely TIA     MRI brain negative for acute infarct  CTA head and neck negative for LVO or flow limiting stenosis      She has had recurrent symptoms without stroke evidence on MRI, her symptoms are consistently in conjunction with a severe HA, this is likely complex migraine  -would recommend Nurtec 75 mg once daily as needed for HA  -would recommend continuing aspirin and Plavix  -will do migraine cocktail for now:   --1L IVF bolus  --depacon 500 mg IVPB   --compazine 5 mg   --mag 1 g     Once her HA is better controlled, she can likely be discharged, with a follow up within 2-4 weeks    Previous stroke workup results  -MRI brain: negative  -CT Head: negative  -CTA head and neck: negative for flow limiting stenosis or LVO  -TSH: 3.730  -ECHO:     There was agitated saline (bubble) used. Study is negative for shunt.    Left Ventricle: The left ventricle is normal in size. Normal wall thickness. Normal wall motion. There is normal systolic function with a visually estimated ejection fraction of 60 - 65%. Grade I diastolic dysfunction.    Right Ventricle: Normal right ventricular cavity size. Systolic function is normal.    Aortic Valve: The aortic valve is a trileaflet valve. There is no stenosis. Aortic valve peak velocity is 1.21 m/s. Mean gradient is 3 mmHg. There is no significant regurgitation.    Mitral Valve: There is no stenosis. The mean pressure gradient across the mitral valve is 2 mmHg at a heart rate of  bpm. There is trace regurgitation.    Tricuspid Valve: There is no stenosis. There is trace regurgitation.    Pulmonic Valve: There is no stenosis.  -LDL: 7   -CUS: negative

## 2023-10-09 NOTE — PT/OT/SLP EVAL
[facility]  Speech Language Pathology Department  Cognitive-Communication Evaluation    Patient Name:  Yandy Chaudhry   MRN:  [id number]    Recommendations:     General recommendations:  SLP intervention not indicated  Communication strategies:  none  Barriers to safe discharge: none    History:     Yandy Chaudhry is a/n 53 y.o. female admitted with LUE numbness and tingling.    Past Medical History:   Diagnosis Date    Acid reflux     Anemia, unspecified     Anxiety     Coronary artery disease     Hyperlipidemia     Hypertension     Migraines     Myocardial infarction     Thrombus     apical    Thyroid disease      Past Surgical History:   Procedure Laterality Date    ANKLE SURGERY Left     ANTERIOR CERVICAL DISCECTOMY W/ FUSION      CHOLECYSTECTOMY      CORONARY ANGIOPLASTY WITH STENT PLACEMENT      DILATION AND CURETTAGE OF UTERUS      HYSTERECTOMY         Previous level of Function  Education:doctorate ( )  Occupation: full time job  Lives:  with family  Handed: Right  Glasses: yes  Hearing Aids: no    Imaging   Results for orders placed during the hospital encounter of 10/08/23    MRI Brain Without Contrast    Narrative  Technique:Multiplanar, multisequence magnetic resonance imaging of the brain was performed without intravenous contrast.    Comparison:Comparison is with study dated 2023-09-20 01:09:58. Correlation is with CT study dated 2023-10-08 14:56:55.    Clinical history:Cva, slurred speech, ext. weakness/numbness.    Findings:There is no significant interval change.    Hemorrhage:No acute intracranial hemorrhage is identified.    Stroke:No abnormal signal is identified on the diffusion images to suggest acute infarct.    Extra axial spaces:The ventricles sulci and basal cisterns are within normal limits.    Cerebral, cerebellar, and brainstem parenchyma:Within normal limits.    Sella and skull base:The sella is prominent and CSF filled, with the  pituitary gland layering along the floor of the sella, suggestive of partial empty sella.    Temporal bones and mastoids:Within normal limits.    Impression  Impression:    1. No abnormal signal is identified on the diffusion images to suggest acute infarct.    2. No acute intracranial process is identified. Details and other findings as discussed above.  Electronically signed by: Doug Salazar  Date:    10/09/2023  Time:    06:47    Subjective     Patient awake, alert, and oriented x4 .  Spiritual/Cultural/Jewish Beliefs/Practices that affect care: no  Pain/Comfort: Pain Rating 1: 0/10    Objective:     ORAL MUSCULATURE  Dentition: own teeth  Facial Movement: WFL  Buccal Strength & Mobility: WFL  Mandibular Strength & Mobility: WFL    SPEECH PRODUCTION  Phoneme Production: adequate  Voice Quality: adequate  Voice Production: adequate  Speech Rate: appropriate  Loudness: acceptable  Speech Intelligibility  Known Context: Greater that 90%  Unknown Context: Greater that 90%    AUDITORY COMPREHENSION  Identification:  Body parts: 1005  Objects: 100%  Following Directions:  1-Step: 100%  2-Step: 100%  Yes/No Questions:  Biographical: 100%  Environmental: 100%  Simple: 100%  Complex: 100%    VERBAL EXPRESSION  Automatic Speech:  Functional needs: 100%  Days of the week: 100%  Months of the year: 100%  Repetition:  Phonemes: 100%  Single syllable words: 100%  Multi-syllabic words: 100%  Phrases: 100%  Phrase Completion: 100%  Confrontation Naming  Body Parts: 100%  Objects: 100%  Wh- Questions:  Object name: 100%  Object function: 100%    COGNITION  Orientation:  Person: yes  Place: yes  Time: yes  Situation: yes   Attention:  Focused: WNL  Sustained: WNL  Memory:  Immediate: WNL  Delayed: WNL  Short Term: WNL  Long Term: WNL  Problem Solving  Functional simple: WNL  Functional complex: WNL  Organization:  Convergent thinking: WNL  Divergent thinking: WNL  Sequencing: WNL  Executive Function:  Attention to detail:  WNL  Awareness: WNL    Assessment:     Pt presents with functional speech and language skills, cognitive linguistic skills note to be at baseline. No skilled SLP intervention is warranted at this time.     Patient Education:     Patient provided with verbal education regarding POC.  Understanding was verbalized.     Time Tracking:     SLP Treatment Date:    10/9/23  Speech Start Time:   1100  Speech Stop Time:    1115    Speech Total Time (min):   15    Billable minutes:  Evaluation of Speech Sound Production with Comprehension and Expression, 15 minutes     10/09/2023

## 2023-10-11 ENCOUNTER — PATIENT MESSAGE (OUTPATIENT)
Dept: ADMINISTRATIVE | Facility: OTHER | Age: 53
End: 2023-10-11
Payer: COMMERCIAL

## 2023-10-11 DIAGNOSIS — G43.E09 CHRONIC MIGRAINE WITH AURA WITHOUT STATUS MIGRAINOSUS, NOT INTRACTABLE: Primary | ICD-10-CM

## 2023-10-12 ENCOUNTER — PATIENT MESSAGE (OUTPATIENT)
Dept: ADMINISTRATIVE | Facility: OTHER | Age: 53
End: 2023-10-12
Payer: COMMERCIAL

## 2023-10-13 ENCOUNTER — HOSPITAL ENCOUNTER (EMERGENCY)
Facility: HOSPITAL | Age: 53
Discharge: HOME | End: 2023-10-13
Attending: EMERGENCY MEDICINE
Payer: COMMERCIAL

## 2023-10-13 ENCOUNTER — TELEPHONE (OUTPATIENT)
Dept: NEUROLOGY | Facility: CLINIC | Age: 53
End: 2023-10-13
Payer: COMMERCIAL

## 2023-10-13 VITALS
HEART RATE: 67 BPM | SYSTOLIC BLOOD PRESSURE: 138 MMHG | TEMPERATURE: 98 F | WEIGHT: 205 LBS | RESPIRATION RATE: 18 BRPM | DIASTOLIC BLOOD PRESSURE: 70 MMHG | BODY MASS INDEX: 34.11 KG/M2 | OXYGEN SATURATION: 97 %

## 2023-10-13 DIAGNOSIS — M79.602 LEFT ARM PAIN: ICD-10-CM

## 2023-10-13 DIAGNOSIS — I63.9 STROKE: ICD-10-CM

## 2023-10-13 DIAGNOSIS — I10 HYPERTENSION, UNSPECIFIED TYPE: ICD-10-CM

## 2023-10-13 DIAGNOSIS — G43.409 HEMIPLEGIC MIGRAINE WITHOUT STATUS MIGRAINOSUS, NOT INTRACTABLE: ICD-10-CM

## 2023-10-13 DIAGNOSIS — I80.8 SUPERFICIAL THROMBOPHLEBITIS OF LEFT UPPER EXTREMITY: Primary | ICD-10-CM

## 2023-10-13 LAB
ALBUMIN SERPL-MCNC: 4.2 G/DL (ref 3.5–5)
ALBUMIN/GLOB SERPL: 1.4 RATIO (ref 1.1–2)
ALP SERPL-CCNC: 68 UNIT/L (ref 40–150)
ALT SERPL-CCNC: 22 UNIT/L (ref 0–55)
ANION GAP SERPL CALC-SCNC: 17 MMOL/L (ref 8–16)
AST SERPL-CCNC: 34 UNIT/L (ref 5–34)
BASOPHILS # BLD AUTO: 0.12 X10(3)/MCL
BASOPHILS NFR BLD AUTO: 1.8 %
BILIRUB SERPL-MCNC: 0.4 MG/DL
BUN SERPL-MCNC: 7.3 MG/DL (ref 9.8–20.1)
BUN SERPL-MCNC: 8 MG/DL (ref 6–30)
CALCIUM SERPL-MCNC: 9.4 MG/DL (ref 8.4–10.2)
CHLORIDE SERPL-SCNC: 103 MMOL/L (ref 95–110)
CHLORIDE SERPL-SCNC: 107 MMOL/L (ref 98–107)
CHOLEST SERPL-MCNC: 140 MG/DL
CHOLEST/HDLC SERPL: 2 {RATIO} (ref 0–5)
CO2 SERPL-SCNC: 21 MMOL/L (ref 22–29)
CREAT SERPL-MCNC: 0.5 MG/DL (ref 0.5–1.4)
CREAT SERPL-MCNC: 0.65 MG/DL (ref 0.55–1.02)
EOSINOPHIL # BLD AUTO: 0.31 X10(3)/MCL (ref 0–0.9)
EOSINOPHIL NFR BLD AUTO: 4.5 %
ERYTHROCYTE [DISTWIDTH] IN BLOOD BY AUTOMATED COUNT: 16.7 % (ref 11.5–17)
GFR SERPLBLD CREATININE-BSD FMLA CKD-EPI: >60 MLS/MIN/1.73/M2
GLOBULIN SER-MCNC: 2.9 GM/DL (ref 2.4–3.5)
GLUCOSE SERPL-MCNC: 113 MG/DL (ref 74–100)
GLUCOSE SERPL-MCNC: 115 MG/DL (ref 70–110)
HCT VFR BLD AUTO: 40.5 % (ref 37–47)
HCT VFR BLD CALC: 43 %PCV (ref 36–54)
HDLC SERPL-MCNC: 81 MG/DL (ref 35–60)
HGB BLD-MCNC: 13 G/DL (ref 12–16)
HGB BLD-MCNC: 15 G/DL
IMM GRANULOCYTES # BLD AUTO: 0.01 X10(3)/MCL (ref 0–0.04)
IMM GRANULOCYTES NFR BLD AUTO: 0.1 %
INR PPP: 0.9
LDLC SERPL CALC-MCNC: 42 MG/DL (ref 50–140)
LYMPHOCYTES # BLD AUTO: 3.07 X10(3)/MCL (ref 0.6–4.6)
LYMPHOCYTES NFR BLD AUTO: 44.9 %
MCH RBC QN AUTO: 25.9 PG (ref 27–31)
MCHC RBC AUTO-ENTMCNC: 32.1 G/DL (ref 33–36)
MCV RBC AUTO: 80.7 FL (ref 80–94)
MONOCYTES # BLD AUTO: 0.93 X10(3)/MCL (ref 0.1–1.3)
MONOCYTES NFR BLD AUTO: 13.6 %
NEUTROPHILS # BLD AUTO: 2.39 X10(3)/MCL (ref 2.1–9.2)
NEUTROPHILS NFR BLD AUTO: 35.1 %
NRBC BLD AUTO-RTO: 0 %
PLATELET # BLD AUTO: 247 X10(3)/MCL (ref 130–400)
PMV BLD AUTO: 11.9 FL (ref 7.4–10.4)
POC IONIZED CALCIUM: 1.14 MMOL/L (ref 1.06–1.42)
POC TCO2 (MEASURED): 24 MMOL/L (ref 23–29)
POCT GLUCOSE: 100 MG/DL (ref 70–110)
POTASSIUM BLD-SCNC: 3.8 MMOL/L (ref 3.5–5.1)
POTASSIUM SERPL-SCNC: 4 MMOL/L (ref 3.5–5.1)
PROT SERPL-MCNC: 7.1 GM/DL (ref 6.4–8.3)
PROTHROMBIN TIME: 11.6 SECONDS (ref 12.5–14.5)
RBC # BLD AUTO: 5.02 X10(6)/MCL (ref 4.2–5.4)
SAMPLE: ABNORMAL
SODIUM BLD-SCNC: 139 MMOL/L (ref 136–145)
SODIUM SERPL-SCNC: 140 MMOL/L (ref 136–145)
TRIGL SERPL-MCNC: 84 MG/DL (ref 37–140)
TSH SERPL-ACNC: 1.86 UIU/ML (ref 0.35–4.94)
VLDLC SERPL CALC-MCNC: 17 MG/DL
WBC # SPEC AUTO: 6.83 X10(3)/MCL (ref 4.5–11.5)

## 2023-10-13 PROCEDURE — 25000003 PHARM REV CODE 250: Performed by: PHYSICIAN ASSISTANT

## 2023-10-13 PROCEDURE — 84443 ASSAY THYROID STIM HORMONE: CPT | Performed by: NURSE PRACTITIONER

## 2023-10-13 PROCEDURE — 80048 BASIC METABOLIC PNL TOTAL CA: CPT | Mod: XB

## 2023-10-13 PROCEDURE — 85025 COMPLETE CBC W/AUTO DIFF WBC: CPT | Performed by: NURSE PRACTITIONER

## 2023-10-13 PROCEDURE — 96374 THER/PROPH/DIAG INJ IV PUSH: CPT

## 2023-10-13 PROCEDURE — 80061 LIPID PANEL: CPT | Performed by: NURSE PRACTITIONER

## 2023-10-13 PROCEDURE — 85610 PROTHROMBIN TIME: CPT | Performed by: NURSE PRACTITIONER

## 2023-10-13 PROCEDURE — 93010 EKG 12-LEAD: ICD-10-PCS | Mod: ,,, | Performed by: INTERNAL MEDICINE

## 2023-10-13 PROCEDURE — 99285 EMERGENCY DEPT VISIT HI MDM: CPT | Mod: 25

## 2023-10-13 PROCEDURE — 93005 ELECTROCARDIOGRAM TRACING: CPT

## 2023-10-13 PROCEDURE — 80053 COMPREHEN METABOLIC PANEL: CPT | Performed by: NURSE PRACTITIONER

## 2023-10-13 PROCEDURE — 82962 GLUCOSE BLOOD TEST: CPT

## 2023-10-13 PROCEDURE — 63600175 PHARM REV CODE 636 W HCPCS: Performed by: EMERGENCY MEDICINE

## 2023-10-13 PROCEDURE — 93010 ELECTROCARDIOGRAM REPORT: CPT | Mod: ,,, | Performed by: INTERNAL MEDICINE

## 2023-10-13 RX ORDER — CLONIDINE HYDROCHLORIDE 0.1 MG/1
0.1 TABLET ORAL
Status: COMPLETED | OUTPATIENT
Start: 2023-10-13 | End: 2023-10-13

## 2023-10-13 RX ORDER — HYDRALAZINE HYDROCHLORIDE 20 MG/ML
20 INJECTION INTRAMUSCULAR; INTRAVENOUS
Status: COMPLETED | OUTPATIENT
Start: 2023-10-13 | End: 2023-10-13

## 2023-10-13 RX ORDER — AMLODIPINE BESYLATE 10 MG/1
10 TABLET ORAL DAILY
Qty: 30 TABLET | Refills: 11 | Status: SHIPPED | OUTPATIENT
Start: 2023-10-13 | End: 2023-10-18

## 2023-10-13 RX ORDER — IBUPROFEN 800 MG/1
800 TABLET ORAL 3 TIMES DAILY
Qty: 30 TABLET | Refills: 0 | Status: SHIPPED | OUTPATIENT
Start: 2023-10-13 | End: 2023-10-18

## 2023-10-13 RX ADMIN — CLONIDINE HYDROCHLORIDE 0.1 MG: 0.1 TABLET ORAL at 06:10

## 2023-10-13 RX ADMIN — HYDRALAZINE HYDROCHLORIDE 20 MG: 20 INJECTION INTRAMUSCULAR; INTRAVENOUS at 09:10

## 2023-10-13 NOTE — TELEPHONE ENCOUNTER
Pt was evaluated by Dr Navarro while in the hospital & is scheduled for hospital f/u on 10/18/23.  Pt reports inner left forearm is red & swollen, & tender to the touch. She reports it feels as if the vein is swollen & hard. Pt reports she is on Plavix. Pt inquiring on if we have any recommendations.    [phone]# [phone]

## 2023-10-13 NOTE — FIRST PROVIDER EVALUATION
Medical screening examination initiated.  I have conducted a focused provider triage encounter, findings are as follows:    Brief history of present illness:  53 year old female presents to ER with c/o left arm pain. States that she was admitted here last week due to TIA. She states that she has been having pain in the arm since last night. States she had IV in her left AC     There were no vitals filed for this visit.    Pertinent physical exam:  awake and alert, nad    Brief workup plan:  imaging     Preliminary workup initiated; this workup will be continued and followed by the physician or advanced practice provider that is assigned to the patient when roomed.

## 2023-10-13 NOTE — TELEPHONE ENCOUNTER
Advised patient no recommendations at this time due to provider being out until Monday. Advised patient if pain becomes unbearable report to the ED. Will discuss with  when in clinic. Verbalized understanding.

## 2023-10-14 NOTE — ED PROVIDER NOTES
Encounter Date: 10/13/2023    SCRIBE #1 NOTE: I, Cheli Carrasco, am scribing for, and in the presence of,  Jermaine Yan MD. I have scribed the following portions of the note - Other sections scribed: HPI, ROS, PE.       History     Chief Complaint   Patient presents with    Rash     Patient reports with rash to left forearm area of arm, was seen here for possible TIA on Sunday , had IV placed left ac area where rash is present. Complains of pain and weakness to left arm. On Plavix. Denies any fevers.     A 53 year old female with a history of HLD, HTN, CAD, and an MI is presenting to the ED with c/o rash. While waiting to be seen by a provider, the pt states that she began experiencing left-sided weakness. A code FAST was called. She notes that she was seen here Sunday for a possible TIA and was diagnosed with a hemiplegic migraine.     The history is provided by the patient. No  was used.     Review of patient's allergies indicates:  No Known Allergies  Past Medical History:   Diagnosis Date    Acid reflux     Anemia, unspecified     Anxiety     Coronary artery disease     Hyperlipidemia     Hypertension     Migraines     Myocardial infarction     Thrombus     apical    Thyroid disease      Past Surgical History:   Procedure Laterality Date    ANKLE SURGERY Left     ANTERIOR CERVICAL DISCECTOMY W/ FUSION      CHOLECYSTECTOMY      CORONARY ANGIOPLASTY WITH STENT PLACEMENT      DILATION AND CURETTAGE OF UTERUS      HYSTERECTOMY       Family History   Problem Relation Age of Onset    No Known Problems Mother     No Known Problems Father     No Known Problems Sister     No Known Problems Brother      Social History     Tobacco Use    Smoking status: Never     Passive exposure: Never    Smokeless tobacco: Never   Substance Use Topics    Alcohol use: Not Currently    Drug use: Never     Review of Systems   Constitutional: Negative.    HENT: Negative.     Eyes: Negative.    Respiratory:  Negative.     Cardiovascular: Negative.    Gastrointestinal: Negative.    Endocrine: Negative.    Genitourinary: Negative.    Musculoskeletal: Negative.    Skin: Negative.    Allergic/Immunologic: Negative.    Neurological:  Positive for weakness (left-sided).   Hematological: Negative.    Psychiatric/Behavioral: Negative.     All other systems reviewed and are negative.      Physical Exam     Initial Vitals [10/13/23 1326]   BP Pulse Resp Temp SpO2   (!) 174/96 75 20 98.3 °F (36.8 °C) 98 %      MAP       --         Physical Exam    Nursing note and vitals reviewed.  Constitutional: She is Obese . No distress.   HENT:   Head: Normocephalic and atraumatic.   Right Ear: Tympanic membrane normal.   Left Ear: Tympanic membrane normal.   Mouth/Throat: Oropharynx is clear and moist. No oropharyngeal exudate or posterior oropharyngeal erythema.   Eyes: EOM are normal.   Neck: Trachea normal. Neck supple. Carotid bruit is not present. No JVD present.   Normal range of motion.  Cardiovascular:  Normal rate and regular rhythm.           No murmur heard.  Pulmonary/Chest: Breath sounds normal. No respiratory distress. She exhibits no tenderness.   Abdominal: Abdomen is soft. Bowel sounds are normal. She exhibits no distension. There is no abdominal tenderness.   Musculoskeletal:         General: Normal range of motion.      Cervical back: Normal range of motion and neck supple.      Lumbar back: Normal. Normal range of motion.     Neurological: She is alert and oriented to person, place, and time. No cranial nerve deficit or sensory deficit.   Slightly decreased  strength in the left hand.  Decreased plantar flexion in the left leg with questionable effort.    Psychiatric: Judgment normal. Her mood appears anxious.         ED Course   Procedures  Labs Reviewed   COMPREHENSIVE METABOLIC PANEL - Abnormal; Notable for the following components:       Result Value    Carbon Dioxide 21 (*)     Glucose Level 113 (*)     Blood  Urea Nitrogen 7.3 (*)     All other components within normal limits   PROTIME-INR - Abnormal; Notable for the following components:    PT 11.6 (*)     All other components within normal limits   LIPID PANEL - Abnormal; Notable for the following components:    HDL Cholesterol 81 (*)     LDL Cholesterol 42.00 (*)     All other components within normal limits   CBC WITH DIFFERENTIAL - Abnormal; Notable for the following components:    MCH 25.9 (*)     MCHC 32.1 (*)     MPV 11.9 (*)     All other components within normal limits   ISTAT CHEM8 - Abnormal; Notable for the following components:    POC Glucose 115 (*)     POC Anion Gap 17 (*)     All other components within normal limits   TSH - Normal   CBC W/ AUTO DIFFERENTIAL    Narrative:     The following orders were created for panel order CBC W/ AUTO DIFFERENTIAL.  Procedure                               Abnormality         Status                     ---------                               -----------         ------                     CBC with Differential[[id number]]       Abnormal            Final result                 Please view results for these tests on the individual orders.   POCT GLUCOSE, HAND-HELD DEVICE   POCT GLUCOSE          Imaging Results              CT HEAD FOR STROKE (Final result)  Result time 10/13/23 19:32:11   Procedure changed from CT Head Without Contrast     Final result by Katherine Mcelroy MD (10/13/23 19:32:11)                   Impression:      No acute intracranial abnormality.    Code fast finding given to Amy PRADO at the time of dictation      Electronically signed by: Katherine Mcelroy  Date:    10/13/2023  Time:    19:32               Narrative:    EXAMINATION:  CT HEAD FOR STROKE    CLINICAL HISTORY:  Neuro deficit, acute, stroke suspected;    TECHNIQUE:  Axial scans were obtained from skull base to the vertex.    Coronal and sagittal reconstructions obtained from the axial data.    Automatic exposure control was  utilized to limit radiation dose.    Contrast: None    Radiation Dose:    Total DLP: 10 40 mGy*cm    COMPARISON:  MRI brain dated 10/08/2023    FINDINGS:  There is no acute intracranial hemorrhage or edema. The gray-white matter differentiation is preserved.    There is no mass effect or midline shift. The ventricles and sulci are normal in size. The basal cisterns are patent. There is no abnormal extra-axial fluid collection.    The calvarium and skull base are intact. The visualized paranasal sinuses and the mastoid air cells are clear.                                       Medications   hydrALAZINE injection 20 mg (has no administration in time range)   cloNIDine tablet 0.1 mg (0.1 mg Oral Given 10/13/23 1819)     Medical Decision Making  Differential diagnosis include but are not limited to: hemiplegic migraine, CVA, TIA, and hypertensive emergency.    Amount and/or Complexity of Data Reviewed  Labs: ordered. Decision-making details documented in ED Course.    Risk  Prescription drug management.            Scribe Attestation:   Scribe #1: I performed the above scribed service and the documentation accurately describes the services I performed. I attest to the accuracy of the note.    Attending Attestation:           Physician Attestation for Scribe:  Physician Attestation Statement for Scribe #1: I, Jermaine Ramos MD, reviewed documentation, as scribed by Cheli Carrasco in my presence, and it is both accurate and complete.             ED Course as of 10/13/23 2113   Fri Oct 13, 2023   2058 I spoke with Dr. Hernandez (neurology) who feels re-admission unnecessary.  Recommends tighten BP control and he will have Dr. Navarro's office arrange cardiology F/U for possible LINQ placement as an outpatient. [CL]   2111 Feeling better, though BP creeping back up.  Patient agreeable with outpatient treatment.  She is established with Dr. Chong. [CL]      ED Course User Index  [CL] Jermaine Ramos MD                     Clinical Impression:   Final diagnoses:  [M79.602] Left arm pain  [I63.9] Stroke  [I80.8] Superficial thrombophlebitis of left upper extremity (Primary)  [I10] Hypertension, unspecified type  [G43.409] Hemiplegic migraine without status migrainosus, not intractable        ED Disposition Condition    Discharge Stable          ED Prescriptions       Medication Sig Dispense Start Date End Date Auth. Provider    amLODIPine (NORVASC) 10 MG tablet Take 1 tablet (10 mg total) by mouth once daily. 30 tablet 10/13/2023 10/12/2024 Jermaine Ramos MD    ibuprofen (ADVIL,MOTRIN) 800 MG tablet Take 1 tablet (800 mg total) by mouth 3 (three) times daily. for 10 days 30 tablet 10/13/2023 10/23/2023 Jermaine Ramos MD          Follow-up Information       Follow up With Specialties Details Why Contact Info    Fabian Phelps MD Internal Medicine In 1 week  [address] [facility] [address]  [phone]               Jermaine Ramos MD  10/13/23 2112       Jermaine Ramos MD  10/13/23 2113

## 2023-10-14 NOTE — DISCHARGE SUMMARY
[facility] Hospital Medicine Discharge Summary    Admit Date: 10/8/2023  Discharge Date and Time: 10/9/23  Admitting Physician:  Team  Discharging Physician: Brittney Austin MD.  Primary Care Physician: Fabian Phelps MD  Consults: Cardiology and Neurology    Discharge Diagnoses:  Complex migraine, less likely TIA   Uncontrolled HTN    Hospital Course:   53 yr old female whose history includes HTN, CAD (coronary stent 2020), Hypothyroidism, and migraines. Patient was discharged on 9/20 following a one day admission during which time she was worked up for possible TIA vs CVA. Testing showed no evidence of CVA. MRI brain negative for acute findings. Echocardiogram showed an EF of 60-65% with grade 1 diastolic dysfunction, bilateral carotid artery US showed < 50% stenosis bilaterally. Prior to discharge her symptoms were completely resolved. Discharged home on Plavix which she has been taking.  Today around 12:30 she again developed a headache, left facial numbness, left arm and hand numbness and weakness. Denies any left leg numbness or weakness.   Denies any chest pain or SOB. Repeat head CT without contrast, CTA, and MRI brain again negative for acute intracranial findings or hemodynamically significant flow-limiting stenosis. Labs unremarkable. Blood pressure on arrival 195/82 and has remained elevated. Reports symptoms left arm have improved but persist and continues to have a headache.     MRI brain negative for acute infarct  .CTA head and neck negative for LVO or flow limiting stenosis.Seen by Neurology, opines it is a complex migraine, medication recommendations were given.    Cardiology saw the patient, recommended to continue aspirin, Plavix, statin, metoprolol succinate and Norvasc. Preventice heart monitor reviewed- no episodes of afib noted--patient instructed to reapply preventice prior to DC so she can complete 2 week monitoring    Pt was seen and examined on the day  of discharge. Patient was stable on discharge,all questions were answered and emphasized the importance of follow ups.   Vitals:  VITAL SIGNS: 24 HRS MIN & MAX LAST   No data recorded 97.8 °F (36.6 °C)   No data recorded (!) 146/74   No data recorded  76   No data recorded 16   No data recorded 97 %       Physical Exam:  General: In no acute distress, afebrile  Chest: Clear to auscultation bilaterally  Heart: RRR, +S1, S2, no appreciable murmur  Abdomen: Soft, nontender, BS +  MSK: Warm, no lower extremity edema, no clubbing or cyanosis  Neurologic: Alert and oriented x4, Cranial nerve II-XII intact, Strength 5/5 in all 4 extremities    Procedures Performed:See full note.    Significant Diagnostic Studies: See Full reports for all details    Recent Labs   Lab 10/08/23  1521 10/09/23  0356 10/13/23  1858   WBC 5.97 8.06  --    RBC 4.83 4.90  --    HGB 12.7 12.6  --    HCT 39.0 39.3 43   MCV 80.7 80.2  --    MCH 26.3* 25.7*  --    MCHC 32.6* 32.1*  --    RDW 16.3 16.4  --     256  --    MPV 11.5* 11.8*  --        Recent Labs   Lab 10/08/23  1521 10/09/23  0356    140   K 4.0 3.8   CO2 23 24   BUN 9.0* 8.0*   CREATININE 0.75 0.69   CALCIUM 9.4 9.4   MG  --  1.60   ALBUMIN 3.8 3.9   ALKPHOS 62 63   ALT 20 21   AST 30 34   BILITOT 0.3 0.5        Microbiology Results (last 7 days)       ** No results found for the last 168 hours. **             MRI Brain Without Contrast  Narrative: Technique:Multiplanar, multisequence magnetic resonance imaging of the brain was performed without intravenous contrast.    Comparison:Comparison is with study dated 2023-09-20 01:09:58. Correlation is with CT study dated 2023-10-08 14:56:55.    Clinical history:Cva, slurred speech, ext. weakness/numbness.    Findings:There is no significant interval change.    Hemorrhage:No acute intracranial hemorrhage is identified.    Stroke:No abnormal signal is identified on the diffusion images to suggest acute infarct.    Extra axial  spaces:The ventricles sulci and basal cisterns are within normal limits.    Cerebral, cerebellar, and brainstem parenchyma:Within normal limits.    Sella and skull base:The sella is prominent and CSF filled, with the pituitary gland layering along the floor of the sella, suggestive of partial empty sella.    Temporal bones and mastoids:Within normal limits.  Impression: Impression:    1. No abnormal signal is identified on the diffusion images to suggest acute infarct.    2. No acute intracranial process is identified. Details and other findings as discussed above.    No significant discrepancy with overnight report.    Electronically signed by: Doug Salazar  Date:    10/09/2023  Time:    06:47         Medication List        START taking these medications      amLODIPine 5 MG tablet  Commonly known as: NORVASC  Take 1 tablet (5 mg total) by mouth once daily.     butalbital-acetaminophen-caffeine -40 mg -40 mg per tablet  Commonly known as: FIORICET, ESGIC  Take 1 tablet by mouth every 6 (six) hours as needed for Headaches.     rimegepant 75 mg odt  Take 1 tablet (75 mg total) by mouth once daily. Place ODT tablet on the tongue; alternatively the ODT tablet may be placed under the tongue            CHANGE how you take these medications      cetirizine 10 MG tablet  Commonly known as: ZYRTEC  Take 1 tablet (10 mg total) by mouth daily as needed for Allergies.  What changed:   when to take this  reasons to take this            CONTINUE taking these medications      aspirin 81 MG EC tablet  Commonly known as: ECOTRIN     clopidogreL 75 mg tablet  Commonly known as: PLAVIX  Take 1 tablet (75 mg total) by mouth once daily.     FLUoxetine 40 MG capsule     fluticasone propionate 50 mcg/actuation nasal spray  Commonly known as: FLONASE     gabapentin 600 MG tablet  Commonly known as: NEURONTIN     HUMIRA(CF) PEN SUBQ     levothyroxine 75 MCG tablet  Commonly known as: SYNTHROID     metoprolol succinate 25 MG 24  hr tablet  Commonly known as: TOPROL-XL     nitroGLYCERIN 0.4 MG SL tablet  Commonly known as: NITROSTAT     oxybutynin 10 MG 24 hr tablet  Commonly known as: DITROPAN-XL     pantoprazole 40 MG tablet  Commonly known as: PROTONIX     REPATHA SURECLICK 140 mg/mL Pnij  Generic drug: evolocumab     rOPINIRole 0.5 MG tablet  Commonly known as: REQUIP     rosuvastatin 40 MG Tab  Commonly known as: CRESTOR     tiZANidine 4 MG tablet  Commonly known as: ZANAFLEX               Where to Get Your Medications        These medications were sent to [facility]/pharmacy #[id number] - [place], [place] - [address]  [address] [facility] [address]      Phone: [phone]   amLODIPine 5 MG tablet  rimegepant 75 mg odt       Information about where to get these medications is not yet available    Ask your nurse or doctor about these medications  butalbital-acetaminophen-caffeine -40 mg -40 mg per tablet  cetirizine 10 MG tablet          Explained in detail to the patient about the discharge plan, medications, and follow-up visits. Pt understands and agrees with the treatment plan  Discharge Disposition: Home or Self Care   Discharged Condition: stable  Diet-    Medications Per DC med rec  Activities as tolerated   Follow-up Information       Yury Navarro MD Follow up in 2 week(s).    Specialties: Neurology, Interventional Neurology  Why: Follow up in stroke clinic within 2-4 weeks with Dr. Navarro  Contact information:  [address] [place] [address]  [address]  [facility] [address]  [phone]               Alex Abreu FNP Follow up on 10/18/2023.    Specialty: Cardiology  Why: @ 9:15AM  Contact information:  [address]on  [facility] [address]  [phone]                           For further questions contact hospitalist office    Discharge time 33 minutes    For worsening symptoms, chest pain, shortness of breath, increased abdominal pain, high grade fever, stroke or stroke like symptoms, immediately go to the  nearest Emergency Room or call 911 as soon as possible.      Brittney Alcantara M.D, on 10/13/2023. at 7:19 PM.

## 2023-10-16 NOTE — TELEPHONE ENCOUNTER
Attempted to call patient. Per  patient can discontinue Plavix and continue low dose ASA. Left detailed message.

## 2023-10-18 ENCOUNTER — OFFICE VISIT (OUTPATIENT)
Dept: NEUROLOGY | Facility: CLINIC | Age: 53
End: 2023-10-18
Payer: COMMERCIAL

## 2023-10-18 VITALS
BODY MASS INDEX: 34.16 KG/M2 | DIASTOLIC BLOOD PRESSURE: 97 MMHG | WEIGHT: 205 LBS | HEIGHT: 65 IN | SYSTOLIC BLOOD PRESSURE: 149 MMHG

## 2023-10-18 DIAGNOSIS — R51.9 CHRONIC DAILY HEADACHE: Primary | ICD-10-CM

## 2023-10-18 PROCEDURE — 1159F MED LIST DOCD IN RCRD: CPT | Mod: CPTII,S$GLB,, | Performed by: PSYCHIATRY & NEUROLOGY

## 2023-10-18 PROCEDURE — 99999 PR PBB SHADOW E&M-EST. PATIENT-LVL III: ICD-10-PCS | Mod: PBBFAC,,, | Performed by: PSYCHIATRY & NEUROLOGY

## 2023-10-18 PROCEDURE — 3080F DIAST BP >= 90 MM HG: CPT | Mod: CPTII,S$GLB,, | Performed by: PSYCHIATRY & NEUROLOGY

## 2023-10-18 PROCEDURE — 3077F SYST BP >= 140 MM HG: CPT | Mod: CPTII,S$GLB,, | Performed by: PSYCHIATRY & NEUROLOGY

## 2023-10-18 PROCEDURE — 3077F PR MOST RECENT SYSTOLIC BLOOD PRESSURE >= 140 MM HG: ICD-10-PCS | Mod: CPTII,S$GLB,, | Performed by: PSYCHIATRY & NEUROLOGY

## 2023-10-18 PROCEDURE — 3008F PR BODY MASS INDEX (BMI) DOCUMENTED: ICD-10-PCS | Mod: CPTII,S$GLB,, | Performed by: PSYCHIATRY & NEUROLOGY

## 2023-10-18 PROCEDURE — 99214 PR OFFICE/OUTPT VISIT, EST, LEVL IV, 30-39 MIN: ICD-10-PCS | Mod: S$GLB,,, | Performed by: PSYCHIATRY & NEUROLOGY

## 2023-10-18 PROCEDURE — 1159F PR MEDICATION LIST DOCUMENTED IN MEDICAL RECORD: ICD-10-PCS | Mod: CPTII,S$GLB,, | Performed by: PSYCHIATRY & NEUROLOGY

## 2023-10-18 PROCEDURE — 3044F PR MOST RECENT HEMOGLOBIN A1C LEVEL <7.0%: ICD-10-PCS | Mod: CPTII,S$GLB,, | Performed by: PSYCHIATRY & NEUROLOGY

## 2023-10-18 PROCEDURE — 99999 PR PBB SHADOW E&M-EST. PATIENT-LVL III: CPT | Mod: PBBFAC,,, | Performed by: PSYCHIATRY & NEUROLOGY

## 2023-10-18 PROCEDURE — 3008F BODY MASS INDEX DOCD: CPT | Mod: CPTII,S$GLB,, | Performed by: PSYCHIATRY & NEUROLOGY

## 2023-10-18 PROCEDURE — 3080F PR MOST RECENT DIASTOLIC BLOOD PRESSURE >= 90 MM HG: ICD-10-PCS | Mod: CPTII,S$GLB,, | Performed by: PSYCHIATRY & NEUROLOGY

## 2023-10-18 PROCEDURE — 99214 OFFICE O/P EST MOD 30 MIN: CPT | Mod: S$GLB,,, | Performed by: PSYCHIATRY & NEUROLOGY

## 2023-10-18 PROCEDURE — 3044F HG A1C LEVEL LT 7.0%: CPT | Mod: CPTII,S$GLB,, | Performed by: PSYCHIATRY & NEUROLOGY

## 2023-10-18 RX ORDER — HYDROCORTISONE 25 MG/G
CREAM TOPICAL
COMMUNITY
Start: 2023-07-14 | End: 2023-11-17

## 2023-10-18 RX ORDER — ADALIMUMAB 40MG/0.4ML
40 KIT SUBCUTANEOUS
COMMUNITY
Start: 2023-08-29

## 2023-10-18 RX ORDER — TOPIRAMATE 50 MG/1
50 TABLET, FILM COATED ORAL 2 TIMES DAILY
Qty: 60 TABLET | Refills: 11 | Status: SHIPPED | OUTPATIENT
Start: 2023-10-18 | End: 2023-11-17

## 2023-10-18 RX ORDER — KETOCONAZOLE 20 MG/G
1 CREAM TOPICAL 2 TIMES DAILY
COMMUNITY
Start: 2023-07-14

## 2023-10-18 NOTE — PROGRESS NOTES
Neurology Office Visit  Neurology    Yandy Bernie Chaudhry is a 53 y.o. female for hosp f/u.  Still with daily headache.  Has not taken prophylaxis in the past.    ROS:  Review of Systems   All other systems reviewed and are negative.     Past Medical History:   Diagnosis Date    Acid reflux     Anemia, unspecified     Anxiety     Coronary artery disease     Hyperlipidemia     Hypertension     Migraines     Myocardial infarction     Thrombus     apical    Thyroid disease      Past Surgical History:   Procedure Laterality Date    ANKLE SURGERY Left     ANTERIOR CERVICAL DISCECTOMY W/ FUSION       SECTION  2003     SECTION      CHOLECYSTECTOMY      CORONARY ANGIOPLASTY WITH STENT PLACEMENT      DILATION AND CURETTAGE OF UTERUS      HYSTERECTOMY       Family History   Problem Relation Age of Onset    Cancer Mother         Pancriatic cancer    Hydrocephalus Mother     No Known Problems Father     No Known Problems Sister     No Known Problems Brother     Hydrocephalus Maternal Grandmother     Parkinsonism Maternal Grandfather      Review of patient's allergies indicates:  No Known Allergies    Current Outpatient Medications:     aspirin (ECOTRIN) 81 MG EC tablet, Take 81 mg by mouth once daily., Disp: , Rfl:     butalbital-acetaminophen-caffeine -40 mg (FIORICET, ESGIC) -40 mg per tablet, Take 1 tablet by mouth every 6 (six) hours as needed for Headaches., Disp: 10 tablet, Rfl: 0    cetirizine (ZYRTEC) 10 MG tablet, Take 1 tablet (10 mg total) by mouth daily as needed for Allergies., Disp: , Rfl:     FLUoxetine 40 MG capsule, Take 40 mg by mouth 2 (two) times daily., Disp: , Rfl:     fluticasone propionate (FLONASE) 50 mcg/actuation nasal spray, 1 spray by Each Nostril route 2 (two) times daily. Takes as needed, Disp: , Rfl:     gabapentin (NEURONTIN) 600 MG tablet, Take 600 mg by mouth 3 (three) times daily., Disp: , Rfl:     HUMIRA,CF, PEN 40 mg/0.4 mL PnKt, Inject 40 mg into the  skin., Disp: , Rfl:     hydrocortisone 2.5 % cream, Apply topically., Disp: , Rfl:     ketoconazole (NIZORAL) 2 % cream, Apply 1 application  topically 2 (two) times daily., Disp: , Rfl:     levothyroxine (SYNTHROID) 75 MCG tablet, Take 75 mcg by mouth before breakfast., Disp: , Rfl:     metoprolol succinate (TOPROL-XL) 25 MG 24 hr tablet, Take 25 mg by mouth once daily., Disp: , Rfl:     nitroGLYCERIN (NITROSTAT) 0.4 MG SL tablet, PLEASE SEE ATTACHED FOR DETAILED DIRECTIONS, Disp: , Rfl:     oxybutynin (DITROPAN-XL) 10 MG 24 hr tablet, Take 10 mg by mouth once daily. As needed, Disp: , Rfl:     pantoprazole (PROTONIX) 40 MG tablet, Take 40 mg by mouth once daily., Disp: , Rfl:     REPATHA SURECLICK 140 mg/mL PnIj, SMARTSIG:pre-filled pen syringe SUB-Q Every 2 Weeks, Disp: , Rfl:     rimegepant 75 mg odt, Take 1 tablet (75 mg total) by mouth once daily. Place ODT tablet on the tongue; alternatively the ODT tablet may be placed under the tongue, Disp: 30 tablet, Rfl: 1    rOPINIRole (REQUIP) 0.5 MG tablet, Take 0.5 mg by mouth every evening., Disp: , Rfl:     rosuvastatin (CRESTOR) 40 MG Tab, Take 40 mg by mouth once daily., Disp: , Rfl:     tiZANidine (ZANAFLEX) 4 MG tablet, Take 4 mg by mouth every 8 (eight) hours as needed., Disp: , Rfl:     clopidogreL (PLAVIX) 75 mg tablet, Take 1 tablet (75 mg total) by mouth once daily. (Patient not taking: Reported on 10/18/2023), Disp: 30 tablet, Rfl: 3  No current facility-administered medications for this visit.  Outpatient Medications Marked as Taking for the 10/18/23 encounter (Office Visit) with Yury Navarro MD   Medication Sig Dispense Refill    aspirin (ECOTRIN) 81 MG EC tablet Take 81 mg by mouth once daily.      butalbital-acetaminophen-caffeine -40 mg (FIORICET, ESGIC) -40 mg per tablet Take 1 tablet by mouth every 6 (six) hours as needed for Headaches. 10 tablet 0    cetirizine (ZYRTEC) 10 MG tablet Take 1 tablet (10 mg total) by mouth daily as  needed for Allergies.      FLUoxetine 40 MG capsule Take 40 mg by mouth 2 (two) times daily.      fluticasone propionate (FLONASE) 50 mcg/actuation nasal spray 1 spray by Each Nostril route 2 (two) times daily. Takes as needed      gabapentin (NEURONTIN) 600 MG tablet Take 600 mg by mouth 3 (three) times daily.      HUMIRA,CF, PEN 40 mg/0.4 mL PnKt Inject 40 mg into the skin.      hydrocortisone 2.5 % cream Apply topically.      ketoconazole (NIZORAL) 2 % cream Apply 1 application  topically 2 (two) times daily.      levothyroxine (SYNTHROID) 75 MCG tablet Take 75 mcg by mouth before breakfast.      metoprolol succinate (TOPROL-XL) 25 MG 24 hr tablet Take 25 mg by mouth once daily.      nitroGLYCERIN (NITROSTAT) 0.4 MG SL tablet PLEASE SEE ATTACHED FOR DETAILED DIRECTIONS      oxybutynin (DITROPAN-XL) 10 MG 24 hr tablet Take 10 mg by mouth once daily. As needed      pantoprazole (PROTONIX) 40 MG tablet Take 40 mg by mouth once daily.      REPATHA SURECLICK 140 mg/mL PnIj SMARTSIG:pre-filled pen syringe SUB-Q Every 2 Weeks      rimegepant 75 mg odt Take 1 tablet (75 mg total) by mouth once daily. Place ODT tablet on the tongue; alternatively the ODT tablet may be placed under the tongue 30 tablet 1    rOPINIRole (REQUIP) 0.5 MG tablet Take 0.5 mg by mouth every evening.      rosuvastatin (CRESTOR) 40 MG Tab Take 40 mg by mouth once daily.      tiZANidine (ZANAFLEX) 4 MG tablet Take 4 mg by mouth every 8 (eight) hours as needed.       Social History     Tobacco Use    Smoking status: Never     Passive exposure: Never    Smokeless tobacco: Never   Substance Use Topics    Alcohol use: Not Currently    Drug use: Never         Vitals:    10/18/23 0925   BP: (!) 149/97     Gen NAD  HEENT NC/AT  CV RRR, no carotid bruits  Resp clear  GI soft  Ext no C/C/E  Neuro  AAOx4  Speech fluent, appropriate  EOMI, PERRLA, VFF  Normal facial strength, sensation  Tongue and palate midline  Motor 5/5  Sensation intact  DTRs  symmetric  Coord intact  Gait Normal    Assessment: Chronic Daily Headache    Plan: Trial of TPX 50mg BID.  If no improvement, consider LP, in light of partial empty sella on MRI.

## 2023-10-19 ENCOUNTER — TELEPHONE (OUTPATIENT)
Dept: NEUROLOGY | Facility: CLINIC | Age: 53
End: 2023-10-19
Payer: COMMERCIAL

## 2023-10-19 NOTE — TELEPHONE ENCOUNTER
Pt called reporting she was advised to d/c Plavix but is inquiring on if she should also d/c 325 mg Aspirin & resume baby aspirin.    Pt also has questions on if she should continue taking Gabapentin 600 mg TID while taking Topamax 50 mg BID.  Pt is being prescribed Gabapentin by Dr. Ashley Mendoza.    Pt was advised that Dr. Navarro is out of the office today.    # [phone]

## 2023-10-20 NOTE — TELEPHONE ENCOUNTER
Spoke with Dr. Navarro, he advised to d/c ASA 325mg and resume ASA 81mg.   Patient can continue taking both gabapentin and topamax.   Patient notified, voiced understanding

## 2023-10-23 ENCOUNTER — TELEPHONE (OUTPATIENT)
Dept: NEUROLOGY | Facility: CLINIC | Age: 53
End: 2023-10-23
Payer: COMMERCIAL

## 2023-10-23 NOTE — TELEPHONE ENCOUNTER
Pt called to let Dr. Navarro know that her headaches have not improved since starting Topamax on 10/19/2023. Pt reports headaches have worsened.  She also reports she forgot to mention to Dr. Navarro during her visit that both her grandmother and her mother have/had normal pressure hydrocephalus. Pt inquiring on if the headaches she has been experiencing could be from normal pressure hydrocephalus.    # [phone]

## 2023-10-23 NOTE — TELEPHONE ENCOUNTER
Spoke with . States would patient to come in for a visit to further discuss. Will fwd message to scheduling.

## 2023-11-02 ENCOUNTER — TELEPHONE (OUTPATIENT)
Dept: NEUROLOGY | Facility: CLINIC | Age: 53
End: 2023-11-02
Payer: COMMERCIAL

## 2023-11-06 ENCOUNTER — TELEPHONE (OUTPATIENT)
Dept: NEUROLOGY | Facility: CLINIC | Age: 53
End: 2023-11-06
Payer: COMMERCIAL

## 2023-11-06 DIAGNOSIS — G43.901 MIGRAINE WITH STATUS MIGRAINOSUS, NOT INTRACTABLE, UNSPECIFIED MIGRAINE TYPE: Primary | ICD-10-CM

## 2023-11-06 DIAGNOSIS — R51.9 CHRONIC DAILY HEADACHE: ICD-10-CM

## 2023-11-06 RX ORDER — KETOROLAC TROMETHAMINE 10 MG/1
10 TABLET, FILM COATED ORAL EVERY 6 HOURS
Qty: 20 TABLET | Refills: 0 | Status: SHIPPED | OUTPATIENT
Start: 2023-11-06 | End: 2023-11-11

## 2023-11-06 RX ORDER — ONDANSETRON HYDROCHLORIDE 8 MG/1
8 TABLET, FILM COATED ORAL EVERY 8 HOURS PRN
Qty: 15 TABLET | Refills: 0 | Status: SHIPPED | OUTPATIENT
Start: 2023-11-06 | End: 2023-11-11

## 2023-11-06 NOTE — TELEPHONE ENCOUNTER
Patient called reporting that she did not receive prescription of Toradol or Zofran that she was advised would have been sent in on 11/3/23. Pt's pharmacy is [facility] on [address].  Pt also requesting to schedule lumbar puncture.    CB# [phone]

## 2023-11-07 ENCOUNTER — TELEPHONE (OUTPATIENT)
Dept: NEUROLOGY | Facility: CLINIC | Age: 53
End: 2023-11-07
Payer: COMMERCIAL

## 2023-11-07 NOTE — TELEPHONE ENCOUNTER
Pt called to let Dr. Navarro know that her vision is getting dimmer. Pt is scheduled for LP tomorrow.

## 2023-11-07 NOTE — TELEPHONE ENCOUNTER
Patient scheduled for LP on 11/8/23. Patient notified.   
Per Dr Navarro patient to start zofran 4mg Q8 hour and toradol 10mg Q8 hour. Rupa to call patient Monday and schedule a lumbar puncture to be done by Dr Navarro  
Pt called reporting that headaches are getting worse. She reports the longer she sits up or stands up the headaches worsen. Pt is not scheduled to come back in the office until 11/17/23. Pt inquiring on if we have any advice/recommendations.    CB# [phone]  
Spoke with patient. Patient states Topiramate did not help HA neither did Nurtec. Informed patient that  is in procedures today and will be back in clinic on tomorrow. Will further discuss recommendations Patient verbalized understanding.   
unknown

## 2023-11-08 ENCOUNTER — TELEPHONE (OUTPATIENT)
Dept: NEUROLOGY | Facility: CLINIC | Age: 53
End: 2023-11-08
Payer: COMMERCIAL

## 2023-11-08 ENCOUNTER — HOSPITAL ENCOUNTER (OUTPATIENT)
Dept: INTERVENTIONAL RADIOLOGY/VASCULAR | Facility: HOSPITAL | Age: 53
Discharge: HOME | End: 2023-11-08
Attending: PSYCHIATRY & NEUROLOGY
Payer: COMMERCIAL

## 2023-11-08 VITALS
HEART RATE: 54 BPM | OXYGEN SATURATION: 96 % | WEIGHT: 203.5 LBS | SYSTOLIC BLOOD PRESSURE: 121 MMHG | HEIGHT: 65 IN | TEMPERATURE: 98 F | DIASTOLIC BLOOD PRESSURE: 74 MMHG | BODY MASS INDEX: 33.91 KG/M2

## 2023-11-08 DIAGNOSIS — G43.901 MIGRAINE WITH STATUS MIGRAINOSUS, NOT INTRACTABLE, UNSPECIFIED MIGRAINE TYPE: ICD-10-CM

## 2023-11-08 DIAGNOSIS — R51.9 CHRONIC DAILY HEADACHE: ICD-10-CM

## 2023-11-08 DIAGNOSIS — G43.901 MIGRAINE WITH STATUS MIGRAINOSUS, NOT INTRACTABLE, UNSPECIFIED MIGRAINE TYPE: Primary | ICD-10-CM

## 2023-11-08 PROCEDURE — 62328 DX LMBR SPI PNXR W/FLUOR/CT: CPT

## 2023-11-08 PROCEDURE — 62328 PR SPINAL PUNCTURE, LUMBAR, DX, W/FLUORO/CT GUIDANCE: ICD-10-PCS | Mod: ,,, | Performed by: PSYCHIATRY & NEUROLOGY

## 2023-11-08 PROCEDURE — 25000003 PHARM REV CODE 250: Performed by: PSYCHIATRY & NEUROLOGY

## 2023-11-08 PROCEDURE — 62328 DX LMBR SPI PNXR W/FLUOR/CT: CPT | Performed by: PSYCHIATRY & NEUROLOGY

## 2023-11-08 PROCEDURE — 62328 DX LMBR SPI PNXR W/FLUOR/CT: CPT | Mod: ,,, | Performed by: PSYCHIATRY & NEUROLOGY

## 2023-11-08 RX ORDER — ACETAZOLAMIDE 500 MG/1
500 CAPSULE, EXTENDED RELEASE ORAL 2 TIMES DAILY
Qty: 60 CAPSULE | Refills: 11 | Status: SHIPPED | OUTPATIENT
Start: 2023-11-08 | End: 2023-11-17

## 2023-11-08 RX ORDER — LIDOCAINE HYDROCHLORIDE 20 MG/ML
INJECTION, SOLUTION INFILTRATION; PERINEURAL
Status: COMPLETED | OUTPATIENT
Start: 2023-11-08 | End: 2023-11-08

## 2023-11-08 RX ADMIN — LIDOCAINE HYDROCHLORIDE 5 ML: 20 INJECTION, SOLUTION INFILTRATION; PERINEURAL at 01:11

## 2023-11-08 NOTE — OP NOTE
"DATE OF SURGERY:    11/08/23    SURGEON:  Yury Navarro MD    PREOPERATIVE DIAGNOSIS:  Pseudotumor Cerebri.    POSTOPERATIVE DIAGNOSIS:  Pseudotumor Cerebri.    PROCEDURE PERFORMED:  Fluoroscopically-guided diagnostic lumbar puncture.    EQUIPMENT USED:  Lumbar Puncture Tray.    DESCRIPTION OF PROCEDURE:  Following informed consent, the patient was prepped and draped in the usual sterile fashion.  I infiltrated the tissue overlying L2-3 with local anesthetic.  Under fluoroscopic guidance, I advanced a 22g 3.5" B-D spinal needle intrathecally.  Opening pressure 21.5 cm H2O.  Removed 7 cc clear, colorless CSF.  Closing pressure 13 cm H2O.  Needle removed and sterile dressing applied.  The patient tolerated the procedure well, without apparent complication.    IMPRESSION:  Successful fluoroscopically-guided diagnostic lumbar puncture.  "

## 2023-11-08 NOTE — H&P
Pre-Operative History and Physical   Interventional Neurology    Yandy Chaudhry is a 53 y.o. female here for LP.    ROS:  Review of Systems   All other systems reviewed and are negative.       Past Medical History:   Diagnosis Date    Acid reflux     Anemia, unspecified     Anxiety     Coronary artery disease     Hyperlipidemia     Hypertension     Migraines     Myocardial infarction     Thrombus     apical    Thyroid disease      Past Surgical History:   Procedure Laterality Date    ANKLE SURGERY Left     ANTERIOR CERVICAL DISCECTOMY W/ FUSION       SECTION       SECTION      CHOLECYSTECTOMY      CORONARY ANGIOPLASTY WITH STENT PLACEMENT      DILATION AND CURETTAGE OF UTERUS      HYSTERECTOMY       Family History   Problem Relation Age of Onset    Cancer Mother         Pancriatic cancer    Hydrocephalus Mother     No Known Problems Father     No Known Problems Sister     No Known Problems Brother     Hydrocephalus Maternal Grandmother     Parkinsonism Maternal Grandfather      Review of patient's allergies indicates:  No Known Allergies    Current Outpatient Medications:     aspirin (ECOTRIN) 81 MG EC tablet, Take 81 mg by mouth once daily., Disp: , Rfl:     butalbital-acetaminophen-caffeine -40 mg (FIORICET, ESGIC) -40 mg per tablet, Take 1 tablet by mouth every 6 (six) hours as needed for Headaches., Disp: 10 tablet, Rfl: 0    cetirizine (ZYRTEC) 10 MG tablet, Take 1 tablet (10 mg total) by mouth daily as needed for Allergies., Disp: , Rfl:     FLUoxetine 40 MG capsule, Take 40 mg by mouth 2 (two) times daily., Disp: , Rfl:     fluticasone propionate (FLONASE) 50 mcg/actuation nasal spray, 1 spray by Each Nostril route 2 (two) times daily. Takes as needed, Disp: , Rfl:     gabapentin (NEURONTIN) 600 MG tablet, Take 600 mg by mouth 3 (three) times daily., Disp: , Rfl:     ketorolac (TORADOL) 10 mg tablet, Take 1 tablet (10 mg total) by mouth every 6 (six) hours.  for 5 days, Disp: 20 tablet, Rfl: 0    levothyroxine (SYNTHROID) 75 MCG tablet, Take 75 mcg by mouth before breakfast., Disp: , Rfl:     metoprolol succinate (TOPROL-XL) 25 MG 24 hr tablet, Take 25 mg by mouth once daily., Disp: , Rfl:     nitroGLYCERIN (NITROSTAT) 0.4 MG SL tablet, PLEASE SEE ATTACHED FOR DETAILED DIRECTIONS, Disp: , Rfl:     ondansetron (ZOFRAN) 8 MG tablet, Take 1 tablet (8 mg total) by mouth every 8 (eight) hours as needed for Nausea., Disp: 15 tablet, Rfl: 0    oxybutynin (DITROPAN-XL) 10 MG 24 hr tablet, Take 10 mg by mouth once daily. As needed, Disp: , Rfl:     pantoprazole (PROTONIX) 40 MG tablet, Take 40 mg by mouth once daily., Disp: , Rfl:     rimegepant 75 mg odt, Take 1 tablet (75 mg total) by mouth once daily. Place ODT tablet on the tongue; alternatively the ODT tablet may be placed under the tongue, Disp: 30 tablet, Rfl: 1    rOPINIRole (REQUIP) 0.5 MG tablet, Take 0.5 mg by mouth every evening., Disp: , Rfl:     rosuvastatin (CRESTOR) 40 MG Tab, Take 40 mg by mouth once daily., Disp: , Rfl:     topiramate (TOPAMAX) 50 MG tablet, Take 1 tablet (50 mg total) by mouth 2 (two) times daily., Disp: 60 tablet, Rfl: 11    clopidogreL (PLAVIX) 75 mg tablet, Take 1 tablet (75 mg total) by mouth once daily. (Patient not taking: Reported on 10/18/2023), Disp: 30 tablet, Rfl: 3    HUMIRA,CF, PEN 40 mg/0.4 mL PnKt, Inject 40 mg into the skin., Disp: , Rfl:     hydrocortisone 2.5 % cream, Apply topically., Disp: , Rfl:     ketoconazole (NIZORAL) 2 % cream, Apply 1 application  topically 2 (two) times daily., Disp: , Rfl:     REPATHA SURECLICK 140 mg/mL PnIj, SMARTSIG:pre-filled pen syringe SUB-Q Every 2 Weeks, Disp: , Rfl:     tiZANidine (ZANAFLEX) 4 MG tablet, Take 4 mg by mouth every 8 (eight) hours as needed., Disp: , Rfl:   No current facility-administered medications for this encounter.  Outpatient Medications Marked as Taking for the 11/8/23 encounter (Hospital Encounter) with [facility]    Medication Sig Dispense Refill    aspirin (ECOTRIN) 81 MG EC tablet Take 81 mg by mouth once daily.      butalbital-acetaminophen-caffeine -40 mg (FIORICET, ESGIC) -40 mg per tablet Take 1 tablet by mouth every 6 (six) hours as needed for Headaches. 10 tablet 0    cetirizine (ZYRTEC) 10 MG tablet Take 1 tablet (10 mg total) by mouth daily as needed for Allergies.      FLUoxetine 40 MG capsule Take 40 mg by mouth 2 (two) times daily.      fluticasone propionate (FLONASE) 50 mcg/actuation nasal spray 1 spray by Each Nostril route 2 (two) times daily. Takes as needed      gabapentin (NEURONTIN) 600 MG tablet Take 600 mg by mouth 3 (three) times daily.      ketorolac (TORADOL) 10 mg tablet Take 1 tablet (10 mg total) by mouth every 6 (six) hours. for 5 days 20 tablet 0    levothyroxine (SYNTHROID) 75 MCG tablet Take 75 mcg by mouth before breakfast.      metoprolol succinate (TOPROL-XL) 25 MG 24 hr tablet Take 25 mg by mouth once daily.      nitroGLYCERIN (NITROSTAT) 0.4 MG SL tablet PLEASE SEE ATTACHED FOR DETAILED DIRECTIONS      ondansetron (ZOFRAN) 8 MG tablet Take 1 tablet (8 mg total) by mouth every 8 (eight) hours as needed for Nausea. 15 tablet 0    oxybutynin (DITROPAN-XL) 10 MG 24 hr tablet Take 10 mg by mouth once daily. As needed      pantoprazole (PROTONIX) 40 MG tablet Take 40 mg by mouth once daily.      rimegepant 75 mg odt Take 1 tablet (75 mg total) by mouth once daily. Place ODT tablet on the tongue; alternatively the ODT tablet may be placed under the tongue 30 tablet 1    rOPINIRole (REQUIP) 0.5 MG tablet Take 0.5 mg by mouth every evening.      rosuvastatin (CRESTOR) 40 MG Tab Take 40 mg by mouth once daily.      topiramate (TOPAMAX) 50 MG tablet Take 1 tablet (50 mg total) by mouth 2 (two) times daily. 60 tablet 11     Social History     Tobacco Use    Smoking status: Never     Passive exposure: Never    Smokeless tobacco: Never   Substance Use Topics    Alcohol use: Not Currently     Drug use: Never         Vitals:    11/08/23 1220   BP: (!) 145/84   Pulse: (!) 58   Temp: 98.1 °F (36.7 °C)     Gen NAD  HEENT NC/AT  CV RRR, no carotid bruits  Resp clear  GI soft  Ext no C/C/E  Neuro  AAOx4  Speech fluent, appropriate  EOMI, PERRLA, VFF  Normal facial strength, sensation  Tongue and palate midline  Motor 5/5  Sensation intact  DTRs symmetric  Coord intact  Gait Normal        Assessment: IIH    Plan: LP    I have discussed the risks, benefits, indications, and alternatives of the procedure in detail.  The patient verbalizes her understanding.  All questions answered.  Consents signed.  The patient agrees to proceed to proceed as planned.

## 2023-11-08 NOTE — TELEPHONE ENCOUNTER
Pt called reporting she has not heard from the hospital regarding her lumbar puncture that is scheduled for today. Pt is unsure which department of the hospital she is to report to.    CB# [phone]

## 2023-11-08 NOTE — SEDATION DOCUMENTATION
Needle inserted into L2,L3 by physician  Opening pressure 21.5 cm H20   Removed 7cc of CSF fluid   Closing pressure 13 cm H20   Needle out at 1316

## 2023-11-08 NOTE — DISCHARGE SUMMARY
[facility] - Interventional Radiology  Discharge Note  Short Stay    IR LUMBAR PUNCTURE DIAGNOSTIC WITH IMAGING      OUTCOME: Patient tolerated treatment/procedure well without complication and is now ready for discharge.    DISPOSITION: Home or Self Care    FINAL DIAGNOSIS:  <principal problem not specified>    FOLLOWUP: In clinic    DISCHARGE INSTRUCTIONS:  No discharge procedures on file.     TIME SPENT ON DISCHARGE: 31 minutes

## 2023-11-17 ENCOUNTER — OFFICE VISIT (OUTPATIENT)
Dept: NEUROLOGY | Facility: CLINIC | Age: 53
End: 2023-11-17
Payer: COMMERCIAL

## 2023-11-17 VITALS
HEIGHT: 65 IN | DIASTOLIC BLOOD PRESSURE: 68 MMHG | BODY MASS INDEX: 33.82 KG/M2 | WEIGHT: 203 LBS | SYSTOLIC BLOOD PRESSURE: 106 MMHG

## 2023-11-17 DIAGNOSIS — G43.901 MIGRAINE WITH STATUS MIGRAINOSUS, NOT INTRACTABLE, UNSPECIFIED MIGRAINE TYPE: Primary | ICD-10-CM

## 2023-11-17 DIAGNOSIS — R51.9 CHRONIC DAILY HEADACHE: ICD-10-CM

## 2023-11-17 DIAGNOSIS — G93.2 PSEUDOTUMOR CEREBRI: ICD-10-CM

## 2023-11-17 DIAGNOSIS — G93.2 PSEUDOTUMOR CEREBRI: Primary | ICD-10-CM

## 2023-11-17 PROCEDURE — 3074F SYST BP LT 130 MM HG: CPT | Mod: CPTII,S$GLB,, | Performed by: PSYCHIATRY & NEUROLOGY

## 2023-11-17 PROCEDURE — 3044F HG A1C LEVEL LT 7.0%: CPT | Mod: CPTII,S$GLB,, | Performed by: PSYCHIATRY & NEUROLOGY

## 2023-11-17 PROCEDURE — 3074F PR MOST RECENT SYSTOLIC BLOOD PRESSURE < 130 MM HG: ICD-10-PCS | Mod: CPTII,S$GLB,, | Performed by: PSYCHIATRY & NEUROLOGY

## 2023-11-17 PROCEDURE — 3008F BODY MASS INDEX DOCD: CPT | Mod: CPTII,S$GLB,, | Performed by: PSYCHIATRY & NEUROLOGY

## 2023-11-17 PROCEDURE — 1159F PR MEDICATION LIST DOCUMENTED IN MEDICAL RECORD: ICD-10-PCS | Mod: CPTII,S$GLB,, | Performed by: PSYCHIATRY & NEUROLOGY

## 2023-11-17 PROCEDURE — 3078F DIAST BP <80 MM HG: CPT | Mod: CPTII,S$GLB,, | Performed by: PSYCHIATRY & NEUROLOGY

## 2023-11-17 PROCEDURE — 1159F MED LIST DOCD IN RCRD: CPT | Mod: CPTII,S$GLB,, | Performed by: PSYCHIATRY & NEUROLOGY

## 2023-11-17 PROCEDURE — 99999 PR PBB SHADOW E&M-EST. PATIENT-LVL III: ICD-10-PCS | Mod: PBBFAC,,, | Performed by: PSYCHIATRY & NEUROLOGY

## 2023-11-17 PROCEDURE — 3008F PR BODY MASS INDEX (BMI) DOCUMENTED: ICD-10-PCS | Mod: CPTII,S$GLB,, | Performed by: PSYCHIATRY & NEUROLOGY

## 2023-11-17 PROCEDURE — 99214 PR OFFICE/OUTPT VISIT, EST, LEVL IV, 30-39 MIN: ICD-10-PCS | Mod: S$GLB,,, | Performed by: PSYCHIATRY & NEUROLOGY

## 2023-11-17 PROCEDURE — 3078F PR MOST RECENT DIASTOLIC BLOOD PRESSURE < 80 MM HG: ICD-10-PCS | Mod: CPTII,S$GLB,, | Performed by: PSYCHIATRY & NEUROLOGY

## 2023-11-17 PROCEDURE — 99999 PR PBB SHADOW E&M-EST. PATIENT-LVL III: CPT | Mod: PBBFAC,,, | Performed by: PSYCHIATRY & NEUROLOGY

## 2023-11-17 PROCEDURE — 3044F PR MOST RECENT HEMOGLOBIN A1C LEVEL <7.0%: ICD-10-PCS | Mod: CPTII,S$GLB,, | Performed by: PSYCHIATRY & NEUROLOGY

## 2023-11-17 PROCEDURE — 99214 OFFICE O/P EST MOD 30 MIN: CPT | Mod: S$GLB,,, | Performed by: PSYCHIATRY & NEUROLOGY

## 2023-11-17 RX ORDER — METHOCARBAMOL 500 MG/1
500 TABLET, FILM COATED ORAL
COMMUNITY
Start: 2023-11-13

## 2023-11-17 RX ORDER — METOPROLOL TARTRATE 25 MG/1
TABLET, FILM COATED ORAL
COMMUNITY
Start: 2023-10-17 | End: 2023-11-17

## 2023-11-17 RX ORDER — FUROSEMIDE 40 MG/1
TABLET ORAL
COMMUNITY
Start: 2023-11-09 | End: 2023-12-07

## 2023-11-17 RX ORDER — GABAPENTIN 600 MG/1
600 TABLET ORAL 3 TIMES DAILY
Qty: 90 TABLET | Refills: 11 | Status: SHIPPED | OUTPATIENT
Start: 2023-11-17 | End: 2024-11-16

## 2023-11-17 RX ORDER — ACETAZOLAMIDE 500 MG/1
1000 CAPSULE, EXTENDED RELEASE ORAL 2 TIMES DAILY
Qty: 120 CAPSULE | Refills: 11 | Status: SHIPPED | OUTPATIENT
Start: 2023-11-17 | End: 2023-12-11

## 2023-11-17 NOTE — PROGRESS NOTES
Neurology Office Visit  Neurology    Yandy Bernie Chaudhry is a 53 y.o. female here for follow up of chronic migraines. She states LP helped for a couple of days, Diamox has seemed to show some marginal improvement. States she went to eye doctor who said that the Diamox seemed to be improving condition. Since , migraines have been occurring daily. She states they will wake her up at night time and then will last throughout the day. Currently only taking Diamox, she will also take tylenol at least 5 times a day, she states she has been doing this for months. No other acute concerns at this time.    ROS:  Review of Systems   Constitutional:  Negative for fever.   Respiratory:  Negative for cough and shortness of breath.    Cardiovascular:  Negative for chest pain.   Musculoskeletal:  Positive for neck pain.   Neurological:  Positive for headaches. Negative for dizziness, seizures and weakness.   Psychiatric/Behavioral:  The patient is not nervous/anxious.       Past Medical History:   Diagnosis Date    Acid reflux     Anemia, unspecified     Anxiety     Coronary artery disease     Hyperlipidemia     Hypertension     Migraines     Myocardial infarction     Thrombus     apical    Thyroid disease      Past Surgical History:   Procedure Laterality Date    ANKLE SURGERY Left     ANTERIOR CERVICAL DISCECTOMY W/ FUSION       SECTION  2003     SECTION  2005    CHOLECYSTECTOMY      CORONARY ANGIOPLASTY WITH STENT PLACEMENT      DILATION AND CURETTAGE OF UTERUS      HYSTERECTOMY       Family History   Problem Relation Age of Onset    Cancer Mother         Pancriatic cancer    Hydrocephalus Mother     No Known Problems Father     No Known Problems Sister     No Known Problems Brother     Hydrocephalus Maternal Grandmother     Parkinsonism Maternal Grandfather      Review of patient's allergies indicates:  No Known Allergies    Current Outpatient Medications:     aspirin (ECOTRIN) 81 MG EC tablet, Take  81 mg by mouth once daily., Disp: , Rfl:     butalbital-acetaminophen-caffeine -40 mg (FIORICET, ESGIC) -40 mg per tablet, Take 1 tablet by mouth every 6 (six) hours as needed for Headaches., Disp: 10 tablet, Rfl: 0    cetirizine (ZYRTEC) 10 MG tablet, Take 1 tablet (10 mg total) by mouth daily as needed for Allergies., Disp: , Rfl:     FLUoxetine 40 MG capsule, Take 40 mg by mouth 2 (two) times daily., Disp: , Rfl:     fluticasone propionate (FLONASE) 50 mcg/actuation nasal spray, 1 spray by Each Nostril route 2 (two) times daily. Takes as needed, Disp: , Rfl:     furosemide (LASIX) 40 MG tablet, PLEASE SEE ATTACHED FOR DETAILED DIRECTIONS, Disp: , Rfl:     gabapentin (NEURONTIN) 600 MG tablet, Take 600 mg by mouth 3 (three) times daily., Disp: , Rfl:     HUMIRA,CF, PEN 40 mg/0.4 mL PnKt, Inject 40 mg into the skin., Disp: , Rfl:     ketoconazole (NIZORAL) 2 % cream, Apply 1 application  topically 2 (two) times daily., Disp: , Rfl:     levothyroxine (SYNTHROID) 75 MCG tablet, Take 75 mcg by mouth before breakfast., Disp: , Rfl:     methocarbamoL (ROBAXIN) 500 MG Tab, Take by mouth., Disp: , Rfl:     metoprolol succinate (TOPROL-XL) 25 MG 24 hr tablet, Take 25 mg by mouth once daily., Disp: , Rfl:     nitroGLYCERIN (NITROSTAT) 0.4 MG SL tablet, PLEASE SEE ATTACHED FOR DETAILED DIRECTIONS, Disp: , Rfl:     oxybutynin (DITROPAN-XL) 10 MG 24 hr tablet, Take 10 mg by mouth once daily. As needed, Disp: , Rfl:     pantoprazole (PROTONIX) 40 MG tablet, Take 40 mg by mouth once daily., Disp: , Rfl:     REPATHA SURECLICK 140 mg/mL PnIj, SMARTSIG:pre-filled pen syringe SUB-Q Every 2 Weeks, Disp: , Rfl:     rimegepant 75 mg odt, Take 1 tablet (75 mg total) by mouth once daily. Place ODT tablet on the tongue; alternatively the ODT tablet may be placed under the tongue, Disp: 30 tablet, Rfl: 1    rOPINIRole (REQUIP) 0.5 MG tablet, Take 0.5 mg by mouth every evening., Disp: , Rfl:     rosuvastatin (CRESTOR) 40 MG Tab,  Take 40 mg by mouth once daily., Disp: , Rfl:     tiZANidine (ZANAFLEX) 4 MG tablet, Take 4 mg by mouth every 8 (eight) hours as needed., Disp: , Rfl:   Outpatient Medications Marked as Taking for the 11/17/23 encounter (Office Visit) with Yury Navarro MD   Medication Sig Dispense Refill    aspirin (ECOTRIN) 81 MG EC tablet Take 81 mg by mouth once daily.      butalbital-acetaminophen-caffeine -40 mg (FIORICET, ESGIC) -40 mg per tablet Take 1 tablet by mouth every 6 (six) hours as needed for Headaches. 10 tablet 0    cetirizine (ZYRTEC) 10 MG tablet Take 1 tablet (10 mg total) by mouth daily as needed for Allergies.      FLUoxetine 40 MG capsule Take 40 mg by mouth 2 (two) times daily.      fluticasone propionate (FLONASE) 50 mcg/actuation nasal spray 1 spray by Each Nostril route 2 (two) times daily. Takes as needed      furosemide (LASIX) 40 MG tablet PLEASE SEE ATTACHED FOR DETAILED DIRECTIONS      gabapentin (NEURONTIN) 600 MG tablet Take 600 mg by mouth 3 (three) times daily.      HUMIRA,CF, PEN 40 mg/0.4 mL PnKt Inject 40 mg into the skin.      ketoconazole (NIZORAL) 2 % cream Apply 1 application  topically 2 (two) times daily.      levothyroxine (SYNTHROID) 75 MCG tablet Take 75 mcg by mouth before breakfast.      methocarbamoL (ROBAXIN) 500 MG Tab Take by mouth.      metoprolol succinate (TOPROL-XL) 25 MG 24 hr tablet Take 25 mg by mouth once daily.      nitroGLYCERIN (NITROSTAT) 0.4 MG SL tablet PLEASE SEE ATTACHED FOR DETAILED DIRECTIONS      oxybutynin (DITROPAN-XL) 10 MG 24 hr tablet Take 10 mg by mouth once daily. As needed      pantoprazole (PROTONIX) 40 MG tablet Take 40 mg by mouth once daily.      REPATHA SURECLICK 140 mg/mL PnIj SMARTSIG:pre-filled pen syringe SUB-Q Every 2 Weeks      rimegepant 75 mg odt Take 1 tablet (75 mg total) by mouth once daily. Place ODT tablet on the tongue; alternatively the ODT tablet may be placed under the tongue 30 tablet 1    rOPINIRole (REQUIP)  0.5 MG tablet Take 0.5 mg by mouth every evening.      rosuvastatin (CRESTOR) 40 MG Tab Take 40 mg by mouth once daily.      tiZANidine (ZANAFLEX) 4 MG tablet Take 4 mg by mouth every 8 (eight) hours as needed.      [DISCONTINUED] acetaZOLAMIDE (DIAMOX) 500 mg CpSR Take 1 capsule (500 mg total) by mouth 2 (two) times daily. 60 capsule 11    [DISCONTINUED] metoprolol tartrate (LOPRESSOR) 25 MG tablet SMARTSI Tablet(s) By Mouth Morning-Night       Social History     Tobacco Use    Smoking status: Never     Passive exposure: Never    Smokeless tobacco: Never   Substance Use Topics    Alcohol use: Not Currently    Drug use: Never     Vitals:    23 0952   BP: 106/68     Gen NAD  HEENT NC/AT  CV RRR  Resp clear  GI soft  Ext no C/C/E  Neuro  AAOx4  Speech fluent, appropriate  EOMI, PERRLA  Motor 5/5  Sensation intact  Coord intact  Gait Normal    Assessment:   Chronic Migraines  IIH  Medication Overuse Headache    Plan:   Should increase diamox to 1000mg bid, continue to follow with opthomology  Decrease amount of tylenol use as this is likely contributing to daily headache  Will restart Nurtec for daily migraine prevention    Socrates Kaiser DO

## 2023-11-20 ENCOUNTER — TELEPHONE (OUTPATIENT)
Dept: NEUROLOGY | Facility: CLINIC | Age: 53
End: 2023-11-20
Payer: COMMERCIAL

## 2023-11-20 NOTE — TELEPHONE ENCOUNTER
Pt called reporting that since yesterday morning she has been experiencing severe dizziness, difficulties w/ walking/balance, and vision issues. She reports she feels as if her vision is jumpy. Pt also reports that headaches have not resolved. Pt has increased Diamox to 1000 mg bid & has also restarted Nurtec.    CB# [phone]

## 2023-11-20 NOTE — TELEPHONE ENCOUNTER
Attempted to call patient back. Per  patient is to decrease Diamox to 500mg and restart Nurtec. Unable to reach patient to relay information. Left voicemail to call clinic back.

## 2023-11-20 NOTE — TELEPHONE ENCOUNTER
Spoke with patient and relayed previous message. Also advised patient to take Nurtec every other day and not everyday to avoid rebound headaches. Patient verbalized understanding.

## 2023-11-27 ENCOUNTER — TELEPHONE (OUTPATIENT)
Dept: NEUROLOGY | Facility: CLINIC | Age: 53
End: 2023-11-27
Payer: COMMERCIAL

## 2023-11-27 DIAGNOSIS — G43.911 INTRACTABLE MIGRAINE WITH STATUS MIGRAINOSUS, UNSPECIFIED MIGRAINE TYPE: Primary | ICD-10-CM

## 2023-11-27 NOTE — TELEPHONE ENCOUNTER
Pt called reporting that she is experiencing an increase in pressure headaches. Pt reports pain is located at both temples & near ears. Pt reports dizziness, nausea, double vision, blurry vision & sensitivity to light & sound. Pt is currently taking Nurtec every other day as well as taking Diamox. Pt reports she has decreased Tylenol intake, but did take a dose this morning. Pt reports she did receive 4 days of headache relief after lumbar puncture, but reports pressure headaches have slowly worsened since then.     Pt was advised that Dr. Navarro is currently out of the clinic & that this would have to be addressed w/ him when he returns.    # [phone]

## 2023-11-28 RX ORDER — ZONISAMIDE 100 MG/1
100 CAPSULE ORAL NIGHTLY
Qty: 30 CAPSULE | Refills: 11 | Status: SHIPPED | OUTPATIENT
Start: 2023-11-28 | End: 2023-12-07

## 2023-12-01 ENCOUNTER — TELEPHONE (OUTPATIENT)
Dept: NEUROLOGY | Facility: CLINIC | Age: 53
End: 2023-12-01
Payer: COMMERCIAL

## 2023-12-01 NOTE — TELEPHONE ENCOUNTER
Pt called to inquire on what medications she can take to help alleviate pain from pseudotumor cerebri. Pt reports she is not experiencing migraine pain. Pt reports pharmacy is unable to fill rx of Zonegran until next week.  Pt reports she was previously advised not to take Tylenol, but is inquiring on what medications she can take for acute pain.    # [phone]

## 2023-12-04 NOTE — TELEPHONE ENCOUNTER
Per Dr Navarro inform patient to wait until zonegran is able to be filled. Refer to Dr Henao. Informed patient, she verbalized understanding

## 2023-12-07 ENCOUNTER — OFFICE VISIT (OUTPATIENT)
Dept: NEUROLOGY | Facility: CLINIC | Age: 53
End: 2023-12-07
Payer: COMMERCIAL

## 2023-12-07 VITALS
HEART RATE: 52 BPM | DIASTOLIC BLOOD PRESSURE: 75 MMHG | HEIGHT: 65 IN | SYSTOLIC BLOOD PRESSURE: 115 MMHG | BODY MASS INDEX: 34.49 KG/M2 | WEIGHT: 207 LBS

## 2023-12-07 DIAGNOSIS — G43.709 CHRONIC MIGRAINE WITHOUT AURA WITHOUT STATUS MIGRAINOSUS, NOT INTRACTABLE: Primary | ICD-10-CM

## 2023-12-07 DIAGNOSIS — G44.40 MEDICATION OVERUSE HEADACHE: ICD-10-CM

## 2023-12-07 PROCEDURE — 99999 PR PBB SHADOW E&M-EST. PATIENT-LVL IV: ICD-10-PCS | Mod: PBBFAC,,, | Performed by: NURSE PRACTITIONER

## 2023-12-07 PROCEDURE — 1159F PR MEDICATION LIST DOCUMENTED IN MEDICAL RECORD: ICD-10-PCS | Mod: CPTII,S$GLB,, | Performed by: NURSE PRACTITIONER

## 2023-12-07 PROCEDURE — 1160F RVW MEDS BY RX/DR IN RCRD: CPT | Mod: CPTII,S$GLB,, | Performed by: NURSE PRACTITIONER

## 2023-12-07 PROCEDURE — 99215 OFFICE O/P EST HI 40 MIN: CPT | Mod: S$GLB,,, | Performed by: NURSE PRACTITIONER

## 2023-12-07 PROCEDURE — 3008F BODY MASS INDEX DOCD: CPT | Mod: CPTII,S$GLB,, | Performed by: NURSE PRACTITIONER

## 2023-12-07 PROCEDURE — 3078F PR MOST RECENT DIASTOLIC BLOOD PRESSURE < 80 MM HG: ICD-10-PCS | Mod: CPTII,S$GLB,, | Performed by: NURSE PRACTITIONER

## 2023-12-07 PROCEDURE — 99417 PR PROLONGED SVC, OUTPT, W/WO DIRECT PT CONTACT,  EA ADDTL 15 MIN: ICD-10-PCS | Mod: S$GLB,,, | Performed by: NURSE PRACTITIONER

## 2023-12-07 PROCEDURE — 3044F HG A1C LEVEL LT 7.0%: CPT | Mod: CPTII,S$GLB,, | Performed by: NURSE PRACTITIONER

## 2023-12-07 PROCEDURE — 3074F PR MOST RECENT SYSTOLIC BLOOD PRESSURE < 130 MM HG: ICD-10-PCS | Mod: CPTII,S$GLB,, | Performed by: NURSE PRACTITIONER

## 2023-12-07 PROCEDURE — 1160F PR REVIEW ALL MEDS BY PRESCRIBER/CLIN PHARMACIST DOCUMENTED: ICD-10-PCS | Mod: CPTII,S$GLB,, | Performed by: NURSE PRACTITIONER

## 2023-12-07 PROCEDURE — 3074F SYST BP LT 130 MM HG: CPT | Mod: CPTII,S$GLB,, | Performed by: NURSE PRACTITIONER

## 2023-12-07 PROCEDURE — 99999 PR PBB SHADOW E&M-EST. PATIENT-LVL IV: CPT | Mod: PBBFAC,,, | Performed by: NURSE PRACTITIONER

## 2023-12-07 PROCEDURE — 1159F MED LIST DOCD IN RCRD: CPT | Mod: CPTII,S$GLB,, | Performed by: NURSE PRACTITIONER

## 2023-12-07 PROCEDURE — 99417 PROLNG OP E/M EACH 15 MIN: CPT | Mod: S$GLB,,, | Performed by: NURSE PRACTITIONER

## 2023-12-07 PROCEDURE — 99215 PR OFFICE/OUTPT VISIT, EST, LEVL V, 40-54 MIN: ICD-10-PCS | Mod: S$GLB,,, | Performed by: NURSE PRACTITIONER

## 2023-12-07 PROCEDURE — 3078F DIAST BP <80 MM HG: CPT | Mod: CPTII,S$GLB,, | Performed by: NURSE PRACTITIONER

## 2023-12-07 PROCEDURE — 3044F PR MOST RECENT HEMOGLOBIN A1C LEVEL <7.0%: ICD-10-PCS | Mod: CPTII,S$GLB,, | Performed by: NURSE PRACTITIONER

## 2023-12-07 PROCEDURE — 3008F PR BODY MASS INDEX (BMI) DOCUMENTED: ICD-10-PCS | Mod: CPTII,S$GLB,, | Performed by: NURSE PRACTITIONER

## 2023-12-07 NOTE — PROGRESS NOTES
Subjective:       Patient ID: Yandy Chaudhry is a 53 y.o. female.    Chief Complaint: Pseudotumor (Pt states LP 11/8/23 she felt significantly better for four days was recently decreased from diamox 1000 mg BID to 500 mg BID on 11/20/23. Increased diamox has cause difficulty with stomach upset/nausea last optho visit 15/11/2023 with Dr. Garces )      History of Present Illness:  Follow-up visit for headaches.  53-year-old woman who was previously seen by Dr. Navarro.  She tells me she had migraines in her younger years.  They became really severe after the birth of her 2nd child but ultimately resolved and later adulthood.  In October 2022, she began having daily headaches that were so severe they would wake her up at night.  She says that they did not feel like the migraine she would have when she was younger so she never really felt like they were migraines.  At the onset of the headache starting, she began taking Tylenol essentially around the clock sometimes 3 to 5 times a day.  She ended up establishing care with Dr. Mullen in December 2022 and he started her on gabapentin and ordered an MRI brain.  She tells me her MRI brain reportedly showed an empty sella, but he told her that this was just an incidental finding.  He did encourage her to have a dilated eye exam with her ophthalmologist, though.  She tells me that she did see her ophthalmologist after his recommendation and the ophthalmologist did not notice any abnormalities behind the eye.  On the gabapentin, she did see slight improvement in the head pain severity, but they were still occurring every day so she stopped the medication and continued round-the-clock Tylenol.  Ultimately, she was admitted to [facility] in September of this year for a stroke workup.  She had left-sided weakness associated with her severe headache.  Dr. Hollins saw her and felt it could be migraine related.  She ended up seeing Dr. Navarro in hospital follow-up.  He  started her on topiramate which seemed to slightly improve the headaches, but they were still occurring daily.  At that point, the decision was made to do a lumbar puncture to evaluate opening pressure.  Opening pressure was 21.  The lumbar puncture was performed on 11/08/2023.  She was subsequently started on Diamox and titrated up to 1000 mg b.i.d..  This may have slightly improved the severity of her pain, but certainly did not resolve it and she started having severe side effects with the Diamox including severe diarrhea and dizziness.  They decreased her dose to 500 mg b.i.d. which is what she remains on today, but she is still having some side effects of severe diarrhea and dizziness.  They had also tried to add Zonegran and then Nurtec for her headaches.  The Zonegran did not help.  She does feel like the Nurtec every other day has seemed to improve some of her headaches.  They are not as severe as they were previously and she thinks this maybe the most effective thing she is tried.  She still has daily head pain to the bitemporal regions and specifically around the ear.  After her lumbar puncture, she saw her ophthalmologist again for dilated eye exam.  I do have this note today and the ophthalmologist reportedly noted trace inferior resolving disc edema from recent history of pseudotumor to on the right.  My office is currently in the process of trying to get the note from the dilated eye exam done prior to the lumbar puncture.  After her office follow-up with Dr. Navarro on 11/17/2023, rebound headaches were discussed with the patient so she cut back her Tylenol dosing from 3 to 5 times a day to just twice a day.  She tells me that she has never had any visual deficits since the onset of her headaches.  She has had difficulty with irritability and concentration over the last year as a result of the headaches.    MRI brain w/ and w/out contrast 10/26/2023 with incidentally noted partially empty  sella  CTAh/n with no hemodynamically sig stenosis  TSH 1.8, CMP with slightly elevated LFTs    Review of Systems  I have reviewed a 12 point ROS which is negative unless otherwise stated in HPI        Past Medical History:   Diagnosis Date    Acid reflux     Anemia, unspecified     Anxiety     Coronary artery disease     Hyperlipidemia     Hypertension     Migraines     Myocardial infarction     Thrombus     apical    Thyroid disease        Past Surgical History:   Procedure Laterality Date    ANKLE SURGERY Left     ANTERIOR CERVICAL DISCECTOMY W/ FUSION       SECTION  2003     SECTION  2005    CHOLECYSTECTOMY      CORONARY ANGIOPLASTY WITH STENT PLACEMENT      DILATION AND CURETTAGE OF UTERUS      HYSTERECTOMY      LUMBAR PUNCTURE          Family History   Problem Relation Age of Onset    Cancer Mother         Pancriatic cancer    Hydrocephalus Mother     No Known Problems Father     No Known Problems Sister     No Known Problems Brother     Hydrocephalus Maternal Grandmother     Parkinsonism Maternal Grandfather         Social History     Socioeconomic History    Marital status:    Tobacco Use    Smoking status: Never     Passive exposure: Never    Smokeless tobacco: Never   Substance and Sexual Activity    Alcohol use: Not Currently    Drug use: Never        Outpatient Encounter Medications as of 2023   Medication Sig Dispense Refill    acetaZOLAMIDE (DIAMOX) 500 mg CpSR Take 2 capsules (1,000 mg total) by mouth 2 (two) times daily. (Patient taking differently: Take 500 mg by mouth 2 (two) times daily.) 120 capsule 11    aspirin (ECOTRIN) 81 MG EC tablet Take 81 mg by mouth once daily.      butalbital-acetaminophen-caffeine -40 mg (FIORICET, ESGIC) -40 mg per tablet Take 1 tablet by mouth every 6 (six) hours as needed for Headaches. 10 tablet 0    cetirizine (ZYRTEC) 10 MG tablet Take 1 tablet (10 mg total) by mouth daily as needed for Allergies.      FLUoxetine 40 MG  "capsule Take 40 mg by mouth 2 (two) times daily.      fluticasone propionate (FLONASE) 50 mcg/actuation nasal spray 1 spray by Each Nostril route 2 (two) times daily. Takes as needed      gabapentin (NEURONTIN) 600 MG tablet Take 1 tablet (600 mg total) by mouth 3 (three) times daily. 90 tablet 11    HUMIRA,CF, PEN 40 mg/0.4 mL PnKt Inject 40 mg into the skin every 14 (fourteen) days.      ketoconazole (NIZORAL) 2 % cream Apply 1 application  topically 2 (two) times daily.      levothyroxine (SYNTHROID) 75 MCG tablet Take 75 mcg by mouth before breakfast.      methocarbamoL (ROBAXIN) 500 MG Tab Take 500 mg by mouth as needed.      metoprolol succinate (TOPROL-XL) 25 MG 24 hr tablet Take 25 mg by mouth 2 (two) times daily.      nitroGLYCERIN (NITROSTAT) 0.4 MG SL tablet PLEASE SEE ATTACHED FOR DETAILED DIRECTIONS      oxybutynin (DITROPAN-XL) 10 MG 24 hr tablet Take 10 mg by mouth once daily. As needed      pantoprazole (PROTONIX) 40 MG tablet Take 40 mg by mouth once daily.      REPATHA SURECLICK 140 mg/mL PnIj SMARTSIG:pre-filled pen syringe SUB-Q Every 2 Weeks      rimegepant 75 mg odt Take 1 tablet (75 mg total) by mouth every other day. Place ODT tablet on the tongue; alternatively the ODT tablet may be placed under the tongue 15 tablet 11    rOPINIRole (REQUIP) 0.5 MG tablet Take 0.5 mg by mouth every evening.      rosuvastatin (CRESTOR) 40 MG Tab Take 40 mg by mouth once daily.      tiZANidine (ZANAFLEX) 4 MG tablet Take 4 mg by mouth every 8 (eight) hours as needed.      [DISCONTINUED] furosemide (LASIX) 40 MG tablet PLEASE SEE ATTACHED FOR DETAILED DIRECTIONS      [DISCONTINUED] zonisamide (ZONEGRAN) 100 MG Cap Take 1 capsule (100 mg total) by mouth every evening. (Patient not taking: Reported on 12/7/2023) 30 capsule 11     No facility-administered encounter medications on file as of 12/7/2023.      Objective:   /75   Pulse (!) 52   Ht 5' 5" (1.651 m)   Wt 93.9 kg (207 lb)   BMI 34.45 kg/m²      "   Physical Exam  General:  Alert and oriented  NAD  No overt edema  R exophthalmos    Cognition and Comprehension:  Speech and language intact  Follows commands    Cranial nerves:   CN 2_ Visual fields (full to confrontation both eyes)  CN 3, 4, 6_ Intact, RUSLAN, no nystagmus  CN 5_facial tactile sensation intact  CN 7_no face asymmetry; normal eye closure and smile  CN 8_hearing intact to spoken voice  CN 9, 10, 11_voice normal, shoulder shrug ok; deltoids not fatigable   CN 12 tongue_protrudes mid line    Muscle Strength and Tone:  Normal upper extremity tone  Normal lower extremity tone  Normal upper extremity strength  Normal lower extremity strength    Reflexes:  Normal and symmetric    Sensation:  Intact to light touch and temperature    Coordination and Gait:  Coordination and gait are normal   NO ataxia with FTN or HKS      Assessment & Plan:      1. Chronic migraine without aura without status migrainosus, not intractable    2. Medication overuse headache      -I certainly think medication overuse headache is playing a very large role in the headaches that have now been chronic for over a year.  We discussed this at length.  She only recently reduced Tylenol intake to twice a day.  Recommend stopping Tylenol completely.  Continue Nurtec every other day for migraine coverage.  When she has been off of Tylenol for about 3-4 days, she will contact me via patient portal and I will instruct her on weaning down on Diamox.  I am awaiting ophthalmology notes from the note prior to her lumbar puncture were no papilledema was noted.  I am not sure what to make of the noting of slight resolving papilledema noted on the ophthalmology note post lumbar puncture without referenced from the previous one.  If papilledema was noted on the optho exam prior to LP then will order MRV     Prior to the patient's arrival on the same day, I spent 15 minutes reviewing chart. Once in the exam room with the patient, I spent 50 minutes  in the room with the member performing a history and exam as well as reviewing the test results and recommendations with the patient. After leaving the exam room, I spend an additional 10 minutes completing the electronic health record. The total time spent  that day caring for the member is 75 minutes, and this time--including the breakdown--is documented in the medical record.     This note was created with the assistance of voice recognition software. There may be transcription errors as a result of using this technology however minimal. Effort has been made to assure accuracy of transcription but any obvious errors or omissions should be clarified with the author of the document.

## 2023-12-11 ENCOUNTER — PATIENT MESSAGE (OUTPATIENT)
Dept: NEUROLOGY | Facility: CLINIC | Age: 53
End: 2023-12-11
Payer: COMMERCIAL

## 2023-12-11 DIAGNOSIS — G43.901 MIGRAINE WITH STATUS MIGRAINOSUS, NOT INTRACTABLE, UNSPECIFIED MIGRAINE TYPE: ICD-10-CM

## 2023-12-11 DIAGNOSIS — G93.2 PSEUDOTUMOR CEREBRI: ICD-10-CM

## 2023-12-11 DIAGNOSIS — R51.9 CHRONIC DAILY HEADACHE: ICD-10-CM

## 2023-12-11 RX ORDER — SUMATRIPTAN 50 MG/1
TABLET, FILM COATED ORAL
Qty: 9 TABLET | Refills: 5 | Status: SHIPPED | OUTPATIENT
Start: 2023-12-11 | End: 2024-01-22

## 2023-12-11 RX ORDER — ACETAZOLAMIDE 250 MG/1
TABLET ORAL
Qty: 42 TABLET | Refills: 0 | Status: SHIPPED | OUTPATIENT
Start: 2023-12-11 | End: 2024-01-22

## 2023-12-13 DIAGNOSIS — G43.709 CHRONIC MIGRAINE WITHOUT AURA WITHOUT STATUS MIGRAINOSUS, NOT INTRACTABLE: ICD-10-CM

## 2023-12-13 DIAGNOSIS — G43.901 MIGRAINE WITH STATUS MIGRAINOSUS, NOT INTRACTABLE, UNSPECIFIED MIGRAINE TYPE: Primary | ICD-10-CM

## 2023-12-13 RX ORDER — RIZATRIPTAN BENZOATE 10 MG/1
10 TABLET ORAL
Qty: 12 TABLET | Refills: 2 | Status: SHIPPED | OUTPATIENT
Start: 2023-12-13 | End: 2024-01-22

## 2023-12-13 NOTE — TELEPHONE ENCOUNTER
Let her know that the sumatriptan just may not be the right rescue for her.  Lets try relpax 10 mg instead of the imitrex for rescue.  Please send her in an rx once you talk to her.  Disp 12 tab

## 2023-12-18 DIAGNOSIS — G43.709 CHRONIC MIGRAINE WITHOUT AURA WITHOUT STATUS MIGRAINOSUS, NOT INTRACTABLE: Primary | ICD-10-CM

## 2023-12-18 RX ORDER — PREDNISONE 10 MG/1
TABLET ORAL
Qty: 21 TABLET | Refills: 0 | OUTPATIENT
Start: 2023-12-18 | End: 2023-12-19 | Stop reason: SDUPTHER

## 2023-12-19 DIAGNOSIS — G43.709 CHRONIC MIGRAINE WITHOUT AURA WITHOUT STATUS MIGRAINOSUS, NOT INTRACTABLE: ICD-10-CM

## 2023-12-19 DIAGNOSIS — G43.709 CHRONIC MIGRAINE WITHOUT AURA WITHOUT STATUS MIGRAINOSUS, NOT INTRACTABLE: Primary | ICD-10-CM

## 2023-12-19 RX ORDER — PREDNISONE 10 MG/1
TABLET ORAL
Qty: 21 TABLET | Refills: 0 | Status: SHIPPED | OUTPATIENT
Start: 2023-12-19

## 2023-12-19 RX ORDER — PREDNISONE 10 MG/1
TABLET ORAL
Qty: 21 TABLET | Refills: 0 | OUTPATIENT
Start: 2023-12-19 | End: 2023-12-19 | Stop reason: SDUPTHER

## 2023-12-28 RX ORDER — ACETAZOLAMIDE 250 MG/1
TABLET ORAL
Qty: 42 TABLET | Refills: 0 | OUTPATIENT
Start: 2023-12-28

## 2024-01-08 PROBLEM — G45.9 TIA (TRANSIENT ISCHEMIC ATTACK): Status: RESOLVED | Noted: 2023-09-20 | Resolved: 2024-01-08

## 2024-01-22 ENCOUNTER — OFFICE VISIT (OUTPATIENT)
Dept: NEUROLOGY | Facility: CLINIC | Age: 54
End: 2024-01-22
Payer: COMMERCIAL

## 2024-01-22 VITALS
BODY MASS INDEX: 33.43 KG/M2 | WEIGHT: 200.63 LBS | HEIGHT: 65 IN | HEART RATE: 72 BPM | DIASTOLIC BLOOD PRESSURE: 91 MMHG | SYSTOLIC BLOOD PRESSURE: 128 MMHG

## 2024-01-22 DIAGNOSIS — G43.709 CHRONIC MIGRAINE WITHOUT AURA WITHOUT STATUS MIGRAINOSUS, NOT INTRACTABLE: Primary | ICD-10-CM

## 2024-01-22 DIAGNOSIS — G44.40 MEDICATION OVERUSE HEADACHE: ICD-10-CM

## 2024-01-22 PROBLEM — G43.009 MIGRAINE WITHOUT AURA AND WITHOUT STATUS MIGRAINOSUS, NOT INTRACTABLE: Status: ACTIVE | Noted: 2023-10-09

## 2024-01-22 PROCEDURE — 99214 OFFICE O/P EST MOD 30 MIN: CPT | Mod: S$GLB,,, | Performed by: NURSE PRACTITIONER

## 2024-01-22 PROCEDURE — 1160F RVW MEDS BY RX/DR IN RCRD: CPT | Mod: CPTII,S$GLB,, | Performed by: NURSE PRACTITIONER

## 2024-01-22 PROCEDURE — 3008F BODY MASS INDEX DOCD: CPT | Mod: CPTII,S$GLB,, | Performed by: NURSE PRACTITIONER

## 2024-01-22 PROCEDURE — 1159F MED LIST DOCD IN RCRD: CPT | Mod: CPTII,S$GLB,, | Performed by: NURSE PRACTITIONER

## 2024-01-22 PROCEDURE — 3080F DIAST BP >= 90 MM HG: CPT | Mod: CPTII,S$GLB,, | Performed by: NURSE PRACTITIONER

## 2024-01-22 PROCEDURE — 3074F SYST BP LT 130 MM HG: CPT | Mod: CPTII,S$GLB,, | Performed by: NURSE PRACTITIONER

## 2024-01-22 PROCEDURE — 99999 PR PBB SHADOW E&M-EST. PATIENT-LVL IV: CPT | Mod: PBBFAC,,, | Performed by: NURSE PRACTITIONER

## 2024-01-22 NOTE — PROGRESS NOTES
Subjective:       Patient ID: Yandy Chaudhry is a 53 y.o. female.    Chief Complaint: Pseudotumor (Pt states doing well ) and Migraine (Pt states migraines doing well with nurtec every other day decrease of frequency/intensity and rizatriptan helpful in aborting )      History of Present Illness:  One-month follow-up visit for migraines.  She is now on Nurtec every other day as a preventive and Maxalt as needed for headaches.  She is taking the Maxalt about twice a week.  She notes that her headache frequency and intensity has profoundly improved since the changes made at last visit.  She is now completely off Diamox and is no longer taking Tylenol several times a day every day.  She says she tries to limit her Tylenol to 3 to 4 times a week.  She denies any vision change.  Over the last couple of weeks, she has noticed an increase of scalp sensitivity to the right side of her face and head.  Even moving her hair with her hand will increase the sensitivity.  It is a burning type pain.  Of note, she had been on gabapentin 300 mg t.i.d. for many months.  After her last visit here and after coming off Diamox and Tylenol, she decided that she wanted to try to wean off that since she may not need it.  She does note a correlation between the increase scalp sensitivity and weaning down on the gabapentin.  She is currently only taking 300 mg at bedtime            Past Medical History:   Diagnosis Date    Acid reflux     Anemia, unspecified     Anxiety     Coronary artery disease     Hyperlipidemia     Hypertension     Migraines     Myocardial infarction     Thrombus     apical    Thyroid disease        Past Surgical History:   Procedure Laterality Date    ANKLE SURGERY Left     ANTERIOR CERVICAL DISCECTOMY W/ FUSION       SECTION       SECTION  2005    CHOLECYSTECTOMY      CORONARY ANGIOPLASTY WITH STENT PLACEMENT      DILATION AND CURETTAGE OF UTERUS      HYSTERECTOMY      LUMBAR PUNCTURE           Family History   Problem Relation Age of Onset    Cancer Mother         Pancriatic cancer    Hydrocephalus Mother     No Known Problems Father     No Known Problems Sister     No Known Problems Brother     Hydrocephalus Maternal Grandmother     Parkinsonism Maternal Grandfather         Social History     Socioeconomic History    Marital status:    Tobacco Use    Smoking status: Never     Passive exposure: Never    Smokeless tobacco: Never   Substance and Sexual Activity    Alcohol use: Not Currently    Drug use: Never        Outpatient Encounter Medications as of 1/22/2024   Medication Sig Dispense Refill    aspirin (ECOTRIN) 81 MG EC tablet Take 81 mg by mouth once daily.      butalbital-acetaminophen-caffeine -40 mg (FIORICET, ESGIC) -40 mg per tablet Take 1 tablet by mouth every 6 (six) hours as needed for Headaches. 10 tablet 0    cetirizine (ZYRTEC) 10 MG tablet Take 1 tablet (10 mg total) by mouth daily as needed for Allergies.      FLUoxetine 40 MG capsule Take 40 mg by mouth 2 (two) times daily.      fluticasone propionate (FLONASE) 50 mcg/actuation nasal spray 1 spray by Each Nostril route 2 (two) times daily. Takes as needed      gabapentin (NEURONTIN) 600 MG tablet Take 1 tablet (600 mg total) by mouth 3 (three) times daily. (Patient taking differently: Take 600 mg by mouth once daily.) 90 tablet 11    HUMIRA,CF, PEN 40 mg/0.4 mL PnKt Inject 40 mg into the skin every 14 (fourteen) days.      ketoconazole (NIZORAL) 2 % cream Apply 1 application  topically 2 (two) times daily.      levothyroxine (SYNTHROID) 75 MCG tablet Take 75 mcg by mouth before breakfast.      methocarbamoL (ROBAXIN) 500 MG Tab Take 500 mg by mouth as needed.      metoprolol succinate (TOPROL-XL) 25 MG 24 hr tablet Take 25 mg by mouth 2 (two) times daily.      nitroGLYCERIN (NITROSTAT) 0.4 MG SL tablet PLEASE SEE ATTACHED FOR DETAILED DIRECTIONS      oxybutynin (DITROPAN-XL) 10 MG 24 hr tablet Take 10 mg by  "mouth once daily. As needed      pantoprazole (PROTONIX) 40 MG tablet Take 40 mg by mouth once daily.      REPATHA SURECLICK 140 mg/mL PnIj SMARTSIG:pre-filled pen syringe SUB-Q Every 2 Weeks      rimegepant 75 mg odt Take 1 tablet (75 mg total) by mouth every other day. Place ODT tablet on the tongue; alternatively the ODT tablet may be placed under the tongue 15 tablet 11    rizatriptan (MAXALT) 10 MG tablet Take 1 tablet (10 mg total) by mouth as needed for Migraine (may repeat dose in two hours if needed). 12 tablet 2    rOPINIRole (REQUIP) 0.5 MG tablet Take 0.5 mg by mouth every evening.      rosuvastatin (CRESTOR) 40 MG Tab Take 40 mg by mouth once daily.      tiZANidine (ZANAFLEX) 4 MG tablet Take 4 mg by mouth every 8 (eight) hours as needed.      predniSONE (DELTASONE) 10 MG tablet Day 1: Prednisone 10 mg take 6 tablets Day 2: Prednisone 10 mg take 5 tablets Day 3: Prednisone 10 mg take 4 tablets Day 4: Prednisone 10 mg take 3 tablets Day 5: Prednisone 10 mg take 2 tablets Day 6: Prednisone 10 mg take 1 tablet, then discontinue (Patient not taking: Reported on 1/22/2024) 21 tablet 0    [DISCONTINUED] acetaZOLAMIDE (DIAMOX) 250 MG tablet 250 mg PO q AM and 500 mg q PM x 1 week, 250 mg BID x 1 weel, 250 mg q PM x 1 week, then stop (Patient not taking: Reported on 1/22/2024) 42 tablet 0    [DISCONTINUED] sumatriptan (IMITREX) 50 MG tablet Take 1 tablet prn once for migraine, may repeat dose in 2 hours (Patient not taking: Reported on 1/22/2024) 9 tablet 5     No facility-administered encounter medications on file as of 1/22/2024.      Objective:   BP (!) 128/91   Pulse 72   Ht 5' 5" (1.651 m)   Wt 91 kg (200 lb 9.6 oz)   BMI 33.38 kg/m²        Physical Exam  NAD  alert and oriented  cognition and perception intact  no aphasia  EOMI  no facial asymmetry  no dysarthria  moves all extremities symmetrically  no gross coordination abnormalities  gait normal       Assessment & Plan:      1. Chronic migraine " without aura without status migrainosus, not intractable  Overview:  Previous patient of Dr. Navarro.  She started having migraines in her teens and they became really severe after the birth of her 2nd child, but they ultimately resolved in later adulthood.  In October 2022, she began having daily headaches that were so severe they would wake her up at night.  She began taking Tylenol around the clock 3 to 5 times a day.  She saw Dr. Joiner in December 2022 and an MRI brain showed an empty sella.  She was directed to have an ophthalmologic evaluation which she did and there were no reported abnormalities at that time.  She was admitted to [facility] in September 2023 for a stroke workup as she had left-sided weakness associated with a severe headache.  She saw Dr. Navarro in hospital follow-up who started her on Topamax and she ultimately underwent a lumbar puncture (Nov 2023) to evaluate opening pressure which was measured as 21.  She was subsequently started on Diamox and titrated up to 1000 mg b.i.d., but severity of head pain persisted and she had side effects of severe diarrhea and dizziness with Diamox.  She was also tried on gabapentin and zonegran with no relief.  Care was transferred to [facility] team in Dec 2023.  She has been weaned off Diamox and has also stopped daily Tylenol.  She is now on Nurtec every other day with Maxalt p.r.n. as rescue.    Assessment & Plan:  -significant improvement in headaches since changes made at last visit about a month ago.  Continue Nurtec every other day as preventive.  Imitrex not effective as a rescue.  Maxalt p.r.n. for rescue  -can increase gabapentin dose back up to b.i.d. to see if this improves the neuralgia type pain to the right side of her head that has seemed to increase since she started to wean herself down on the medication      2. Medication overuse headache  Assessment & Plan:  -resolved            This note was created with the assistance of voice recognition  software. There may be transcription errors as a result of using this technology however minimal. Effort has been made to assure accuracy of transcription but any obvious errors or omissions should be clarified with the author of the document.

## 2024-01-22 NOTE — ASSESSMENT & PLAN NOTE
-significant improvement in headaches since changes made at last visit about a month ago.  Continue Nurtec every other day as preventive.  Imitrex not effective as a rescue.  Maxalt p.r.n. for rescue  -can increase gabapentin dose back up to b.i.d. to see if this improves the neuralgia type pain to the right side of her head that has seemed to increase since she started to wean herself down on the medication

## 2024-02-19 ENCOUNTER — OFFICE VISIT (OUTPATIENT)
Dept: NEUROLOGY | Facility: CLINIC | Age: 54
End: 2024-02-19
Payer: COMMERCIAL

## 2024-02-19 VITALS
BODY MASS INDEX: 33.32 KG/M2 | SYSTOLIC BLOOD PRESSURE: 142 MMHG | HEART RATE: 81 BPM | WEIGHT: 200 LBS | DIASTOLIC BLOOD PRESSURE: 99 MMHG | HEIGHT: 65 IN

## 2024-02-19 DIAGNOSIS — G43.709 CHRONIC MIGRAINE WITHOUT AURA WITHOUT STATUS MIGRAINOSUS, NOT INTRACTABLE: Primary | ICD-10-CM

## 2024-02-19 PROCEDURE — 1159F MED LIST DOCD IN RCRD: CPT | Mod: CPTII,S$GLB,, | Performed by: NURSE PRACTITIONER

## 2024-02-19 PROCEDURE — 99999 PR PBB SHADOW E&M-EST. PATIENT-LVL V: CPT | Mod: PBBFAC,,, | Performed by: NURSE PRACTITIONER

## 2024-02-19 PROCEDURE — 99213 OFFICE O/P EST LOW 20 MIN: CPT | Mod: S$GLB,,, | Performed by: NURSE PRACTITIONER

## 2024-02-19 PROCEDURE — 3077F SYST BP >= 140 MM HG: CPT | Mod: CPTII,S$GLB,, | Performed by: NURSE PRACTITIONER

## 2024-02-19 PROCEDURE — 3008F BODY MASS INDEX DOCD: CPT | Mod: CPTII,S$GLB,, | Performed by: NURSE PRACTITIONER

## 2024-02-19 PROCEDURE — 3080F DIAST BP >= 90 MM HG: CPT | Mod: CPTII,S$GLB,, | Performed by: NURSE PRACTITIONER

## 2024-02-19 PROCEDURE — 1160F RVW MEDS BY RX/DR IN RCRD: CPT | Mod: CPTII,S$GLB,, | Performed by: NURSE PRACTITIONER

## 2024-02-19 NOTE — PROGRESS NOTES
Subjective:      Patient ID: Yandy Chaudhry is a 54 y.o. female.    Chief Complaint:  Transient Ischemic Attack (Patient c/o blurred vision and dizziness. Denies facial droop or slurred speech. )      History of Present Illness  Patient presents for follow up. [place] [facility] [place] workup as she had left-sided weakness associated with a severe headache. Stroke work up negative. She saw Dr. Navarro in hospital follow-up who started her on Topamax and she ultimately underwent a lumbar puncture (2023) to evaluate opening pressure which was measured as 21. Patient currently being treated for migraines by Dr. Henao's care team after being transferred to them in December. Today patient denies any new onset of numbness, tingling or weakness. Headaches are being well managed by Tess Nurse Practitioner. Patient reports she was seen in ED for fluctuating BP. Will be seeing cardiologist tomorrow. Thinks that BP is the cause of her dizziness.      Past Medical History:   Diagnosis Date    Acid reflux     Anemia, unspecified     Anxiety     Coronary artery disease     Hyperlipidemia     Hypertension     Migraines     Myocardial infarction     Thrombus     apical    Thyroid disease        Past Surgical History:   Procedure Laterality Date    ANKLE SURGERY Left     ANTERIOR CERVICAL DISCECTOMY W/ FUSION       SECTION  2003     SECTION      CHOLECYSTECTOMY      CORONARY ANGIOPLASTY WITH STENT PLACEMENT      DILATION AND CURETTAGE OF UTERUS      HYSTERECTOMY      LUMBAR PUNCTURE         Family History   Problem Relation Age of Onset    Cancer Mother         Pancriatic cancer    Hydrocephalus Mother     No Known Problems Father     No Known Problems Sister     No Known Problems Brother     Hydrocephalus Maternal Grandmother     Parkinsonism Maternal Grandfather        Social History     Socioeconomic History    Marital status:     Tobacco Use    Smoking status: Never     Passive exposure: Never    Smokeless tobacco: Never   Substance and Sexual Activity    Alcohol use: Not Currently    Drug use: Never       Current Outpatient Medications   Medication Sig Dispense Refill    aspirin (ECOTRIN) 81 MG EC tablet Take 81 mg by mouth once daily.      butalbital-acetaminophen-caffeine -40 mg (FIORICET, ESGIC) -40 mg per tablet Take 1 tablet by mouth every 6 (six) hours as needed for Headaches. 10 tablet 0    cetirizine (ZYRTEC) 10 MG tablet Take 1 tablet (10 mg total) by mouth daily as needed for Allergies.      FLUoxetine 40 MG capsule Take 40 mg by mouth 2 (two) times daily.      fluticasone propionate (FLONASE) 50 mcg/actuation nasal spray 1 spray by Each Nostril route 2 (two) times daily. Takes as needed      gabapentin (NEURONTIN) 600 MG tablet Take 1 tablet (600 mg total) by mouth 3 (three) times daily. (Patient taking differently: Take 600 mg by mouth once daily.) 90 tablet 11    HUMIRA,CF, PEN 40 mg/0.4 mL PnKt Inject 40 mg into the skin every 14 (fourteen) days.      ketoconazole (NIZORAL) 2 % cream Apply 1 application  topically 2 (two) times daily.      levothyroxine (SYNTHROID) 75 MCG tablet Take 75 mcg by mouth before breakfast.      methocarbamoL (ROBAXIN) 500 MG Tab Take 500 mg by mouth as needed.      metoprolol succinate (TOPROL-XL) 25 MG 24 hr tablet Take 25 mg by mouth 2 (two) times daily.      nitroGLYCERIN (NITROSTAT) 0.4 MG SL tablet PLEASE SEE ATTACHED FOR DETAILED DIRECTIONS      oxybutynin (DITROPAN-XL) 10 MG 24 hr tablet Take 10 mg by mouth once daily. As needed      pantoprazole (PROTONIX) 40 MG tablet Take 40 mg by mouth once daily.      predniSONE (DELTASONE) 10 MG tablet Day 1: Prednisone 10 mg take 6 tablets Day 2: Prednisone 10 mg take 5 tablets Day 3: Prednisone 10 mg take 4 tablets Day 4: Prednisone 10 mg take 3 tablets Day 5: Prednisone 10 mg take 2 tablets Day 6: Prednisone 10 mg  "take 1 tablet, then discontinue 21 tablet 0    REPATHA SURECLICK 140 mg/mL PnIj SMARTSIG:pre-filled pen syringe SUB-Q Every 2 Weeks      rimegepant 75 mg odt Take 1 tablet (75 mg total) by mouth every other day. Place ODT tablet on the tongue; alternatively the ODT tablet may be placed under the tongue 15 tablet 11    rOPINIRole (REQUIP) 0.5 MG tablet Take 0.5 mg by mouth every evening.      rosuvastatin (CRESTOR) 40 MG Tab Take 40 mg by mouth once daily.      tiZANidine (ZANAFLEX) 4 MG tablet Take 4 mg by mouth every 8 (eight) hours as needed.      rizatriptan (MAXALT) 10 MG tablet Take 1 tablet (10 mg total) by mouth as needed for Migraine (may repeat dose in two hours if needed). 12 tablet 2     No current facility-administered medications for this visit.       Review of patient's allergies indicates:  No Known Allergies       Vitals:    02/19/24 1111   BP: (!) 142/99   BP Location: Left arm   Patient Position: Sitting   Pulse: 81   Weight: 90.7 kg (200 lb)   Height: 5' 5" (1.651 m)      Review of Systems  12 point ROS performed and negative unless otherwise documented in HPI  Objective:     Neurologic Exam     Mental Status   Oriented to person, place, and time.   Speech: speech is normal   Level of consciousness: alert    Cranial Nerves   Cranial nerves II through XII intact.     Motor Exam   Muscle bulk: normal    Strength   Strength 5/5 throughout.     Sensory Exam   Light touch normal.     Gait, Coordination, and Reflexes     Gait  Gait: normal      Physical Exam  Vitals reviewed.   Pulmonary:      Effort: Pulmonary effort is normal.   Neurological:      Mental Status: She is oriented to person, place, and time.      Cranial Nerves: Cranial nerves 2-12 are intact.      Motor: Motor strength is normal.     Gait: Gait is intact.   Psychiatric:         Speech: Speech normal.        Assessment:     1. Chronic migraine without aura without status migrainosus, not intractable         Plan:     Continue " ASA/crestor/repatha  Goal LDL <70  Goal BP <140/90  Continue migraine management per Tess and Dr. Henao  Patient to follow up prn.

## 2024-02-27 ENCOUNTER — TELEPHONE (OUTPATIENT)
Dept: NEUROLOGY | Facility: CLINIC | Age: 54
End: 2024-02-27
Payer: COMMERCIAL

## 2024-02-27 NOTE — TELEPHONE ENCOUNTER
Pt states that her Nurtec had paperwork sent to us from the pharmacy that fills it, she states that she is having trouble refilling it.     CB #[phone]

## 2024-03-04 ENCOUNTER — TELEPHONE (OUTPATIENT)
Dept: NEUROLOGY | Facility: CLINIC | Age: 54
End: 2024-03-04
Payer: COMMERCIAL

## 2024-03-04 ENCOUNTER — HOSPITAL ENCOUNTER (EMERGENCY)
Facility: HOSPITAL | Age: 54
Discharge: HOME | End: 2024-03-05
Attending: STUDENT IN AN ORGANIZED HEALTH CARE EDUCATION/TRAINING PROGRAM
Payer: COMMERCIAL

## 2024-03-04 VITALS
BODY MASS INDEX: 32.49 KG/M2 | HEIGHT: 65 IN | WEIGHT: 195 LBS | OXYGEN SATURATION: 97 % | SYSTOLIC BLOOD PRESSURE: 138 MMHG | TEMPERATURE: 98 F | RESPIRATION RATE: 18 BRPM | DIASTOLIC BLOOD PRESSURE: 81 MMHG | HEART RATE: 66 BPM

## 2024-03-04 DIAGNOSIS — R07.9 CHEST PAIN: ICD-10-CM

## 2024-03-04 DIAGNOSIS — G43.409 HEMIPLEGIC MIGRAINE WITHOUT STATUS MIGRAINOSUS, NOT INTRACTABLE: Primary | ICD-10-CM

## 2024-03-04 LAB
ALBUMIN SERPL-MCNC: 3.8 G/DL (ref 3.5–5)
ALBUMIN/GLOB SERPL: 1.1 RATIO (ref 1.1–2)
ALP SERPL-CCNC: 79 UNIT/L (ref 40–150)
ALT SERPL-CCNC: 27 UNIT/L (ref 0–55)
AST SERPL-CCNC: 37 UNIT/L (ref 5–34)
BASOPHILS # BLD AUTO: 0.12 X10(3)/MCL
BASOPHILS NFR BLD AUTO: 1.8 %
BILIRUB SERPL-MCNC: 0.5 MG/DL
BUN SERPL-MCNC: 10.7 MG/DL (ref 9.8–20.1)
CALCIUM SERPL-MCNC: 9.7 MG/DL (ref 8.4–10.2)
CHLORIDE SERPL-SCNC: 110 MMOL/L (ref 98–107)
CO2 SERPL-SCNC: 23 MMOL/L (ref 22–29)
CREAT SERPL-MCNC: 0.84 MG/DL (ref 0.55–1.02)
EOSINOPHIL # BLD AUTO: 0.23 X10(3)/MCL (ref 0–0.9)
EOSINOPHIL NFR BLD AUTO: 3.5 %
ERYTHROCYTE [DISTWIDTH] IN BLOOD BY AUTOMATED COUNT: 15.8 % (ref 11.5–17)
GFR SERPLBLD CREATININE-BSD FMLA CKD-EPI: >60 MLS/MIN/1.73/M2
GLOBULIN SER-MCNC: 3.5 GM/DL (ref 2.4–3.5)
GLUCOSE SERPL-MCNC: 103 MG/DL (ref 74–100)
HCT VFR BLD AUTO: 41.1 % (ref 37–47)
HGB BLD-MCNC: 13.1 G/DL (ref 12–16)
IMM GRANULOCYTES # BLD AUTO: 0.01 X10(3)/MCL (ref 0–0.04)
IMM GRANULOCYTES NFR BLD AUTO: 0.2 %
INR PPP: 0.9
LYMPHOCYTES # BLD AUTO: 2.77 X10(3)/MCL (ref 0.6–4.6)
LYMPHOCYTES NFR BLD AUTO: 41.8 %
MCH RBC QN AUTO: 25.5 PG (ref 27–31)
MCHC RBC AUTO-ENTMCNC: 31.9 G/DL (ref 33–36)
MCV RBC AUTO: 80 FL (ref 80–94)
MONOCYTES # BLD AUTO: 0.62 X10(3)/MCL (ref 0.1–1.3)
MONOCYTES NFR BLD AUTO: 9.4 %
NEUTROPHILS # BLD AUTO: 2.88 X10(3)/MCL (ref 2.1–9.2)
NEUTROPHILS NFR BLD AUTO: 43.3 %
NRBC BLD AUTO-RTO: 0 %
PLATELET # BLD AUTO: 283 X10(3)/MCL (ref 130–400)
PMV BLD AUTO: 12.9 FL (ref 7.4–10.4)
POTASSIUM SERPL-SCNC: 4.3 MMOL/L (ref 3.5–5.1)
PROT SERPL-MCNC: 7.3 GM/DL (ref 6.4–8.3)
PROTHROMBIN TIME: 12.3 SECONDS (ref 12.5–14.5)
RBC # BLD AUTO: 5.14 X10(6)/MCL (ref 4.2–5.4)
SODIUM SERPL-SCNC: 143 MMOL/L (ref 136–145)
TROPONIN I SERPL-MCNC: <0.01 NG/ML (ref 0–0.04)
WBC # SPEC AUTO: 6.63 X10(3)/MCL (ref 4.5–11.5)

## 2024-03-04 PROCEDURE — 93005 ELECTROCARDIOGRAM TRACING: CPT

## 2024-03-04 PROCEDURE — 99285 EMERGENCY DEPT VISIT HI MDM: CPT | Mod: 25

## 2024-03-04 PROCEDURE — 25000003 PHARM REV CODE 250: Performed by: STUDENT IN AN ORGANIZED HEALTH CARE EDUCATION/TRAINING PROGRAM

## 2024-03-04 PROCEDURE — 63600175 PHARM REV CODE 636 W HCPCS: Performed by: STUDENT IN AN ORGANIZED HEALTH CARE EDUCATION/TRAINING PROGRAM

## 2024-03-04 PROCEDURE — 84484 ASSAY OF TROPONIN QUANT: CPT | Performed by: NURSE PRACTITIONER

## 2024-03-04 PROCEDURE — 96374 THER/PROPH/DIAG INJ IV PUSH: CPT

## 2024-03-04 PROCEDURE — 85025 COMPLETE CBC W/AUTO DIFF WBC: CPT | Performed by: NURSE PRACTITIONER

## 2024-03-04 PROCEDURE — 80053 COMPREHEN METABOLIC PANEL: CPT | Performed by: NURSE PRACTITIONER

## 2024-03-04 PROCEDURE — 96375 TX/PRO/DX INJ NEW DRUG ADDON: CPT

## 2024-03-04 PROCEDURE — 96361 HYDRATE IV INFUSION ADD-ON: CPT

## 2024-03-04 PROCEDURE — 85610 PROTHROMBIN TIME: CPT | Performed by: NURSE PRACTITIONER

## 2024-03-04 PROCEDURE — 93010 ELECTROCARDIOGRAM REPORT: CPT | Mod: ,,, | Performed by: INTERNAL MEDICINE

## 2024-03-04 RX ORDER — DIPHENHYDRAMINE HYDROCHLORIDE 50 MG/ML
50 INJECTION INTRAMUSCULAR; INTRAVENOUS
Status: COMPLETED | OUTPATIENT
Start: 2024-03-04 | End: 2024-03-04

## 2024-03-04 RX ORDER — PROCHLORPERAZINE EDISYLATE 5 MG/ML
5 INJECTION INTRAMUSCULAR; INTRAVENOUS
Status: DISCONTINUED | OUTPATIENT
Start: 2024-03-04 | End: 2024-03-04

## 2024-03-04 RX ORDER — KETOROLAC TROMETHAMINE 30 MG/ML
15 INJECTION, SOLUTION INTRAMUSCULAR; INTRAVENOUS
Status: COMPLETED | OUTPATIENT
Start: 2024-03-04 | End: 2024-03-04

## 2024-03-04 RX ORDER — LORAZEPAM 0.5 MG/1
0.5 TABLET ORAL
Status: COMPLETED | OUTPATIENT
Start: 2024-03-04 | End: 2024-03-04

## 2024-03-04 RX ORDER — SODIUM CHLORIDE 9 MG/ML
1000 INJECTION, SOLUTION INTRAVENOUS
Status: COMPLETED | OUTPATIENT
Start: 2024-03-04 | End: 2024-03-04

## 2024-03-04 RX ORDER — PROCHLORPERAZINE EDISYLATE 5 MG/ML
10 INJECTION INTRAMUSCULAR; INTRAVENOUS
Status: COMPLETED | OUTPATIENT
Start: 2024-03-04 | End: 2024-03-04

## 2024-03-04 RX ADMIN — LORAZEPAM 0.5 MG: 0.5 TABLET ORAL at 10:03

## 2024-03-04 RX ADMIN — SODIUM CHLORIDE 1000 ML: 9 INJECTION, SOLUTION INTRAVENOUS at 09:03

## 2024-03-04 RX ADMIN — DIPHENHYDRAMINE HYDROCHLORIDE 50 MG: 50 INJECTION INTRAMUSCULAR; INTRAVENOUS at 09:03

## 2024-03-04 RX ADMIN — PROCHLORPERAZINE EDISYLATE 10 MG: 5 INJECTION INTRAMUSCULAR; INTRAVENOUS at 09:03

## 2024-03-04 RX ADMIN — KETOROLAC TROMETHAMINE 15 MG: 30 INJECTION, SOLUTION INTRAMUSCULAR at 09:03

## 2024-03-04 NOTE — FIRST PROVIDER EVALUATION
"Medical screening examination initiated.  I have conducted a focused provider triage encounter, findings are as follows:    Brief history of present illness:  53 y/o female presents with migraine and left side facial numbness and left arm tingling at 11AM. 1330 chest tightness with nausea.     Vitals:    03/04/24 1550   BP: (!) 145/84   Pulse: (!) 58   Resp: 20   Temp: 97.9 °F (36.6 °C)   SpO2: 98%   Weight: 88.5 kg (195 lb)   Height: 5' 5" (1.651 m)       Pertinent physical exam:  alert, nonlabored, no slurred speech, ambulatory, no drift/droop     Brief workup plan:  EKG, labs, urine, image    Preliminary workup initiated; this workup will be continued and followed by the physician or advanced practice provider that is assigned to the patient when roomed.  "

## 2024-03-04 NOTE — TELEPHONE ENCOUNTER
Pt states that she is currently having a migraine that mimics a stroke, at 11 am today her left side face went numb and had tingling that radiated down to her left arm and hand, pt is having difficulty talking, she states she took a rizatriptan at 11:30 am and states it did not help at all. Symptoms have not improved at all, she states she is having slight difficulty swallowing and blurry vision still is having numbness and tingling on left side of face w/ radiating to left hand & arm. Pt states she is having chest tightness in the middle. Pt was told to go to the ER, pt verbalized understanding and is currently on her way to [facility].

## 2024-03-05 LAB
OHS QRS DURATION: 84 MS
OHS QTC CALCULATION: 443 MS

## 2024-03-05 NOTE — ED NOTES
Pt to room 2 she is awake alert moves all extremities equally states that her HA is 7/10. Currently awaiting provider for eval family at bedside

## 2024-03-05 NOTE — ED NOTES
Pt states that her HA has completed resolved and feels ready to be dc home, pt was updated to plan of care awaiting reval by MD

## 2024-03-05 NOTE — ED PROVIDER NOTES
Encounter Date: 3/4/2024    SCRIBE #1 NOTE: I, Rupa Lugo, am scribing for, and in the presence of,  Gus Brady MD. I have scribed the following portions of the note - Other sections scribed: HPI, ROS, PE.       History     Chief Complaint   Patient presents with    Headache     Left side headache with ear pain, numbness to left cheek and tingling in left arm since 11am. Chest tightness with nausea since 1:30 pm. PMH complex migraines. Dr Henao nurse sent pt here for evaluation     54 year old female with history of migraines, CAD, HTN, HLD, MI, anxiety, acid reflux, and cardiac stent presents to the ED with complaints of a headache onset 11:00 this morning. Pt reports that since the onset of her headache, she has been having left facial numbness, LUE weakness, expressive aphasia, and central chest pain. Pt states that she was seen here in the ED for the same exact symptoms in the past and was diagnosed with a migraine. She notes that she took rizatriptan when her symptoms started and had no relief, so she called her neurologist Dr. Henao's office and was told to come to the ED. Pt denies neck, shoulder, and jaw pain, and she denies leg weakness or numbness. Pt follows with cardiologist Dr. De Leon.     The history is provided by the patient. No  was used.     Review of patient's allergies indicates:  No Known Allergies  Past Medical History:   Diagnosis Date    Acid reflux     Anemia, unspecified     Anxiety     Coronary artery disease     Hyperlipidemia     Hypertension     Migraines     Myocardial infarction     Thrombus     apical    Thyroid disease      Past Surgical History:   Procedure Laterality Date    ANKLE SURGERY Left     ANTERIOR CERVICAL DISCECTOMY W/ FUSION       SECTION  2003     SECTION  2005    CHOLECYSTECTOMY      CORONARY ANGIOPLASTY WITH STENT PLACEMENT      DILATION AND CURETTAGE OF UTERUS      HYSTERECTOMY      LUMBAR PUNCTURE       Family History    Problem Relation Age of Onset    Cancer Mother         Pancriatic cancer    Hydrocephalus Mother     No Known Problems Father     No Known Problems Sister     No Known Problems Brother     Hydrocephalus Maternal Grandmother     Parkinsonism Maternal Grandfather      Social History     Tobacco Use    Smoking status: Never     Passive exposure: Never    Smokeless tobacco: Never   Substance Use Topics    Alcohol use: Not Currently    Drug use: Never     Review of Systems   Cardiovascular:  Positive for chest pain.   Musculoskeletal:  Negative for neck pain.        No shoulder pain.    Neurological:  Positive for speech difficulty, weakness (+LUE; -bi LE and RUE), numbness (left face) and headaches.       Physical Exam     Initial Vitals [03/04/24 1550]   BP Pulse Resp Temp SpO2   (!) 145/84 (!) 58 20 97.9 °F (36.6 °C) 98 %      MAP       --         Physical Exam    Nursing note and vitals reviewed.  Constitutional: She appears well-developed and well-nourished. She is not diaphoretic. No distress.   HENT:   Head: Normocephalic and atraumatic.   Right Ear: External ear normal.   Left Ear: External ear normal.   Nose: Nose normal.   Eyes: Conjunctivae and EOM are normal. Pupils are equal, round, and reactive to light. Right eye exhibits no discharge. Left eye exhibits no discharge.   Cardiovascular:  Normal rate, regular rhythm, normal heart sounds and intact distal pulses.     Exam reveals no gallop and no friction rub.       No murmur heard.  Pulmonary/Chest: Breath sounds normal. No respiratory distress. She has no wheezes. She has no rhonchi. She has no rales. She exhibits no tenderness.   Abdominal: Abdomen is soft. Bowel sounds are normal. She exhibits no distension and no mass. There is no abdominal tenderness. There is no rebound and no guarding.   Musculoskeletal:         General: No edema. Normal range of motion.     Neurological: She is alert and oriented to person, place, and time. GCS score is 15. GCS eye  subscore is 4. GCS verbal subscore is 5. GCS motor subscore is 6.   4/5  strength on left, 5/5 on right.   5/5 bilateral bicep flexion and bilateral shoulder shrug.   Decreased sensation to left cheek.    Skin: Skin is warm and dry. Capillary refill takes less than 2 seconds. No erythema. No pallor.         ED Course   Procedures  Labs Reviewed   CBC WITH DIFFERENTIAL - Abnormal; Notable for the following components:       Result Value    MCH 25.5 (*)     MCHC 31.9 (*)     MPV 12.9 (*)     All other components within normal limits   COMPREHENSIVE METABOLIC PANEL - Abnormal; Notable for the following components:    Chloride 110 (*)     Glucose Level 103 (*)     Aspartate Aminotransferase 37 (*)     All other components within normal limits   PROTIME-INR - Abnormal; Notable for the following components:    PT 12.3 (*)     All other components within normal limits   TROPONIN I - Normal     EKG Readings: (Independently Interpreted)   Initial Reading: No STEMI. Rhythm: Sinus Bradycardia. Heart Rate: 55. Ectopy: No Ectopy. Conduction: Normal. ST Segments: Normal ST Segments. T Waves: Normal. Axis: Normal. Clinical Impression: Sinus Bradycardia Other Impression: QTC of 443 ms.   EKG performed at 15:49.        Imaging Results              CT Head Without Contrast (Final result)  Result time 03/04/24 17:14:42      Final result by Roman Reyes MD (03/04/24 17:14:42)                   Impression:      No acute abnormality seen      Electronically signed by: Frank Reyes  Date:    03/04/2024  Time:    17:14               Narrative:    EXAMINATION:  CT HEAD WITHOUT CONTRAST    CLINICAL HISTORY:  Transient ischemic attack (TIA);    TECHNIQUE:  Multiple axial images were obtained from the base of the brain to the vertex without contrast administration.  Sagittal and coronal reconstructions were performed. .Automatic exposure control  (AEC) is utilized to reduce patient radiation  exposure.    COMPARISON:  10/13/2023    FINDINGS:  There is no intracranial mass or lesion seen.  No hemorrhage is seen.  No infarct is seen.  The ventricles and basilar cisterns appear normal.  Brain parenchyma appears grossly unremarkable.    Posterior fossa appears normal.  The calvarium is intact.  The paranasal sinuses appear grossly unremarkable.                                       X-Ray Chest 1 View (Final result)  Result time 03/04/24 16:43:19      Final result by Gamal Jean Baptiste MD (03/04/24 16:43:19)                   Impression:      No acute findings in the chest      Electronically signed by: Gamal Jean Baptiste MD  Date:    03/04/2024  Time:    16:43               Narrative:    EXAMINATION:  XR CHEST 1 VIEW    CLINICAL HISTORY:  chest pain;    COMPARISON:  02/14/2014    FINDINGS:  Single view of the chest shows no focal consolidation, pneumothorax or pleural effusion.  Cardiac silhouette and pulmonary vasculature are normal.                                       Medications   0.9%  NaCl infusion (0 mLs Intravenous Stopped 3/4/24 2310)   diphenhydrAMINE injection 50 mg (50 mg Intravenous Given 3/4/24 2132)   ketorolac injection 15 mg (15 mg Intravenous Given 3/4/24 2132)   prochlorperazine injection Soln 10 mg (10 mg Intravenous Given 3/4/24 2132)   LORazepam tablet 0.5 mg (0.5 mg Oral Given 3/4/24 2244)     Medical Decision Making  Differential diagnosis includes but is not limited to migraine, headache, tension headache, ocular migraine,, complex migraine    Initially patient appears mildly uncomfortable.  She reports some decreased sensation in to her left side of her face.  She was decreased  strength.  She was treated with a migraine cocktail, Benadryl, Compazine, Toradol with great improvement.  Her headache has completely resolved.  She reports whenever symptoms have completely resolved.  Strength is 5/5 to bilateral hands.  She was sensation is intact to her face.  She was requesting discharge  home.  I believe she was suitable for such.  The seems to be an exacerbation of her complex migraine..  Showed be discharged at this time.  Instructed to follow up with her neurologist. Return precautions given.  Questions invited, questions answered to the best my ability.  Patient discharged home condition stable.        Amount and/or Complexity of Data Reviewed  External Data Reviewed: notes.     Details: Chart review reveals that presented to this hospital September 2023 for left-sided weakness and had a headache stroke workup. Following with neurology as outpatient for headaches.    Labs: ordered. Decision-making details documented in ED Course.  Radiology: ordered and independent interpretation performed. Decision-making details documented in ED Course.  ECG/medicine tests: ordered and independent interpretation performed.     Details: Initial Reading: No STEMI. Rhythm: Sinus Bradycardia. Heart Rate: 55. Ectopy: No Ectopy. Conduction: Normal. ST Segments: Normal ST Segments. T Waves: Normal. Axis: Normal. Clinical Impression: Sinus Bradycardia Other Impression: QTC of 443 ms.   EKG performed at 15:49.      Risk  OTC drugs.  Prescription drug management.              Attending Attestation:           Physician Attestation for Scribe:  Physician Attestation Statement for Scribe #1: I, Gus Brady MD, reviewed documentation, as scribed by Rupa Lugo in my presence, and it is both accurate and complete.             ED Course as of 03/05/24 0614   Mon Mar 04, 2024   2005 Troponin I: <0.010 [MM]   2005 INR: 0.9 [MM]   2005 Comprehensive Metabolic Panel(!)  No significant electrolyte abnormality or renal dysfunction. [MM]   2005 CBC with Differential(!)  No anemia.  No leukocytosis. [MM]   2005 X-Ray Chest 1 View  Negative for any acute finding. [MM]   2009 Chart review reveals that presented to this hospital September 2023 for left-sided weakness and had a headache stroke workup.  Following with neurology as  "outpatient for headaches.  Apparently, today she called her PCP was complaining of a migraine that "may make psych stroke" started at 11:00 a.m. left side of face went numb and tingling that radiated down her left arm and hand. Pt took a rizatriptan at approximately 11:30 a.m. did not help symptoms at all.  Slight difficulty swallowing and blurry vision.  She was directed to the emergency department for further evaluation. [MM]      ED Course User Index  [MM] Gus Brady MD                           Clinical Impression:  Final diagnoses:  [R07.9] Chest pain  [G43.409] Hemiplegic migraine without status migrainosus, not intractable (Primary)          ED Disposition Condition    Discharge Stable          ED Prescriptions    None       Follow-up Information       Follow up With Specialties Details Why Contact Info    Fabian Phelps MD Internal Medicine Call   [address]  [place] [address]  [phone]      Anni Henao MD Neurology Call   [address] [place] [address] Dr  [address]  [place] [address]  [phone]               Gus Brady MD  03/05/24 0614    "

## 2024-03-05 NOTE — DISCHARGE INSTRUCTIONS

## 2024-03-07 ENCOUNTER — PATIENT MESSAGE (OUTPATIENT)
Dept: NEUROLOGY | Facility: CLINIC | Age: 54
End: 2024-03-07
Payer: COMMERCIAL

## 2024-03-08 DIAGNOSIS — G43.709 CHRONIC MIGRAINE WITHOUT AURA WITHOUT STATUS MIGRAINOSUS, NOT INTRACTABLE: Primary | ICD-10-CM

## 2024-03-08 RX ORDER — PROMETHAZINE HYDROCHLORIDE 12.5 MG/1
12.5 TABLET ORAL EVERY 6 HOURS PRN
Qty: 20 TABLET | Refills: 0 | Status: SHIPPED | OUTPATIENT
Start: 2024-03-08 | End: 2024-04-19

## 2024-03-08 RX ORDER — KETOROLAC TROMETHAMINE 10 MG/1
10 TABLET, FILM COATED ORAL EVERY 6 HOURS PRN
Qty: 20 TABLET | Refills: 0 | Status: SHIPPED | OUTPATIENT
Start: 2024-03-08 | End: 2024-04-19

## 2024-04-09 ENCOUNTER — PATIENT MESSAGE (OUTPATIENT)
Dept: NEUROLOGY | Facility: CLINIC | Age: 54
End: 2024-04-09
Payer: COMMERCIAL

## 2024-04-09 DIAGNOSIS — G43.709 CHRONIC MIGRAINE WITHOUT AURA WITHOUT STATUS MIGRAINOSUS, NOT INTRACTABLE: ICD-10-CM

## 2024-04-09 RX ORDER — PREDNISONE 10 MG/1
TABLET ORAL
Qty: 21 TABLET | Refills: 0 | Status: SHIPPED | OUTPATIENT
Start: 2024-04-09 | End: 2024-04-19

## 2024-04-13 ENCOUNTER — HOSPITAL ENCOUNTER (INPATIENT)
Facility: HOSPITAL | Age: 54
LOS: 1 days | Discharge: HOME | DRG: 103 | End: 2024-04-14
Attending: EMERGENCY MEDICINE | Admitting: STUDENT IN AN ORGANIZED HEALTH CARE EDUCATION/TRAINING PROGRAM
Payer: COMMERCIAL

## 2024-04-13 DIAGNOSIS — G93.2 IIH (IDIOPATHIC INTRACRANIAL HYPERTENSION): ICD-10-CM

## 2024-04-13 DIAGNOSIS — R20.0 NUMBNESS: Primary | ICD-10-CM

## 2024-04-13 DIAGNOSIS — R53.1 LEFT-SIDED WEAKNESS: ICD-10-CM

## 2024-04-13 DIAGNOSIS — R29.818 ACUTE FOCAL NEUROLOGICAL DEFICIT: ICD-10-CM

## 2024-04-13 DIAGNOSIS — I63.9 STROKE: ICD-10-CM

## 2024-04-13 LAB
ABS NEUT (OLG): 7.38 X10(3)/MCL (ref 2.1–9.2)
ALBUMIN SERPL-MCNC: 3.6 G/DL (ref 3.5–5)
ALBUMIN/GLOB SERPL: 1.2 RATIO (ref 1.1–2)
ALP SERPL-CCNC: 68 UNIT/L (ref 40–150)
ALT SERPL-CCNC: 50 UNIT/L (ref 0–55)
AST SERPL-CCNC: 53 UNIT/L (ref 5–34)
BASOPHILS NFR BLD MANUAL: 0.13 X10(3)/MCL (ref 0–0.2)
BASOPHILS NFR BLD MANUAL: 1 %
BILIRUB SERPL-MCNC: 0.4 MG/DL
BUN SERPL-MCNC: 22.8 MG/DL (ref 9.8–20.1)
CALCIUM SERPL-MCNC: 9.2 MG/DL (ref 8.4–10.2)
CHLORIDE SERPL-SCNC: 109 MMOL/L (ref 98–107)
CHOLEST SERPL-MCNC: 148 MG/DL
CHOLEST/HDLC SERPL: 2 {RATIO} (ref 0–5)
CO2 SERPL-SCNC: 23 MMOL/L (ref 22–29)
CREAT SERPL-MCNC: 1.18 MG/DL (ref 0.55–1.02)
CRP SERPL HS-MCNC: 0.43 MG/L
ERYTHROCYTE [DISTWIDTH] IN BLOOD BY AUTOMATED COUNT: 18.7 % (ref 11.5–17)
ERYTHROCYTE [SEDIMENTATION RATE] IN BLOOD: 11 MM/HR (ref 0–20)
GFR SERPLBLD CREATININE-BSD FMLA CKD-EPI: 54 MLS/MIN/1.73/M2
GLOBULIN SER-MCNC: 2.9 GM/DL (ref 2.4–3.5)
GLUCOSE SERPL-MCNC: 100 MG/DL (ref 74–100)
HCT VFR BLD AUTO: 40.2 % (ref 37–47)
HDLC SERPL-MCNC: 88 MG/DL (ref 35–60)
HGB BLD-MCNC: 12.8 G/DL (ref 12–16)
INR PPP: 0.9
INSTRUMENT WBC (OLG): 12.72 X10(3)/MCL
LDLC SERPL CALC-MCNC: 51 MG/DL (ref 50–140)
LYMPHOCYTES NFR BLD MANUAL: 35 %
LYMPHOCYTES NFR BLD MANUAL: 4.45 X10(3)/MCL
MCH RBC QN AUTO: 25.6 PG (ref 27–31)
MCHC RBC AUTO-ENTMCNC: 31.8 G/DL (ref 33–36)
MCV RBC AUTO: 80.4 FL (ref 80–94)
MONOCYTES NFR BLD MANUAL: 0.64 X10(3)/MCL (ref 0.1–1.3)
MONOCYTES NFR BLD MANUAL: 5 %
NEUTROPHILS NFR BLD MANUAL: 58 %
NRBC BLD AUTO-RTO: 0 %
PLATELET # BLD AUTO: 251 X10(3)/MCL (ref 130–400)
PLATELET # BLD EST: NORMAL 10*3/UL
PLATELETS.RETICULATED NFR BLD AUTO: 11.8 % (ref 0.9–11.2)
PMV BLD AUTO: 12.4 FL (ref 7.4–10.4)
POCT GLUCOSE: 90 MG/DL (ref 70–110)
POIKILOCYTOSIS BLD QL SMEAR: ABNORMAL
POTASSIUM SERPL-SCNC: 3.9 MMOL/L (ref 3.5–5.1)
PROT SERPL-MCNC: 6.5 GM/DL (ref 6.4–8.3)
PROTHROMBIN TIME: 12.2 SECONDS (ref 12.5–14.5)
RBC # BLD AUTO: 5 X10(6)/MCL (ref 4.2–5.4)
RBC MORPH BLD: ABNORMAL
SODIUM SERPL-SCNC: 141 MMOL/L (ref 136–145)
TRIGL SERPL-MCNC: 44 MG/DL (ref 37–140)
TSH SERPL-ACNC: 1.23 UIU/ML (ref 0.35–4.94)
VLDLC SERPL CALC-MCNC: 9 MG/DL
WBC # SPEC AUTO: 12.72 X10(3)/MCL (ref 4.5–11.5)

## 2024-04-13 PROCEDURE — 63600175 PHARM REV CODE 636 W HCPCS: Performed by: INTERNAL MEDICINE

## 2024-04-13 PROCEDURE — 84443 ASSAY THYROID STIM HORMONE: CPT

## 2024-04-13 PROCEDURE — 25000003 PHARM REV CODE 250: Performed by: EMERGENCY MEDICINE

## 2024-04-13 PROCEDURE — 85652 RBC SED RATE AUTOMATED: CPT | Performed by: NURSE PRACTITIONER

## 2024-04-13 PROCEDURE — 86141 C-REACTIVE PROTEIN HS: CPT | Performed by: NURSE PRACTITIONER

## 2024-04-13 PROCEDURE — 11000001 HC ACUTE MED/SURG PRIVATE ROOM

## 2024-04-13 PROCEDURE — 82962 GLUCOSE BLOOD TEST: CPT

## 2024-04-13 PROCEDURE — 80061 LIPID PANEL: CPT | Performed by: NURSE PRACTITIONER

## 2024-04-13 PROCEDURE — 99285 EMERGENCY DEPT VISIT HI MDM: CPT | Mod: 25

## 2024-04-13 PROCEDURE — 25500020 PHARM REV CODE 255: Performed by: STUDENT IN AN ORGANIZED HEALTH CARE EDUCATION/TRAINING PROGRAM

## 2024-04-13 PROCEDURE — 85007 BL SMEAR W/DIFF WBC COUNT: CPT

## 2024-04-13 PROCEDURE — 80053 COMPREHEN METABOLIC PANEL: CPT

## 2024-04-13 PROCEDURE — 85610 PROTHROMBIN TIME: CPT

## 2024-04-13 PROCEDURE — 93005 ELECTROCARDIOGRAM TRACING: CPT

## 2024-04-13 RX ORDER — GABAPENTIN 300 MG/1
600 CAPSULE ORAL DAILY
Status: DISCONTINUED | OUTPATIENT
Start: 2024-04-14 | End: 2024-04-14 | Stop reason: HOSPADM

## 2024-04-13 RX ORDER — ACETAMINOPHEN 325 MG/1
650 TABLET ORAL EVERY 6 HOURS PRN
Status: DISCONTINUED | OUTPATIENT
Start: 2024-04-13 | End: 2024-04-14 | Stop reason: HOSPADM

## 2024-04-13 RX ORDER — TOPIRAMATE 50 MG/1
50 TABLET, FILM COATED ORAL 2 TIMES DAILY
Status: ON HOLD | COMMUNITY
End: 2024-04-14 | Stop reason: HOSPADM

## 2024-04-13 RX ORDER — LABETALOL HYDROCHLORIDE 5 MG/ML
10 INJECTION, SOLUTION INTRAVENOUS
Status: DISCONTINUED | OUTPATIENT
Start: 2024-04-13 | End: 2024-04-14 | Stop reason: HOSPADM

## 2024-04-13 RX ORDER — BUTALBITAL, ACETAMINOPHEN AND CAFFEINE 50; 325; 40 MG/1; MG/1; MG/1
1 TABLET ORAL EVERY 6 HOURS PRN
Status: DISCONTINUED | OUTPATIENT
Start: 2024-04-14 | End: 2024-04-14 | Stop reason: HOSPADM

## 2024-04-13 RX ORDER — OXYBUTYNIN CHLORIDE 5 MG/1
10 TABLET, EXTENDED RELEASE ORAL DAILY
Status: DISCONTINUED | OUTPATIENT
Start: 2024-04-14 | End: 2024-04-14 | Stop reason: HOSPADM

## 2024-04-13 RX ORDER — ONDANSETRON HYDROCHLORIDE 2 MG/ML
4 INJECTION, SOLUTION INTRAVENOUS EVERY 4 HOURS PRN
Status: DISCONTINUED | OUTPATIENT
Start: 2024-04-13 | End: 2024-04-14 | Stop reason: HOSPADM

## 2024-04-13 RX ORDER — SODIUM CHLORIDE 0.9 % (FLUSH) 0.9 %
10 SYRINGE (ML) INJECTION EVERY 8 HOURS PRN
Status: DISCONTINUED | OUTPATIENT
Start: 2024-04-13 | End: 2024-04-14 | Stop reason: HOSPADM

## 2024-04-13 RX ORDER — BISACODYL 10 MG/1
10 SUPPOSITORY RECTAL DAILY PRN
Status: DISCONTINUED | OUTPATIENT
Start: 2024-04-13 | End: 2024-04-14 | Stop reason: HOSPADM

## 2024-04-13 RX ORDER — ATORVASTATIN CALCIUM 40 MG/1
80 TABLET, FILM COATED ORAL DAILY
Status: DISCONTINUED | OUTPATIENT
Start: 2024-04-14 | End: 2024-04-14 | Stop reason: HOSPADM

## 2024-04-13 RX ORDER — ASPIRIN 81 MG/1
81 TABLET ORAL DAILY
Status: DISCONTINUED | OUTPATIENT
Start: 2024-04-14 | End: 2024-04-14 | Stop reason: HOSPADM

## 2024-04-13 RX ORDER — PANTOPRAZOLE SODIUM 40 MG/1
40 TABLET, DELAYED RELEASE ORAL DAILY
Status: DISCONTINUED | OUTPATIENT
Start: 2024-04-14 | End: 2024-04-14 | Stop reason: HOSPADM

## 2024-04-13 RX ORDER — FLUOXETINE HYDROCHLORIDE 20 MG/1
40 CAPSULE ORAL 2 TIMES DAILY
Status: DISCONTINUED | OUTPATIENT
Start: 2024-04-14 | End: 2024-04-14 | Stop reason: HOSPADM

## 2024-04-13 RX ORDER — TIZANIDINE 4 MG/1
4 TABLET ORAL EVERY 8 HOURS PRN
Status: DISCONTINUED | OUTPATIENT
Start: 2024-04-13 | End: 2024-04-14 | Stop reason: HOSPADM

## 2024-04-13 RX ORDER — KETOROLAC TROMETHAMINE 30 MG/ML
15 INJECTION, SOLUTION INTRAMUSCULAR; INTRAVENOUS
Status: COMPLETED | OUTPATIENT
Start: 2024-04-13 | End: 2024-04-13

## 2024-04-13 RX ORDER — TOPIRAMATE 25 MG/1
50 TABLET ORAL 2 TIMES DAILY
Status: DISCONTINUED | OUTPATIENT
Start: 2024-04-14 | End: 2024-04-14

## 2024-04-13 RX ORDER — ROPINIROLE 0.25 MG/1
0.5 TABLET, FILM COATED ORAL NIGHTLY
Status: DISCONTINUED | OUTPATIENT
Start: 2024-04-14 | End: 2024-04-14 | Stop reason: HOSPADM

## 2024-04-13 RX ORDER — ASPIRIN 325 MG
325 TABLET ORAL
Status: COMPLETED | OUTPATIENT
Start: 2024-04-13 | End: 2024-04-13

## 2024-04-13 RX ORDER — METOPROLOL SUCCINATE 25 MG/1
25 TABLET, EXTENDED RELEASE ORAL 2 TIMES DAILY
Status: DISCONTINUED | OUTPATIENT
Start: 2024-04-14 | End: 2024-04-14 | Stop reason: HOSPADM

## 2024-04-13 RX ADMIN — KETOROLAC TROMETHAMINE 15 MG: 30 INJECTION, SOLUTION INTRAMUSCULAR at 07:04

## 2024-04-13 RX ADMIN — IOPAMIDOL 100 ML: 755 INJECTION, SOLUTION INTRAVENOUS at 08:04

## 2024-04-13 RX ADMIN — ASPIRIN 325 MG ORAL TABLET 325 MG: 325 PILL ORAL at 04:04

## 2024-04-13 NOTE — Clinical Note
Diagnosis: Numbness [156209]   Future Attending Provider: NASIR MARKHAM [[id number]]   Admit to which facility:: [facility] [[id number]]   Reason for IP Medical Treatment  (Clinical interventions that can only be accomplished in the IP setting? ) :: Stroke workup   I certify that Inpatient services for greater than or equal to 2 midnights are medically necessary:: Yes   Plans for Post-Acute care--if anticipated (pick the single best option):: A. No post acute care anticipated at this time   Special Needs:: No Special Needs [1]

## 2024-04-13 NOTE — ED PROVIDER NOTES
Encounter Date: 2024       History     Chief Complaint   Patient presents with    Numbness     Patient with a hx of hemiplegic migraines ,CAD, HTN, HLD, MI, presents to the ED with left facial numbness and left arm weakness around 1pm today. Reports speech troubles. No aphasia noted. +left arm drift, +left leg weakness. CBG 90 in triage     Patient female presenting with complaint of left facial numbness and some weakness to the left upper and lower extremity starting at around 1:00 a.m. this afternoon.  Patient states she has a history of hemiplegic my green clear affect her left side patient denies headache however.  Patient also states she is having some difficulty speaking.      Review of patient's allergies indicates:  No Known Allergies  Past Medical History:   Diagnosis Date    Acid reflux     Anemia, unspecified     Anxiety     Coronary artery disease     Hyperlipidemia     Hypertension     Migraines     Myocardial infarction     Thrombus     apical    Thyroid disease      Past Surgical History:   Procedure Laterality Date    ANKLE SURGERY Left     ANTERIOR CERVICAL DISCECTOMY W/ FUSION       SECTION  2003     SECTION      CHOLECYSTECTOMY      CORONARY ANGIOPLASTY WITH STENT PLACEMENT      DILATION AND CURETTAGE OF UTERUS      HYSTERECTOMY      LUMBAR PUNCTURE       Family History   Problem Relation Name Age of Onset    Cancer Mother          Pancriatic cancer    Hydrocephalus Mother      No Known Problems Father      No Known Problems Sister      No Known Problems Brother      Hydrocephalus Maternal Grandmother      Parkinsonism Maternal Grandfather       Social History     Tobacco Use    Smoking status: Never     Passive exposure: Never    Smokeless tobacco: Never   Substance Use Topics    Alcohol use: Not Currently    Drug use: Never     Review of Systems   Constitutional: Negative.    HENT: Negative.     Respiratory: Negative.     Cardiovascular: Negative.    Gastrointestinal:  Negative.    Genitourinary: Negative.    Musculoskeletal: Negative.    Neurological:  Positive for weakness and numbness. Negative for dizziness, facial asymmetry, speech difficulty, light-headedness and headaches.   Hematological: Negative.        Physical Exam     Initial Vitals [04/13/24 1510]   BP Pulse Resp Temp SpO2   134/85 (!) 58 20 98 °F (36.7 °C) 98 %      MAP       --         Physical Exam    Nursing note and vitals reviewed.  Constitutional: She appears well-developed and well-nourished.   HENT:   Head: Normocephalic.   Eyes: Pupils are equal, round, and reactive to light.   Neck: Neck supple.   Normal range of motion.  Cardiovascular:  Normal rate, regular rhythm and normal heart sounds.           Pulmonary/Chest: Breath sounds normal.   Abdominal: Abdomen is soft. Bowel sounds are normal.   Musculoskeletal:         General: Normal range of motion.      Cervical back: Normal range of motion and neck supple.     Neurological: She is alert and oriented to person, place, and time.    patient intermittently displays normal strength to the left upper and lower extremity.   There is no aphasia.  There is no facial droop.  Reports decreased sensation to the left face.   Psychiatric: She has a normal mood and affect.         ED Course   Procedures  Labs Reviewed   COMPREHENSIVE METABOLIC PANEL - Abnormal; Notable for the following components:       Result Value    Chloride 109 (*)     Blood Urea Nitrogen 22.8 (*)     Creatinine 1.18 (*)     Aspartate Aminotransferase 53 (*)     All other components within normal limits   PROTIME-INR - Abnormal; Notable for the following components:    PT 12.2 (*)     All other components within normal limits   CBC WITH DIFFERENTIAL - Abnormal; Notable for the following components:    WBC 12.72 (*)     MCH 25.6 (*)     MCHC 31.8 (*)     RDW 18.7 (*)     MPV 12.4 (*)     IPF 11.8 (*)     All other components within normal limits   MANUAL DIFFERENTIAL - Abnormal; Notable for the  following components:    RBC Morph Abnormal (*)     Poikilocytosis 1+ (*)     All other components within normal limits   TSH - Normal   CBC W/ AUTO DIFFERENTIAL    Narrative:     The following orders were created for panel order CBC W/ AUTO DIFFERENTIAL.  Procedure                               Abnormality         Status                     ---------                               -----------         ------                     CBC with Differential[[id number]]       Abnormal            Final result               Manual Differential[[id number]]         Abnormal            Final result                 Please view results for these tests on the individual orders.   POCT GLUCOSE, HAND-HELD DEVICE   POCT GLUCOSE     EKG Readings: (Independently Interpreted)   Initial Reading: No STEMI. Rhythm: Sinus Bradycardia. Heart Rate: 51. Ectopy: No Ectopy. Conduction: Normal. ST Segments: Normal ST Segments. T Waves: Normal. Axis: Normal.       Imaging Results              CT Head Without Contrast (Final result)  Result time 04/13/24 15:30:53      Final result by Roman Reyes MD (04/13/24 15:30:53)                   Impression:      No acute abnormality seen      Electronically signed by: Frank Reyes  Date:    04/13/2024  Time:    15:30               Narrative:    EXAMINATION:  CT HEAD WITHOUT CONTRAST    CLINICAL HISTORY:  Neuro deficit, acute, stroke suspected;    TECHNIQUE:  Multiple axial images were obtained from the base of the brain to the vertex without contrast administration.  Sagittal and coronal reconstructions were performed. .Automatic exposure control  (AEC) is utilized to reduce patient radiation exposure.    COMPARISON:  03/04/2024    FINDINGS:  There is no intracranial mass or lesion seen.  No hemorrhage is seen.  No infarct is seen.  The ventricles and basilar cisterns appear normal.  Brain parenchyma appears grossly unremarkable.    Posterior fossa appears normal.  The calvarium is intact.  The  paranasal sinuses appear grossly unremarkable.                                       Medications   aspirin tablet 325 mg (325 mg Oral Given 4/13/24 1603)     Medical Decision Making  Differential diagnosis: Conversion disorder, CVA, hemiplegic migraine    Amount and/or Complexity of Data Reviewed  Labs: ordered.     Details: Slightly elevated white blood cell count, all labs are stable  Radiology: ordered and independent interpretation performed.  ECG/medicine tests: ordered and independent interpretation performed.  Discussion of management or test interpretation with external provider(s): Patient with reported left-sided facial numbness and left-sided motor weakness.  Patient ambulated at bedside without difficulty.  CT of the head shows no acute changes.  Patient was given aspirin while in the ER.  Vital signs remained stable.  Will admit for further stroke workup    Risk  OTC drugs.  Decision regarding hospitalization.               ED Course as of 04/13/24 1732   Sat Apr 13, 2024   1717 Patient ambulates without difficulty and unassisted. [KG]   1718 Luda NP, OK to admit [KG]      ED Course User Index  [KG] Lex Stanley MD                           Clinical Impression:  Final diagnoses:  [R29.818] Acute focal neurological deficit  [R20.0] Numbness (Primary)  [R53.1] Left-sided weakness          ED Disposition Condition    Admit Stable                Lex Stanley MD  04/13/24 1722       Lex Stanley MD  04/13/24 1732

## 2024-04-14 VITALS
SYSTOLIC BLOOD PRESSURE: 115 MMHG | HEART RATE: 62 BPM | WEIGHT: 186.31 LBS | RESPIRATION RATE: 16 BRPM | BODY MASS INDEX: 31.04 KG/M2 | DIASTOLIC BLOOD PRESSURE: 74 MMHG | TEMPERATURE: 98 F | OXYGEN SATURATION: 95 % | HEIGHT: 65 IN

## 2024-04-14 PROBLEM — G93.2 IIH (IDIOPATHIC INTRACRANIAL HYPERTENSION): Status: ACTIVE | Noted: 2024-04-14

## 2024-04-14 PROBLEM — R20.0 NUMBNESS: Status: RESOLVED | Noted: 2024-04-13 | Resolved: 2024-04-14

## 2024-04-14 LAB
ALBUMIN SERPL-MCNC: 3.4 G/DL (ref 3.5–5)
ALBUMIN/GLOB SERPL: 1.3 RATIO (ref 1.1–2)
ALP SERPL-CCNC: 66 UNIT/L (ref 40–150)
ALT SERPL-CCNC: 45 UNIT/L (ref 0–55)
AST SERPL-CCNC: 37 UNIT/L (ref 5–34)
AV INDEX (PROSTH): 0.68
AV MEAN GRADIENT: 4 MMHG
AV PEAK GRADIENT: 6 MMHG
AV VALVE AREA BY VELOCITY RATIO: 1.96 CM²
AV VALVE AREA: 1.94 CM²
AV VELOCITY RATIO: 0.69
BASOPHILS # BLD AUTO: 0.05 X10(3)/MCL
BASOPHILS NFR BLD AUTO: 0.5 %
BILIRUB SERPL-MCNC: 0.5 MG/DL
BSA FOR ECHO PROCEDURE: 2.03 M2
BUN SERPL-MCNC: 20.7 MG/DL (ref 9.8–20.1)
CALCIUM SERPL-MCNC: 9.3 MG/DL (ref 8.4–10.2)
CHLORIDE SERPL-SCNC: 108 MMOL/L (ref 98–107)
CO2 SERPL-SCNC: 23 MMOL/L (ref 22–29)
CREAT SERPL-MCNC: 0.72 MG/DL (ref 0.55–1.02)
CV ECHO LV RWT: 0.32 CM
DOP CALC AO PEAK VEL: 1.26 M/S
DOP CALC AO VTI: 30.3 CM
DOP CALC LVOT AREA: 2.8 CM2
DOP CALC LVOT DIAMETER: 1.9 CM
DOP CALC LVOT PEAK VEL: 0.87 M/S
DOP CALC LVOT STROKE VOLUME: 58.66 CM3
DOP CALC MV VTI: 37.6 CM
DOP CALCLVOT PEAK VEL VTI: 20.7 CM
E WAVE DECELERATION TIME: 248 MSEC
E/A RATIO: 1.58
E/E' RATIO: 8.78 M/S
ECHO LV POSTERIOR WALL: 0.8 CM (ref 0.6–1.1)
EOSINOPHIL # BLD AUTO: 0.01 X10(3)/MCL (ref 0–0.9)
EOSINOPHIL NFR BLD AUTO: 0.1 %
ERYTHROCYTE [DISTWIDTH] IN BLOOD BY AUTOMATED COUNT: 18.6 % (ref 11.5–17)
FRACTIONAL SHORTENING: 34 % (ref 28–44)
GFR SERPLBLD CREATININE-BSD FMLA CKD-EPI: >60 MLS/MIN/1.73/M2
GLOBULIN SER-MCNC: 2.6 GM/DL (ref 2.4–3.5)
GLUCOSE SERPL-MCNC: 89 MG/DL (ref 74–100)
HCT VFR BLD AUTO: 37.8 % (ref 37–47)
HGB BLD-MCNC: 12.3 G/DL (ref 12–16)
IMM GRANULOCYTES # BLD AUTO: 0.05 X10(3)/MCL (ref 0–0.04)
IMM GRANULOCYTES NFR BLD AUTO: 0.5 %
INTERVENTRICULAR SEPTUM: 0.84 CM (ref 0.6–1.1)
LEFT ATRIUM SIZE: 3.6 CM
LEFT ATRIUM VOLUME INDEX MOD: 29.1 ML/M2
LEFT ATRIUM VOLUME MOD: 55.9 CM3
LEFT INTERNAL DIMENSION IN SYSTOLE: 3.34 CM (ref 2.1–4)
LEFT VENTRICLE DIASTOLIC VOLUME INDEX: 62.5 ML/M2
LEFT VENTRICLE DIASTOLIC VOLUME: 120 ML
LEFT VENTRICLE MASS INDEX: 74 G/M2
LEFT VENTRICLE SYSTOLIC VOLUME INDEX: 23.6 ML/M2
LEFT VENTRICLE SYSTOLIC VOLUME: 45.4 ML
LEFT VENTRICULAR INTERNAL DIMENSION IN DIASTOLE: 5.03 CM (ref 3.5–6)
LEFT VENTRICULAR MASS: 141.61 G
LV LATERAL E/E' RATIO: 7.9 M/S
LV SEPTAL E/E' RATIO: 9.88 M/S
LVOT MG: 2 MMHG
LVOT MV: 0.64 CM/S
LYMPHOCYTES # BLD AUTO: 2.9 X10(3)/MCL (ref 0.6–4.6)
LYMPHOCYTES NFR BLD AUTO: 27.6 %
MAGNESIUM SERPL-MCNC: 2 MG/DL (ref 1.6–2.6)
MCH RBC QN AUTO: 25.9 PG (ref 27–31)
MCHC RBC AUTO-ENTMCNC: 32.5 G/DL (ref 33–36)
MCV RBC AUTO: 79.7 FL (ref 80–94)
MONOCYTES # BLD AUTO: 0.76 X10(3)/MCL (ref 0.1–1.3)
MONOCYTES NFR BLD AUTO: 7.2 %
MV MEAN GRADIENT: 1 MMHG
MV PEAK A VEL: 0.5 M/S
MV PEAK E VEL: 0.79 M/S
MV PEAK GRADIENT: 4 MMHG
MV VALVE AREA BY CONTINUITY EQUATION: 1.56 CM2
NEUTROPHILS # BLD AUTO: 6.74 X10(3)/MCL (ref 2.1–9.2)
NEUTROPHILS NFR BLD AUTO: 64.1 %
NRBC BLD AUTO-RTO: 0 %
OHS LV EJECTION FRACTION SIMPSONS BIPLANE MOD: 54 %
OHS QRS DURATION: 86 MS
OHS QTC CALCULATION: 451 MS
PHOSPHATE SERPL-MCNC: 3.9 MG/DL (ref 2.3–4.7)
PLATELET # BLD AUTO: 243 X10(3)/MCL (ref 130–400)
PMV BLD AUTO: 12.1 FL (ref 7.4–10.4)
POTASSIUM SERPL-SCNC: 4.3 MMOL/L (ref 3.5–5.1)
PROT SERPL-MCNC: 6 GM/DL (ref 6.4–8.3)
RA MAJOR: 4.36 CM
RA WIDTH: 3.33 CM
RBC # BLD AUTO: 4.74 X10(6)/MCL (ref 4.2–5.4)
RIGHT VENTRICULAR END-DIASTOLIC DIMENSION: 3.03 CM
SODIUM SERPL-SCNC: 140 MMOL/L (ref 136–145)
TDI LATERAL: 0.1 M/S
TDI SEPTAL: 0.08 M/S
TDI: 0.09 M/S
TRICUSPID ANNULAR PLANE SYSTOLIC EXCURSION: 2.3 CM
WBC # SPEC AUTO: 10.51 X10(3)/MCL (ref 4.5–11.5)
Z-SCORE OF LEFT VENTRICULAR DIMENSION IN END DIASTOLE: -0.74
Z-SCORE OF LEFT VENTRICULAR DIMENSION IN END SYSTOLE: 0.01

## 2024-04-14 PROCEDURE — 97161 PT EVAL LOW COMPLEX 20 MIN: CPT

## 2024-04-14 PROCEDURE — 25000003 PHARM REV CODE 250: Performed by: NURSE PRACTITIONER

## 2024-04-14 PROCEDURE — 92523 SPEECH SOUND LANG COMPREHEN: CPT

## 2024-04-14 PROCEDURE — 85025 COMPLETE CBC W/AUTO DIFF WBC: CPT | Performed by: NURSE PRACTITIONER

## 2024-04-14 PROCEDURE — 83735 ASSAY OF MAGNESIUM: CPT | Performed by: NURSE PRACTITIONER

## 2024-04-14 PROCEDURE — 84100 ASSAY OF PHOSPHORUS: CPT | Performed by: NURSE PRACTITIONER

## 2024-04-14 PROCEDURE — 80053 COMPREHEN METABOLIC PANEL: CPT | Performed by: NURSE PRACTITIONER

## 2024-04-14 PROCEDURE — 63600175 PHARM REV CODE 636 W HCPCS: Performed by: NURSE PRACTITIONER

## 2024-04-14 PROCEDURE — 99223 1ST HOSP IP/OBS HIGH 75: CPT | Mod: ,,, | Performed by: PSYCHIATRY & NEUROLOGY

## 2024-04-14 PROCEDURE — 97166 OT EVAL MOD COMPLEX 45 MIN: CPT

## 2024-04-14 RX ORDER — HYDRALAZINE HYDROCHLORIDE 20 MG/ML
10 INJECTION INTRAMUSCULAR; INTRAVENOUS EVERY 4 HOURS PRN
Status: DISCONTINUED | OUTPATIENT
Start: 2024-04-14 | End: 2024-04-14 | Stop reason: HOSPADM

## 2024-04-14 RX ORDER — SODIUM CHLORIDE, SODIUM LACTATE, POTASSIUM CHLORIDE, CALCIUM CHLORIDE 600; 310; 30; 20 MG/100ML; MG/100ML; MG/100ML; MG/100ML
INJECTION, SOLUTION INTRAVENOUS CONTINUOUS
Status: DISCONTINUED | OUTPATIENT
Start: 2024-04-14 | End: 2024-04-14

## 2024-04-14 RX ADMIN — FLUOXETINE HYDROCHLORIDE 40 MG: 20 CAPSULE ORAL at 09:04

## 2024-04-14 RX ADMIN — GABAPENTIN 600 MG: 300 CAPSULE ORAL at 09:04

## 2024-04-14 RX ADMIN — PANTOPRAZOLE SODIUM 40 MG: 40 TABLET, DELAYED RELEASE ORAL at 09:04

## 2024-04-14 RX ADMIN — OXYBUTYNIN CHLORIDE 10 MG: 5 TABLET, EXTENDED RELEASE ORAL at 09:04

## 2024-04-14 RX ADMIN — SODIUM CHLORIDE, POTASSIUM CHLORIDE, SODIUM LACTATE AND CALCIUM CHLORIDE: 600; 310; 30; 20 INJECTION, SOLUTION INTRAVENOUS at 01:04

## 2024-04-14 RX ADMIN — TOPIRAMATE 50 MG: 25 TABLET, FILM COATED ORAL at 09:04

## 2024-04-14 RX ADMIN — ATORVASTATIN CALCIUM 80 MG: 40 TABLET, FILM COATED ORAL at 09:04

## 2024-04-14 RX ADMIN — ASPIRIN 81 MG: 81 TABLET, COATED ORAL at 09:04

## 2024-04-14 NOTE — DISCHARGE SUMMARY
[facility] Neu[facility] Hospital Medicine  Discharge Summary      Patient Name: Yandy Chaudhry  MRN: [id number]  [facility]: [id number]  Patient Class: IP- Inpatient  Admission Date: 4/13/2024  Hospital Length of Stay: 1 days  Discharge Date and Time:  04/14/2024 1:07 PM  Attending Physician: Pb Booker MD   Discharging Provider: Christopher Martínez MD  Primary Care Provider: Fabian Phelps MD    Primary Care Team: Networked reference to record PCT     Ms. Huffman is a 54 year old female with a pmh of CAD/stent, HTN, HLD, Hypothyroidism, Migraine, anxiety who presented to the ED with c/o left facial numbness and left arm weakness starting around 1 p.m. today.  She states that she started having hemiplegic migraines back in September of last year, but something about today's migraine scared her, so she decided to come in.      ED vitals on arrival:  Temp 98° F, pulse 58, resp 20, /85, SpO2 98% on RA.  Today's ED lab work revealed WBC 12.72, BUN/CR 22.8/1.18, CT head without contrast showed no acute abnormality seen.  She was admitted to Hospital Medicine for further workup and management.     MRI brain showed no acute abnormalities. Bubble study was negative. Presenting symptoms have improved since admission. Neuro recommended no new meds and will schedule an outpatient DSA. She will be discharged to home today.      Ms. Huffman is a 54 year old female with a pmh of CAD/stent, HTN, HLD, Hypothyroidism, Migraine, anxiety who presented to the ED with c/o left facial numbness and left arm weakness starting around 1 p.m. today.  She states that she started having hemiplegic migraines back in September of last year, but something about today's migraine scared her, so she decided to come in.      ED vitals on arrival:  Temp 98° F, pulse 58, resp 20, /85, SpO2 98% on RA.  Today's ED lab work revealed WBC 12.72, BUN/CR 22.8/1.18, CT head without contrast showed no acute  abnormality seen.  She was admitted to Hospital Medicine for further workup and management.     MRI brain showed no acute abnormalities      Goals of Care Treatment Preferences:  Code Status: Full Code      Consults:   Consults (From admission, onward)          Status Ordering Provider     IP consult to case management/social work  Once        Provider:  (Not yet assigned)    Acknowledged MADISYN TIPTON     Inpatient consult to Vascular (Stroke) Neurology  Once        Provider:  Henri Sanders MD    Completed MADISYN TIPTON            No new Assessment & Plan notes have been filed under this hospital service since the last note was generated.  Service: Hospital Medicine    Final Active Diagnoses:      Problems Resolved During this Admission:    Diagnosis Date Noted Date Resolved POA    PRINCIPAL PROBLEM:  Numbness [R20.0] 04/13/2024 04/14/2024 Yes       Discharged Condition: good    Disposition: Home or Self Care    Follow Up:   Follow-up Information       Fabian Phelps MD Follow up in 1 week(s).    Specialty: Internal Medicine  Contact information:  [address] [place], [place] [id number] [phone]                           Patient Instructions:   No discharge procedures on file.    Significant Diagnostic Studies: Labs: CMP   Recent Labs   Lab 04/13/24  1553 04/14/24  0602    140   K 3.9 4.3   CO2 23 23   BUN 22.8* 20.7*   CREATININE 1.18* 0.72   CALCIUM 9.2 9.3   ALBUMIN 3.6 3.4*   BILITOT 0.4 0.5   ALKPHOS 68 66   AST 53* 37*   ALT 50 45       Pending Diagnostic Studies:       None           Medications:  Reconciled Home Medications:      Medication List        CONTINUE taking these medications      aspirin 81 MG EC tablet  Commonly known as: ECOTRIN  Take 81 mg by mouth once daily.     butalbital-acetaminophen-caffeine -40 mg -40 mg per tablet  Commonly known as: FIORICET, ESGIC  Take 1 tablet by mouth every 6 (six) hours as needed for Headaches.     cetirizine 10 MG  tablet  Commonly known as: ZYRTEC  Take 1 tablet (10 mg total) by mouth daily as needed for Allergies.     FLUoxetine 40 MG capsule  Take 40 mg by mouth 2 (two) times daily.     fluticasone propionate 50 mcg/actuation nasal spray  Commonly known as: FLONASE  1 spray by Each Nostril route 2 (two) times daily. Takes as needed     gabapentin 600 MG tablet  Commonly known as: NEURONTIN  Take 1 tablet (600 mg total) by mouth 3 (three) times daily.     HUMIRA(CF) PEN 40 mg/0.4 mL Pnkt  Generic drug: adalimumab  Inject 40 mg into the skin every 14 (fourteen) days.     ketoconazole 2 % cream  Commonly known as: NIZORAL  Apply 1 application  topically 2 (two) times daily.     ketorolac 10 mg tablet  Commonly known as: TORADOL  Take 1 tablet (10 mg total) by mouth every 6 (six) hours as needed (migraine). To be taken with phenergan (12.5 mg) and benadryl (25 mg)     levothyroxine 75 MCG tablet  Commonly known as: SYNTHROID  Take 75 mcg by mouth before breakfast.     methocarbamoL 500 MG Tab  Commonly known as: ROBAXIN  Take 500 mg by mouth as needed.     metoprolol succinate 25 MG 24 hr tablet  Commonly known as: TOPROL-XL  Take 25 mg by mouth 2 (two) times daily.     nitroGLYCERIN 0.4 MG SL tablet  Commonly known as: NITROSTAT  PLEASE SEE ATTACHED FOR DETAILED DIRECTIONS     oxybutynin 10 MG 24 hr tablet  Commonly known as: DITROPAN-XL  Take 10 mg by mouth once daily. As needed     pantoprazole 40 MG tablet  Commonly known as: PROTONIX  Take 40 mg by mouth once daily.     predniSONE 10 MG tablet  Commonly known as: DELTASONE  Day 1: Prednisone 10 mg take 6 tablets Day 2: Prednisone 10 mg take 5 tablets Day 3: Prednisone 10 mg take 4 tablets Day 4: Prednisone 10 mg take 3 tablets Day 5: Prednisone 10 mg take 2 tablets Day 6: Prednisone 10 mg take 1 tablet, then discontinue     promethazine 12.5 MG Tab  Commonly known as: PHENERGAN  Take 1 tablet (12.5 mg total) by mouth every 6 (six) hours as needed (migraine). To be taken  with toradol (10 mg) and benadryl (25 mg) for migraine cocktail     REPATHA SURECLICK 140 mg/mL Pnij  Generic drug: evolocumab  SMARTSIG:pre-filled pen syringe SUB-Q Every 2 Weeks     rimegepant 75 mg odt  Take 1 tablet (75 mg total) by mouth every other day. Place ODT tablet on the tongue; alternatively the ODT tablet may be placed under the tongue     rizatriptan 10 MG tablet  Commonly known as: MAXALT  Take 1 tablet (10 mg total) by mouth as needed for Migraine (may repeat dose in two hours if needed).     rOPINIRole 0.5 MG tablet  Commonly known as: REQUIP  Take 0.5 mg by mouth every evening.     rosuvastatin 40 MG Tab  Commonly known as: CRESTOR  Take 40 mg by mouth once daily.     tiZANidine 4 MG tablet  Commonly known as: ZANAFLEX  Take 4 mg by mouth every 8 (eight) hours as needed.            STOP taking these medications      topiramate 50 MG tablet  Commonly known as: TOPAMAX              Indwelling Lines/Drains at time of discharge:   Lines/Drains/Airways       None                   Time spent on the discharge of patient: 35 minutes         Christopher Martínez MD  Department of Hospital Medicine  [facility] - Neurology

## 2024-04-14 NOTE — CONSULTS
[facility] - Neurology  Neurology  Consult Note    Patient Name: Yandy Chaudhry  MRN: [id number]  Admission Date: 4/13/2024  Hospital Length of Stay: 1 days  Code Status: Full Code   Attending Provider: Pb Booker MD   Consulting Provider: Rosanne Urbano NP  Primary Care Physician: Fabian Phelps MD  Principal Problem:Numbness    Inpatient consult to Vascular (Stroke) Neurology  Consult performed by: Rosanne Urbano NP  Consult ordered by: Laura Reynolds, AGACNP-BC         Subjective:     Chief Complaint:  left sided numbness      HPI:   54-year-old female with a past medical history of CAD s/p stent, HTN, HLD, migraine, hypothyroidism, and anxiety he presented to ED on 4/13 for left arm weakness and left facial numbness.  She reported she had a normal day yesterday, ate lunch, she was folding clothes when she said only had left facial numbness that she described as very intense.  She then had left hand weakness and was concerned so to ED for evaluation.  She also reported this past week she had a severe headache with little relief, she called Dr. Henao's office and she was prescribed steroids that she started on Wednesday.  She reported she felt like they were helping prior to yesterday.  Upon arrival to ED, /85.  CT head was negative for acute intracranial abnormalities.  Neurology was consulted for stroke workup.    Of note, she was seen in the fall for a similar this in had a hemiplegic migraine.  She sees neurology outpatient and has been prescribed Nurtec which has given her some relief, but she has had difficulty with insurance approval for Nurtec. She reports she has been unable to work since the September, she reports decreased mental capacity since the fall.      Past Medical History:   Diagnosis Date    Acid reflux     Anemia, unspecified     Anxiety     Coronary artery disease     Hyperlipidemia     Hypertension     Migraines     Myocardial  infarction     Thrombus     apical    Thyroid disease        Past Surgical History:   Procedure Laterality Date    ANKLE SURGERY Left     ANTERIOR CERVICAL DISCECTOMY W/ FUSION       SECTION  2003     SECTION      CHOLECYSTECTOMY      CORONARY ANGIOPLASTY WITH STENT PLACEMENT      DILATION AND CURETTAGE OF UTERUS      HYSTERECTOMY      LUMBAR PUNCTURE         Review of patient's allergies indicates:  No Known Allergies    Current Neurological Medications:     Current Facility-Administered Medications   Medication Dose Route Frequency Provider Last Rate Last Admin    acetaminophen tablet 650 mg  650 mg Oral Q6H PRN Laura Reynolds AGACNP-BC        aspirin EC tablet 81 mg  81 mg Oral Daily Laura Reynolds AGACNP-BC        atorvastatin tablet 80 mg  80 mg Oral Daily Laura Reynolds AGACNP-BC        bisacodyL suppository 10 mg  10 mg Rectal Daily PRN Laura Reynolds AGACNP-BC        butalbital-acetaminophen-caffeine -40 mg per tablet 1 tablet  1 tablet Oral Q6H PRN Laura Reynolds AGACNP-BC        FLUoxetine capsule 40 mg  40 mg Oral BID Laura Reynolds AGACNP-BC        gabapentin capsule 600 mg  600 mg Oral Daily Laura Reynolds AGACNP-BC        hydrALAZINE injection 10 mg  10 mg Intravenous Q4H PRN Laura Reynolds AGACNP-BC        labetaloL injection 10 mg  10 mg Intravenous Q2H PRN Laura Reynolsd AGACNP-BC        lactated ringers infusion   Intravenous Continuous Laura Reynolds AGACNP- mL/hr at 24 0105 New Bag at 24 0105    levothyroxine tablet 75 mcg  75 mcg Oral Before breakfast Laura Reynolds AGACNP-BC        metoprolol succinate (TOPROL-XL) 24 hr tablet 25 mg  25 mg Oral BID Laura Reynolds AGACNP-BC        ondansetron injection 4 mg  4 mg Intravenous Q4H PRN Laura Reynolds AGACNP-BC        oxybutynin 24 hr tablet 10 mg  10 mg Oral Daily Laura Reynolds AGACNP-BC        pantoprazole EC tablet 40 mg  40 mg Oral Daily Laura Reynolds,  AGACNP-BC        rOPINIRole tablet 0.5 mg  0.5 mg Oral QHS Laura Reynolds AGACNP-BC        sodium chloride 0.9% flush 10 mL  10 mL Intravenous Q8H PRN Laura Reynolds AGACNP-BC        tiZANidine tablet 4 mg  4 mg Oral Q8H PRN Laura Reynolds AGACNP-BC        topiramate tablet 50 mg  50 mg Oral BID Laura Reynolds AGACNP-BC         Family History       Problem Relation (Age of Onset)    Cancer Mother    Hydrocephalus Mother, Maternal Grandmother    No Known Problems Father, Sister, Brother    Parkinsonism Maternal Grandfather          Tobacco Use    Smoking status: Never     Passive exposure: Never    Smokeless tobacco: Never   Substance and Sexual Activity    Alcohol use: Not Currently    Drug use: Never    Sexual activity: Not on file     Review of Systems   Neurological:  Positive for numbness (left facial numbness and LUE numbness). Negative for dizziness, tremors, seizures, syncope, facial asymmetry, speech difficulty, weakness, light-headedness and headaches.   All other systems reviewed and are negative.    Objective:     Vital Signs (Most Recent):  Temp: 98.2 °F (36.8 °C) (04/14/24 0036)  Pulse: (!) 52 (04/14/24 0442)  Resp: 20 (04/14/24 0442)  BP: 135/72 (04/14/24 0442)  SpO2: 95 % (04/14/24 0442) Vital Signs (24h Range):  Temp:  [98 °F (36.7 °C)-98.2 °F (36.8 °C)] 98.2 °F (36.8 °C)  Pulse:  [51-58] 52  Resp:  [13-22] 20  SpO2:  [94 %-98 %] 95 %  BP: (134-179)/(72-96) 135/72     Weight: 84.5 kg (186 lb 4.8 oz)  Body mass index is 31 kg/m².     Physical Exam  Vitals and nursing note reviewed.   Constitutional:       General: She is not in acute distress.     Appearance: Normal appearance. She is not ill-appearing or toxic-appearing.   HENT:      Head: Normocephalic.   Eyes:      General: No visual field deficit.     Extraocular Movements:      Right eye: Normal extraocular motion and no nystagmus.      Left eye: Normal extraocular motion and no nystagmus.      Pupils: Pupils are equal, round, and  reactive to light.   Cardiovascular:      Rate and Rhythm: Normal rate.   Pulmonary:      Effort: Pulmonary effort is normal.      Breath sounds: Normal breath sounds.   Musculoskeletal:         General: No swelling. Normal range of motion.      Cervical back: Normal range of motion.      Right lower leg: No edema.      Left lower leg: No edema.   Skin:     General: Skin is warm and dry.      Capillary Refill: Capillary refill takes less than 2 seconds.   Neurological:      General: No focal deficit present.      Mental Status: She is alert and oriented to person, place, and time.      Cranial Nerves: No dysarthria or facial asymmetry.      Sensory: Sensory deficit (decreased sensation to left face and LUE) present.      Motor: Motor function is intact. No weakness, tremor, atrophy, abnormal muscle tone, seizure activity or pronator drift.      Coordination: Coordination normal. Finger-Nose-Finger Test normal.   Psychiatric:         Attention and Perception: Attention normal.         Mood and Affect: Affect normal.         Speech: Speech normal.         Behavior: Behavior normal.          NEUROLOGICAL EXAMINATION:     MENTAL STATUS   Oriented to person, place, and time.   Speech: speech is normal     CRANIAL NERVES     CN III, IV, VI   Pupils are equal, round, and reactive to light.    GAIT AND COORDINATION      Coordination   Finger to nose coordination: normal      Significant Labs:   Recent Lab Results  (Last 5 results in the past 24 hours)        04/14/24  0602   04/13/24  2104   04/13/24  1719   04/13/24  1554   04/13/24  1553        Immature Platelet Fraction         11.8       Albumin/Globulin Ratio 1.3         1.2       Albumin 3.4         3.6       ALP 66         68       ALT 45         50       AST 37         53       Baso # 0.05         0.1272       Basophil % 0.5         1       BILIRUBIN TOTAL 0.5         0.4       BUN 20.7         22.8       Calcium 9.3         9.2       Chloride 108         109        Cholesterol Total   148             CO2 23         23       Creatinine 0.72         1.18       High Sens CRP   0.43             eGFR >60         54       Eos # 0.01               Eos % 0.1               Globulin, Total 2.6         2.9       Glucose 89         100       Gran # (ANC)         7.3776       HDL   88             Hematocrit 37.8         40.2       Hemoglobin 12.3         12.8       Immature Grans (Abs) 0.05               Immature Granulocytes 0.5               INR       0.9         LDL Cholesterol   51.00             Lymph # 2.90         4.452       LYMPH % 27.6               Lymphocyte %         35       Magnesium  2.00               MCH 25.9         25.6       MCHC 32.5         31.8       MCV 79.7         80.4       Mono # 0.76         0.636       Mono % 7.2         5       MPV 12.1         12.4       Neut # 6.74               Neut % 64.1               Neutrophils Relative         58       nRBC 0.0         0.0       QRS Duration     86           [facility] QTC Calculation     451           Phosphorus Level 3.9               Platelet Estimate         Normal       Platelet Count 243         251       Poikilocytosis         1+       Potassium 4.3         3.9       PROTEIN TOTAL 6.0         6.5       PT       12.2         RBC 4.74         5.00       RBC Morph         Abnormal       RDW 18.6         18.7       Sed Rate   11             Sodium 140         141       Total Cholesterol/HDL Ratio   2             Triglycerides   44             TSH         1.230       Very Low Density Lipoprotein   9             WBC 10.51         12.72                12.72                              Significant Imaging: I have reviewed all pertinent imaging results/findings within the past 24 hours.  Assessment and Plan:     * Numbness  -presented with LUE numbness and left facial numbness, left hand weakness  -Stroke RF: HTN, HLD      Stroke workup   -CT head: negative  -CTA head and neck: negative  -MRI brain: negative for acute  infarct  -LDL: 51      Plan  -MRI brain negative  -therapy evals  -ok to normalize BP  -awaiting ECHO      1220   Rounded with MD  Recommend DSA as an outpatient for IIP   Dr. Sanders's clinic to schedule  Ok to d/c home       VTE Risk Mitigation (From admission, onward)           Ordered     Reason for No Pharmacological VTE Prophylaxis  Once        Comments: Defer to neurology   Question:  Reasons:  Answer:  Physician Provided (leave comment)    04/13/24 2035     IP VTE HIGH RISK PATIENT  Once         04/13/24 2035     Place sequential compression device  Until discontinued         04/13/24 2035                    Thank you for your consult.  Further recommendations to follow from MD Rosanne Urbano, NP  Neurology  [facility] - Neurology

## 2024-04-14 NOTE — PROGRESS NOTES
[facility]  Hospital Medicine Progress Note        Chief Complaint: Inpatient Follow-up for hemiplegic migraine    HPI:   Ms. Huffman is a 54 year old female with a pmh of CAD/stent, HTN, HLD, Hypothyroidism, Migraine, anxiety who presented to the ED with c/o left facial numbness and left arm weakness starting around 1 p.m. today.  She states that she started having hemiplegic migraines back in September of last year, but something about today's migraine scared her, so she decided to come in.      ED vitals on arrival:  Temp 98° F, pulse 58, resp 20, /85, SpO2 98% on RA.  Today's ED lab work revealed WBC 12.72, BUN/CR 22.8/1.18, CT head without contrast showed no acute abnormality seen.  She was admitted to Hospital Medicine for further workup and management.    MRI brain showed no acute abnormalities      Interval Hx:   No acute events overnight.  Reports feeling better.  Presenting symptoms improving.  Blood pressure elevated overnight but this morning looking better.  Borderline asymptomatic bradycardia noted.  Doing well on room air.  She was alert, comfortably resting.  Ambulating well with in the room.  Denies any new neurological symptoms.  No family members at bedside.  At baseline she is independent with ADLs and IADLs.      Labs this morning showed improving white count, stable hemoglobin and platelets, no major electrolyte abnormalities.  CTA was unremarkable.  TTE and carotid Doppler pending    Objective/physical exam:  Vitals:    04/13/24 2200 04/14/24 0036 04/14/24 0052 04/14/24 0442   BP: (!) 179/96 (!) 150/85  135/72   BP Location:  Right arm     Patient Position:  Lying     Pulse: (!) 54 (!) 52  (!) 52   Resp: (!) 22 20 20 20   Temp:  98.2 °F (36.8 °C)     TempSrc:  Oral     SpO2: (!) 94% 98%  95%   Weight:   84.5 kg (186 lb 4.8 oz)    Height:         General: In no acute distress, afebrile  Respiratory: Clear to auscultation bilaterally  Cardiovascular: S1,  S2, no appreciable murmur  Abdomen: Soft, nontender, BS +  MSK: Warm, no lower extremity edema, no clubbing or cyanosis  Neurologic: Alert and oriented x4, moving all extremities with good strength     Lab Results   Component Value Date     04/14/2024    K 4.3 04/14/2024     02/15/2024    CO2 23 04/14/2024    BUN 20.7 (H) 04/14/2024    CREATININE 0.72 04/14/2024    CALCIUM 9.3 04/14/2024    ANIONGAP 8 02/15/2024    ESTGFRAFRICA 87 02/15/2024    EGFRNONAA >60 08/01/2022      Lab Results   Component Value Date    ALT 45 04/14/2024    AST 37 (H) 04/14/2024    ALKPHOS 66 04/14/2024    BILITOT 0.5 04/14/2024      Lab Results   Component Value Date    WBC 10.51 04/14/2024    HGB 12.3 04/14/2024    HCT 37.8 04/14/2024    MCV 79.7 (L) 04/14/2024     04/14/2024           Medications:  Current Facility-Administered Medications   Medication Dose Route Frequency Provider Last Rate Last Admin    acetaminophen tablet 650 mg  650 mg Oral Q6H PRN Laura Reynolds AGACNP-BC        aspirin EC tablet 81 mg  81 mg Oral Daily Laura Reynolds AGACNP-BC   81 mg at 04/14/24 0950    atorvastatin tablet 80 mg  80 mg Oral Daily Laura Reynolds AGACNP-BC   80 mg at 04/14/24 0950    bisacodyL suppository 10 mg  10 mg Rectal Daily PRN Laura Reynolds AGACNP-BC        butalbital-acetaminophen-caffeine -40 mg per tablet 1 tablet  1 tablet Oral Q6H PRN Laura Reynolds AGACNP-BC        FLUoxetine capsule 40 mg  40 mg Oral BID Laura Reynolds AGACNP-BC   40 mg at 04/14/24 0950    gabapentin capsule 600 mg  600 mg Oral Daily Laura Reynolds AGACNP-BC   600 mg at 04/14/24 0949    hydrALAZINE injection 10 mg  10 mg Intravenous Q4H PRN Rodolfo, Laura G, AGACNP-BC        labetaloL injection 10 mg  10 mg Intravenous Q2H PRN Laura Reynolds, AGACNP-BC        lactated ringers infusion   Intravenous Continuous Laura Reynolds, AGACNP- mL/hr at 04/14/24 0105 New Bag at 04/14/24 0105    levothyroxine tablet 75 mcg   75 mcg Oral Before breakfast Laura Reynolds AGACNP-BC        metoprolol succinate (TOPROL-XL) 24 hr tablet 25 mg  25 mg Oral BID Laura Reynolds AGACNP-BC        ondansetron injection 4 mg  4 mg Intravenous Q4H PRN Laura Reynolds AGACNP-BC        oxybutynin 24 hr tablet 10 mg  10 mg Oral Daily Laura Reynolds AGACNP-BC   10 mg at 04/14/24 0950    pantoprazole EC tablet 40 mg  40 mg Oral Daily Laura Reynolds AGACNP-BC   40 mg at 04/14/24 0950    rOPINIRole tablet 0.5 mg  0.5 mg Oral QHS Laura Reynolds AGACNP-BC        sodium chloride 0.9% flush 10 mL  10 mL Intravenous Q8H PRN Laura Reynolds AGACNP-BC        tiZANidine tablet 4 mg  4 mg Oral Q8H PRN Laura Reynolds AGACNP-BC          Current Facility-Administered Medications   Medication Dose Route Frequency Provider Last Rate Last Admin    acetaminophen tablet 650 mg  650 mg Oral Q6H PRN Laura Reynolds AGACNP-BC        aspirin EC tablet 81 mg  81 mg Oral Daily Laura Reynolds AGACNP-BC   81 mg at 04/14/24 0950    atorvastatin tablet 80 mg  80 mg Oral Daily Laura Reynolds AGACNP-BC   80 mg at 04/14/24 0950    bisacodyL suppository 10 mg  10 mg Rectal Daily PRN Laura Reynolds AGACNP-BC        butalbital-acetaminophen-caffeine -40 mg per tablet 1 tablet  1 tablet Oral Q6H PRN Laura Reynolds AGACNP-BC        FLUoxetine capsule 40 mg  40 mg Oral BID Laura Reynolds AGACNP-BC   40 mg at 04/14/24 0950    gabapentin capsule 600 mg  600 mg Oral Daily Laura Reynolds AGACNP-BC   600 mg at 04/14/24 0949    hydrALAZINE injection 10 mg  10 mg Intravenous Q4H PRN Laura Reynolds AGACNP-BC        labetaloL injection 10 mg  10 mg Intravenous Q2H PRN Laura Reynolds AGACNP-BC        lactated ringers infusion   Intravenous Continuous Laura Reynolds AGACNP- mL/hr at 04/14/24 0105 New Bag at 04/14/24 0105    levothyroxine tablet 75 mcg  75 mcg Oral Before breakfast Laura Reynolds AGACNP-BC        metoprolol succinate  (TOPROL-XL) 24 hr tablet 25 mg  25 mg Oral BID Laura Reynolds AGACNP-BC        ondansetron injection 4 mg  4 mg Intravenous Q4H PRN Laura Reynolds AGACNP-BC        oxybutynin 24 hr tablet 10 mg  10 mg Oral Daily Laura Reynolds AGACNP-BC   10 mg at 04/14/24 0950    pantoprazole EC tablet 40 mg  40 mg Oral Daily Laura Reynolds AGACNP-BC   40 mg at 04/14/24 0950    rOPINIRole tablet 0.5 mg  0.5 mg Oral QHS Laura Reynolds AGACNP-BC        sodium chloride 0.9% flush 10 mL  10 mL Intravenous Q8H PRN Laura Reynolds AGACNP-BC        tiZANidine tablet 4 mg  4 mg Oral Q8H PRN Laura Ryenolds AGACNP-BC            Assessment/Plan:    Left-sided weakness associated with numbness-possible hemiplegic migraine  Mild DEEJAY at admission-improving  Reactive leukocytosis-resolved   Chronic migraine    HX: Obesity, CAD s/p PCI, HTN, HLD, hypothyroidism, anxiety     Plan:  -stroke workup reviewed.  Pending TTE and carotid Doppler.  Await further recommendations from Neurology   -encourage p.o. intake.  DC IV hydration  -mobilize as tolerated   -home medications were reviewed and renewed    SCDs      Christopher Martínez MD

## 2024-04-14 NOTE — NURSING
Dr. Chen and Nocturnal Hospitalist notified of pt HR SB 47-50 (Per the tele monitor tech)  via secure chat

## 2024-04-14 NOTE — HPI
54-year-old female with a past medical history of CAD s/p stent, HTN, HLD, migraine, hypothyroidism, and anxiety he presented to ED on 4/13 for left arm weakness and left facial numbness.  She reported she had a normal day yesterday, ate lunch, she was folding clothes when she said only had left facial numbness that she described as very intense.  She then had left hand weakness and was concerned so to ED for evaluation.  She also reported this past week she had a severe headache with little relief, she called Dr. Henao's office and she was prescribed steroids that she started on Wednesday.  She reported she felt like they were helping prior to yesterday.  Upon arrival to ED, /85.  CT head was negative for acute intracranial abnormalities.  Neurology was consulted for stroke workup.    Of note, she was seen in the fall for a similar this in had a hemiplegic migraine.  She sees neurology outpatient and has been prescribed Nurtec which has given her some relief, but she has had difficulty with insurance approval for Nurtec. She reports she has been unable to work since the September, she reports decreased mental capacity since the fall.

## 2024-04-14 NOTE — ASSESSMENT & PLAN NOTE
-presented with LUE numbness and left facial numbness, left hand weakness  -Stroke RF: HTN, HLD      Stroke workup   -CT head: negative  -CTA head and neck: negative  -MRI brain: negative for acute infarct  -LDL: 51      Plan  -MRI brain negative  -therapy evals  -ok to normalize BP

## 2024-04-14 NOTE — PT/OT/SLP EVAL
Occupational Therapy   Evaluation and Discharge Note    Name: Yandy Chaudhry  MRN: [id number]  Admitting Diagnosis: numbness  Recent Surgery: * No surgery found *      Recommendations:     Discharge therapy intensity: No Therapy Indicated   Discharge Equipment Recommendations: none  Barriers to discharge:  None    Assessment:     Yandy Chaudhry is a 54 y.o. female with a medical diagnosis of numbness. On eval, patient presents with independence in ADLS. Skilled OT services are not warranted at this time.    Plan:     OT to sign off as acute OT services are not warranted at this time.  Please re-consult if situation changes during this hospitalization.    Plan of Care Reviewed with: patient    Subjective     Chief Complaint: none  Patient/Family Comments/goals: to go home    Occupational Profile:  Living Environment: lives in [facility] with no steps  Previous level of function: independent  Equipment Used at Home: none  Assistance upon Discharge: family    Pain/Comfort:  Pain Rating 1: 0/10    Patients cultural, spiritual, Taoism conflicts given the current situation: no    Objective:     OT communicated with RN prior to session.      Patient was found HOB elevated with peripheral IV upon OT entry to room.    General Precautions: Standard, fall  Orthopedic Precautions: N/A  Braces: N/A    Bed Mobility:    Patient completed Rolling/Turning to Left with  independence  Patient completed Supine to Sit with independence    Functional Mobility/Transfers:  Patient completed Sit <> Stand Transfer with independence  with  no assistive device   Patient completed Bed <> Chair Transfer using Step Transfer technique with independence with no assistive device  Patient completed Toilet Transfer Step Transfer technique with independence with  no AD  Functional Mobility: walked around bed, no AD, no LOB    Activities of Daily Living:  Feeding:  independence    Grooming: independence    Upper Body Dressing:  independence    Lower Body Dressing: independence    Toileting: independence      AMPA 6 Click ADL:  AMPAC Total Score: 24    Functional Cognition:  Intact    Visual Perceptual Skills:  Intact    Upper Extremity Function:  Right Upper Extremity:   WFL    Left Upper Extremity:  WFL    Balance:   Intact    Therapeutic Positioning  Risk for acquired pressure injuries is decreased due to ability to mobilize independently .      Patient Education:  Patient provided with verbal education education regarding fall prevention and safety awareness.  Understanding was verbalized.     Patient left sitting edge of bed with all lines intact, call button in reach, and family present.    History:     Past Medical History:   Diagnosis Date    Acid reflux     Anemia, unspecified     Anxiety     Coronary artery disease     Hyperlipidemia     Hypertension     Migraines     Myocardial infarction     Thrombus     apical    Thyroid disease          Past Surgical History:   Procedure Laterality Date    ANKLE SURGERY Left     ANTERIOR CERVICAL DISCECTOMY W/ FUSION       SECTION  2003     SECTION  2005    CHOLECYSTECTOMY      CORONARY ANGIOPLASTY WITH STENT PLACEMENT      DILATION AND CURETTAGE OF UTERUS      HYSTERECTOMY      LUMBAR PUNCTURE         Time Tracking:     OT Date of Treatment: 24  OT Start Time: 1145  OT Stop Time: 1155  OT Total Time (min): 10 min    Billable Minutes:Evaluation 10    2024

## 2024-04-14 NOTE — PT/OT/SLP EVAL
Physical Therapy Evaluation and Discharge Note    Patient Name:  Yandy Chaudhry   MRN:  [id number]    Recommendations:     Discharge therapy intensity: No Therapy Indicated   Discharge Equipment Recommendations: none   Barriers to discharge: None    Assessment:     Yandy Chaudhry is a 54 y.o. 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 CVA workup negative and pt's symptoms resolved by time of evaluation.  At this time, patient is functioning at their prior level of function and does not require further acute PT services.     Recent Surgery: * No surgery found *      Plan:     During this hospitalization, patient does not require further acute PT services.  Please re-consult if situation changes.      Subjective     Chief Complaint: none  Patient/Family Comments/goals: return home  Pain/Comfort:  Pain Rating 1: 0/10  Pain Rating Post-Intervention 1: 0/10    Patients cultural, spiritual, Orthodox conflicts given the current situation: no    Living Environment:  Pt lives with spouse in single level home, no steps to enter.   Prior to admission, patients level of function was Independent.  Equipment used at home: none.  DME owned (not currently used): none.  Upon discharge, patient will have assistance from spouse.    Objective:     Communicated with nurse prior to session.  Patient found HOB elevated with telemetry, peripheral IV upon PT entry to room.    General Precautions: Standard,      Orthopedic Precautions:N/A   Braces: N/A  Respiratory Status: Room air  Blood Pressure: 137/74    Exams:  Cognitive Exam:  Patient is oriented to Person, Place, Time, and Situation  Gross Motor Coordination:  WFL  RLE ROM: WFL  RLE Strength: WFL  LLE ROM: WFL  LLE Strength: WFL    Functional Mobility:  Bed Mobility:     Supine to Sit: independence  Transfers:     Sit to Stand:   independence with no AD  Gait: 200ft without AD. Independent with steady kenzie, step through gait pattern  Balance: Good    AM-PAC 6 CLICK MOBILITY  Total Score:24     Patient provided with verbal education and demonstrations education regarding PT role/goals/POC and safety awareness.  Understanding was verbalized.     Patient left sitting edge of bed with all lines intact and call button in reach.    GOALS:   Multidisciplinary Problems       Physical Therapy Goals       Not on file                    History:     Past Medical History:   Diagnosis Date    Acid reflux     Anemia, unspecified     Anxiety     Coronary artery disease     Hyperlipidemia     Hypertension     Migraines     Myocardial infarction     Thrombus     apical    Thyroid disease        Past Surgical History:   Procedure Laterality Date    ANKLE SURGERY Left     ANTERIOR CERVICAL DISCECTOMY W/ FUSION       SECTION  2003     SECTION  2005    CHOLECYSTECTOMY      CORONARY ANGIOPLASTY WITH STENT PLACEMENT      DILATION AND CURETTAGE OF UTERUS      HYSTERECTOMY      LUMBAR PUNCTURE         Time Tracking:     PT Received On: 24  PT Start Time: 1032     PT Stop Time: 1043  PT Total Time (min): 11 min     Billable Minutes: Evaluation low      2024

## 2024-04-14 NOTE — PT/OT/SLP EVAL
[facility]  Speech Language Pathology Department  Cognitive-Communication Evaluation    Patient Name:  Yandy Chaudhry   MRN:  [id number]    Recommendations     General recommendations:  cognitive-linguistic therapy  Communication strategies:  provide increased time to answer    Barriers to safe discharge: none    History     Yandy Chaudhry is a/n 54 y.o. female admitted secondary to L facial numbness and L UE weakness with speech difficulty, migraine.     Past Medical History:   Diagnosis Date    Acid reflux     Anemia, unspecified     Anxiety     Coronary artery disease     Hyperlipidemia     Hypertension     Migraines     Myocardial infarction     Thrombus     apical    Thyroid disease      Past Surgical History:   Procedure Laterality Date    ANKLE SURGERY Left     ANTERIOR CERVICAL DISCECTOMY W/ FUSION       SECTION       SECTION      CHOLECYSTECTOMY      CORONARY ANGIOPLASTY WITH STENT PLACEMENT      DILATION AND CURETTAGE OF UTERUS      HYSTERECTOMY      LUMBAR PUNCTURE         Previous level of Function  Education: law degree  Occupation: unemployed  Lives:  with spouse and daughter  Handed: Right  Glasses: yes (reading)  Hearing Aids: no  Home Responsibilities: drives, financial management, medication/health management, laundry, shopping, meal preparation, and cleaning    Imaging   Results for orders placed during the hospital encounter of 24    MRI Brain Without Contrast    Narrative  EXAMINATION:  MRI BRAIN WITHOUT CONTRAST    CLINICAL HISTORY:  Stroke, follow up;    TECHNIQUE:  Multiplanar multisequence MR imaging of the brain was performed without contrast.    COMPARISON:  CT of the head 2024.    FINDINGS:  There is normal brain formation.  There is normal gray-white matter differentiation.  There is no hemorrhage, hydrocephalus, or midline shift.  There is no restricted diffusion.  There is no intra or extra-axial  fluid collection.  There is no herniation.    There are normal flow voids in the bilateral carotid siphons.  The sella is normal.  The cerebellar tonsils are normally position.  There is no evidence of intracranial hypotension.  The calvarium is normal in signal intensity.  The bilateral orbits are normal.  The paranasal sinuses are free of disease.    Impression  No acute intracranial abnormality.    [employer] concordance      Electronically signed by: Jay Gutierrez MD  Date:    04/14/2024  Time:    09:49    Subjective     Patient awake, alert, and cooperative.  Patient goals: none stated   Spiritual/Cultural/Catholic Beliefs/Practices that affect care: yes    Pain/Comfort: Pain Rating 1: 0/10  Respiratory Status: room air    Objective     ORAL MUSCULATURE  Dentition: own teeth  Facial Movement: WFL  Buccal Strength & Mobility: WFL  Mandibular Strength & Mobility: WFL  Oral Labial Strength & Mobility: impaired coordination  Lingual Strength & Mobility:  impaired coordination    SPEECH PRODUCTION  Phoneme Production: adequate  Voice Quality: adequate  Voice Production: adequate  Speech Rate: appropriate  Loudness: acceptable  Respiration: WFL for speech  Resonance: adequate  Prosody: adequate  Speech Intelligibility  Known Context: Greater that 90%  Unknown Context: Greater that 90%    AUDITORY COMPREHENSION  Identification:  Objects: Within Functional Limits  Following Directions:  1-Step: Within Functional Limits  2-Step: Within Functional Limits  3-Step: Within Functional Limits  Yes/No Questions:  Biographical: Within Functional Limits  Environmental: Within Functional Limits  Simple: Within Functional Limits  Complex: Within Functional Limits    VERBAL EXPRESSION  Automatic Speech:  Functional needs: Within Functional Limits  Days of the week: Within Functional Limits  Months of the year: Within Functional Limits  Counting: Within Functional Limits  Alphabet: Within Functional Limits  Repetition:  Sentences:  "Within Functional Limits  Phrase Completion: Within Functional Limits  Confrontation Naming  Mills Naming Test:   Wh- Questions:  Object name: Within Functional Limits  Object function: Within Functional Limits    COGNITION  Orientation:  Person: yes  Place: yes  Time: yes  Situation: yes   Attention:  Adequate for participation  Pragmatics:  Eye contact: Within Functional Limits  Personal space: Within Functional Limits  Facial expression: Within Functional Limits  Communicative Intent: Within Functional Limits  Intonation: Within Functional Limits  Gestures: Within Functional Limits  Initiation: Within Functional Limits  Topic Maintenance: Within Functional Limits  Response Length: Within Functional Limits  Referencing Skills: Within Functional Limits  Completeness: Within Functional Limits  Memory:  Immediate: 4/4 words independently, up to 6 digits during digit span task,  facts story recall (Normal 13/4 SD 3.2)  Delayed: Within Functional Limits  Short Term: 2/4 independently, increasing to 4/4 with semantic cues, score 10 (Norms: Average 10-12)  Long Term: Within Functional Limits  Problem Solving  Functional simple: Within Functional Limits  Money Management: 60%  Organization:  Sequencin and 5 step written sequences x100% independently, 6 step sequence x17% accuracy independently   Executive Function:  Clock Drawing Task: score 8 ([id number], mild 11-10, moderate 9-8, severe 7-0)    Assessment     Pt states that at symptom onset she was experiencing difficulty talking with wording finding difficulties. When inquired if pt has returned to baseline, pt verbalized "I think so." During cognitive-linguistic assessment, pt exhibited mild to moderate difficulties with executive function tasks, short-term memory, and complex sequencing tasks. Pt would benefit from SLP services targeting high level cognitive-linguistic skills. Consider standardized cognitive-linguistic assessment.    Goals "     Multidisciplinary Problems       SLP Goals          Problem: SLP    Goal Priority Disciplines Outcome   SLP Goal     SLP    Description: LTG: Pt will improve cognitive-linguistic skills for increased independence and return to PLOF.    ST. Pt will state 3-4 memory strategies for improved recall.   2. Pt will recall 80% of novel stimuli with external/internal memory aids as needed with SBA for improved recall.  3. Pt will complete complex organization/sequencing tasks x90% accuracy with SBA.  4. Pt will participate in further standardized assessment as clinically warranted.                     Patient Education     Patient provided with verbal education regarding SLP POC.  Understanding was verbalized.    Plan     SLP Follow-Up:  Yes   Patient to be seen:  5 x/week   Plan of Care expires:  24  Plan of Care reviewed with:  patient      Time Tracking     SLP Treatment Date:   24  Speech Start Time:  935  Speech Stop Time:  1007     Speech Total Time (min):  32 min    Billable minutes:  Evaluation of Speech Sound Production with Comprehension and Expression, 32 minutes  Total Time, 32 minutes     2024

## 2024-04-14 NOTE — SUBJECTIVE & OBJECTIVE
Past Medical History:   Diagnosis Date    Acid reflux     Anemia, unspecified     Anxiety     Coronary artery disease     Hyperlipidemia     Hypertension     Migraines     Myocardial infarction     Thrombus     apical    Thyroid disease        Past Surgical History:   Procedure Laterality Date    ANKLE SURGERY Left     ANTERIOR CERVICAL DISCECTOMY W/ FUSION       SECTION  2003     SECTION  2005    CHOLECYSTECTOMY      CORONARY ANGIOPLASTY WITH STENT PLACEMENT      DILATION AND CURETTAGE OF UTERUS      HYSTERECTOMY      LUMBAR PUNCTURE         Review of patient's allergies indicates:  No Known Allergies    Current Neurological Medications:     Current Facility-Administered Medications   Medication Dose Route Frequency Provider Last Rate Last Admin    acetaminophen tablet 650 mg  650 mg Oral Q6H PRN Laura Reynolds AGACNP-BC        aspirin EC tablet 81 mg  81 mg Oral Daily Laura Reynolds AGACNP-BC        atorvastatin tablet 80 mg  80 mg Oral Daily Laura Reynolds AGACNP-BC        bisacodyL suppository 10 mg  10 mg Rectal Daily PRN Laura Reynolds AGACNP-BC        butalbital-acetaminophen-caffeine -40 mg per tablet 1 tablet  1 tablet Oral Q6H PRN Laura Reynolds AGACNP-BC        FLUoxetine capsule 40 mg  40 mg Oral BID Laura Reynolds AGACNP-BC        gabapentin capsule 600 mg  600 mg Oral Daily Laura Reynolds AGACNP-BC        hydrALAZINE injection 10 mg  10 mg Intravenous Q4H PRN Laura Reynolds AGACNP-BC        labetaloL injection 10 mg  10 mg Intravenous Q2H PRN Laura Reynolds AGACNP-BC        lactated ringers infusion   Intravenous Continuous Laura Reynolds AGACNP- mL/hr at 24 0105 New Bag at 24 0105    levothyroxine tablet 75 mcg  75 mcg Oral Before breakfast Laura Reynolds AGACNP-BC        metoprolol succinate (TOPROL-XL) 24 hr tablet 25 mg  25 mg Oral BID Laura Reynolds AGACNP-BC        ondansetron injection 4 mg  4 mg Intravenous Q4H PRN  Laura Reynolds AGACNP-BC        oxybutynin 24 hr tablet 10 mg  10 mg Oral Daily Laura Reynolds AGACNP-BC        pantoprazole EC tablet 40 mg  40 mg Oral Daily Laura Reynolds AGACNP-BC        rOPINIRole tablet 0.5 mg  0.5 mg Oral QHS Laura Reynolds AGACNP-BC        sodium chloride 0.9% flush 10 mL  10 mL Intravenous Q8H PRN Laura Reynolds GRACIELACNP-BC        tiZANidine tablet 4 mg  4 mg Oral Q8H PRN Laura Reynolds AGACNP-BC        topiramate tablet 50 mg  50 mg Oral BID Laura Reynolds GRACIELACNP-BC         Family History       Problem Relation (Age of Onset)    Cancer Mother    Hydrocephalus Mother, Maternal Grandmother    No Known Problems Father, Sister, Brother    Parkinsonism Maternal Grandfather          Tobacco Use    Smoking status: Never     Passive exposure: Never    Smokeless tobacco: Never   Substance and Sexual Activity    Alcohol use: Not Currently    Drug use: Never    Sexual activity: Not on file     Review of Systems   Neurological:  Positive for numbness (left facial numbness and LUE numbness). Negative for dizziness, tremors, seizures, syncope, facial asymmetry, speech difficulty, weakness, light-headedness and headaches.   All other systems reviewed and are negative.    Objective:     Vital Signs (Most Recent):  Temp: 98.2 °F (36.8 °C) (04/14/24 0036)  Pulse: (!) 52 (04/14/24 0442)  Resp: 20 (04/14/24 0442)  BP: 135/72 (04/14/24 0442)  SpO2: 95 % (04/14/24 0442) Vital Signs (24h Range):  Temp:  [98 °F (36.7 °C)-98.2 °F (36.8 °C)] 98.2 °F (36.8 °C)  Pulse:  [51-58] 52  Resp:  [13-22] 20  SpO2:  [94 %-98 %] 95 %  BP: (134-179)/(72-96) 135/72     Weight: 84.5 kg (186 lb 4.8 oz)  Body mass index is 31 kg/m².     Physical Exam  Vitals and nursing note reviewed.   Constitutional:       General: She is not in acute distress.     Appearance: Normal appearance. She is not ill-appearing or toxic-appearing.   HENT:      Head: Normocephalic.   Eyes:      General: No visual field deficit.      Extraocular Movements:      Right eye: Normal extraocular motion and no nystagmus.      Left eye: Normal extraocular motion and no nystagmus.      Pupils: Pupils are equal, round, and reactive to light.   Cardiovascular:      Rate and Rhythm: Normal rate.   Pulmonary:      Effort: Pulmonary effort is normal.      Breath sounds: Normal breath sounds.   Musculoskeletal:         General: No swelling. Normal range of motion.      Cervical back: Normal range of motion.      Right lower leg: No edema.      Left lower leg: No edema.   Skin:     General: Skin is warm and dry.      Capillary Refill: Capillary refill takes less than 2 seconds.   Neurological:      General: No focal deficit present.      Mental Status: She is alert and oriented to person, place, and time.      Cranial Nerves: No dysarthria or facial asymmetry.      Sensory: Sensory deficit (decreased sensation to left face and LUE) present.      Motor: Motor function is intact. No weakness, tremor, atrophy, abnormal muscle tone, seizure activity or pronator drift.      Coordination: Coordination normal. Finger-Nose-Finger Test normal.   Psychiatric:         Attention and Perception: Attention normal.         Mood and Affect: Affect normal.         Speech: Speech normal.         Behavior: Behavior normal.          NEUROLOGICAL EXAMINATION:     MENTAL STATUS   Oriented to person, place, and time.   Speech: speech is normal     CRANIAL NERVES     CN III, IV, VI   Pupils are equal, round, and reactive to light.    GAIT AND COORDINATION      Coordination   Finger to nose coordination: normal      Significant Labs:   Recent Lab Results  (Last 5 results in the past 24 hours)        04/14/24  0602   04/13/24  2104   04/13/24  1719   04/13/24  1554   04/13/24  1553        Immature Platelet Fraction         11.8       Albumin/Globulin Ratio 1.3         1.2       Albumin 3.4         3.6       ALP 66         68       ALT 45         50       AST 37         53       Baso #  0.05         0.1272       Basophil % 0.5         1       BILIRUBIN TOTAL 0.5         0.4       BUN 20.7         22.8       Calcium 9.3         9.2       Chloride 108         109       Cholesterol Total   148             CO2 23         23       Creatinine 0.72         1.18       High Sens CRP   0.43             eGFR >60         54       Eos # 0.01               Eos % 0.1               Globulin, Total 2.6         2.9       Glucose 89         100       Gran # (ANC)         7.3776       HDL   88             Hematocrit 37.8         40.2       Hemoglobin 12.3         12.8       Immature Grans (Abs) 0.05               Immature Granulocytes 0.5               INR       0.9         LDL Cholesterol   51.00             Lymph # 2.90         4.452       LYMPH % 27.6               Lymphocyte %         35       Magnesium  2.00               MCH 25.9         25.6       MCHC 32.5         31.8       MCV 79.7         80.4       Mono # 0.76         0.636       Mono % 7.2         5       MPV 12.1         12.4       Neut # 6.74               Neut % 64.1               Neutrophils Relative         58       nRBC 0.0         0.0       QRS Duration     86           [facility] QTC Calculation     451           Phosphorus Level 3.9               Platelet Estimate         Normal       Platelet Count 243         251       Poikilocytosis         1+       Potassium 4.3         3.9       PROTEIN TOTAL 6.0         6.5       PT       12.2         RBC 4.74         5.00       RBC Morph         Abnormal       RDW 18.6         18.7       Sed Rate   11             Sodium 140         141       Total Cholesterol/HDL Ratio   2             Triglycerides   44             TSH         1.230       Very Low Density Lipoprotein   9             WBC 10.51         12.72                12.72                              Significant Imaging: I have reviewed all pertinent imaging results/findings within the past 24 hours.

## 2024-04-14 NOTE — H&P
[facility] Hospital Medicine History & Physical Examination       Patient Name: Yandy Chaudhry  MRN: [id number]  Patient Class: IP- Inpatient   Admission Date: 2024  3:14 PM  Length of Stay: 0  Admitting Service: Hospital Medicine   Attending Physician: Jermaine Chen MD   Primary Care Provider: Fabian Phelps MD  History source: EMR, patient and/or patient's family    CHIEF COMPLAINT   Numbness (Patient with a hx of hemiplegic migraines ,CAD, HTN, HLD, MI, presents to the ED with left facial numbness and left arm weakness around 1pm today. Reports speech troubles. No aphasia noted. +left arm drift, +left leg weakness. CBG 90 in triage)      HISTORY OF PRESENT ILLNESS:   Ms. Huffman is a 54 year old female with a pmh of CAD/stent, HTN, HLD, Hypothyroidism, Migraine, anxiety who presented to the ED with c/o left facial numbness and left arm weakness starting around 1 p.m. today.  She states that she started having hemiplegic migraines back in September of last year, but something about today's migraine scared her, so she decided to come in.     ED vitals on arrival:  Temp 98° F, pulse 58, resp 20, /85, SpO2 98% on RA.  Today's ED lab work revealed WBC 12.72, BUN/CR 22.8/1.18, CT head without contrast showed no acute abnormality seen.  She was admitted to Hospital Medicine for further management.    PAST MEDICAL HISTORY:     CAD s/p stent   Hypertension  Hyperlipidemia  Hypothyroidism  Migraine  Anxiety    PAST SURGICAL HISTORY:     Past Surgical History:   Procedure Laterality Date    ANKLE SURGERY Left     ANTERIOR CERVICAL DISCECTOMY W/ FUSION       SECTION  2003     SECTION  2005    CHOLECYSTECTOMY      CORONARY ANGIOPLASTY WITH STENT PLACEMENT      DILATION AND CURETTAGE OF UTERUS      HYSTERECTOMY      LUMBAR PUNCTURE         ALLERGIES:   Patient has no known allergies.    FAMILY HISTORY:   Reviewed and non-contributory      SOCIAL HISTORY:     Social History     Tobacco Use    Smoking status: Never     Passive exposure: Never    Smokeless tobacco: Never   Substance Use Topics    Alcohol use: Not Currently        HOME MEDICATIONS:     Prior to Admission medications    Medication Sig Start Date End Date Taking? Authorizing Provider   aspirin (ECOTRIN) 81 MG EC tablet Take 81 mg by mouth once daily.    Provider, Historical   butalbital-acetaminophen-caffeine -40 mg (FIORICET, ESGIC) -40 mg per tablet Take 1 tablet by mouth every 6 (six) hours as needed for Headaches. 10/9/23   Brittney Austin MD   cetirizine (ZYRTEC) 10 MG tablet Take 1 tablet (10 mg total) by mouth daily as needed for Allergies. 10/9/23   Brittney Austin MD   FLUoxetine 40 MG capsule Take 40 mg by mouth 2 (two) times daily.    Provider, Historical   fluticasone propionate (FLONASE) 50 mcg/actuation nasal spray 1 spray by Each Nostril route 2 (two) times daily. Takes as needed 2/10/22   Provider, Historical   gabapentin (NEURONTIN) 600 MG tablet Take 1 tablet (600 mg total) by mouth 3 (three) times daily.  Patient taking differently: Take 600 mg by mouth once daily. 11/17/23 11/16/24  Yury Navarro MD   HUMIRA,, PEN 40 mg/0.4 mL PnKt Inject 40 mg into the skin every 14 (fourteen) days. 8/29/23   Provider, Historical   ketoconazole (NIZORAL) 2 % cream Apply 1 application  topically 2 (two) times daily. 7/14/23   Provider, Historical   ketorolac (TORADOL) 10 mg tablet Take 1 tablet (10 mg total) by mouth every 6 (six) hours as needed (migraine). To be taken with phenergan (12.5 mg) and benadryl (25 mg) 3/8/24   Tess Liz, SAFIA   levothyroxine (SYNTHROID) 75 MCG tablet Take 75 mcg by mouth before breakfast.    Provider, Historical   methocarbamoL (ROBAXIN) 500 MG Tab Take 500 mg by mouth as needed. 11/13/23   Provider, Historical   metoprolol succinate (TOPROL-XL) 25 MG 24 hr tablet Take 25 mg by mouth 2 (two) times daily.    Provider, Historical    nitroGLYCERIN (NITROSTAT) 0.4 MG SL tablet PLEASE SEE ATTACHED FOR DETAILED DIRECTIONS 7/19/22   Provider, Historical   oxybutynin (DITROPAN-XL) 10 MG 24 hr tablet Take 10 mg by mouth once daily. As needed 6/1/22   Provider, Historical   pantoprazole (PROTONIX) 40 MG tablet Take 40 mg by mouth once daily. 6/20/22   Provider, Historical   predniSONE (DELTASONE) 10 MG tablet Day 1: Prednisone 10 mg take 6 tablets Day 2: Prednisone 10 mg take 5 tablets Day 3: Prednisone 10 mg take 4 tablets Day 4: Prednisone 10 mg take 3 tablets Day 5: Prednisone 10 mg take 2 tablets Day 6: Prednisone 10 mg take 1 tablet, then discontinue 4/9/24   Tess Liz FNP   promethazine (PHENERGAN) 12.5 MG Tab Take 1 tablet (12.5 mg total) by mouth every 6 (six) hours as needed (migraine). To be taken with toradol (10 mg) and benadryl (25 mg) for migraine cocktail 3/8/24   Tess Liz FNP   REPATHA SURECLICK 140 mg/mL PnIj SMARTSIG:pre-filled pen syringe SUB-Q Every 2 Weeks 4/3/23   Provider, Historical   rimegepant 75 mg odt Take 1 tablet (75 mg total) by mouth every other day. Place ODT tablet on the tongue; alternatively the ODT tablet may be placed under the tongue 11/17/23   Yury Navarro MD   rizatriptan (MAXALT) 10 MG tablet Take 1 tablet (10 mg total) by mouth as needed for Migraine (may repeat dose in two hours if needed). 12/13/23 1/22/24  Tess Liz FNP   rOPINIRole (REQUIP) 0.5 MG tablet Take 0.5 mg by mouth every evening.    Provider, Historical   rosuvastatin (CRESTOR) 40 MG Tab Take 40 mg by mouth once daily.    Provider, Historical   tiZANidine (ZANAFLEX) 4 MG tablet Take 4 mg by mouth every 8 (eight) hours as needed.    Provider, Historical       REVIEW OF SYSTEMS:   Except as documented, all other systems reviewed and negative     PHYSICAL EXAM:   T 98 °F (36.7 °C)   BP (!) 150/89   P (!) 55   RR 17   O2 95 %  GENERAL: Well developed, well nourished. In no acute distress, non-toxic appearing.   HEENT:  "normocephalic atraumatic   NECK: supple   LUNGS: Clear bilaterally, no wheezing or rales, no accessory muscle use   CVS: Regular rate and rhythm, normal peripheral perfusion  ABD: Soft, non-tender, non-distended, bowel sounds present  EXTREMITIES: no clubbing or cyanosis  SKIN: Warm, dry.   NEURO: alert and oriented, grossly without focal deficits   PSYCHIATRIC: Cooperative    LABS AND IMAGING:     Recent Labs     04/13/24  1553   WBC 12.72  12.72*   RBC 5.00   HGB 12.8   HCT 40.2   MCV 80.4   MCH 25.6*   MCHC 31.8*   RDW 18.7*        No results for input(s): "LACTIC" in the last 72 hours.  Recent Labs     04/13/24  1554   INR 0.9     No results for input(s): "HGBA1C", "CHOL", "TRIG", "LDL", "VLDL", "HDL" in the last 72 hours.   Recent Labs     04/13/24  1553      K 3.9   CHLORIDE 109*   CO2 23   BUN 22.8*   CREATININE 1.18*   GLUCOSE 100   CALCIUM 9.2   ALBUMIN 3.6   GLOBULIN 2.9   ALKPHOS 68   ALT 50   AST 53*   BILITOT 0.4   TSH 1.230     No results for input(s): "BNP", "CPK", "TROPONINI" in the last 72 hours.       CT Head Without Contrast  Narrative: EXAMINATION:  CT HEAD WITHOUT CONTRAST    CLINICAL HISTORY:  Neuro deficit, acute, stroke suspected;    TECHNIQUE:  Multiple axial images were obtained from the base of the brain to the vertex without contrast administration.  Sagittal and coronal reconstructions were performed. .Automatic exposure control  (AEC) is utilized to reduce patient radiation exposure.    COMPARISON:  03/04/2024    FINDINGS:  There is no intracranial mass or lesion seen.  No hemorrhage is seen.  No infarct is seen.  The ventricles and basilar cisterns appear normal.  Brain parenchyma appears grossly unremarkable.    Posterior fossa appears normal.  The calvarium is intact.  The paranasal sinuses appear grossly unremarkable.  Impression: No acute abnormality seen    Electronically signed by: Frank Reyes  Date:    04/13/2024  Time:    15:30      ASSESSMENT & PLAN:     1.  " Left sided weakness - Hemiplegic Migraine vs CVA/TIA  2.  Acute kidney injury  3.  Leukocytosis - likely reactive no s/s infection    History:  CAD/stent, HTN, HLD, Hypothyroidism, Migraine, anxiety    PLAN:    - Neurology consulted - appreciate recommendations  - MRI brain w/o pending  - CTA w/wo head and neck pending  - Echo pending  - LR @ 100 ml/hr  - US NIVA bilateral carotids pending  - ST/PT/OT evaluation  - Q 4 hour neuro checks  - Anti-hypertensives as needed  - Resume home meds as appropriate  - Labs in AM    I, Laura Reynolds NP have reviewed and discussed this case with Dr. Chen.  Please see addendum for further assessment and plan from attending MD.    DVT prophylaxis: SCDs  Code status: Full    ________________________________________________________________________  I, Dr. Jermaine Chen assumed care of this patient.  For the patient encounter, I performed the substantive portion of the visit, I reviewed the NPPA documentation, treatment plan, and medical decision making.  I had face to face time with this patient.  I have personally reviewed the labs and test results that are presently available. I have reviewed or attempted to review medical records based upon their availability. If patient was admitted under observational status it is with my approval.      54-year-old female with a history of hemiplegic migraines in the past, followed with Dr. Arechiga presents with left facial numbness and left arm weakness which she reports she was had in the past with hemiplegic migraines however for some reason this time she was more alarmed than usual.  Workup thus far unremarkable, MRI brain negative, strongly suspect migraine with hemiplegia.  Symptoms seem to be improving at the time my assessment she had no focal neurologic deficit in terms of strength she does report still some persistent numbness on her left lower face.  Monitor neuro exam overnight, manage migraine, follow up Neurology  recommendations.    Time seen:  11:00 p.m. 4/13  Jermaine Chen MD

## 2024-04-15 ENCOUNTER — TELEPHONE (OUTPATIENT)
Dept: NEUROLOGY | Facility: CLINIC | Age: 54
End: 2024-04-15
Payer: COMMERCIAL

## 2024-04-16 ENCOUNTER — PATIENT MESSAGE (OUTPATIENT)
Dept: ADMINISTRATIVE | Facility: OTHER | Age: 54
End: 2024-04-16
Payer: COMMERCIAL

## 2024-04-16 ENCOUNTER — OFFICE VISIT (OUTPATIENT)
Dept: NEUROLOGY | Facility: CLINIC | Age: 54
End: 2024-04-16
Payer: COMMERCIAL

## 2024-04-16 ENCOUNTER — PATIENT MESSAGE (OUTPATIENT)
Dept: NEUROLOGY | Facility: CLINIC | Age: 54
End: 2024-04-16

## 2024-04-16 ENCOUNTER — TELEPHONE (OUTPATIENT)
Dept: NEUROLOGY | Facility: CLINIC | Age: 54
End: 2024-04-16

## 2024-04-16 VITALS
HEART RATE: 63 BPM | DIASTOLIC BLOOD PRESSURE: 88 MMHG | SYSTOLIC BLOOD PRESSURE: 127 MMHG | WEIGHT: 186.31 LBS | BODY MASS INDEX: 31.04 KG/M2 | HEIGHT: 65 IN

## 2024-04-16 DIAGNOSIS — G43.409 HEMIPLEGIC MIGRAINE WITHOUT STATUS MIGRAINOSUS, NOT INTRACTABLE: ICD-10-CM

## 2024-04-16 DIAGNOSIS — H47.10 PAPILLEDEMA: ICD-10-CM

## 2024-04-16 DIAGNOSIS — R29.818 ACUTE FOCAL NEUROLOGICAL DEFICIT: Primary | ICD-10-CM

## 2024-04-16 DIAGNOSIS — I63.9 CEREBROVASCULAR ACCIDENT (CVA), UNSPECIFIED MECHANISM: ICD-10-CM

## 2024-04-16 PROCEDURE — 99999 PR PBB SHADOW E&M-EST. PATIENT-LVL IV: CPT | Mod: PBBFAC,,, | Performed by: NURSE PRACTITIONER

## 2024-04-16 PROCEDURE — 99214 OFFICE O/P EST MOD 30 MIN: CPT | Mod: S$GLB,,, | Performed by: NURSE PRACTITIONER

## 2024-04-16 NOTE — TELEPHONE ENCOUNTER
Spoke with patient advised that Diagnostic Cerebral Angiogram is good to go on 4/19/24. Also advised that a nurse from [facility] will call day before with time and pre-op instructions. Also advised patient that an LP will be performed same day but will not be done by . Patient verbalized understanding. Patient currently has no medications to hold or any allergy to iodine.

## 2024-04-16 NOTE — PROGRESS NOTES
Subjective:      Patient ID: Yandy Chaudhry is a 54 y.o. female.    Chief Complaint:  Hospital Follow Up (Patient c/o left sided facial droop, HA located on right side of head. Describes pain as throbbing/sharp that is associated with dizziness and blurred vision. )      History of Present Illness  Patient presents for hospital follow up after stroke work up. On 24, patient presented to ED with reports of left arm weakness and facial numbness. CT head, MRI brain were negative for any acute process. CTA reviewed by Dr. Sanders in hospital which showed concern for bilateral transverse sinus stenosis. Patient had also presented to [facility] in 2023 for a stroke workup as she had left-sided weakness associated with a severe headache. Stroke work up negative. She saw Dr. Navarro in hospital follow-up who started her on Topamax and she ultimately underwent a lumbar puncture (2023) to evaluate opening pressure which was measured as 21. Patient reports over the past 2 years she has had intermittent episodes of left sided weakness. Stroke workups have been negative. Has recently restarted Nurtec every other day for migraine prevention. Today, patient reports she is still with headache with some associated dizziness.                  Past Medical History:   Diagnosis Date    Acid reflux     Anemia, unspecified     Anxiety     Coronary artery disease     Hyperlipidemia     Hypertension     Migraines     Myocardial infarction     Thrombus     apical    Thyroid disease        Past Surgical History:   Procedure Laterality Date    ANKLE SURGERY Left     ANTERIOR CERVICAL DISCECTOMY W/ FUSION       SECTION       SECTION      CHOLECYSTECTOMY      CORONARY ANGIOPLASTY WITH STENT PLACEMENT      DILATION AND CURETTAGE OF UTERUS      HYSTERECTOMY      LUMBAR PUNCTURE         Family History   Problem Relation Name Age of Onset    Cancer Mother          Pancriatic cancer    Hydrocephalus  Mother      No Known Problems Father      No Known Problems Sister      No Known Problems Brother      Hydrocephalus Maternal Grandmother      Parkinsonism Maternal Grandfather         Social History     Socioeconomic History    Marital status:    Tobacco Use    Smoking status: Never     Passive exposure: Never    Smokeless tobacco: Never   Substance and Sexual Activity    Alcohol use: Not Currently    Drug use: Never     Social Determinants of Health     Food Insecurity: No Food Insecurity (2/15/2024)    [facility], [employer] [facility]    Hunger Vital Sign     Worried About Running Out of Food in the Last Year: Never true     Ran Out of Food in the Last Year: Never true   Transportation Needs: No Transportation Needs (2/15/2024)    [facility], [employer] [facility]    PRAPARE - Transportation     Lack of Transportation (Medical): No     Lack of Transportation (Non-Medical): No   Housing Stability: Low Risk  (2/15/2024)    Received from [facility], [employer] [facility]    Housing Stability Vital Sign     Unable to Pay for Housing in the Last Year: No     Number of Places Lived in the Last Year: 1     In the last 12 months, was there a time when you did not have a steady place to sleep or slept in a shelter (including now)?: No       Current Outpatient Medications   Medication Sig Dispense Refill    aspirin (ECOTRIN) 81 MG EC tablet Take 81 mg by mouth once daily.      butalbital-acetaminophen-caffeine -40 mg (FIORICET, ESGIC) -40 mg per tablet Take 1 tablet  by mouth every 6 (six) hours as needed for Headaches. 10 tablet 0    cetirizine (ZYRTEC) 10 MG tablet Take 1 tablet (10 mg total) by mouth daily as needed for Allergies.      FLUoxetine 40 MG capsule Take 40 mg by mouth 2 (two) times daily.      fluticasone propionate (FLONASE) 50 mcg/actuation nasal spray 1 spray by Each Nostril route 2 (two) times daily. Takes as needed      gabapentin (NEURONTIN) 600 MG tablet Take 1 tablet (600 mg total) by mouth 3 (three) times daily. (Patient taking differently: Take 600 mg by mouth once daily.) 90 tablet 11    HUMIRA,CF, PEN 40 mg/0.4 mL PnKt Inject 40 mg into the skin every 14 (fourteen) days.      ketoconazole (NIZORAL) 2 % cream Apply 1 application  topically 2 (two) times daily.      ketorolac (TORADOL) 10 mg tablet Take 1 tablet (10 mg total) by mouth every 6 (six) hours as needed (migraine). To be taken with phenergan (12.5 mg) and benadryl (25 mg) 20 tablet 0    levothyroxine (SYNTHROID) 75 MCG tablet Take 75 mcg by mouth before breakfast.      methocarbamoL (ROBAXIN) 500 MG Tab Take 500 mg by mouth as needed.      metoprolol succinate (TOPROL-XL) 25 MG 24 hr tablet Take 25 mg by mouth 2 (two) times daily.      nitroGLYCERIN (NITROSTAT) 0.4 MG SL tablet PLEASE SEE ATTACHED FOR DETAILED DIRECTIONS      oxybutynin (DITROPAN-XL) 10 MG 24 hr tablet Take 10 mg by mouth once daily. As needed      pantoprazole (PROTONIX) 40 MG tablet Take 40 mg by mouth once daily.      predniSONE (DELTASONE) 10 MG tablet Day 1: Prednisone 10 mg take 6 tablets Day 2: Prednisone 10 mg take 5 tablets Day 3: Prednisone 10 mg take 4 tablets Day 4: Prednisone 10 mg take 3 tablets Day 5: Prednisone 10 mg take 2 tablets Day 6: Prednisone 10 mg take 1 tablet, then discontinue 21 tablet 0    promethazine (PHENERGAN) 12.5 MG Tab Take 1 tablet (12.5 mg total) by mouth every 6 (six) hours as needed (migraine). To be taken with toradol (10 mg) and benadryl (25 mg) for migraine cocktail 20 tablet 0     REPATHA SURECLICK 140 mg/mL PnIj SMARTSIG:pre-filled pen syringe SUB-Q Every 2 Weeks      rimegepant 75 mg odt Take 1 tablet (75 mg total) by mouth every other day. Place ODT tablet on the tongue; alternatively the ODT tablet may be placed under the tongue 15 tablet 11    rOPINIRole (REQUIP) 0.5 MG tablet Take 0.5 mg by mouth every evening.      rosuvastatin (CRESTOR) 40 MG Tab Take 40 mg by mouth once daily.      tiZANidine (ZANAFLEX) 4 MG tablet Take 4 mg by mouth every 8 (eight) hours as needed.      rizatriptan (MAXALT) 10 MG tablet Take 1 tablet (10 mg total) by mouth as needed for Migraine (may repeat dose in two hours if needed). 12 tablet 2     No current facility-administered medications for this visit.       Review of patient's allergies indicates:  No Known Allergies     Review of Systems  12 point ROS performed and negative unless otherwise documented in HPI  Objective:        Neurologic Exam      Mental Status   Oriented to person, place, and time.   Speech: speech is normal   Level of consciousness: alert     Cranial Nerves   Cranial nerves II through XII intact.      Motor Exam   Muscle bulk: normal     Strength   Strength 5/5 throughout.      Sensory Exam   Light touch normal.      Gait, Coordination, and Reflexes      Gait  Gait: normal        Physical Exam  Vitals reviewed.   Pulmonary:      Effort: Pulmonary effort is normal.   Neurological:      Mental Status: She is oriented to person, place, and time.      Cranial Nerves: Cranial nerves 2-12 are intact.      Motor: Motor strength is normal.     Gait: Gait is intact.   Psychiatric:         Speech: Speech normal.        Assessment:     1. Acute focal neurological deficit    2. Papilledema    3. Cerebrovascular accident (CVA), unspecified mechanism    4. Hemiplegic migraine without status migrainosus, not intractable        Plan:     Cerebral angiogram scheduled, also LP for opening and closing pressure scheduled to be performed the same  day  Continue nurtec every other day for migraines.

## 2024-04-19 ENCOUNTER — HOSPITAL ENCOUNTER (OUTPATIENT)
Dept: RADIOLOGY | Facility: HOSPITAL | Age: 54
Discharge: HOME | End: 2024-04-19
Attending: PSYCHIATRY & NEUROLOGY
Payer: COMMERCIAL

## 2024-04-19 ENCOUNTER — HOSPITAL ENCOUNTER (OUTPATIENT)
Dept: INTERVENTIONAL RADIOLOGY/VASCULAR | Facility: HOSPITAL | Age: 54
Discharge: HOME | End: 2024-04-19
Attending: PSYCHIATRY & NEUROLOGY | Admitting: PSYCHIATRY & NEUROLOGY
Payer: COMMERCIAL

## 2024-04-19 VITALS
HEART RATE: 54 BPM | SYSTOLIC BLOOD PRESSURE: 126 MMHG | BODY MASS INDEX: 32.18 KG/M2 | OXYGEN SATURATION: 95 % | HEIGHT: 65 IN | WEIGHT: 193.13 LBS | DIASTOLIC BLOOD PRESSURE: 86 MMHG

## 2024-04-19 VITALS
TEMPERATURE: 98 F | HEART RATE: 57 BPM | OXYGEN SATURATION: 98 % | RESPIRATION RATE: 16 BRPM | SYSTOLIC BLOOD PRESSURE: 131 MMHG | DIASTOLIC BLOOD PRESSURE: 64 MMHG

## 2024-04-19 DIAGNOSIS — R29.818 ACUTE FOCAL NEUROLOGICAL DEFICIT: ICD-10-CM

## 2024-04-19 DIAGNOSIS — I63.9 CEREBROVASCULAR ACCIDENT (CVA), UNSPECIFIED MECHANISM: ICD-10-CM

## 2024-04-19 DIAGNOSIS — Z01.818 PRE-OP EXAMINATION: ICD-10-CM

## 2024-04-19 LAB
APPEARANCE CSF: CLEAR
COLOR CSF: COLORLESS
GLUCOSE CSF-MCNC: 60 MG/DL (ref 40–70)
LEFT CCA DIST DIAS: 20 CM/S
LEFT CCA DIST SYS: 72 CM/S
LEFT CCA PROX DIAS: 20 CM/S
LEFT CCA PROX SYS: 81 CM/S
LEFT ECA DIAS: 0 CM/S
LEFT ECA SYS: 61 CM/S
LEFT ICA DIST DIAS: 33 CM/S
LEFT ICA DIST SYS: 100 CM/S
LEFT ICA MID DIAS: 28 CM/S
LEFT ICA MID SYS: 94 CM/S
LEFT ICA PROX DIAS: 28 CM/S
LEFT ICA PROX SYS: 102 CM/S
LEFT VERTEBRAL DIAS: 14 CM/S
LEFT VERTEBRAL SYS: 68 CM/S
LYMPHOCYTE MANUAL CSF (OHS): 87 %
MONOCYTE MANUAL CSF (OHS): 13 %
OHS CV CAROTID RIGHT ICA EDV HIGHEST: 21
OHS CV CAROTID ULTRASOUND LEFT ICA/CCA RATIO: 1.42
OHS CV CAROTID ULTRASOUND RIGHT ICA/CCA RATIO: 1.34
OHS CV PV CAROTID LEFT HIGHEST CCA: 81
OHS CV PV CAROTID LEFT HIGHEST ICA: 102
OHS CV PV CAROTID RIGHT HIGHEST CCA: 62
OHS CV PV CAROTID RIGHT HIGHEST ICA: 83
OHS CV US CAROTID LEFT HIGHEST EDV: 33
PROT CSF-MCNC: 20.5 MG/DL (ref 15–45)
RBC # CSF MANUAL: 0 /UL
RIGHT CCA DIST DIAS: 13 CM/S
RIGHT CCA DIST SYS: 62 CM/S
RIGHT CCA PROX DIAS: 10 CM/S
RIGHT CCA PROX SYS: 62 CM/S
RIGHT ECA DIAS: 1 CM/S
RIGHT ECA SYS: 67 CM/S
RIGHT ICA DIST DIAS: 19 CM/S
RIGHT ICA DIST SYS: 61 CM/S
RIGHT ICA MID DIAS: 14 CM/S
RIGHT ICA MID SYS: 81 CM/S
RIGHT ICA PROX DIAS: 21 CM/S
RIGHT ICA PROX SYS: 83 CM/S
RIGHT VERTEBRAL SYS: 54 CM/S
SPECIMEN VOL CSF: 6 ML
WBC # CSF MANUAL: 1 /UL (ref 0–5)

## 2024-04-19 PROCEDURE — 84157 ASSAY OF PROTEIN OTHER: CPT | Performed by: PSYCHIATRY & NEUROLOGY

## 2024-04-19 PROCEDURE — C1894 INTRO/SHEATH, NON-LASER: HCPCS | Performed by: PSYCHIATRY & NEUROLOGY

## 2024-04-19 PROCEDURE — 25000003 PHARM REV CODE 250: Performed by: PSYCHIATRY & NEUROLOGY

## 2024-04-19 PROCEDURE — 75860 VEIN X-RAY NECK: CPT | Mod: TC,59 | Performed by: PSYCHIATRY & NEUROLOGY

## 2024-04-19 PROCEDURE — 76937 US GUIDE VASCULAR ACCESS: CPT | Mod: 26,,, | Performed by: PSYCHIATRY & NEUROLOGY

## 2024-04-19 PROCEDURE — 99152 MOD SED SAME PHYS/QHP 5/>YRS: CPT | Mod: ,,, | Performed by: PSYCHIATRY & NEUROLOGY

## 2024-04-19 PROCEDURE — 36012 PLACE CATHETER IN VEIN: CPT | Mod: 59,,, | Performed by: PSYCHIATRY & NEUROLOGY

## 2024-04-19 PROCEDURE — C1760 CLOSURE DEV, VASC: HCPCS | Performed by: PSYCHIATRY & NEUROLOGY

## 2024-04-19 PROCEDURE — 36224 PLACE CATH CAROTD ART: CPT | Mod: 50,,, | Performed by: PSYCHIATRY & NEUROLOGY

## 2024-04-19 PROCEDURE — 25500020 PHARM REV CODE 255: Performed by: PSYCHIATRY & NEUROLOGY

## 2024-04-19 PROCEDURE — 36224 PLACE CATH CAROTD ART: CPT | Mod: 50

## 2024-04-19 PROCEDURE — C1887 CATHETER, GUIDING: HCPCS | Performed by: PSYCHIATRY & NEUROLOGY

## 2024-04-19 PROCEDURE — 75870 VEIN X-RAY SKULL: CPT | Mod: TC,59 | Performed by: PSYCHIATRY & NEUROLOGY

## 2024-04-19 PROCEDURE — 82945 GLUCOSE OTHER FLUID: CPT | Performed by: PSYCHIATRY & NEUROLOGY

## 2024-04-19 PROCEDURE — 89051 BODY FLUID CELL COUNT: CPT | Performed by: PSYCHIATRY & NEUROLOGY

## 2024-04-19 PROCEDURE — C1769 GUIDE WIRE: HCPCS | Performed by: PSYCHIATRY & NEUROLOGY

## 2024-04-19 PROCEDURE — 75870 VEIN X-RAY SKULL: CPT | Mod: 26,59,, | Performed by: PSYCHIATRY & NEUROLOGY

## 2024-04-19 PROCEDURE — 36227 PLACE CATH XTRNL CAROTID: CPT | Mod: 50 | Performed by: PSYCHIATRY & NEUROLOGY

## 2024-04-19 PROCEDURE — 36224 PLACE CATH CAROTD ART: CPT | Mod: 50 | Performed by: PSYCHIATRY & NEUROLOGY

## 2024-04-19 PROCEDURE — 75860 VEIN X-RAY NECK: CPT | Mod: 26,59,, | Performed by: PSYCHIATRY & NEUROLOGY

## 2024-04-19 PROCEDURE — 36012 PLACE CATHETER IN VEIN: CPT | Mod: 59 | Performed by: PSYCHIATRY & NEUROLOGY

## 2024-04-19 PROCEDURE — 36226 PLACE CATH VERTEBRAL ART: CPT | Mod: 50 | Performed by: PSYCHIATRY & NEUROLOGY

## 2024-04-19 PROCEDURE — 63600175 PHARM REV CODE 636 W HCPCS: Performed by: PSYCHIATRY & NEUROLOGY

## 2024-04-19 PROCEDURE — 36226 PLACE CATH VERTEBRAL ART: CPT | Mod: 51,50,, | Performed by: PSYCHIATRY & NEUROLOGY

## 2024-04-19 PROCEDURE — 62328 DX LMBR SPI PNXR W/FLUOR/CT: CPT

## 2024-04-19 PROCEDURE — 99153 MOD SED SAME PHYS/QHP EA: CPT | Performed by: PSYCHIATRY & NEUROLOGY

## 2024-04-19 PROCEDURE — 36227 PLACE CATH XTRNL CAROTID: CPT | Mod: 50,,, | Performed by: PSYCHIATRY & NEUROLOGY

## 2024-04-19 PROCEDURE — 99152 MOD SED SAME PHYS/QHP 5/>YRS: CPT | Performed by: PSYCHIATRY & NEUROLOGY

## 2024-04-19 PROCEDURE — 27201423 OPTIME MED/SURG SUP & DEVICES STERILE SUPPLY: Performed by: PSYCHIATRY & NEUROLOGY

## 2024-04-19 RX ORDER — HYDROCODONE BITARTRATE AND ACETAMINOPHEN 5; 325 MG/1; MG/1
1 TABLET ORAL ONCE
Status: DISCONTINUED | OUTPATIENT
Start: 2024-04-19 | End: 2024-04-19

## 2024-04-19 RX ORDER — LIDOCAINE HYDROCHLORIDE 20 MG/ML
INJECTION, SOLUTION INFILTRATION; PERINEURAL
Status: COMPLETED | OUTPATIENT
Start: 2024-04-19 | End: 2024-04-19

## 2024-04-19 RX ORDER — ACETAZOLAMIDE 250 MG/1
250 TABLET ORAL 2 TIMES DAILY
Qty: 180 TABLET | Refills: 3 | Status: SHIPPED | OUTPATIENT
Start: 2024-04-19 | End: 2024-06-05

## 2024-04-19 RX ORDER — AMLODIPINE BESYLATE 5 MG/1
5 TABLET ORAL DAILY
COMMUNITY

## 2024-04-19 RX ORDER — METOPROLOL TARTRATE 25 MG/1
25 TABLET, FILM COATED ORAL 2 TIMES DAILY
COMMUNITY

## 2024-04-19 RX ORDER — HEPARIN SOD,PORCINE/0.9 % NACL 1000/500ML
INTRAVENOUS SOLUTION INTRAVENOUS
Status: COMPLETED | OUTPATIENT
Start: 2024-04-19 | End: 2024-04-19

## 2024-04-19 RX ORDER — SODIUM CHLORIDE 9 MG/ML
INJECTION, SOLUTION INTRAVENOUS CONTINUOUS
Status: ACTIVE | OUTPATIENT
Start: 2024-04-19 | End: 2024-04-19

## 2024-04-19 RX ORDER — SODIUM CHLORIDE 9 MG/ML
INJECTION, SOLUTION INTRAVENOUS ONCE
Status: COMPLETED | OUTPATIENT
Start: 2024-04-19 | End: 2024-04-19

## 2024-04-19 RX ORDER — ONDANSETRON 4 MG/1
8 TABLET, ORALLY DISINTEGRATING ORAL EVERY 8 HOURS PRN
Status: DISCONTINUED | OUTPATIENT
Start: 2024-04-19 | End: 2024-04-20 | Stop reason: HOSPADM

## 2024-04-19 RX ORDER — FENTANYL CITRATE 50 UG/ML
INJECTION, SOLUTION INTRAMUSCULAR; INTRAVENOUS
Status: COMPLETED | OUTPATIENT
Start: 2024-04-19 | End: 2024-04-19

## 2024-04-19 RX ORDER — HYDROCODONE BITARTRATE AND ACETAMINOPHEN 5; 325 MG/1; MG/1
1 TABLET ORAL ONCE
Status: COMPLETED | OUTPATIENT
Start: 2024-04-19 | End: 2024-04-19

## 2024-04-19 RX ORDER — ACETAMINOPHEN 325 MG/1
650 TABLET ORAL EVERY 4 HOURS PRN
Status: DISCONTINUED | OUTPATIENT
Start: 2024-04-19 | End: 2024-04-20 | Stop reason: HOSPADM

## 2024-04-19 RX ADMIN — SODIUM CHLORIDE: 9 INJECTION, SOLUTION INTRAVENOUS at 08:04

## 2024-04-19 RX ADMIN — LIDOCAINE HYDROCHLORIDE 5 ML: 20 INJECTION, SOLUTION INFILTRATION; PERINEURAL at 10:04

## 2024-04-19 RX ADMIN — FENTANYL CITRATE 50 MCG: 50 INJECTION INTRAMUSCULAR; INTRAVENOUS at 11:04

## 2024-04-19 RX ADMIN — Medication 2000 ML: at 10:04

## 2024-04-19 RX ADMIN — HYDROCODONE BITARTRATE AND ACETAMINOPHEN 1 TABLET: 5; 325 TABLET ORAL at 12:04

## 2024-04-19 RX ADMIN — FENTANYL CITRATE 50 MCG: 50 INJECTION INTRAMUSCULAR; INTRAVENOUS at 10:04

## 2024-04-19 RX ADMIN — IOPAMIDOL 100 ML: 755 INJECTION, SOLUTION INTRAVENOUS at 01:04

## 2024-04-19 NOTE — H&P
Pre-Operative History and Physical   Interventional Neurology    Yandy Chaudhry is a 54 y.o. female with headaches, tinnitus, left sided numbness with MRI neg for acute infarct. Signs concerning for IIH. Underwent LP this AM. Presents for DSA to assess venous sinus stenosis. Continues to have numbness on left side.     ROS:  ROS as described in HPI    Past Medical History:   Diagnosis Date    Acid reflux     Anemia, unspecified     Anxiety     Coronary artery disease     Hyperlipidemia     Hypertension     Migraines     Myocardial infarction     Thrombus     apical    Thyroid disease      Past Surgical History:   Procedure Laterality Date    ANKLE SURGERY Left     ANTERIOR CERVICAL DISCECTOMY W/ FUSION       SECTION  2003     SECTION  2005    CHOLECYSTECTOMY      CORONARY ANGIOPLASTY WITH STENT PLACEMENT      DILATION AND CURETTAGE OF UTERUS      HYSTERECTOMY      LUMBAR PUNCTURE       Family History   Problem Relation Name Age of Onset    Cancer Mother          Pancriatic cancer    Hydrocephalus Mother      No Known Problems Father      No Known Problems Sister      No Known Problems Brother      Hydrocephalus Maternal Grandmother      Parkinsonism Maternal Grandfather       Review of patient's allergies indicates:  No Known Allergies    Current Outpatient Medications:     amLODIPine (NORVASC) 5 MG tablet, Take 5 mg by mouth once daily., Disp: , Rfl:     aspirin (ECOTRIN) 81 MG EC tablet, Take 81 mg by mouth once daily., Disp: , Rfl:     FLUoxetine 40 MG capsule, Take 40 mg by mouth 2 (two) times daily., Disp: , Rfl:     fluticasone propionate (FLONASE) 50 mcg/actuation nasal spray, 1 spray by Each Nostril route 2 (two) times daily. Takes as needed, Disp: , Rfl:     gabapentin (NEURONTIN) 600 MG tablet, Take 1 tablet (600 mg total) by mouth 3 (three) times daily. (Patient taking differently: Take 600 mg by mouth once daily.), Disp: 90 tablet, Rfl: 11    levothyroxine (SYNTHROID) 75  MCG tablet, Take 75 mcg by mouth before breakfast., Disp: , Rfl:     metoprolol tartrate (LOPRESSOR) 25 MG tablet, Take 25 mg by mouth 2 (two) times daily., Disp: , Rfl:     oxybutynin (DITROPAN-XL) 10 MG 24 hr tablet, Take 10 mg by mouth once daily. As needed, Disp: , Rfl:     pantoprazole (PROTONIX) 40 MG tablet, Take 40 mg by mouth once daily., Disp: , Rfl:     rimegepant 75 mg odt, Take 1 tablet (75 mg total) by mouth every other day. Place ODT tablet on the tongue; alternatively the ODT tablet may be placed under the tongue, Disp: 15 tablet, Rfl: 11    rOPINIRole (REQUIP) 0.5 MG tablet, Take 0.5 mg by mouth every evening., Disp: , Rfl:     rosuvastatin (CRESTOR) 40 MG Tab, Take 40 mg by mouth once daily., Disp: , Rfl:     tiZANidine (ZANAFLEX) 4 MG tablet, Take 4 mg by mouth every 8 (eight) hours as needed., Disp: , Rfl:     methocarbamoL (ROBAXIN) 500 MG Tab, Take 500 mg by mouth as needed., Disp: , Rfl:     nitroGLYCERIN (NITROSTAT) 0.4 MG SL tablet, PLEASE SEE ATTACHED FOR DETAILED DIRECTIONS, Disp: , Rfl:   No current facility-administered medications for this encounter.  Current Outpatient Medications   Medication Sig Dispense Refill    amLODIPine (NORVASC) 5 MG tablet Take 5 mg by mouth once daily.      aspirin (ECOTRIN) 81 MG EC tablet Take 81 mg by mouth once daily.      FLUoxetine 40 MG capsule Take 40 mg by mouth 2 (two) times daily.      fluticasone propionate (FLONASE) 50 mcg/actuation nasal spray 1 spray by Each Nostril route 2 (two) times daily. Takes as needed      gabapentin (NEURONTIN) 600 MG tablet Take 1 tablet (600 mg total) by mouth 3 (three) times daily. (Patient taking differently: Take 600 mg by mouth once daily.) 90 tablet 11    levothyroxine (SYNTHROID) 75 MCG tablet Take 75 mcg by mouth before breakfast.      metoprolol tartrate (LOPRESSOR) 25 MG tablet Take 25 mg by mouth 2 (two) times daily.      oxybutynin (DITROPAN-XL) 10 MG 24 hr tablet Take 10 mg by mouth once daily. As needed       pantoprazole (PROTONIX) 40 MG tablet Take 40 mg by mouth once daily.      rimegepant 75 mg odt Take 1 tablet (75 mg total) by mouth every other day. Place ODT tablet on the tongue; alternatively the ODT tablet may be placed under the tongue 15 tablet 11    rOPINIRole (REQUIP) 0.5 MG tablet Take 0.5 mg by mouth every evening.      rosuvastatin (CRESTOR) 40 MG Tab Take 40 mg by mouth once daily.      tiZANidine (ZANAFLEX) 4 MG tablet Take 4 mg by mouth every 8 (eight) hours as needed.      methocarbamoL (ROBAXIN) 500 MG Tab Take 500 mg by mouth as needed.      nitroGLYCERIN (NITROSTAT) 0.4 MG SL tablet PLEASE SEE ATTACHED FOR DETAILED DIRECTIONS       No current facility-administered medications for this encounter.     Social History     Tobacco Use    Smoking status: Never     Passive exposure: Never    Smokeless tobacco: Never   Substance Use Topics    Alcohol use: Not Currently    Drug use: Never         There were no vitals filed for this visit.  Gen NAD  HEENT NC/AT  CV RRR,  Resp clear  GI soft  Ext no C/C/E    Neuro  AAOx4  Speech fluent, appropriate  EOMI, PERRLA, VFF  Normal facial strength, sensation  Tongue and palate midline  Motor 5/5  Sensation intact  Coord intact      Assessment: Yandy Chaudhry is a 54 y.o. female with headaches, tinnitus, left sided numbness with MRI neg for acute infarct. Signs concerning for IIH. Underwent LP this AM. Presents for DSA to assess venous sinus stenosis    Plan:   DSA with possible venous pressure monitoring    I have discussed the risks, benefits, indications, and alternatives of the procedure in detail.  The patient verbalizes understanding.  All questions answered.  Consents signed.  The patient agrees to proceed to proceed as planned.

## 2024-04-19 NOTE — OP NOTE
[facility]                       [address] [address]     PATIENT NAME:Yandy Chaudhry    YOB: 1970      DATE OF SURGERY:4/19/2024      SURGEON:  Henri Sanders MD     PREOPERATIVE DIAGNOSIS:  Concern for venous sinus stenosis     POSTOPERATIVE DIAGNOSIS:  severe stenosis of right transverse -sigmoid sinus junction     PROCEDURE PERFORMED:    Cerebral angiogram with catheter insertion in the following arteries:  Right common carotid, right internal carotid, right external carotid artery, right subclavian, right vertebral, left common carotid, left internal carotid, left external carotid artery, left subclavian, left vertebral, right Internal jugular vein, right sigmoid sinus, right transverse sinus, left transverse sinus, left sigmoid sinus, superior sagittal sinus  Interpretation of images  Venous pressure monitoring  Ultrasound-guided right femoral artery access  Ultrasound guided left femoral vein access  Moderate conscious sedation for 75 minutes     LEVEL OF SEDATION:  Conscious sedation.  Sedation administered by independent   trained observer under attending supervision with continuous monitoring of the   patient's level of consciousness and physiologic status.     TOTAL INTRASERVICE SEDATION TIME:  75 minutes.     COMPLICATIONS:  None.    APPROACH:  Right femoral artery  Left femoral vein      VESSELS SELECTIVELY CATHETERIZED:   Right common carotid artery   Right internal carotid artery   Right external carotid artery   Right subclavian artery   Right vertebral artery  Left common carotid artery  Left internal carotid artery   Left external carotid artery   Left subclavian artery   Left vertebral artery  Right IJ  Right transverse sinus, right sigmoid sinus  Left transverse sinus, left sigmoid sinus  Superior sagittal sinus    DEVICES USED:  5 Fr short sheath x 2  5 Fr Angled catheter  5 Fr  Envoy  Phenom 27  Synchro microwire  035 glidewire  Mynx  Micropuncture kit       INDICATION:  54 y.o. years old female with a history of headache, tinnitus, left sided numbness (MRI neg for acute stroke), papilledema presents for a cerebral angiogram for evaluation of venous sinus stenosis.    DETAILED DESCRIPTION:      Risks/benefits were thoroughly reviewed with patient/family prior to the procedure.  Risks inclusive but not limited to death, stroke, contrast reaction/nephropathy, access/vascular injury, and other unforeseen risks.  Patient/family provided informed consent.  Patient supine on the angio table, groin prepped and draped in routine fashion.  Moderate conscious sedation was administered along with local anesthesia.    The right femoral artery was sonographically evaluated and judged to be patent.  Real-time ultrasound was used to visualize needle entry into the vessel and a permanent image was stored. Using a micropuncture kit modified Seldinger technique,, an arterial sheath was placed the right femoral artery.  Diagnostic catheter telescoping over the 035 glidewire advanced to the arch and double flushed.  Enumerated vessels were then selectively catheterized and angiograms obtained as listed below.  Multiple cervical and intracranial runs were obtained in AP and lateral projection. The angiogram demonstrated concern for bilateral transverse-sigmoid sinus junction stenosis.     The left femoral vein  was sonographically evaluated and judged to be patent.  Real-time ultrasound was used to visualize needle entry into the vessel and a permanent image was stored. Using a micropuncture kit modified Seldinger technique, a 5Fr sheath was placed the left femoral vein.     Envoy guide catheter was advanced to right IJ. Using Phenom 27 microwcatheter over the synchro microwire the above veins were catheterized and selective injections were performed. Venous pressure monitoring was performed bilaterally and  below numbers were recorded. Positive venous pressure gradient of 9 recorded on right side and 7 on left side.     SSS 21  Torcula 20  Right distal TS 20  Right Mid TS 20  Right proximal TS 21  Right TS-SS junction 20  Right SS 12  Right SS-IJ junction 12    Left distal TS 22  Left mid TS 22  Left proximal TS 22  Left TS-SS junction 18  Left SS 15  Left SS-IJ junction 14      Diagnostic catheter and sheath were then withdrawn and hemostasis was obtained with the above closure device.  No immediate complications were noted.  Patient tolerated the procedure well.  The films were reviewed and determined to be of good diagnostic quality.     Angiograms performed and findings:    Right femoral artery:  Sheath placement in the right common femoral artery above the femoral bifurcation.  There is no evidence of stenosis, dissection, atherosclerosis or contrast extravasation at the site of puncture.  Right common carotid artery - cervical:  Unremarkable right common carotid artery and carotid bifurcation.  There is mild stenosis of right ICA origin measuring less than 50% due to underlying plaque. The internal carotid artery is widely patent distally.  The right external carotid artery and its branches appear unremarkable.    Right internal carotid artery - cerebral:  The distal cervical, petrous, cavernous, supraclinoid segments of the right ICA appear patent.   There is normal flow and branching noted in right JAYCOB and MCA territory.  The capillary phase appear unremarkable. Venous phase demonstrated stenosis in bilateral TS-SS junction.   Right external carotid artery - cerebral:  The right external carotid artery and its branches appear unremarkable.  There is no evidence of AV fistula.  Right subclavian artery - cervical:  Normal origin and cervical course of the right vertebral artery.    Right vertebral artery - cerebral:  Right vertebral artery is co-dominant.  Unremarkable distal cervical and intracranial segments of  the right vertebral artery.  Right PICA, bilateral AICA, SCA, PCA are visualized. Left PCA has fetal origin.  The basilar artery appears patent.  There is normal capillary and venous phase.  There is stenosis noted in bilateral TS-SS junction.   Left common carotid artery - cervical:  Unremarkable left common carotid and carotid bifurcation.  The left internal carotid artery is widely patent distally the left external carotid artery and its branches appear unremarkable.    Left internal carotid artery - cerebral:  The distal cervical, petrous, cavernous, supraclinoid segments of the left ICA appeared patent.  There is a posterior communicating artery supplying the PCA territory.  There is normal flow and branching noted in the left JAYCOB and MCA territory.  The capillary and venous phases appear unremarkable except stenosis in bilateral TS-SS junction.   Left external carotid artery - cerebral:  The left external carotid artery and its branches appear unremarkable.  There is no evidence of AV fistula.  Left subclavian artery - cervical:  Normal origin and cervical course of the left vertebral artery.    Left vertebral artery - cerebral:  Left vertebral artery is codominant.  Unremarkable distal cervical and intracranial segments of the left vertebral artery.  Left PICA, bilateral AICA, SCA, PCA visualized. Left PCA has fetal origin. The basilar artery appears patent.  There is normal capillary and venous phase.  Right IJ - cranial: patent right IJ  Superior sagittal sinus - cranial : patent bilateral TS with evidence of stenosis in bilateral TS-SS junction. Stenosis measures 70% on right and <50% on left.   Left Transverse sinus - cranial: stenosis of left TS-SS junction noted. Measures less than 50%  Right transverse sinus - cranial: stenosis of right TS-SS junction noted. Measures 70%  Right Sigmoid sinus - cranial: stenosis of right TS-SS junction measuring 70%      OVERALL IMPRESSION:  No evidence of aneurysm,  AVM, AV fistula noted  Stenosis of right transverse - sigmoid sinus junction measuring 70%  Stenosis of left transverse - sigmoid sinus junction measuring less than 50%  Positive venous pressure gradient of 9 mm Hg on right and 7 mm Hg on left across the stenotic area.         ______________________________  Henri Sanders MD  Vascular and Interventional Neurology

## 2024-04-19 NOTE — BRIEF OP NOTE
Name: Yandy Chaudhry  MRN: [id number]  Age: 54 y.o.  Gender: female    Date of Procedure: 04/19/2024    Pre-operative Diagnosis: Concern for venous sinus stenosis    Post-operative Diagnosis: Bilateral transverse sigmoid sinus junction stenosis     Procedure: Diagnostic cerebral angiogram with venous pressure gradient monitoring    Access/Closure: Right femoral artery access closed with mynx  Left femoral vein access closed with manual compression    Surgeon: Henri Sanders MD    Anesthesia: LA and conscious sedation    EBL: min    Description of findings:  - no evidence of aneurysm, AVM, AV fistula  - 70% stenosis of right transverse sigmoid sinus junction  - mild stenosis of left transverse sigmoid sinus junction (<50%)  - venous pressure gradient of 9 mm Hg on right across the stenosis  - venous pressure gradient of 7 mm Hg on left across the stenosis    Patient condition:   stable    Implant/specimen(s):  none    Plan:  - -160  - continue aspirin  - follow up in clinic to discuss treatment options including venous sinus stenting vs  shunt  - groin precautions  - LP done today (opening pressure 24, closing pressure 12, total CSF 18 cc drained)    Henri Sanders MD  Interventional Neurology

## 2024-04-19 NOTE — DISCHARGE INSTRUCTIONS
Leave dressings in place for 24 hours.   No soaking/submerging in water until site healed.   Rest today and tomorrow, then return to normal activity.   If bleeding develops at access sites, lay down and hold pressure on site, call 911, and go to nearest emergency room.

## 2024-04-22 ENCOUNTER — HOSPITAL ENCOUNTER (EMERGENCY)
Facility: HOSPITAL | Age: 54
Discharge: HOME | End: 2024-04-22
Attending: EMERGENCY MEDICINE
Payer: COMMERCIAL

## 2024-04-22 ENCOUNTER — TELEPHONE (OUTPATIENT)
Dept: NEUROLOGY | Facility: CLINIC | Age: 54
End: 2024-04-22
Payer: COMMERCIAL

## 2024-04-22 VITALS
WEIGHT: 193 LBS | TEMPERATURE: 99 F | BODY MASS INDEX: 32.15 KG/M2 | SYSTOLIC BLOOD PRESSURE: 131 MMHG | DIASTOLIC BLOOD PRESSURE: 77 MMHG | RESPIRATION RATE: 16 BRPM | OXYGEN SATURATION: 99 % | HEART RATE: 49 BPM | HEIGHT: 65 IN

## 2024-04-22 DIAGNOSIS — G43.809 OTHER MIGRAINE WITHOUT STATUS MIGRAINOSUS, NOT INTRACTABLE: Primary | ICD-10-CM

## 2024-04-22 LAB
ALBUMIN SERPL-MCNC: 3.8 G/DL (ref 3.5–5)
ALBUMIN/GLOB SERPL: 1.3 RATIO (ref 1.1–2)
ALP SERPL-CCNC: 78 UNIT/L (ref 40–150)
ALT SERPL-CCNC: 58 UNIT/L (ref 0–55)
APPEARANCE UR: CLEAR
APTT PPP: 24.6 SECONDS (ref 23.2–33.7)
AST SERPL-CCNC: 75 UNIT/L (ref 5–34)
BACTERIA #/AREA URNS AUTO: ABNORMAL /HPF
BASOPHILS # BLD AUTO: 0.09 X10(3)/MCL
BASOPHILS NFR BLD AUTO: 1.2 %
BILIRUB SERPL-MCNC: 0.7 MG/DL
BILIRUB UR QL STRIP.AUTO: ABNORMAL
BUN SERPL-MCNC: 11.9 MG/DL (ref 9.8–20.1)
CALCIUM SERPL-MCNC: 9.8 MG/DL (ref 8.4–10.2)
CHLORIDE SERPL-SCNC: 109 MMOL/L (ref 98–107)
CO2 SERPL-SCNC: 18 MMOL/L (ref 22–29)
COLOR UR AUTO: ABNORMAL
CREAT SERPL-MCNC: 0.94 MG/DL (ref 0.55–1.02)
EOSINOPHIL # BLD AUTO: 0.14 X10(3)/MCL (ref 0–0.9)
EOSINOPHIL NFR BLD AUTO: 1.9 %
ERYTHROCYTE [DISTWIDTH] IN BLOOD BY AUTOMATED COUNT: 19.8 % (ref 11.5–17)
GFR SERPLBLD CREATININE-BSD FMLA CKD-EPI: >60 MLS/MIN/1.73/M2
GLOBULIN SER-MCNC: 3 GM/DL (ref 2.4–3.5)
GLUCOSE SERPL-MCNC: 116 MG/DL (ref 74–100)
GLUCOSE UR QL STRIP.AUTO: NEGATIVE
HCT VFR BLD AUTO: 41.6 % (ref 37–47)
HGB BLD-MCNC: 13.4 G/DL (ref 12–16)
HYALINE CASTS #/AREA URNS LPF: ABNORMAL /LPF
IMM GRANULOCYTES # BLD AUTO: 0.02 X10(3)/MCL (ref 0–0.04)
IMM GRANULOCYTES NFR BLD AUTO: 0.3 %
INR PPP: 1
KETONES UR QL STRIP.AUTO: ABNORMAL
LEUKOCYTE ESTERASE UR QL STRIP.AUTO: NEGATIVE
LYMPHOCYTES # BLD AUTO: 1.68 X10(3)/MCL (ref 0.6–4.6)
LYMPHOCYTES NFR BLD AUTO: 23 %
MCH RBC QN AUTO: 25.8 PG (ref 27–31)
MCHC RBC AUTO-ENTMCNC: 32.2 G/DL (ref 33–36)
MCV RBC AUTO: 80.2 FL (ref 80–94)
MONOCYTES # BLD AUTO: 0.53 X10(3)/MCL (ref 0.1–1.3)
MONOCYTES NFR BLD AUTO: 7.2 %
MUCOUS THREADS URNS QL MICRO: ABNORMAL /LPF
NEUTROPHILS # BLD AUTO: 4.86 X10(3)/MCL (ref 2.1–9.2)
NEUTROPHILS NFR BLD AUTO: 66.4 %
NITRITE UR QL STRIP.AUTO: POSITIVE
NRBC BLD AUTO-RTO: 0 %
PH UR STRIP.AUTO: 5.5 [PH]
PLATELET # BLD AUTO: 227 X10(3)/MCL (ref 130–400)
PLATELETS.RETICULATED NFR BLD AUTO: 10.1 % (ref 0.9–11.2)
PMV BLD AUTO: 12.2 FL (ref 7.4–10.4)
POCT GLUCOSE: 113 MG/DL (ref 70–110)
POTASSIUM SERPL-SCNC: 4.4 MMOL/L (ref 3.5–5.1)
PROT SERPL-MCNC: 6.8 GM/DL (ref 6.4–8.3)
PROT UR QL STRIP.AUTO: ABNORMAL
PROTHROMBIN TIME: 13.3 SECONDS (ref 12.5–14.5)
RBC # BLD AUTO: 5.19 X10(6)/MCL (ref 4.2–5.4)
RBC #/AREA URNS AUTO: ABNORMAL /HPF
RBC UR QL AUTO: NEGATIVE
SODIUM SERPL-SCNC: 139 MMOL/L (ref 136–145)
SP GR UR STRIP.AUTO: >=1.03 (ref 1–1.03)
SQUAMOUS #/AREA URNS LPF: ABNORMAL /HPF
UROBILINOGEN UR STRIP-ACNC: NORMAL
WBC # SPEC AUTO: 7.32 X10(3)/MCL (ref 4.5–11.5)
WBC #/AREA URNS AUTO: ABNORMAL /HPF

## 2024-04-22 PROCEDURE — 82962 GLUCOSE BLOOD TEST: CPT

## 2024-04-22 PROCEDURE — 81001 URINALYSIS AUTO W/SCOPE: CPT | Performed by: NURSE PRACTITIONER

## 2024-04-22 PROCEDURE — 99285 EMERGENCY DEPT VISIT HI MDM: CPT | Mod: 25

## 2024-04-22 PROCEDURE — 85610 PROTHROMBIN TIME: CPT | Performed by: NURSE PRACTITIONER

## 2024-04-22 PROCEDURE — 85025 COMPLETE CBC W/AUTO DIFF WBC: CPT | Performed by: NURSE PRACTITIONER

## 2024-04-22 PROCEDURE — 25000003 PHARM REV CODE 250: Performed by: EMERGENCY MEDICINE

## 2024-04-22 PROCEDURE — 85730 THROMBOPLASTIN TIME PARTIAL: CPT | Performed by: NURSE PRACTITIONER

## 2024-04-22 PROCEDURE — 80053 COMPREHEN METABOLIC PANEL: CPT | Performed by: NURSE PRACTITIONER

## 2024-04-22 RX ORDER — LORAZEPAM 1 MG/1
2 TABLET ORAL
Status: DISCONTINUED | OUTPATIENT
Start: 2024-04-22 | End: 2024-04-22

## 2024-04-22 RX ORDER — LORAZEPAM 1 MG/1
1 TABLET ORAL
Status: COMPLETED | OUTPATIENT
Start: 2024-04-22 | End: 2024-04-22

## 2024-04-22 RX ADMIN — LORAZEPAM 1 MG: 1 TABLET ORAL at 12:04

## 2024-04-22 NOTE — TELEPHONE ENCOUNTER
Pt called reporting she had angiogram & LP on Friday 04/19/24 & since Saturday morning pt has been experiencing confusion, headaches, dizziness, drowsiness & urinary incontinence.   Pt also c/o difficulties w/ vision, reports when looking at her medication bottles everything appears to be jumbled. Pt also reports a bright spot in the middle of her vision.  Pt has been taking Nurtec for headache, but reports she has not had any relief.    CB# [phone]

## 2024-04-22 NOTE — TELEPHONE ENCOUNTER
Pt was advised that per NP, she is to present to ED.  Pt verbalized understanding & confirmed that she does have transportation to ED.

## 2024-04-22 NOTE — ED PROVIDER NOTES
"Encounter Date: 2024    SCRIBE #1 NOTE: I, Purnima Baker, am scribing for, and in the presence of,  Alex Pradhan MD. I have scribed the entire note.       History     Chief Complaint   Patient presents with    Dizziness     Reports difficulty speaking, incontinence x2 days. Also c/o dizziness and numbness tingling on L side of face.Reports having LP an cerbral angiogram on . Neuro intact in triage. GCS 15. Forgetful at times. Van neg. .      54 year old female with a hx of hemiplegic migraines, CAD, HTN, and anxiety presents to the ED for headache. Pt states she has been having episodes of headache for some time. Pt states she has also had episodes where she becomes weak, forgets everything, and experiences temporary paralysis. Pt states she has had multiple MRI's done for this with no acute findings. Pt had a cerebral angiogram and lumbar puncture done on . Pt was told she would need a stent in a vein on the right side of her neck. Pt also notes that she was told her CSF is "borderline high" after her LP. Pt states she did experience relief from her symptoms for a day after her LP. Pt was recommended by her neurologist to come to the ED for further evaluation.     The history is provided by the patient. No  was used.     Review of patient's allergies indicates:  No Known Allergies  Past Medical History:   Diagnosis Date    Acid reflux     Anemia, unspecified     Anxiety     Coronary artery disease     Hyperlipidemia     Hypertension     Migraines     Myocardial infarction     Thrombus     apical    Thyroid disease      Past Surgical History:   Procedure Laterality Date    ANKLE SURGERY Left     ANTERIOR CERVICAL DISCECTOMY W/ FUSION       SECTION       SECTION  2005    CHOLECYSTECTOMY      CORONARY ANGIOPLASTY WITH STENT PLACEMENT      DILATION AND CURETTAGE OF UTERUS      HYSTERECTOMY      LUMBAR PUNCTURE       Family History   Problem Relation " Name Age of Onset    Cancer Mother          Pancriatic cancer    Hydrocephalus Mother      No Known Problems Father      No Known Problems Sister      No Known Problems Brother      Hydrocephalus Maternal Grandmother      Parkinsonism Maternal Grandfather       Social History     Tobacco Use    Smoking status: Never     Passive exposure: Never    Smokeless tobacco: Never   Substance Use Topics    Alcohol use: Not Currently    Drug use: Never     Review of Systems   Constitutional:  Negative for chills, fatigue and fever.   HENT:  Negative for congestion and sore throat.    Eyes:  Negative for visual disturbance.   Respiratory:  Negative for cough and shortness of breath.    Cardiovascular:  Negative for chest pain.   Gastrointestinal:  Negative for abdominal pain, diarrhea, nausea and vomiting.   Genitourinary:  Negative for dysuria.   Musculoskeletal:  Negative for myalgias.   Skin:  Negative for rash.   Neurological:  Positive for dizziness and headaches. Negative for numbness.        Episodes of paralysis and forgetfulness.    All other systems reviewed and are negative.      Physical Exam     Initial Vitals [04/22/24 0920]   BP Pulse Resp Temp SpO2   106/73 66 18 98.5 °F (36.9 °C) 99 %      MAP       --         Physical Exam    Nursing note and vitals reviewed.  Constitutional: She appears well-developed and well-nourished.   HENT:   Head: Normocephalic and atraumatic.   Mouth/Throat: Oropharynx is clear and moist.   Eyes: Pupils are equal, round, and reactive to light.   Neck: Neck supple.   Normal range of motion.  Cardiovascular:  Normal rate, regular rhythm, normal heart sounds and intact distal pulses.           Pulmonary/Chest: Breath sounds normal.   Abdominal: Abdomen is soft. Bowel sounds are normal. There is no abdominal tenderness. There is no rebound.   Musculoskeletal:         General: No tenderness. Normal range of motion.      Cervical back: Normal range of motion and neck supple.      Neurological: She is alert and oriented to person, place, and time. She has normal strength. No sensory deficit. GCS score is 15. GCS eye subscore is 4. GCS verbal subscore is 5. GCS motor subscore is 6.   Reflex Scores:       Bicep reflexes are 3+ on the right side and 3+ on the left side.       Patellar reflexes are 3+ on the right side and 3+ on the left side.  Skin: Skin is warm and dry.   Psychiatric: She has a normal mood and affect.         ED Course   Procedures  Labs Reviewed   COMPREHENSIVE METABOLIC PANEL - Abnormal; Notable for the following components:       Result Value    Chloride 109 (*)     Carbon Dioxide 18 (*)     Glucose Level 116 (*)     Alanine Aminotransferase 58 (*)     Aspartate Aminotransferase 75 (*)     All other components within normal limits   URINALYSIS, REFLEX TO URINE CULTURE - Abnormal; Notable for the following components:    Color, UA Dark-Orange (*)     Protein, UA 1+ (*)     Ketones, UA Trace (*)     Bilirubin, UA 2+ (*)     Nitrites, UA Positive (*)     Bacteria, UA Few (*)     Mucous, UA Moderate (*)     All other components within normal limits   CBC WITH DIFFERENTIAL - Abnormal; Notable for the following components:    MCH 25.8 (*)     MCHC 32.2 (*)     RDW 19.8 (*)     MPV 12.2 (*)     All other components within normal limits   POCT GLUCOSE - Abnormal; Notable for the following components:    POCT Glucose 113 (*)     All other components within normal limits   APTT - Normal   PROTIME-INR - Normal   CBC W/ AUTO DIFFERENTIAL    Narrative:     The following orders were created for panel order CBC Auto Differential.  Procedure                               Abnormality         Status                     ---------                               -----------         ------                     CBC with Differential[[id number]]       Abnormal            Final result                 Please view results for these tests on the individual orders.          Imaging Results               CT Head Without Contrast (Final result)  Result time 04/22/24 10:10:28      Final result by Segun Calvillo MD (04/22/24 10:10:28)                   Impression:      No acute intracranial abnormality identified.      Electronically signed by: Segun Calvillo  Date:    04/22/2024  Time:    10:10               Narrative:    EXAMINATION:  CT HEAD WITHOUT CONTRAST    CLINICAL HISTORY:  Neuro deficit, acute, stroke suspected;    TECHNIQUE:  Low dose axial images were obtained through the head.  Coronal and sagittal reformations were also performed. Contrast was not administered.    Automatic exposure control was utilized to reduce the patient's radiation dose.    DLP= 1158    COMPARISON:  04/13/2024    FINDINGS:  No acute intracranial hemorrhage, edema or mass. No acute parenchymal abnormality.    There is no hydrocephalus, evidence of herniation or midline shift. The ventricles and sulci are normal.    There is normal gray white differentiation.    The osseous structures are normal.    The mastoid air cells are clear.    The auditory canals are patent bilaterally.    The globes and orbital contents are normal bilaterally.    The visualized maxillary, ethmoid and sphenoid sinuses are clear.                                       Medications   LORazepam tablet 1 mg (has no administration in time range)     Medical Decision Making  The differential diagnosis includes, but is not limited to, obstructive hydrocephalus, ICH, CVA, or headache.      Amount and/or Complexity of Data Reviewed  External Data Reviewed: notes.     Details: Reviewed patient's prior procedure notes, for a lumbar puncture this past Friday with opening pressure 24 closing pressure as well.  Reviewed patient's prior neurology clinic notes, as well as previous admissions for her hemiplegic migraines  Labs: ordered.     Details: Unremarkable  Radiology: ordered and independent interpretation performed.     Details: CT head unremarkable  Discussion of  "management or test interpretation with external provider(s): Consulted Neurology who evaluated the patient in the emergency department    Risk  Prescription drug management.            Scribe Attestation:   Scribe #1: I performed the above scribed service and the documentation accurately describes the services I performed. I attest to the accuracy of the note.    Attending Attestation:           Physician Attestation for Scribe:  Physician Attestation Statement for Scribe #1: I, Alex Pradhan MD, reviewed documentation, as scribed by Purnima Baker in my presence, and it is both accurate and complete.             ED Course as of 04/22/24 1234   Mon Apr 22, 2024   1122 Patient with long history of what sounds like hemiplegic migraines.  Patient states that she was told by the neurologist that she may need a stent in 1 of the veins that drain the brain.  She also reports that she was told her lumbar puncture pressure was "borderline high".  She states she did feel better after the procedure for 1 day.  Patient's ED workup is essentially unremarkable, so will discuss with Neurology [MP]   1232 Dr. Frazier evaluated the patient in the ED and cleared for discharge.  Patient has follow-up on Wednesday. [MP]      ED Course User Index  [MP] Alex Pradhan MD                             Clinical Impression:  Final diagnoses:  [G43.809] Other migraine without status migrainosus, not intractable (Primary)          ED Disposition Condition    Discharge Stable          ED Prescriptions    None       Follow-up Information       Follow up With Specialties Details Why Contact Info    [facility] - Emergency Dept Emergency Medicine Go to  If symptoms worsen, As needed [address] [facility] [id number] [phone]    Henri Sanders MD Neurology, Interventional Radiology  Keep appointment on Wednesday [address] [place] [address] [address]  [place] [address]  [phone]               Alex Pradhan, " MD  04/22/24 6648

## 2024-04-22 NOTE — FIRST PROVIDER EVALUATION
Medical screening examination initiated.  I have conducted a focused provider triage encounter, findings are as follows:    Brief history of present illness:  Patient states difficulty speaking and incontinence x 2 days. States dizziness. States that she had an LP and a cerebral angiogram on 04/19/2024.     There were no vitals filed for this visit.    Pertinent physical exam:  Awake, alert    Brief workup plan:  Labs, Imaging    Preliminary workup initiated; this workup will be continued and followed by the physician or advanced practice provider that is assigned to the patient when roomed.

## 2024-04-24 ENCOUNTER — OFFICE VISIT (OUTPATIENT)
Dept: NEUROLOGY | Facility: CLINIC | Age: 54
End: 2024-04-24
Payer: COMMERCIAL

## 2024-04-24 VITALS
WEIGHT: 192.88 LBS | SYSTOLIC BLOOD PRESSURE: 135 MMHG | HEIGHT: 65 IN | BODY MASS INDEX: 32.14 KG/M2 | DIASTOLIC BLOOD PRESSURE: 99 MMHG

## 2024-04-24 DIAGNOSIS — I63.9 CEREBROVASCULAR ACCIDENT (CVA), UNSPECIFIED MECHANISM: ICD-10-CM

## 2024-04-24 DIAGNOSIS — E66.9 OBESITY, UNSPECIFIED CLASSIFICATION, UNSPECIFIED OBESITY TYPE, UNSPECIFIED WHETHER SERIOUS COMORBIDITY PRESENT: ICD-10-CM

## 2024-04-24 DIAGNOSIS — I10 HYPERTENSION, UNSPECIFIED TYPE: ICD-10-CM

## 2024-04-24 DIAGNOSIS — G93.2 IIH (IDIOPATHIC INTRACRANIAL HYPERTENSION): Primary | ICD-10-CM

## 2024-04-24 PROCEDURE — 99999 PR PBB SHADOW E&M-EST. PATIENT-LVL III: CPT | Mod: PBBFAC,,, | Performed by: PSYCHIATRY & NEUROLOGY

## 2024-04-24 PROCEDURE — 99215 OFFICE O/P EST HI 40 MIN: CPT | Mod: S$GLB,,, | Performed by: PSYCHIATRY & NEUROLOGY

## 2024-04-24 RX ORDER — CLOPIDOGREL BISULFATE 75 MG/1
75 TABLET ORAL DAILY
Qty: 30 TABLET | Refills: 2 | Status: SHIPPED | OUTPATIENT
Start: 2024-04-24 | End: 2025-04-24

## 2024-04-24 NOTE — PROGRESS NOTES
Neurology Office Visit  Neurology  HPI:    Yandy Chaudhry is a 54 y.o. female who presents to the clinic for follow up from a recent hospitalization. She was previously seen by Dr Henao, Dr Navarro, for headaches and stroke like symptoms. She was last seen in the clinic by Eboni Kim NP in April 2024 and subsequently underwent a cerebral angiogram with venous pressure monitoring on 4/19. Her angiogram demonstrated bilateral transverse sinus stenosis (Right worse than left) with a positive pressure  gradient across the right transverse-sigmoid sinus junction of 9 mm Hg. She has been having long standing headaches, with left facial/arm numbness, vision changes, tinnitus (L>R), brain fog, confusion. She said her symptoms started getting worse since September 2023 and is now able to function normally due to her symptoms. She also underwent 2 LP. First LP was done in November 2023 with OP of 21 and had a second LP on 4/19 which had showed OP of 24. She had reported significant improvement of her symptoms following the LP with resolution of headaches, clear thinking, resolution of numbness and tinnitus.     Today, she reports that the resolution of symptoms after LP lasted 1 day and by the next day, she had worsening headache, confusion which prompted a visit to ED. She was evaluated in the ED with a CTH and discharged home as she had a follow up scheduled for today. She reports feeling better today but continues to have headaches and tinnitus. She denies any other new symptoms otherwise. She is going to see her ophthalmologist today and also  prescription for diamox. She said she could not tolerate diamox in the past due to high dose causing inability to stand or move. However she is willing to try it at a smaller dose today. 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     ROS:  Review of Systems   HENT:  Positive for  tinnitus. Negative for ear pain and hearing loss.    Eyes:  Positive for blurred vision. Negative for double vision, photophobia and pain.   Neurological:  Positive for dizziness, tingling, sensory change and headaches. Negative for speech change, focal weakness and loss of consciousness.        Past Medical History:   Diagnosis Date    Acid reflux     Anemia, unspecified     Anxiety     Coronary artery disease     Hyperlipidemia     Hypertension     Migraines     Myocardial infarction     Thrombus     apical    Thyroid disease      Past Surgical History:   Procedure Laterality Date    ANKLE SURGERY Left     ANTERIOR CERVICAL DISCECTOMY W/ FUSION       SECTION  2003     SECTION  2005    CHOLECYSTECTOMY      CORONARY ANGIOPLASTY WITH STENT PLACEMENT      DILATION AND CURETTAGE OF UTERUS      HYSTERECTOMY      LUMBAR PUNCTURE       Family History   Problem Relation Name Age of Onset    Cancer Mother          Pancriatic cancer    Hydrocephalus Mother      No Known Problems Father      No Known Problems Sister      No Known Problems Brother      Hydrocephalus Maternal Grandmother      Parkinsonism Maternal Grandfather       Review of patient's allergies indicates:  No Known Allergies    Current Outpatient Medications:     acetaZOLAMIDE (DIAMOX) 250 MG tablet, Take 1 tablet (250 mg total) by mouth 2 (two) times daily., Disp: 180 tablet, Rfl: 3    amLODIPine (NORVASC) 5 MG tablet, Take 5 mg by mouth once daily., Disp: , Rfl:     aspirin (ECOTRIN) 81 MG EC tablet, Take 81 mg by mouth once daily., Disp: , Rfl:     FLUoxetine 40 MG capsule, Take 40 mg by mouth 2 (two) times daily., Disp: , Rfl:     fluticasone propionate (FLONASE) 50 mcg/actuation nasal spray, 1 spray by Each Nostril route 2 (two) times daily. Takes as needed, Disp: , Rfl:     gabapentin (NEURONTIN) 600 MG tablet, Take 1 tablet (600 mg total) by mouth 3 (three) times daily. (Patient taking differently: Take 600 mg by mouth once daily.), Disp:  90 tablet, Rfl: 11    levothyroxine (SYNTHROID) 75 MCG tablet, Take 75 mcg by mouth before breakfast., Disp: , Rfl:     methocarbamoL (ROBAXIN) 500 MG Tab, Take 500 mg by mouth as needed., Disp: , Rfl:     metoprolol tartrate (LOPRESSOR) 25 MG tablet, Take 25 mg by mouth 2 (two) times daily., Disp: , Rfl:     nitroGLYCERIN (NITROSTAT) 0.4 MG SL tablet, PLEASE SEE ATTACHED FOR DETAILED DIRECTIONS, Disp: , Rfl:     oxybutynin (DITROPAN-XL) 10 MG 24 hr tablet, Take 10 mg by mouth once daily. As needed, Disp: , Rfl:     pantoprazole (PROTONIX) 40 MG tablet, Take 40 mg by mouth once daily., Disp: , Rfl:     rimegepant 75 mg odt, Take 1 tablet (75 mg total) by mouth every other day. Place ODT tablet on the tongue; alternatively the ODT tablet may be placed under the tongue, Disp: 15 tablet, Rfl: 11    rOPINIRole (REQUIP) 0.5 MG tablet, Take 0.5 mg by mouth every evening., Disp: , Rfl:     rosuvastatin (CRESTOR) 40 MG Tab, Take 40 mg by mouth once daily., Disp: , Rfl:     tiZANidine (ZANAFLEX) 4 MG tablet, Take 4 mg by mouth every 8 (eight) hours as needed., Disp: , Rfl:     clopidogreL (PLAVIX) 75 mg tablet, Take 1 tablet (75 mg total) by mouth once daily., Disp: 30 tablet, Rfl: 2  Current Outpatient Medications   Medication Sig Dispense Refill    acetaZOLAMIDE (DIAMOX) 250 MG tablet Take 1 tablet (250 mg total) by mouth 2 (two) times daily. 180 tablet 3    amLODIPine (NORVASC) 5 MG tablet Take 5 mg by mouth once daily.      aspirin (ECOTRIN) 81 MG EC tablet Take 81 mg by mouth once daily.      FLUoxetine 40 MG capsule Take 40 mg by mouth 2 (two) times daily.      fluticasone propionate (FLONASE) 50 mcg/actuation nasal spray 1 spray by Each Nostril route 2 (two) times daily. Takes as needed      gabapentin (NEURONTIN) 600 MG tablet Take 1 tablet (600 mg total) by mouth 3 (three) times daily. (Patient taking differently: Take 600 mg by mouth once daily.) 90 tablet 11    levothyroxine (SYNTHROID) 75 MCG tablet Take 75 mcg  by mouth before breakfast.      methocarbamoL (ROBAXIN) 500 MG Tab Take 500 mg by mouth as needed.      metoprolol tartrate (LOPRESSOR) 25 MG tablet Take 25 mg by mouth 2 (two) times daily.      nitroGLYCERIN (NITROSTAT) 0.4 MG SL tablet PLEASE SEE ATTACHED FOR DETAILED DIRECTIONS      oxybutynin (DITROPAN-XL) 10 MG 24 hr tablet Take 10 mg by mouth once daily. As needed      pantoprazole (PROTONIX) 40 MG tablet Take 40 mg by mouth once daily.      rimegepant 75 mg odt Take 1 tablet (75 mg total) by mouth every other day. Place ODT tablet on the tongue; alternatively the ODT tablet may be placed under the tongue 15 tablet 11    rOPINIRole (REQUIP) 0.5 MG tablet Take 0.5 mg by mouth every evening.      rosuvastatin (CRESTOR) 40 MG Tab Take 40 mg by mouth once daily.      tiZANidine (ZANAFLEX) 4 MG tablet Take 4 mg by mouth every 8 (eight) hours as needed.      clopidogreL (PLAVIX) 75 mg tablet Take 1 tablet (75 mg total) by mouth once daily. 30 tablet 2     No current facility-administered medications for this visit.     Social History     Tobacco Use    Smoking status: Never     Passive exposure: Never    Smokeless tobacco: Never   Substance Use Topics    Alcohol use: Not Currently    Drug use: Never         Vitals:    04/24/24 1021   BP: (!) 135/99       Physical Exam:  HEENT NC/AT  CV RRR, no tenderness  Resp clear without dyspnea  GI soft  Ext no edema    Neuro:  Alert & oriented x 3  EOMI, PERRLA, visual field intact in all 4 quadrants, no nystagmus or diplopia on lateral gaze  Face symmetric, speech clear and fluent, facial sensation equal bilaterally  Tongue midline  Strength 5/5 in bilateral upper and lower extremities   Sensation intact and equal bilaterally  Gait steady and balanced     mRS 1    Imaging:  Reviewed DSA, LP  Bilateral transverse sinus stenosis (right worse than left) with positive venous pressure gradient on right 9 mm HD and 7 on left  Opening pressure of 24 with resolution of symptoms with  high volume tap.     Assessment:   54 F with long standing history of headache, brain fog, tinnitus, left sided paresthesia was previously seen by multiple providers including Dr Navarro, Dr Henao. She was diagnosed with pseudotumor cerebri in the past but had failed diamox therapy due to intolerance. She also has evidence of borderline elevated Opening pressure on LP (24) but had resolution of symptoms with high volume spinal tap. Given her symptomatology and findings of cerebral angiogram with positive venous pressure gradient on bilaterally (R>L), she likely has increased intracranial pressures due to venous sinus stenosis indicating a diagnosis of Pseudotumor cerebri.     I discussed the diagnosis and treatment of the Pseudotumor cerebri including medical management vs surgical options including venous sinus stenting vs  shunt. I explained the risks associated with venous sinus stenting including risk of intracranial hemorrhage, in-stent stenosis/thrombosis, recurrence of headaches or failure of therapy. She expressed that she is a high functioning individual at baseline and has not been herself since September, 2023 due to her symptoms of headache, confusion, brain fog, tinnitus. She understands the risks involved with the treatment and would like to proceed with the venous sinus stenting for pseudotumor cerebri as the benefits outweighs the risks.     Plan:   - start diamox 250 mg bid  - start plavix 75 mg daily one week prior to the procedure and will continue for 3-6 months after the procedure  - continue aspirin 81mg  - plan for cerebral angiogram with venous sinus stenting in 2 weeks  - follow up with ophthalmology for papilledema  - follow up in clinic with Eboni Kim NP in July as scheduled.     Henri Sanders MD  Vascular and Interventional Neurology

## 2024-05-06 ENCOUNTER — TELEPHONE (OUTPATIENT)
Dept: NEUROLOGY | Facility: CLINIC | Age: 54
End: 2024-05-06
Payer: COMMERCIAL

## 2024-05-06 NOTE — TELEPHONE ENCOUNTER
----- Message from Poonam Hensley sent at 2024  8:21 AM CDT -----  Regarding: procedure questions  Fri 3-May-24 01:01p TAKEN    To:          Ofc When in  From:        Yandy Chaudhry  Phone:       [phone]  Patient:     Same  :         1970  RegDr:      Dr Sanders  Ref:         received notification from [facility] that procedure has been canceled , please call    Subject:          Patient Calls  ClrID:    [phone]

## 2024-05-06 NOTE — TELEPHONE ENCOUNTER
Patient r/s to May 23rd. Also advised patient to start Plavix one week prior to procedure. Verbalized understanding.

## 2024-05-06 NOTE — TELEPHONE ENCOUNTER
"Patient states experiencing "jumpy vision", dizziness and nausea due to Diamox. Patient states can't tolerate side effects. Any suggestions?  "

## 2024-05-23 ENCOUNTER — ANESTHESIA (OUTPATIENT)
Dept: INTERVENTIONAL RADIOLOGY/VASCULAR | Facility: HOSPITAL | Age: 54
DRG: 027 | End: 2024-05-23
Payer: COMMERCIAL

## 2024-05-23 ENCOUNTER — HOSPITAL ENCOUNTER (INPATIENT)
Dept: INTERVENTIONAL RADIOLOGY/VASCULAR | Facility: HOSPITAL | Age: 54
LOS: 1 days | Discharge: HOME | DRG: 027 | End: 2024-05-24
Attending: PSYCHIATRY & NEUROLOGY | Admitting: INTERNAL MEDICINE
Payer: COMMERCIAL

## 2024-05-23 DIAGNOSIS — G93.2 IIH (IDIOPATHIC INTRACRANIAL HYPERTENSION): ICD-10-CM

## 2024-05-23 DIAGNOSIS — G93.2 IDIOPATHIC INTRACRANIAL HYPERTENSION: ICD-10-CM

## 2024-05-23 LAB
ANION GAP SERPL CALC-SCNC: 10 MEQ/L
BASOPHILS # BLD AUTO: 0.13 X10(3)/MCL
BASOPHILS NFR BLD AUTO: 1.7 %
BUN SERPL-MCNC: 13.3 MG/DL (ref 9.8–20.1)
CALCIUM SERPL-MCNC: 8.9 MG/DL (ref 8.4–10.2)
CHLORIDE SERPL-SCNC: 109 MMOL/L (ref 98–107)
CO2 SERPL-SCNC: 23 MMOL/L (ref 22–29)
CREAT SERPL-MCNC: 0.87 MG/DL (ref 0.55–1.02)
CREAT/UREA NIT SERPL: 15
EOSINOPHIL # BLD AUTO: 0.4 X10(3)/MCL (ref 0–0.9)
EOSINOPHIL NFR BLD AUTO: 5.4 %
ERYTHROCYTE [DISTWIDTH] IN BLOOD BY AUTOMATED COUNT: 18.7 % (ref 11.5–17)
GFR SERPLBLD CREATININE-BSD FMLA CKD-EPI: >60 ML/MIN/1.73/M2
GLUCOSE SERPL-MCNC: 102 MG/DL (ref 74–100)
HCT VFR BLD AUTO: 36.3 % (ref 37–47)
HGB BLD-MCNC: 11.8 G/DL (ref 12–16)
IMM GRANULOCYTES # BLD AUTO: 0.01 X10(3)/MCL (ref 0–0.04)
IMM GRANULOCYTES NFR BLD AUTO: 0.1 %
INR PPP: 1
LYMPHOCYTES # BLD AUTO: 2.33 X10(3)/MCL (ref 0.6–4.6)
LYMPHOCYTES NFR BLD AUTO: 31.4 %
MCH RBC QN AUTO: 25.8 PG (ref 27–31)
MCHC RBC AUTO-ENTMCNC: 32.5 G/DL (ref 33–36)
MCV RBC AUTO: 79.3 FL (ref 80–94)
MONOCYTES # BLD AUTO: 0.77 X10(3)/MCL (ref 0.1–1.3)
MONOCYTES NFR BLD AUTO: 10.4 %
NEUTROPHILS # BLD AUTO: 3.79 X10(3)/MCL (ref 2.1–9.2)
NEUTROPHILS NFR BLD AUTO: 51 %
NRBC BLD AUTO-RTO: 0 %
PLATELET # BLD AUTO: 235 X10(3)/MCL (ref 130–400)
PLATELETS.RETICULATED NFR BLD AUTO: 9.2 % (ref 0.9–11.2)
PMV BLD AUTO: 12.1 FL (ref 7.4–10.4)
POTASSIUM SERPL-SCNC: 3.8 MMOL/L (ref 3.5–5.1)
PROTHROMBIN TIME: 13.2 SECONDS (ref 12.5–14.5)
RBC # BLD AUTO: 4.58 X10(6)/MCL (ref 4.2–5.4)
SODIUM SERPL-SCNC: 142 MMOL/L (ref 136–145)
WBC # SPEC AUTO: 7.43 X10(3)/MCL (ref 4.5–11.5)

## 2024-05-23 PROCEDURE — 36012 PLACE CATHETER IN VEIN: CPT | Mod: RT,,, | Performed by: PSYCHIATRY & NEUROLOGY

## 2024-05-23 PROCEDURE — 37238 OPEN/PERQ PLACE STENT SAME: CPT | Performed by: PSYCHIATRY & NEUROLOGY

## 2024-05-23 PROCEDURE — 20000000 HC ICU ROOM

## 2024-05-23 PROCEDURE — 75870 VEIN X-RAY SKULL: CPT | Mod: 26,59,, | Performed by: PSYCHIATRY & NEUROLOGY

## 2024-05-23 PROCEDURE — 37238 OPEN/PERQ PLACE STENT SAME: CPT | Mod: RT,,, | Performed by: PSYCHIATRY & NEUROLOGY

## 2024-05-23 PROCEDURE — 25000003 PHARM REV CODE 250

## 2024-05-23 PROCEDURE — 36012 PLACE CATHETER IN VEIN: CPT | Mod: RT | Performed by: PSYCHIATRY & NEUROLOGY

## 2024-05-23 PROCEDURE — 63600175 PHARM REV CODE 636 W HCPCS: Performed by: NURSE ANESTHETIST, CERTIFIED REGISTERED

## 2024-05-23 PROCEDURE — 25000003 PHARM REV CODE 250: Performed by: PSYCHIATRY & NEUROLOGY

## 2024-05-23 PROCEDURE — 63600175 PHARM REV CODE 636 W HCPCS

## 2024-05-23 PROCEDURE — 76937 US GUIDE VASCULAR ACCESS: CPT | Mod: TC | Performed by: PSYCHIATRY & NEUROLOGY

## 2024-05-23 PROCEDURE — 75870 VEIN X-RAY SKULL: CPT | Mod: TC,59 | Performed by: PSYCHIATRY & NEUROLOGY

## 2024-05-23 PROCEDURE — 63600175 PHARM REV CODE 636 W HCPCS: Performed by: PSYCHIATRY & NEUROLOGY

## 2024-05-23 PROCEDURE — 61635 INTRACRAN ANGIOPLSTY W/STENT: CPT

## 2024-05-23 PROCEDURE — 037G34Z DILATION OF INTRACRANIAL ARTERY WITH DRUG-ELUTING INTRALUMINAL DEVICE, PERCUTANEOUS APPROACH: ICD-10-PCS | Performed by: PSYCHIATRY & NEUROLOGY

## 2024-05-23 PROCEDURE — B5121ZZ FLUOROSCOPY OF INTRACRANIAL SINUSES USING LOW OSMOLAR CONTRAST: ICD-10-PCS | Performed by: PSYCHIATRY & NEUROLOGY

## 2024-05-23 PROCEDURE — 75860 VEIN X-RAY NECK: CPT | Mod: 26,59,, | Performed by: PSYCHIATRY & NEUROLOGY

## 2024-05-23 PROCEDURE — 75860 VEIN X-RAY NECK: CPT | Mod: TC,59 | Performed by: PSYCHIATRY & NEUROLOGY

## 2024-05-23 PROCEDURE — 85610 PROTHROMBIN TIME: CPT | Performed by: PSYCHIATRY & NEUROLOGY

## 2024-05-23 PROCEDURE — 76937 US GUIDE VASCULAR ACCESS: CPT | Mod: 26,,, | Performed by: PSYCHIATRY & NEUROLOGY

## 2024-05-23 PROCEDURE — 36415 COLL VENOUS BLD VENIPUNCTURE: CPT | Performed by: PSYCHIATRY & NEUROLOGY

## 2024-05-23 PROCEDURE — B5131ZZ FLUOROSCOPY OF RIGHT JUGULAR VEINS USING LOW OSMOLAR CONTRAST: ICD-10-PCS | Performed by: PSYCHIATRY & NEUROLOGY

## 2024-05-23 PROCEDURE — 25000003 PHARM REV CODE 250: Performed by: NURSE ANESTHETIST, CERTIFIED REGISTERED

## 2024-05-23 PROCEDURE — 85025 COMPLETE CBC W/AUTO DIFF WBC: CPT | Performed by: PSYCHIATRY & NEUROLOGY

## 2024-05-23 PROCEDURE — 80048 BASIC METABOLIC PNL TOTAL CA: CPT | Performed by: PSYCHIATRY & NEUROLOGY

## 2024-05-23 RX ORDER — METHYLPREDNISOLONE 4 MG/1
4 TABLET ORAL ONCE
Status: COMPLETED | OUTPATIENT
Start: 2024-05-24 | End: 2024-05-24

## 2024-05-23 RX ORDER — METHYLPREDNISOLONE 4 MG/1
8 TABLET ORAL ONCE
Status: COMPLETED | OUTPATIENT
Start: 2024-05-23 | End: 2024-05-23

## 2024-05-23 RX ORDER — METHYLPREDNISOLONE 4 MG/1
4 TABLET ORAL ONCE
Status: DISCONTINUED | OUTPATIENT
Start: 2024-05-26 | End: 2024-05-24 | Stop reason: HOSPADM

## 2024-05-23 RX ORDER — METHYLPREDNISOLONE 4 MG/1
4 TABLET ORAL ONCE
Status: DISCONTINUED | OUTPATIENT
Start: 2024-05-25 | End: 2024-05-24 | Stop reason: HOSPADM

## 2024-05-23 RX ORDER — LIDOCAINE HYDROCHLORIDE 20 MG/ML
INJECTION, SOLUTION INFILTRATION; PERINEURAL
Status: COMPLETED | OUTPATIENT
Start: 2024-05-23 | End: 2024-05-23

## 2024-05-23 RX ORDER — HYDRALAZINE HYDROCHLORIDE 20 MG/ML
10 INJECTION INTRAMUSCULAR; INTRAVENOUS EVERY 6 HOURS PRN
Status: DISCONTINUED | OUTPATIENT
Start: 2024-05-23 | End: 2024-05-24 | Stop reason: HOSPADM

## 2024-05-23 RX ORDER — CLOPIDOGREL BISULFATE 75 MG/1
75 TABLET ORAL NIGHTLY
Status: DISCONTINUED | OUTPATIENT
Start: 2024-05-23 | End: 2024-05-24 | Stop reason: HOSPADM

## 2024-05-23 RX ORDER — METHYLPREDNISOLONE 4 MG/1
4 TABLET ORAL ONCE
Status: COMPLETED | OUTPATIENT
Start: 2024-05-23 | End: 2024-05-23

## 2024-05-23 RX ORDER — HEPARIN SODIUM 1000 [USP'U]/ML
INJECTION, SOLUTION INTRAVENOUS; SUBCUTANEOUS
Status: DISCONTINUED | OUTPATIENT
Start: 2024-05-23 | End: 2024-05-23

## 2024-05-23 RX ORDER — ACETAMINOPHEN 10 MG/ML
1000 INJECTION, SOLUTION INTRAVENOUS ONCE
Status: COMPLETED | OUTPATIENT
Start: 2024-05-23 | End: 2024-05-23

## 2024-05-23 RX ORDER — CALCIUM GLUCONATE 20 MG/ML
1 INJECTION, SOLUTION INTRAVENOUS
Status: DISCONTINUED | OUTPATIENT
Start: 2024-05-23 | End: 2024-05-23

## 2024-05-23 RX ORDER — ONDANSETRON HYDROCHLORIDE 2 MG/ML
4 INJECTION, SOLUTION INTRAVENOUS EVERY 6 HOURS PRN
Status: DISCONTINUED | OUTPATIENT
Start: 2024-05-23 | End: 2024-05-24 | Stop reason: HOSPADM

## 2024-05-23 RX ORDER — ONDANSETRON HYDROCHLORIDE 2 MG/ML
INJECTION, SOLUTION INTRAVENOUS
Status: COMPLETED
Start: 2024-05-23 | End: 2024-05-23

## 2024-05-23 RX ORDER — ACETAMINOPHEN 325 MG/1
650 TABLET ORAL EVERY 4 HOURS PRN
Status: DISCONTINUED | OUTPATIENT
Start: 2024-05-23 | End: 2024-05-24 | Stop reason: HOSPADM

## 2024-05-23 RX ORDER — METHYLPREDNISOLONE 4 MG/1
4 TABLET ORAL ONCE
Status: DISCONTINUED | OUTPATIENT
Start: 2024-05-27 | End: 2024-05-24 | Stop reason: HOSPADM

## 2024-05-23 RX ORDER — MIDAZOLAM HYDROCHLORIDE 5 MG/ML
INJECTION INTRAMUSCULAR; INTRAVENOUS
Status: DISCONTINUED | OUTPATIENT
Start: 2024-05-23 | End: 2024-05-23

## 2024-05-23 RX ORDER — FENTANYL CITRATE 50 UG/ML
INJECTION, SOLUTION INTRAMUSCULAR; INTRAVENOUS
Status: DISCONTINUED | OUTPATIENT
Start: 2024-05-23 | End: 2024-05-23

## 2024-05-23 RX ORDER — METHYLPREDNISOLONE 4 MG/1
4 TABLET ORAL ONCE
Status: DISCONTINUED | OUTPATIENT
Start: 2024-05-24 | End: 2024-05-24 | Stop reason: HOSPADM

## 2024-05-23 RX ORDER — LABETALOL HYDROCHLORIDE 5 MG/ML
10 INJECTION, SOLUTION INTRAVENOUS EVERY 6 HOURS PRN
Status: DISCONTINUED | OUTPATIENT
Start: 2024-05-23 | End: 2024-05-24 | Stop reason: HOSPADM

## 2024-05-23 RX ORDER — DEXMEDETOMIDINE HYDROCHLORIDE 100 UG/ML
INJECTION, SOLUTION INTRAVENOUS
Status: DISCONTINUED | OUTPATIENT
Start: 2024-05-23 | End: 2024-05-23

## 2024-05-23 RX ORDER — NAPROXEN SODIUM 220 MG/1
81 TABLET, FILM COATED ORAL NIGHTLY
Status: DISCONTINUED | OUTPATIENT
Start: 2024-05-23 | End: 2024-05-24 | Stop reason: HOSPADM

## 2024-05-23 RX ORDER — METHYLPREDNISOLONE 4 MG/1
8 TABLET ORAL ONCE
Status: DISCONTINUED | OUTPATIENT
Start: 2024-05-24 | End: 2024-05-24 | Stop reason: HOSPADM

## 2024-05-23 RX ORDER — SODIUM CHLORIDE 0.9 % (FLUSH) 0.9 %
10 SYRINGE (ML) INJECTION
OUTPATIENT
Start: 2024-05-23

## 2024-05-23 RX ORDER — SODIUM CHLORIDE 9 MG/ML
INJECTION, SOLUTION INTRAVENOUS CONTINUOUS
Status: ACTIVE | OUTPATIENT
Start: 2024-05-23 | End: 2024-05-23

## 2024-05-23 RX ORDER — TALC
6 POWDER (GRAM) TOPICAL NIGHTLY PRN
Status: DISCONTINUED | OUTPATIENT
Start: 2024-05-23 | End: 2024-05-24 | Stop reason: HOSPADM

## 2024-05-23 RX ORDER — METHYLPREDNISOLONE 4 MG/1
4 TABLET ORAL ONCE
Status: DISCONTINUED | OUTPATIENT
Start: 2024-05-28 | End: 2024-05-24 | Stop reason: HOSPADM

## 2024-05-23 RX ORDER — ONDANSETRON 4 MG/1
8 TABLET, ORALLY DISINTEGRATING ORAL EVERY 8 HOURS PRN
Status: DISCONTINUED | OUTPATIENT
Start: 2024-05-23 | End: 2024-05-24 | Stop reason: HOSPADM

## 2024-05-23 RX ORDER — PROTAMINE SULFATE 10 MG/ML
INJECTION, SOLUTION INTRAVENOUS
Status: DISCONTINUED | OUTPATIENT
Start: 2024-05-23 | End: 2024-05-23

## 2024-05-23 RX ADMIN — FENTANYL CITRATE 50 MCG: 50 INJECTION, SOLUTION INTRAMUSCULAR; INTRAVENOUS at 11:05

## 2024-05-23 RX ADMIN — SODIUM CHLORIDE, SODIUM GLUCONATE, SODIUM ACETATE, POTASSIUM CHLORIDE AND MAGNESIUM CHLORIDE: 526; 502; 368; 37; 30 INJECTION, SOLUTION INTRAVENOUS at 10:05

## 2024-05-23 RX ADMIN — CLOPIDOGREL BISULFATE 75 MG: 75 TABLET ORAL at 08:05

## 2024-05-23 RX ADMIN — SODIUM CHLORIDE: 9 INJECTION, SOLUTION INTRAVENOUS at 01:05

## 2024-05-23 RX ADMIN — ASPIRIN 81 MG CHEWABLE TABLET 81 MG: 81 TABLET CHEWABLE at 08:05

## 2024-05-23 RX ADMIN — ACETAMINOPHEN 1000 MG: 10 INJECTION, SOLUTION INTRAVENOUS at 04:05

## 2024-05-23 RX ADMIN — MIDAZOLAM HYDROCHLORIDE 1 MG: 5 INJECTION, SOLUTION INTRAMUSCULAR; INTRAVENOUS at 11:05

## 2024-05-23 RX ADMIN — METHYLPREDNISOLONE 8 MG: 4 TABLET ORAL at 08:05

## 2024-05-23 RX ADMIN — ONDANSETRON 8 MG: 4 TABLET, ORALLY DISINTEGRATING ORAL at 01:05

## 2024-05-23 RX ADMIN — ONDANSETRON 4 MG: 2 INJECTION INTRAMUSCULAR; INTRAVENOUS at 07:05

## 2024-05-23 RX ADMIN — ACETAMINOPHEN 650 MG: 325 TABLET, FILM COATED ORAL at 01:05

## 2024-05-23 RX ADMIN — DEXMEDETOMIDINE 4 MCG: 200 INJECTION, SOLUTION INTRAVENOUS at 11:05

## 2024-05-23 RX ADMIN — Medication 6 MG: at 08:05

## 2024-05-23 RX ADMIN — METHYLPREDNISOLONE 8 MG: 4 TABLET ORAL at 04:05

## 2024-05-23 RX ADMIN — FENTANYL CITRATE 50 MCG: 50 INJECTION, SOLUTION INTRAMUSCULAR; INTRAVENOUS at 12:05

## 2024-05-23 RX ADMIN — ONDANSETRON 4 MG: 2 INJECTION INTRAMUSCULAR; INTRAVENOUS at 01:05

## 2024-05-23 RX ADMIN — PROTAMINE SULFATE 50 MG: 10 INJECTION, SOLUTION INTRAVENOUS at 12:05

## 2024-05-23 RX ADMIN — LIDOCAINE HYDROCHLORIDE 5 ML: 20 INJECTION, SOLUTION INFILTRATION; PERINEURAL at 11:05

## 2024-05-23 RX ADMIN — METHYLPREDNISOLONE 4 MG: 4 TABLET ORAL at 04:05

## 2024-05-23 RX ADMIN — HEPARIN SODIUM 5000 UNITS: 1000 INJECTION, SOLUTION INTRAVENOUS; SUBCUTANEOUS at 11:05

## 2024-05-23 RX ADMIN — METHYLPREDNISOLONE 4 MG: 4 TABLET ORAL at 06:05

## 2024-05-23 RX ADMIN — ACETAMINOPHEN 650 MG: 325 TABLET, FILM COATED ORAL at 08:05

## 2024-05-23 NOTE — ANESTHESIA PREPROCEDURE EVALUATION
05/23/2024  Yandy Chaudhry is a 54 y.o., female.      Pre-op Assessment    I have reviewed the Patient Summary Reports.    I have reviewed the NPO Status.   I have reviewed the Medications.     Review of Systems  Cardiovascular:  Exercise tolerance: good   Hypertension  Past MI CAD   CABG/stent               Coronary Artery Disease:          Hx of Myocardial Infarction                  Hypertension         Hepatic/GI:     GERD      Gerd          Neurological:      Headaches      Dx of Headaches                               Physical Exam  General: Well nourished, Cooperative, Alert and Oriented    Airway:  Mallampati: II   Mouth Opening: Normal  TM Distance: Normal  Tongue: Normal  Neck ROM: Normal ROM    Dental:  Intact    Chest/Lungs:  Normal Respiratory Rate, Clear to auscultation    Heart:  Rate: Normal  Rhythm: Regular Rhythm    Anesthesia Plan  Type of Anesthesia, risks & benefits discussed:    Anesthesia Type: Gen Natural Airway  Intra-op Monitoring Plan: Standard ASA Monitors  Post Op Pain Control Plan: multimodal analgesia  Induction:  IV  ASA Score: 3  Anesthesia Plan Notes: GEN Lma prn    Ready For Surgery From Anesthesia Perspective.   .

## 2024-05-23 NOTE — OP NOTE
[facility]                       [address] [place] [id number]     PATIENT NAME: Yandy Chaudhry  YOB: 1970     DATE OF SURGERY: 5/23/24      SURGEON:  Henri Sanders MD     PREOPERATIVE DIAGNOSIS: Pseudotumor cerebri due to right transverse sinus stenosis     POSTOPERATIVE DIAGNOSIS:  as above     PROCEDURE PERFORMED:    Cerebral angiogram with catheter insertion in the following arteries:  Right internal jugular vein, right sigmoid sinus, right transverse sinus, superior sagittal sinus  Interpretation of images  Right transverse sinus stenting  Venous pressure monitoring  Ultrasound-guided right femoral vein access        LEVEL OF SEDATION:  MAC and LA     TOTAL INTRASERVICE SEDATION TIME:  45 minutes.     COMPLICATIONS:  None.     APPROACH:  Right femoral Vein        VESSELS SELECTIVELY CATHETERIZED:   Right internal jugular vein  Right sigmoid sinus  Right transverse sinus  Right superior sagittal sinus     DEVICES USED:  8 Fr short sheath  Armadillo 7Fr 105 cm  035 glidewire  Berenstein select catheter  Zilver stent 8x60  Hayder 14 microwire  3Max microcatheter  Micropuncture kit        INDICATION:  61 y.o. years old female with a history of pulsatile tinnitus underwent cerebral angiogram which demonstrated right transverse sinus stenosis and venous pressure gradient. She presents for a cerebral angiogram for venous sinus stenting.     DETAILED DESCRIPTION:       Risks/benefits were thoroughly reviewed with patient/family prior to the procedure.  Risks inclusive but not limited to death, stroke, contrast reaction/nephropathy, access/vascular injury, and other unforeseen risks.  Patient/family provided informed consent.  Patient supine on the angio table, groin prepped and draped in routine fashion.  MAC induced. LA administered.      The right femoral vein was sonographically evaluated and judged to be patent.  Real-time  ultrasound was used to visualize needle entry into the vessel and a permanent image was stored. Using a micropuncture kit modified Seldinger technique, a 8Fr sheath was placed the right femoral vein.  5000 units of heparin was given IV.      Guide catheter telescoping over the select catheter and 035 glidewire were advanced to catherize the right IJ. The guide catheter was then advanced to the right sigmoid sinus over the wire under fluoroscopy and roadmap guidance. The right transverse sinus stenosis was noted. Zilver stent over the lachelle microwire were advanced to the right transverse sinus and deployed successfully. Then 3Max was introduced over the wire and used to cross the stent into proximal transverse sinus. Confirmatory angiogram was performed to demonstrate resolution of the right transverse sinus stenosis following stent placement.     Pressures were monitored at Torcula, Right TS, right SS, right SS-IJ as below:  - Torcula : 16  - Right TS : 15  - Right SS : 14  - Right SS-IJ : 14     The catheters were removed and hemostasis was achieved with closure device. 50 units of protamine were given IV.  No immediate complications were noted.  Patient tolerated the procedure well.     Angiograms performed and findings:      Right transverse sinus - cranial: stenosis noted in the distal right transverse sinus measuring 70%  Right transverse sinus - cranial - tina-post procedure view: successful deployment of the stent in right transverse sinus is noted. Following stent placement, there is significant better caliber of the sinus with minimal residual stenosis. No evidence of contrast extravasation noted.         OVERALL IMPRESSION:  70% stenosis of right transverse sinus  Successful stenting of the right transverse sinus.        ______________________________  Henri Sanders MD  Vascular and Interventional Neurology

## 2024-05-23 NOTE — TRANSFER OF CARE
"Anesthesia Transfer of Care Note    Patient: Yandy Chaudhry    Procedure(s) Performed: * No procedures listed *    Patient location: Cath Lab    Anesthesia Type: MAC    Transport from OR: Transported from OR on room air with adequate spontaneous ventilation    Post pain: adequate analgesia    Post assessment: no apparent anesthetic complications and tolerated procedure well    Post vital signs: stable    Level of consciousness: awake and alert    Nausea/Vomiting: no nausea/vomiting    Complications: none    Transfer of care protocol was followedComments: Pt transferred with ICU nurse; nurse came to cath lab to pick pt up; report given; pt stable; AA)      Last vitals: Visit Vitals  /79   Pulse (!) 53   Temp 36.5 °C (97.7 °F) (Oral)   Ht 5' 5" (1.651 m)   Wt 89.4 kg (197 lb 1.5 oz)   SpO2 98%   Breastfeeding No   BMI 32.80 kg/m²     "

## 2024-05-23 NOTE — BRIEF OP NOTE
Name: Yandy Chaudhry  MRN: [id number]  Age: 54 y.o.  Gender: female    Date of Procedure: 05/23/2024    Pre-operative Diagnosis: Pseudotumor Cerebri & Dural Venous sinus stenosis    Post-operative Diagnosis: As Above    Procedure: Cerebral venogram with right transverse sinus stenting    Access/Closure: Right femoral vein access closed with manual compression    Surgeon: Henri Sanders MD    Anesthesia: MAC and LA    EBL: min    Description of findings:  - severe stenosis of right transverse sinus  - successful stenting of the right TS  - no pressure gradient across the stenotic area after stenting    Patient condition:   stable    Implant/specimen(s):  Zilver 8 x 60 cm    Plan:  - admit in ICU for q1h neuro checks  - -140  - continue aspirin and plavix from tonight   - continue home meds  - stat CTH for severe headache  - if CTH is neg for hemorrhage, start medrol dose pack for headache    Hneri Sanders MD  Interventional Neurology

## 2024-05-23 NOTE — H&P
[address] ICU  Pulmonary Critical Care Note    Patient Name: Yandy Chaudhry  MRN: [id number]  Admission Date: 2024  Hospital Length of Stay: 0 days  Code Status: Prior  Attending Provider: Cedric Montemayor MD  Primary Care Provider: Fabian Phelps MD     Subjective:     HPI:  Patient is a 54-year-old  female with a past medical history of hypertension, history of medication overuse headache, migraine W/O aura or status migrainosus, hx of CAD and ICMO s/p PCI w/ CECILLE proximal LAD () on plavix and ASA, obesity, hypothyroidism, GERD, IHH and venous sinus stenosis who presents to [facility] ICU on 2024 s/p outpatient scheduled transverse sinus stenting per Dr. Sanders.  Patient is on aspirin 81 mg daily and Plavix 75 mg daily, last dose last night.  Patient placed in ICU for close monitoring of blood pressure parameters and neuro checks.    Recent Cardiac Procedures/Diagnositics:  TTE 24:  EF 55-60%, no evidence of intra cardiac shunt (negative bubble study)   C 02/15/2024:  Nonobstructive CAD, widely patent LAD stent  PCI w/ CECILLE : LHC revealed 99% stenosis to proximal LAD, CECILLE placed in proximal LAD, with LVEF 30%      Hospital Course/Significant events:  2024:  S/p DSA with venous stenting of transverse sinus; admitted to ICU for close monitoring    24 Hour Interval History:  Na    Past Medical History:   Diagnosis Date    Acid reflux     Anemia, unspecified     Anxiety     Coronary artery disease     Hyperlipidemia     Hypertension     Migraines     Myocardial infarction     Thrombus     apical    Thyroid disease        Past Surgical History:   Procedure Laterality Date    ANKLE SURGERY Left     ANTERIOR CERVICAL DISCECTOMY W/ FUSION       SECTION       SECTION  2005    CHOLECYSTECTOMY      CORONARY ANGIOPLASTY WITH STENT PLACEMENT      DILATION AND CURETTAGE OF UTERUS      HYSTERECTOMY      LUMBAR PUNCTURE         Social  History     Socioeconomic History    Marital status:    Tobacco Use    Smoking status: Never     Passive exposure: Never    Smokeless tobacco: Never   Substance and Sexual Activity    Alcohol use: Not Currently    Drug use: Never     Social Determinants of Health     Food Insecurity: No Food Insecurity (2/15/2024)    [facility]    Hunger Vital Sign     Worried About Running Out of Food in the Last Year: Never true     Ran Out of Food in the Last Year: Never true   Transportation Needs: No Transportation Needs (2/15/2024)    [facility], [facility]    PRAPARE - Transportation     Lack of Transportation (Medical): No     Lack of Transportation (Non-Medical): No   Housing Stability: Low Risk  (2/15/2024)    [facility], [facility]    Housing Stability Vital Sign     Unable to Pay for Housing in the Last Year: No     Number of Places Lived in the Last Year: 1     In the last 12 months, was there a time when you did not have a steady place to sleep or slept in a shelter (including now)?: No           Objective:     Current Outpatient Medications   Medication Instructions    acetaZOLAMIDE (DIAMOX) 250 mg, Oral, 2 times daily    amLODIPine (NORVASC) 5 mg, Oral, Daily    aspirin (ECOTRIN) 81 mg, Oral, Daily    clopidogreL (PLAVIX) 75 mg, Oral, Daily    FLUoxetine 40 mg, Oral, 2 times daily    fluticasone propionate (FLONASE) 50 mcg/actuation nasal spray 1 spray, Each Nostril, 2 times daily, Takes as needed    gabapentin  (NEURONTIN) 600 mg, Oral, 3 times daily    levothyroxine (SYNTHROID) 75 mcg, Oral, Before breakfast    methocarbamoL (ROBAXIN) 500 mg, Oral, As needed (PRN)    metoprolol tartrate (LOPRESSOR) 25 mg, Oral, 2 times daily    nitroGLYCERIN (NITROSTAT) 0.4 MG SL tablet PLEASE SEE ATTACHED FOR DETAILED DIRECTIONS    oxybutynin (DITROPAN-XL) 10 mg, Oral, Daily, As needed    pantoprazole (PROTONIX) 40 mg, Oral, Daily    rimegepant 75 mg, Oral, Every other day, Place ODT tablet on the tongue; alternatively the ODT tablet may be placed under the tongue    rOPINIRole (REQUIP) 0.5 mg, Oral, Nightly    rosuvastatin (CRESTOR) 40 mg, Oral, Daily    tiZANidine (ZANAFLEX) 4 mg, Oral, Every 8 hours PRN       Current Inpatient Medications          Intake/Output Summary (Last 24 hours) at 5/23/2024 1254  Last data filed at 5/23/2024 1217  Gross per 24 hour   Intake 500 ml   Output --   Net 500 ml       Review of Systems   Constitutional:  Negative for chills, diaphoresis and fever.   Gastrointestinal:  Positive for nausea and vomiting.   Skin:  Negative for rash.   Neurological:  Positive for headaches (9/10 HA, sharp, RT forehead/retro-orbital.).        Vital Signs (Most Recent):  Temp: 97.7 °F (36.5 °C) (05/23/24 1038)  Pulse: (!) 53 (05/23/24 0802)  BP: 124/79 (05/23/24 0802)  SpO2: 98 % (05/23/24 0802)  Body mass index is 32.8 kg/m².  Weight: 89.4 kg (197 lb 1.5 oz) Vital Signs (24h Range):  Temp:  [97.7 °F (36.5 °C)] 97.7 °F (36.5 °C)  Pulse:  [53] 53  SpO2:  [98 %] 98 %  BP: (124)/(79) 124/79     Physical Exam  Vitals and nursing note reviewed.   Constitutional:       General: She is not in acute distress.     Appearance: Normal appearance. She is obese. She is not ill-appearing or toxic-appearing.   Eyes:      General: No scleral icterus.     Extraocular Movements: Extraocular movements intact.      Pupils: Pupils are equal, round, and reactive to light.   Cardiovascular:      Rate and Rhythm: Regular rhythm. Bradycardia  "present.      Pulses: Normal pulses.      Heart sounds: Normal heart sounds. No murmur heard.  Pulmonary:      Effort: Pulmonary effort is normal. No respiratory distress.      Breath sounds: Normal breath sounds. No wheezing or rales.   Abdominal:      General: Abdomen is flat. There is no distension.      Palpations: Abdomen is soft.      Tenderness: There is no abdominal tenderness.   Musculoskeletal:         General: No swelling or tenderness. Normal range of motion.      Cervical back: Normal range of motion.      Right lower leg: No edema.      Left lower leg: No edema.   Skin:     General: Skin is warm.      Capillary Refill: Capillary refill takes less than 2 seconds.      Coloration: Skin is not jaundiced or pale.      Findings: No lesion or rash.   Neurological:      General: No focal deficit present.      Mental Status: She is alert and oriented to person, place, and time. Mental status is at baseline.           Mechanical ventilation support:       Lines/Drains/Airways       Peripheral Intravenous Line  Duration                  Peripheral IV - Single Lumen 05/23/24 0835 20 G Anterior;Right Forearm <1 day                    Significant Labs:    Lab Results   Component Value Date    WBC 7.43 05/23/2024    HGB 11.8 (L) 05/23/2024    HCT 36.3 (L) 05/23/2024    MCV 79.3 (L) 05/23/2024     05/23/2024         BMP  Lab Results   Component Value Date     05/23/2024    K 3.8 05/23/2024     02/15/2024    CO2 23 05/23/2024    BUN 13.3 05/23/2024    CREATININE 0.87 05/23/2024    CALCIUM 8.9 05/23/2024    ANIONGAP 8 02/15/2024    ESTGFRAFRICA 87 02/15/2024    EGFRNONAA >60 08/01/2022       ABG  No results for input(s): "PH", "PO2", "PCO2", "HCO3", "BE" in the last 168 hours.        Significant Imaging:  I have reviewed all pertinent imaging within the past 24 hours.        Assessment/Plan:     Assessment  Venous sinus stenosis s/p DSA with venous stenting of transverse sinus (5/23/24; " Thanki)  Idiopathic intracranial hypertension  CAD & ICMO s/p PCI w/ CECILLE proximal LAD (EF 30%-->55-60% TTE 4/14/24; LAD stent widely patent LHC 2/15/24)  Unspecified migraine disorder without status migrainosus or aura  Hypothyroidism   Hypertension   Hyperlipidemia  History of migraine overuse headache   Anxiety   GERD      Plan  -admitted to ICU for close monitoring, blood pressure parameters and neuro checks s/p transverse sinus stenting  -SBP goal 100-140; Cleviprex GTT   -will continue aspirin 81 mg and Plavix 75 mg nightly for tonight  -Low threshold for neurochanges (including HA) for repeat CT;  -History of Migraine/HA description: bitemporal headache with migration to occipital lobe  -Continue to follow perimeters strictly  -Zofran IV PRN and Tylenol PO PRN; will consider IV tylenol if refractory to oral  -Will continue to follow closely      DVT Prophylaxis: SCDs  GI Prophylaxis: None     Greater than 35 minutes of critical care was time spent personally by me on the following activities: development of treatment plan with patient or surrogate and bedside caregivers, discussions with consultants, evaluation of patient's response to treatment, examination of patient, ordering and performing treatments and interventions, ordering and review of laboratory studies, ordering and review of radiographic studies, pulse oximetry, re-evaluation of patient's condition.  This critical care time did not overlap with that of any other provider or involve time for any procedures.     Gamal Martinez MD  Internal Medicine - PGY-2    Pulmonary Critical Care Medicine  [facility] - [address] [place] [address]

## 2024-05-23 NOTE — NURSING
Nurses Note -- 4 Eyes      5/23/2024   1:31 PM      Skin assessed during: Admit      [x] No Altered Skin Integrity Present    [x]Prevention Measures Documented      [] Yes- Altered Skin Integrity Present or Discovered   [] LDA Added if Not in Epic (Describe Wound)   [] New Altered Skin Integrity was Present on Admit and Documented in LDA   [] Wound Image Taken    Wound Care Consulted? No    Attending Nurse:   CARMENZA Franco    Second RN/Staff Member:  CARMENZA Gomes

## 2024-05-23 NOTE — H&P
Pre-Operative History and Physical   Interventional Neurology    Yandy Chaudhry is a 54 y.o. female with IIH and venous sinus stenosis. Presents for venous sinus stenting. On asa, plavix. No acute changes since last seen in clinic on .     ROS:  ROS as described in HPI    Past Medical History:   Diagnosis Date    Acid reflux     Anemia, unspecified     Anxiety     Coronary artery disease     Hyperlipidemia     Hypertension     Migraines     Myocardial infarction     Thrombus     apical    Thyroid disease      Past Surgical History:   Procedure Laterality Date    ANKLE SURGERY Left     ANTERIOR CERVICAL DISCECTOMY W/ FUSION       SECTION       SECTION      CHOLECYSTECTOMY      CORONARY ANGIOPLASTY WITH STENT PLACEMENT      DILATION AND CURETTAGE OF UTERUS      HYSTERECTOMY      LUMBAR PUNCTURE       Family History   Problem Relation Name Age of Onset    Cancer Mother          Pancriatic cancer    Hydrocephalus Mother      No Known Problems Father      No Known Problems Sister      No Known Problems Brother      Hydrocephalus Maternal Grandmother      Parkinsonism Maternal Grandfather       Review of patient's allergies indicates:  No Known Allergies    Current Outpatient Medications:     amLODIPine (NORVASC) 5 MG tablet, Take 5 mg by mouth once daily., Disp: , Rfl:     aspirin (ECOTRIN) 81 MG EC tablet, Take 81 mg by mouth once daily., Disp: , Rfl:     clopidogreL (PLAVIX) 75 mg tablet, Take 1 tablet (75 mg total) by mouth once daily., Disp: 30 tablet, Rfl: 2    FLUoxetine 40 MG capsule, Take 40 mg by mouth 2 (two) times daily., Disp: , Rfl:     fluticasone propionate (FLONASE) 50 mcg/actuation nasal spray, 1 spray by Each Nostril route 2 (two) times daily. Takes as needed, Disp: , Rfl:     gabapentin (NEURONTIN) 600 MG tablet, Take 1 tablet (600 mg total) by mouth 3 (three) times daily. (Patient taking differently: Take 600 mg by mouth once daily.), Disp: 90 tablet, Rfl:  11    levothyroxine (SYNTHROID) 75 MCG tablet, Take 75 mcg by mouth before breakfast., Disp: , Rfl:     methocarbamoL (ROBAXIN) 500 MG Tab, Take 500 mg by mouth as needed., Disp: , Rfl:     metoprolol tartrate (LOPRESSOR) 25 MG tablet, Take 25 mg by mouth 2 (two) times daily., Disp: , Rfl:     oxybutynin (DITROPAN-XL) 10 MG 24 hr tablet, Take 10 mg by mouth once daily. As needed, Disp: , Rfl:     pantoprazole (PROTONIX) 40 MG tablet, Take 40 mg by mouth once daily., Disp: , Rfl:     rOPINIRole (REQUIP) 0.5 MG tablet, Take 0.5 mg by mouth every evening., Disp: , Rfl:     rosuvastatin (CRESTOR) 40 MG Tab, Take 40 mg by mouth once daily., Disp: , Rfl:     acetaZOLAMIDE (DIAMOX) 250 MG tablet, Take 1 tablet (250 mg total) by mouth 2 (two) times daily., Disp: 180 tablet, Rfl: 3    nitroGLYCERIN (NITROSTAT) 0.4 MG SL tablet, PLEASE SEE ATTACHED FOR DETAILED DIRECTIONS, Disp: , Rfl:     rimegepant 75 mg odt, Take 1 tablet (75 mg total) by mouth every other day. Place ODT tablet on the tongue; alternatively the ODT tablet may be placed under the tongue, Disp: 15 tablet, Rfl: 11    tiZANidine (ZANAFLEX) 4 MG tablet, Take 4 mg by mouth every 8 (eight) hours as needed., Disp: , Rfl:   Outpatient Medications Marked as Taking for the 5/23/24 encounter (Hospital Encounter) with [facility]   Medication Sig Dispense Refill    amLODIPine (NORVASC) 5 MG tablet Take 5 mg by mouth once daily.      aspirin (ECOTRIN) 81 MG EC tablet Take 81 mg by mouth once daily.      clopidogreL (PLAVIX) 75 mg tablet Take 1 tablet (75 mg total) by mouth once daily. 30 tablet 2    FLUoxetine 40 MG capsule Take 40 mg by mouth 2 (two) times daily.      fluticasone propionate (FLONASE) 50 mcg/actuation nasal spray 1 spray by Each Nostril route 2 (two) times daily. Takes as needed      gabapentin (NEURONTIN) 600 MG tablet Take 1 tablet (600 mg total) by mouth 3 (three) times daily. (Patient taking differently: Take 600 mg by mouth once daily.) 90 tablet 11     levothyroxine (SYNTHROID) 75 MCG tablet Take 75 mcg by mouth before breakfast.      methocarbamoL (ROBAXIN) 500 MG Tab Take 500 mg by mouth as needed.      metoprolol tartrate (LOPRESSOR) 25 MG tablet Take 25 mg by mouth 2 (two) times daily.      oxybutynin (DITROPAN-XL) 10 MG 24 hr tablet Take 10 mg by mouth once daily. As needed      pantoprazole (PROTONIX) 40 MG tablet Take 40 mg by mouth once daily.      rOPINIRole (REQUIP) 0.5 MG tablet Take 0.5 mg by mouth every evening.      rosuvastatin (CRESTOR) 40 MG Tab Take 40 mg by mouth once daily.       Social History     Tobacco Use    Smoking status: Never     Passive exposure: Never    Smokeless tobacco: Never   Substance Use Topics    Alcohol use: Not Currently    Drug use: Never         Vitals:    05/23/24 0802   BP: 124/79   Pulse: (!) 53     Gen NAD  HEENT NC/AT  CV RRR,  Resp clear  GI soft  Ext no C/C/E    Neuro  AAOx4  Speech fluent, appropriate  EOMI, PERRLA, VFF  Normal facial strength, sensation  Tongue and palate midline  Motor 5/5  Sensation intact  Coord intact      Assessment: 54 F with IIH and venous sinus stenosis presents for venous stenting    Plan:   DSA with venous stenting    I have discussed the risks, benefits, indications, and alternatives of the procedure in detail.  The patient verbalizes understanding.  All questions answered.  Consents signed.  The patient agrees to proceed to proceed as planned.    Henri Sanders MD  Vascular and Interventional Neurology

## 2024-05-24 VITALS
HEIGHT: 65 IN | TEMPERATURE: 98 F | OXYGEN SATURATION: 96 % | DIASTOLIC BLOOD PRESSURE: 78 MMHG | SYSTOLIC BLOOD PRESSURE: 142 MMHG | HEART RATE: 66 BPM | WEIGHT: 197.06 LBS | RESPIRATION RATE: 13 BRPM | BODY MASS INDEX: 32.83 KG/M2

## 2024-05-24 LAB
ALBUMIN SERPL-MCNC: 3.6 G/DL (ref 3.5–5)
ALBUMIN/GLOB SERPL: 1.2 RATIO (ref 1.1–2)
ALP SERPL-CCNC: 82 UNIT/L (ref 40–150)
ALT SERPL-CCNC: 36 UNIT/L (ref 0–55)
ANION GAP SERPL CALC-SCNC: 10 MEQ/L
AST SERPL-CCNC: 41 UNIT/L (ref 5–34)
BASOPHILS # BLD AUTO: 0.02 X10(3)/MCL
BASOPHILS NFR BLD AUTO: 0.3 %
BILIRUB SERPL-MCNC: 0.5 MG/DL
BUN SERPL-MCNC: 8.9 MG/DL (ref 9.8–20.1)
CALCIUM SERPL-MCNC: 9.3 MG/DL (ref 8.4–10.2)
CHLORIDE SERPL-SCNC: 108 MMOL/L (ref 98–107)
CO2 SERPL-SCNC: 19 MMOL/L (ref 22–29)
CREAT SERPL-MCNC: 0.69 MG/DL (ref 0.55–1.02)
CREAT/UREA NIT SERPL: 13
EOSINOPHIL # BLD AUTO: 0 X10(3)/MCL (ref 0–0.9)
EOSINOPHIL NFR BLD AUTO: 0 %
ERYTHROCYTE [DISTWIDTH] IN BLOOD BY AUTOMATED COUNT: 18.6 % (ref 11.5–17)
GFR SERPLBLD CREATININE-BSD FMLA CKD-EPI: >60 ML/MIN/1.73/M2
GLOBULIN SER-MCNC: 3.1 GM/DL (ref 2.4–3.5)
GLUCOSE SERPL-MCNC: 129 MG/DL (ref 74–100)
HCT VFR BLD AUTO: 40.2 % (ref 37–47)
HGB BLD-MCNC: 12.8 G/DL (ref 12–16)
IMM GRANULOCYTES # BLD AUTO: 0.02 X10(3)/MCL (ref 0–0.04)
IMM GRANULOCYTES NFR BLD AUTO: 0.3 %
LYMPHOCYTES # BLD AUTO: 0.76 X10(3)/MCL (ref 0.6–4.6)
LYMPHOCYTES NFR BLD AUTO: 12.5 %
MAGNESIUM SERPL-MCNC: 2 MG/DL (ref 1.6–2.6)
MCH RBC QN AUTO: 25.2 PG (ref 27–31)
MCHC RBC AUTO-ENTMCNC: 31.8 G/DL (ref 33–36)
MCV RBC AUTO: 79.3 FL (ref 80–94)
MONOCYTES # BLD AUTO: 0.07 X10(3)/MCL (ref 0.1–1.3)
MONOCYTES NFR BLD AUTO: 1.1 %
NEUTROPHILS # BLD AUTO: 5.23 X10(3)/MCL (ref 2.1–9.2)
NEUTROPHILS NFR BLD AUTO: 85.8 %
NRBC BLD AUTO-RTO: 0 %
PHOSPHATE SERPL-MCNC: 3.2 MG/DL (ref 2.3–4.7)
PLATELET # BLD AUTO: 242 X10(3)/MCL (ref 130–400)
PLATELETS.RETICULATED NFR BLD AUTO: 8 % (ref 0.9–11.2)
PMV BLD AUTO: 11.9 FL (ref 7.4–10.4)
POTASSIUM SERPL-SCNC: 4.5 MMOL/L (ref 3.5–5.1)
PROT SERPL-MCNC: 6.7 GM/DL (ref 6.4–8.3)
RBC # BLD AUTO: 5.07 X10(6)/MCL (ref 4.2–5.4)
SODIUM SERPL-SCNC: 137 MMOL/L (ref 136–145)
WBC # SPEC AUTO: 6.1 X10(3)/MCL (ref 4.5–11.5)

## 2024-05-24 PROCEDURE — 99232 SBSQ HOSP IP/OBS MODERATE 35: CPT | Mod: ,,, | Performed by: PSYCHIATRY & NEUROLOGY

## 2024-05-24 PROCEDURE — 63600175 PHARM REV CODE 636 W HCPCS

## 2024-05-24 PROCEDURE — 25000003 PHARM REV CODE 250: Performed by: INTERNAL MEDICINE

## 2024-05-24 PROCEDURE — 63600175 PHARM REV CODE 636 W HCPCS: Performed by: PSYCHIATRY & NEUROLOGY

## 2024-05-24 PROCEDURE — 80053 COMPREHEN METABOLIC PANEL: CPT | Performed by: ANESTHESIOLOGY

## 2024-05-24 PROCEDURE — 25000003 PHARM REV CODE 250: Performed by: PSYCHIATRY & NEUROLOGY

## 2024-05-24 PROCEDURE — 83735 ASSAY OF MAGNESIUM: CPT | Performed by: ANESTHESIOLOGY

## 2024-05-24 PROCEDURE — 85025 COMPLETE CBC W/AUTO DIFF WBC: CPT | Performed by: ANESTHESIOLOGY

## 2024-05-24 PROCEDURE — 84100 ASSAY OF PHOSPHORUS: CPT | Performed by: ANESTHESIOLOGY

## 2024-05-24 PROCEDURE — 36415 COLL VENOUS BLD VENIPUNCTURE: CPT | Performed by: ANESTHESIOLOGY

## 2024-05-24 RX ORDER — METHYLPREDNISOLONE 8 MG/1
8 TABLET ORAL ONCE
Qty: 1 TABLET | Refills: 0 | Status: SHIPPED | OUTPATIENT
Start: 2024-05-24 | End: 2024-05-24

## 2024-05-24 RX ORDER — METHYLPREDNISOLONE 4 MG/1
4 TABLET ORAL ONCE
Qty: 1 TABLET | Refills: 0 | Status: SHIPPED | OUTPATIENT
Start: 2024-05-27 | End: 2024-05-24 | Stop reason: HOSPADM

## 2024-05-24 RX ORDER — METHYLPREDNISOLONE 4 MG/1
4 TABLET ORAL ONCE
Qty: 1 TABLET | Refills: 0 | Status: SHIPPED | OUTPATIENT
Start: 2024-05-26 | End: 2024-05-24

## 2024-05-24 RX ORDER — METHYLPREDNISOLONE 4 MG/1
4 TABLET ORAL ONCE
Qty: 1 TABLET | Refills: 0 | Status: SHIPPED | OUTPATIENT
Start: 2024-05-25 | End: 2024-05-24

## 2024-05-24 RX ORDER — METHYLPREDNISOLONE 4 MG/1
4 TABLET ORAL ONCE
Qty: 1 TABLET | Refills: 0 | Status: SHIPPED | OUTPATIENT
Start: 2024-05-28 | End: 2024-05-24

## 2024-05-24 RX ORDER — METHYLPREDNISOLONE 4 MG/1
4 TABLET ORAL ONCE
Qty: 1 TABLET | Refills: 0 | Status: SHIPPED | OUTPATIENT
Start: 2024-05-25 | End: 2024-05-24 | Stop reason: HOSPADM

## 2024-05-24 RX ORDER — METHYLPREDNISOLONE 4 MG/1
4 TABLET ORAL ONCE
Qty: 1 TABLET | Refills: 0 | Status: SHIPPED | OUTPATIENT
Start: 2024-05-28 | End: 2024-05-24 | Stop reason: HOSPADM

## 2024-05-24 RX ORDER — METHYLPREDNISOLONE 4 MG/1
4 TABLET ORAL ONCE
Qty: 1 TABLET | Refills: 0 | Status: SHIPPED | OUTPATIENT
Start: 2024-05-26 | End: 2024-05-24 | Stop reason: HOSPADM

## 2024-05-24 RX ORDER — METOPROLOL TARTRATE 25 MG/1
25 TABLET, FILM COATED ORAL 2 TIMES DAILY
Status: DISCONTINUED | OUTPATIENT
Start: 2024-05-24 | End: 2024-05-24 | Stop reason: HOSPADM

## 2024-05-24 RX ORDER — AMLODIPINE BESYLATE 5 MG/1
5 TABLET ORAL DAILY
Status: DISCONTINUED | OUTPATIENT
Start: 2024-05-24 | End: 2024-05-24 | Stop reason: HOSPADM

## 2024-05-24 RX ORDER — ONDANSETRON 4 MG/1
4 TABLET, ORALLY DISINTEGRATING ORAL EVERY 8 HOURS PRN
Qty: 45 TABLET | Refills: 3 | Status: SHIPPED | OUTPATIENT
Start: 2024-05-24

## 2024-05-24 RX ORDER — METHYLPREDNISOLONE 4 MG/1
TABLET ORAL
Qty: 21 EACH | Refills: 0 | Status: SHIPPED | OUTPATIENT
Start: 2024-05-24 | End: 2024-06-14

## 2024-05-24 RX ORDER — METHYLPREDNISOLONE 4 MG/1
4 TABLET ORAL ONCE
Qty: 1 TABLET | Refills: 0 | Status: SHIPPED | OUTPATIENT
Start: 2024-05-24 | End: 2024-05-24 | Stop reason: HOSPADM

## 2024-05-24 RX ORDER — METHYLPREDNISOLONE 4 MG/1
4 TABLET ORAL ONCE
Qty: 1 TABLET | Refills: 0 | Status: SHIPPED | OUTPATIENT
Start: 2024-05-27 | End: 2024-05-24

## 2024-05-24 RX ORDER — METHYLPREDNISOLONE 4 MG/1
4 TABLET ORAL ONCE
Qty: 1 TABLET | Refills: 0 | Status: SHIPPED | OUTPATIENT
Start: 2024-05-24 | End: 2024-05-24

## 2024-05-24 RX ORDER — METHYLPREDNISOLONE 8 MG/1
8 TABLET ORAL ONCE
Qty: 1 TABLET | Refills: 0 | Status: SHIPPED | OUTPATIENT
Start: 2024-05-24 | End: 2024-05-24 | Stop reason: HOSPADM

## 2024-05-24 RX ADMIN — METOPROLOL TARTRATE 25 MG: 25 TABLET, FILM COATED ORAL at 12:05

## 2024-05-24 RX ADMIN — ONDANSETRON 4 MG: 2 INJECTION INTRAMUSCULAR; INTRAVENOUS at 10:05

## 2024-05-24 RX ADMIN — METHYLPREDNISOLONE 4 MG: 4 TABLET ORAL at 06:05

## 2024-05-24 RX ADMIN — METHYLPREDNISOLONE 4 MG: 4 TABLET ORAL at 12:05

## 2024-05-24 RX ADMIN — ACETAMINOPHEN 650 MG: 325 TABLET, FILM COATED ORAL at 04:05

## 2024-05-24 RX ADMIN — ACETAMINOPHEN 650 MG: 325 TABLET, FILM COATED ORAL at 12:05

## 2024-05-24 RX ADMIN — AMLODIPINE BESYLATE 5 MG: 5 TABLET ORAL at 12:05

## 2024-05-24 NOTE — PROGRESS NOTES
Neurointerventional progress note:    Subjective: no acute events overnight. Reports improvement in headache since starting medrol dose pack. Reports headache 5/10. Reports improvement in tinnitus in right ear. Still has some tinnitus in left ear.     Exam:  Vitals:    05/24/24 1244   BP: (!) 142/78   Pulse: 66   Resp: 13   Temp: 98.4 °F (36.9 °C)       Alert & oriented x 3  EOMI, PERRLA, visual field intact in all 4 quadrants  Face symmetric, speech clear and fluent  Tongue midline, facial sensation equal bilaterally  Strength 5/5 in bilateral upper and lower extremities   Sensation intact and equal bilaterally  Gait steady and balanced  Right groin stable without hematoma  Distal pulse intact. Skin warm.       Imaging: CTH stable yesterday without hemorrhage    A/P:   54 F with IIH and venous sinus stenosis s/p Venous sinus stenting. Started on medrol dose pack due to headaches.     Stable exam. Improvement in headaches with steroids.     Plan:  - continue aspirin, plavix x 3 months followed by aspirin only  - medrol dose pack on discharge  - follow up in clinic in July with Eboni Kim NP in 3 months.  - consider MRV as outpatient for follow up.   - Ok for discharge      Henri Sanders MD  Vascular and Interventional Neurology

## 2024-05-24 NOTE — ANESTHESIA POSTPROCEDURE EVALUATION
Anesthesia Post Evaluation    Patient: Yandy Chaudhry    Procedure(s) Performed: * No procedures listed *    Final Anesthesia Type: MAC      Patient location during evaluation: PACU  Patient participation: Yes- Able to Participate  Level of consciousness: awake and alert  Pain management: adequate      Anesthetic complications: no      Cardiovascular status: blood pressure returned to baseline  Respiratory status: room air  Hydration status: euvolemic  Follow-up not needed.          Vitals Value Taken Time   /77 05/24/24 0701   Temp 36.8 °C (98.3 °F) 05/24/24 0400   Pulse 61 05/24/24 0747   Resp 17 05/24/24 0747   SpO2 95 % 05/24/24 0747   Vitals shown include unfiled device data.      No case tracking events are documented in the log.      Pain/Gifty Score: Pain Rating Prior to Med Admin: 4 (5/24/2024  4:08 AM)  Pain Rating Post Med Admin: 3 (5/24/2024  5:08 AM)

## 2024-05-24 NOTE — NURSING
Nurses Note -- 4 Eyes      5/23/2024   9:41 PM      Skin assessed during: Daily Assessment      [x] No Altered Skin Integrity Present    [x]Prevention Measures Documented      [] Yes- Altered Skin Integrity Present or Discovered   [] LDA Added if Not in Epic (Describe Wound)   [] New Altered Skin Integrity was Present on Admit and Documented in LDA   [] Wound Image Taken    Wound Care Consulted? No    Attending Nurse:  Lobito Young RN/Staff Member:   CARMENZA Lunsford

## 2024-05-24 NOTE — DISCHARGE SUMMARY
Discharge Summary  Critical Care Medicine      Admit Date: 5/23/2024    Discharge Date and Time:5/24/2024      Discharge Attending Physician: Cedric Montemayor MD     Diagnoses:  Active Hospital Problems    Diagnosis  POA    *IIH (idiopathic intracranial hypertension) [G93.2]  Yes      Resolved Hospital Problems   No resolved problems to display.       Discharged Condition: good    Hospital Course: Patient is a 54-year-old  female with a past medical history of hypertension, history of medication overuse headache, migraine W/O aura or status migrainosus, hx of CAD and ICMO s/p PCI w/ CECILLE proximal LAD (2020) on plavix and ASA, obesity, hypothyroidism, GERD, IHH and venous sinus stenosis who presents to [facility] ICU on 05/23/2024 s/p outpatient scheduled transverse sinus stenting per Dr. Sanders.  Patient is on aspirin 81 mg daily and Plavix 75 mg daily, last dose last night.  Patient placed in ICU for close monitoring of blood pressure parameters and neuro checks.     Recent Cardiac Procedures/Diagnositics:  TTE 4/14/24:  EF 55-60%, no evidence of intra cardiac shunt (negative bubble study)   C 02/15/2024:  Nonobstructive CAD, widely patent LAD stent  PCI w/ CECILLE 2020: LHC revealed 99% stenosis to proximal LAD, CECILLE placed in proximal LAD, with LVEF 30%    Consults: Pulmonary/Intensive care, Interventional Neurology    Special Treatments/Procedures: 05/23/2024: S/p DSA with venous stenting of transverse sinus; admitted to ICU for close monitoring     Disposition: Home or Self Care    Patient Instructions:   Current Discharge Medication List        START taking these medications    Details   !! methylPREDNISolone (MEDROL) 4 MG Tab Take 1 tablet (4 mg total) by mouth once. for 1 dose  Qty: 1 tablet, Refills: 0      !! methylPREDNISolone (MEDROL) 4 MG Tab Take 1 tablet (4 mg total) by mouth once. for 1 dose  Qty: 1 tablet, Refills: 0      !! methylPREDNISolone (MEDROL) 4 MG Tab Take 1 tablet (4 mg total) by mouth  once. for 1 dose  Qty: 1 tablet, Refills: 0      !! methylPREDNISolone (MEDROL) 4 MG Tab Take 1 tablet (4 mg total) by mouth once. for 1 dose  Qty: 1 tablet, Refills: 0      !! methylPREDNISolone (MEDROL) 4 MG Tab Take 1 tablet (4 mg total) by mouth once. for 1 dose  Qty: 1 tablet, Refills: 0      !! methylPREDNISolone (MEDROL) 4 MG Tab Take 1 tablet (4 mg total) by mouth once. for 1 dose  Qty: 1 tablet, Refills: 0      !! methylPREDNISolone (MEDROL) 4 MG Tab Take 1 tablet (4 mg total) by mouth once. for 1 dose  Qty: 1 tablet, Refills: 0      !! methylPREDNISolone (MEDROL) 4 MG Tab Take 1 tablet (4 mg total) by mouth once. for 1 dose  Qty: 1 tablet, Refills: 0      !! methylPREDNISolone (MEDROL) 4 MG Tab Take 1 tablet (4 mg total) by mouth once. for 1 dose  Qty: 1 tablet, Refills: 0      !! methylPREDNISolone (MEDROL) 4 MG Tab Take 1 tablet (4 mg total) by mouth once. for 1 dose  Qty: 1 tablet, Refills: 0      !! methylPREDNISolone (MEDROL) 4 MG Tab Take 1 tablet (4 mg total) by mouth once. for 1 dose  Qty: 1 tablet, Refills: 0      !! methylPREDNISolone (MEDROL) 8 MG tablet Take 1 tablet (8 mg total) by mouth once. for 1 dose  Qty: 1 tablet, Refills: 0      ondansetron (ZOFRAN-ODT) 4 MG TbDL Take 1 tablet (4 mg total) by mouth every 8 (eight) hours as needed (nausea/vomiting).  Qty: 45 tablet, Refills: 3       !! - Potential duplicate medications found. Please discuss with provider.        CONTINUE these medications which have NOT CHANGED    Details   amLODIPine (NORVASC) 5 MG tablet Take 5 mg by mouth once daily.      aspirin (ECOTRIN) 81 MG EC tablet Take 81 mg by mouth once daily.      clopidogreL (PLAVIX) 75 mg tablet Take 1 tablet (75 mg total) by mouth once daily.  Qty: 30 tablet, Refills: 2    Associated Diagnoses: Cerebrovascular accident (CVA), unspecified mechanism      FLUoxetine 40 MG capsule Take 40 mg by mouth 2 (two) times daily.      fluticasone propionate (FLONASE) 50 mcg/actuation nasal spray 1  spray by Each Nostril route 2 (two) times daily. Takes as needed      gabapentin (NEURONTIN) 600 MG tablet Take 1 tablet (600 mg total) by mouth 3 (three) times daily.  Qty: 90 tablet, Refills: 11    Associated Diagnoses: Migraine with status migrainosus, not intractable, unspecified migraine type; Chronic daily headache; Pseudotumor cerebri      levothyroxine (SYNTHROID) 75 MCG tablet Take 75 mcg by mouth before breakfast.      methocarbamoL (ROBAXIN) 500 MG Tab Take 500 mg by mouth as needed.      metoprolol tartrate (LOPRESSOR) 25 MG tablet Take 25 mg by mouth 2 (two) times daily.      oxybutynin (DITROPAN-XL) 10 MG 24 hr tablet Take 10 mg by mouth once daily. As needed      pantoprazole (PROTONIX) 40 MG tablet Take 40 mg by mouth once daily.      rOPINIRole (REQUIP) 0.5 MG tablet Take 0.5 mg by mouth every evening.      rosuvastatin (CRESTOR) 40 MG Tab Take 40 mg by mouth once daily.      acetaZOLAMIDE (DIAMOX) 250 MG tablet Take 1 tablet (250 mg total) by mouth 2 (two) times daily.  Qty: 180 tablet, Refills: 3      nitroGLYCERIN (NITROSTAT) 0.4 MG SL tablet PLEASE SEE ATTACHED FOR DETAILED DIRECTIONS      rimegepant 75 mg odt Take 1 tablet (75 mg total) by mouth every other day. Place ODT tablet on the tongue; alternatively the ODT tablet may be placed under the tongue  Qty: 15 tablet, Refills: 11    Associated Diagnoses: Migraine with status migrainosus, not intractable, unspecified migraine type; Chronic daily headache; Pseudotumor cerebri      tiZANidine (ZANAFLEX) 4 MG tablet Take 4 mg by mouth every 8 (eight) hours as needed.             No discharge procedures on file.    Patient discharged on 05/24/2024.  Continued home med regimen as is current.  Prescribed Zofran 4 mg sublingual q.8 hour p.r.n. at time of discharge.

## 2024-05-24 NOTE — PROGRESS NOTES
[facility] - [facility] ICU  Pulmonary Critical Care Note    Patient Name: Yandy Chaudhry  MRN: [id number]  Admission Date: 2024  Hospital Length of Stay: 1 days  Code Status: Prior  Attending Provider: Cedric Montemayor MD  Primary Care Provider: Fabian Phelps MD     Subjective:     HPI:  Patient is a 54-year-old  female with a past medical history of hypertension, history of medication overuse headache, migraine W/O aura or status migrainosus, hx of CAD and ICMO s/p PCI w/ CECILLE proximal LAD () on plavix and ASA, obesity, hypothyroidism, GERD, IHH and venous sinus stenosis who presents to [facility] ICU on 2024 s/p outpatient scheduled transverse sinus stenting per Dr. Sanedrs.  Patient is on aspirin 81 mg daily and Plavix 75 mg daily, last dose last night.  Patient placed in ICU for close monitoring of blood pressure parameters and neuro checks.    Recent Cardiac Procedures/Diagnositics:  TTE 24:  EF 55-60%, no evidence of intra cardiac shunt (negative bubble study)   C 02/15/2024:  Nonobstructive CAD, widely patent LAD stent  PCI w/ CECILLE : LHC revealed 99% stenosis to proximal LAD, CECILLE placed in proximal LAD, with LVEF 30%      Hospital Course/Significant events:  2024:  S/p DSA with venous stenting of transverse sinus; admitted to ICU for close monitoring    24 Hour Interval History:  Patient awake and alert this morning.  Headache is improving overnight.  Denies nausea this morning.  Hemodynamics have been stable with no need for antihypertensives.    Past Medical History:   Diagnosis Date    Acid reflux     Anemia, unspecified     Anxiety     Coronary artery disease     Hyperlipidemia     Hypertension     Migraines     Myocardial infarction     Thrombus     apical    Thyroid disease        Past Surgical History:   Procedure Laterality Date    ANKLE SURGERY Left     ANTERIOR CERVICAL DISCECTOMY W/ FUSION       SECTION        SECTION  2005    CHOLECYSTECTOMY      CORONARY ANGIOPLASTY WITH STENT PLACEMENT      DILATION AND CURETTAGE OF UTERUS      HYSTERECTOMY      LUMBAR PUNCTURE         Social History     Socioeconomic History    Marital status:    Tobacco Use    Smoking status: Never     Passive exposure: Never    Smokeless tobacco: Never   Substance and Sexual Activity    Alcohol use: Not Currently    Drug use: Never     Social Determinants of Health     Food Insecurity: No Food Insecurity (2/15/2024)    [facility]    Hunger Vital Sign     Worried About Running Out of Food in the Last Year: Never true     Ran Out of Food in the Last Year: Never true   Transportation Needs: No Transportation Needs (2/15/2024)    [facility]    PRAPARE - Transportation     Lack of Transportation (Medical): No     Lack of Transportation (Non-Medical): No   Housing Stability: Low Risk  (2/15/2024)    [facility]    Housing Stability Vital Sign     Unable to Pay for Housing in the Last Year: No     Number of Places Lived in the Last Year: 1     In the last 12 months, was there a time when you did not have a steady place to sleep or slept in a shelter (including now)?: No           Objective:     Current Outpatient Medications   Medication Instructions    acetaZOLAMIDE (DIAMOX) 250 mg, Oral, 2 times daily    amLODIPine (NORVASC) 5 mg, Oral, Daily    aspirin (ECOTRIN) 81 mg, Oral, Daily    clopidogreL (PLAVIX) 75 mg, Oral, Daily     FLUoxetine 40 mg, Oral, 2 times daily    fluticasone propionate (FLONASE) 50 mcg/actuation nasal spray 1 spray, Each Nostril, 2 times daily, Takes as needed    gabapentin (NEURONTIN) 600 mg, Oral, 3 times daily    levothyroxine (SYNTHROID) 75 mcg, Oral, Before breakfast    methocarbamoL (ROBAXIN) 500 mg, Oral, As needed (PRN)    metoprolol tartrate (LOPRESSOR) 25 mg, Oral, 2 times daily    nitroGLYCERIN (NITROSTAT) 0.4 MG SL tablet PLEASE SEE ATTACHED FOR DETAILED DIRECTIONS    oxybutynin (DITROPAN-XL) 10 mg, Oral, Daily, As needed    pantoprazole (PROTONIX) 40 mg, Oral, Daily    rimegepant 75 mg, Oral, Every other day, Place ODT tablet on the tongue; alternatively the ODT tablet may be placed under the tongue    rOPINIRole (REQUIP) 0.5 mg, Oral, Nightly    rosuvastatin (CRESTOR) 40 mg, Oral, Daily    tiZANidine (ZANAFLEX) 4 mg, Oral, Every 8 hours PRN       Current Inpatient Medications   aspirin  81 mg Oral QHS    clopidogreL  75 mg Oral QHS    methylPREDNISolone  4 mg Oral Once    Followed by    methylPREDNISolone  4 mg Oral Once    Followed by    methylPREDNISolone  8 mg Oral Once    Followed by    [START ON 5/25/2024] methylPREDNISolone  4 mg Oral Once    Followed by    [START ON 5/25/2024] methylPREDNISolone  4 mg Oral Once    Followed by    [START ON 5/25/2024] methylPREDNISolone  4 mg Oral Once    Followed by    [START ON 5/25/2024] methylPREDNISolone  4 mg Oral Once    Followed by    [START ON 5/26/2024] methylPREDNISolone  4 mg Oral Once    Followed by    [START ON 5/26/2024] methylPREDNISolone  4 mg Oral Once    Followed by    [START ON 5/26/2024] methylPREDNISolone  4 mg Oral Once    Followed by    [START ON 5/27/2024] methylPREDNISolone  4 mg Oral Once    Followed by    [START ON 5/27/2024] methylPREDNISolone  4 mg Oral Once    Followed by    [START ON 5/28/2024] methylPREDNISolone  4 mg Oral Once           Intake/Output Summary (Last 24 hours) at 5/24/2024 0803  Last data filed at 5/23/2024 2052  Gross  per 24 hour   Intake 1100.24 ml   Output --   Net 1100.24 ml       Review of Systems   Constitutional:  Negative for chills, diaphoresis and fever.   Gastrointestinal:  Positive for nausea and vomiting.   Skin:  Negative for rash.   Neurological:  Positive for headaches (9/10 HA, sharp, RT forehead/retro-orbital.).        Vital Signs (Most Recent):  Temp: 98.3 °F (36.8 °C) (05/24/24 0400)  Pulse: (!) 58 (05/24/24 0500)  Resp: 19 (05/24/24 0500)  BP: 128/78 (05/24/24 0500)  SpO2: 96 % (05/24/24 0500)  Body mass index is 32.8 kg/m².  Weight: 89.4 kg (197 lb 1.5 oz) Vital Signs (24h Range):  Temp:  [97.7 °F (36.5 °C)-98.3 °F (36.8 °C)] 98.3 °F (36.8 °C)  Pulse:  [56-71] 58  Resp:  [12-24] 19  SpO2:  [92 %-99 %] 96 %  BP: (105-147)/(61-87) 128/78     Physical Exam  Vitals and nursing note reviewed.   Constitutional:       General: She is not in acute distress.     Appearance: Normal appearance. She is obese. She is not ill-appearing or toxic-appearing.   Eyes:      General: No scleral icterus.     Extraocular Movements: Extraocular movements intact.      Pupils: Pupils are equal, round, and reactive to light.   Cardiovascular:      Rate and Rhythm: Regular rhythm. Bradycardia present.      Pulses: Normal pulses.      Heart sounds: Normal heart sounds. No murmur heard.  Pulmonary:      Effort: Pulmonary effort is normal. No respiratory distress.      Breath sounds: Normal breath sounds. No wheezing or rales.   Abdominal:      General: Abdomen is flat. There is no distension.      Palpations: Abdomen is soft.      Tenderness: There is no abdominal tenderness.   Musculoskeletal:         General: No swelling or tenderness. Normal range of motion.      Cervical back: Normal range of motion.      Right lower leg: No edema.      Left lower leg: No edema.   Skin:     General: Skin is warm.      Capillary Refill: Capillary refill takes less than 2 seconds.      Coloration: Skin is not jaundiced or pale.      Findings: No lesion  "or rash.   Neurological:      General: No focal deficit present.      Mental Status: She is alert and oriented to person, place, and time. Mental status is at baseline.           Mechanical ventilation support:       Lines/Drains/Airways       Peripheral Intravenous Line  Duration                  Peripheral IV - Single Lumen 05/23/24 0835 20 G Anterior;Right Forearm <1 day         Peripheral IV - Single Lumen 05/23/24 1827 20 G Anterior;Right Upper Arm <1 day                    Significant Labs:    Lab Results   Component Value Date    WBC 6.10 05/24/2024    HGB 12.8 05/24/2024    HCT 40.2 05/24/2024    MCV 79.3 (L) 05/24/2024     05/24/2024         BMP  Lab Results   Component Value Date     05/24/2024    K 4.5 05/24/2024     02/15/2024    CO2 19 (L) 05/24/2024    BUN 8.9 (L) 05/24/2024    CREATININE 0.69 05/24/2024    CALCIUM 9.3 05/24/2024    ANIONGAP 8 02/15/2024    ESTGFRAFRICA 87 02/15/2024    EGFRNONAA >60 08/01/2022       ABG  No results for input(s): "PH", "PO2", "PCO2", "HCO3", "BE" in the last 168 hours.        Significant Imaging:  I have reviewed all pertinent imaging within the past 24 hours.        Assessment/Plan:     Assessment  Venous sinus stenosis s/p DSA with venous stenting of transverse sinus (5/23/24; Dr. Sanders)  Idiopathic intracranial hypertension  CAD & ICMO s/p PCI w/ CECILLE proximal LAD (EF 30%-->55-60% TTE 4/14/24; LAD stent widely patent C 2/15/24)  Unspecified migraine disorder without status migrainosus or aura  Hypothyroidism   Hypertension   Hyperlipidemia  History of migraine overuse headache   Anxiety   GERD      Plan  Hemodynamics stable overnight.  No neuro changes noted.  CT head last night showed no significant change.  Meds restarted post stent placement.  Will follow further recommendations from Neurology.          "

## 2024-05-24 NOTE — DISCHARGE INSTRUCTIONS
1115 10/4/19 I faxed transfer request to the [facility].  I called [facility] 
[phone] and spoke with Rem, she said they have no beds available today, but that we can 
call back tomorrow to check on bed availability. All discharge questions and information have been delivered and answered with patient and .

## 2024-05-24 NOTE — PLAN OF CARE
05/24/24 1107   Discharge Assessment   Assessment Type Discharge Planning Assessment   Confirmed/corrected address, phone number and insurance Yes   Confirmed Demographics Correct on Facesheet   Source of Information patient;family   When was your last doctors appointment? 04/15/24   Communicated JOSH with patient/caregiver Date not available/Unable to determine   Reason For Admission pseudotumocerevi d/t transverse sinus stenosis   People in Home spouse   Do you expect to return to your current living situation? Yes   Do you have help at home or someone to help you manage your care at home? Yes   Who are your caregiver(s) and their phone number(s)? Alex ()   Current cognitive status: Alert/Oriented   Walking or Climbing Stairs Difficulty no   Dressing/Bathing Difficulty no   Home Layout Able to live on 1st floor   Equipment Currently Used at Home none   Readmission within 30 days? No   Patient currently being followed by outpatient case management? No   Do you currently have service(s) that help you manage your care at home? No   Do you take prescription medications? Yes   Is the patient taking medications as prescribed? yes   Who is going to help you get home at discharge?    Are you on dialysis? No   Do you take coumadin? No   Discharge Plan A Home   Discharge Plan B Home   DME Needed Upon Discharge  none   Discharge Plan discussed with: Spouse/sig other;Patient   Transition of Care Barriers None   Financial Resource Strain   How hard is it for you to pay for the very basics like food, housing, medical care, and heating? Not very   Housing Stability   In the last 12 months, was there a time when you were not able to pay the mortgage or rent on time? N   At any time in the past 12 months, were you homeless or living in a shelter (including now)? N   Transportation Needs   Has the lack of transportation kept you from medical appointments, meetings, work or from getting things needed for daily  living? No   Food Insecurity   Within the past 12 months, you worried that your food would run out before you got the money to buy more. Never true   Within the past 12 months, the food you bought just didn't last and you didn't have money to get more. Never true   Stress   Do you feel stress - tense, restless, nervous, or anxious, or unable to sleep at night because your mind is troubled all the time - these days? To some exte   Social Isolation   How often do you feel lonely or isolated from those around you?  Never   Alcohol Use   Q1: How often do you have a drink containing alcohol? 2-4 pr month   Q2: How many drinks containing alcohol do you have on a typical day when you are drinking? 1 or 2   Q3: How often do you have six or more drinks on one occasion? Never   Utilities   In the past 12 months has the electric, gas, oil, or water company threatened to shut off services in your home? No   Health Literacy   How often do you need to have someone help you when you read instructions, pamphlets, or other written material from your doctor or pharmacy? Never   OTHER   Name(s) of People in Home Alex ()     Pharmacy: [facility]  Pt states she is indep in adl's. Pt denies any dc needs.  states he will drive pt home at dc.

## 2024-05-27 ENCOUNTER — PATIENT OUTREACH (OUTPATIENT)
Dept: ADMINISTRATIVE | Facility: CLINIC | Age: 54
End: 2024-05-27
Payer: COMMERCIAL

## 2024-06-05 ENCOUNTER — OFFICE VISIT (OUTPATIENT)
Dept: NEUROLOGY | Facility: CLINIC | Age: 54
End: 2024-06-05
Payer: COMMERCIAL

## 2024-06-05 VITALS
BODY MASS INDEX: 32.28 KG/M2 | WEIGHT: 194 LBS | SYSTOLIC BLOOD PRESSURE: 112 MMHG | HEART RATE: 72 BPM | DIASTOLIC BLOOD PRESSURE: 78 MMHG

## 2024-06-05 DIAGNOSIS — G43.009 MIGRAINE WITHOUT AURA AND WITHOUT STATUS MIGRAINOSUS, NOT INTRACTABLE: ICD-10-CM

## 2024-06-05 DIAGNOSIS — G93.2 IIH (IDIOPATHIC INTRACRANIAL HYPERTENSION): Primary | ICD-10-CM

## 2024-06-05 PROCEDURE — 99214 OFFICE O/P EST MOD 30 MIN: CPT | Mod: S$GLB,,, | Performed by: NURSE PRACTITIONER

## 2024-06-05 PROCEDURE — 99999 PR PBB SHADOW E&M-EST. PATIENT-LVL IV: CPT | Mod: PBBFAC,,, | Performed by: NURSE PRACTITIONER

## 2024-06-05 RX ORDER — RIZATRIPTAN BENZOATE 10 MG/1
10 TABLET ORAL
Qty: 9 TABLET | Refills: 5 | Status: SHIPPED | OUTPATIENT
Start: 2024-06-05 | End: 2024-06-17 | Stop reason: SDUPTHER

## 2024-06-05 RX ORDER — ADALIMUMAB 40MG/0.4ML
40 KIT SUBCUTANEOUS
COMMUNITY

## 2024-06-05 NOTE — ASSESSMENT & PLAN NOTE
-continue nurtec QOD as it is helping  -tylenol prn for more dull headahces.  Rizatriptan prn migraine

## 2024-06-05 NOTE — PROGRESS NOTES
Subjective:       Patient ID: Yandy Chaudhry is a 54 y.o. female.    Chief Complaint: Migraine (Migraines are better. 2 weeks ago Dr. Sanders place a vascular stent behind her right ear. States has been getting headaches from that, but they are getting better.)      History of Present Illness:  Follow up visit for migraine.  Since our last visit,  she went to the ED for severe headache with associated left-sided numbness.  She was seen by Dr. Sanders  and he ultimately performed a diagnostic angiogram which revealed right transverse sinus stenosis of 70% and of 30% on the left.  She underwent a stenting of the right transverse sinus with Dr. Thao in May and has recovered well.  Her severe headaches are essentially gone.  She does still have dull head pain in the right occipital region and right temporal region, but it is about a 3/10.  She says it has seemed to be improving daily ever since the procedure.  When it becomes problematic for her, she will take Tylenol and it will relieve the pain.  She has not really had any migraines since being on Nurtec every other day.  She takes rizatriptan for rescue, but has not needed it in quite some time.            Past Medical History:   Diagnosis Date    Acid reflux     Anemia, unspecified     Anxiety     Coronary artery disease     Hyperlipidemia     Hypertension     Migraines     Myocardial infarction     Thrombus     apical    Thyroid disease        Past Surgical History:   Procedure Laterality Date    ANKLE SURGERY Left     ANTERIOR CERVICAL DISCECTOMY W/ FUSION       SECTION       SECTION      CHOLECYSTECTOMY      CORONARY ANGIOPLASTY WITH STENT PLACEMENT      DILATION AND CURETTAGE OF UTERUS      HYSTERECTOMY      INSERTION OF BARE METAL STENT INTO VASCULAR BYPASS GRAFT  2024    LUMBAR PUNCTURE          Family History   Problem Relation Name Age of Onset    Cancer Mother          Pancriatic cancer    Hydrocephalus Mother       No Known Problems Father      No Known Problems Sister      No Known Problems Brother      Hydrocephalus Maternal Grandmother      Parkinsonism Maternal Grandfather          Social History     Socioeconomic History    Marital status:    Tobacco Use    Smoking status: Never     Passive exposure: Never    Smokeless tobacco: Never   Substance and Sexual Activity    Alcohol use: Not Currently    Drug use: Never     Social Determinants of Health     Financial Resource Strain: Low Risk  (5/24/2024)    Overall Financial Resource Strain (CARDIA)     Difficulty of Paying Living Expenses: Not very hard   Food Insecurity: No Food Insecurity (5/24/2024)    Hunger Vital Sign     Worried About Running Out of Food in the Last Year: Never true     Ran Out of Food in the Last Year: Never true   Transportation Needs: No Transportation Needs (5/24/2024)    TRANSPORTATION NEEDS     Transportation : No   Stress: Stress Concern Present (5/24/2024)    [employer] - Occupational Stress Questionnaire     Feeling of Stress : To some extent   Housing Stability: Low Risk  (5/24/2024)    Housing Stability Vital Sign     Unable to Pay for Housing in the Last Year: No     Homeless in the Last Year: No        Outpatient Encounter Medications as of 6/5/2024   Medication Sig Dispense Refill    adalimumab (HUMIRA,CF,) 40 mg/0.4 mL SyKt Inject 40 mg into the skin every 14 (fourteen) days.      amLODIPine (NORVASC) 5 MG tablet Take 5 mg by mouth once daily.      aspirin (ECOTRIN) 81 MG EC tablet Take 81 mg by mouth once daily.      clopidogreL (PLAVIX) 75 mg tablet Take 1 tablet (75 mg total) by mouth once daily. 30 tablet 2    FLUoxetine 40 MG capsule Take 40 mg by mouth 2 (two) times daily.      fluticasone propionate (FLONASE) 50 mcg/actuation nasal spray 1 spray by Each Nostril route 2 (two) times daily. Takes as needed      gabapentin (NEURONTIN) 600 MG tablet Take 1 tablet (600 mg total) by mouth 3 (three)  times daily. 90 tablet 11    levothyroxine (SYNTHROID) 75 MCG tablet Take 75 mcg by mouth before breakfast.      methocarbamoL (ROBAXIN) 500 MG Tab Take 500 mg by mouth as needed.      metoprolol tartrate (LOPRESSOR) 25 MG tablet Take 25 mg by mouth 2 (two) times daily.      nitroGLYCERIN (NITROSTAT) 0.4 MG SL tablet PLEASE SEE ATTACHED FOR DETAILED DIRECTIONS      ondansetron (ZOFRAN-ODT) 4 MG TbDL Take 1 tablet (4 mg total) by mouth every 8 (eight) hours as needed (nausea/vomiting). 45 tablet 3    oxybutynin (DITROPAN-XL) 10 MG 24 hr tablet Take 10 mg by mouth once daily. As needed      pantoprazole (PROTONIX) 40 MG tablet Take 40 mg by mouth once daily.      rimegepant 75 mg odt Take 1 tablet (75 mg total) by mouth every other day. Place ODT tablet on the tongue; alternatively the ODT tablet may be placed under the tongue 15 tablet 11    rOPINIRole (REQUIP) 0.5 MG tablet Take 0.5 mg by mouth every evening.      rosuvastatin (CRESTOR) 40 MG Tab Take 40 mg by mouth once daily.      tiZANidine (ZANAFLEX) 4 MG tablet Take 4 mg by mouth every 8 (eight) hours as needed.      methylPREDNISolone (MEDROL DOSEPACK) 4 mg tablet use as directed (Patient not taking: Reported on 2024) 21 each 0    rizatriptan (MAXALT) 10 MG tablet Take 1 tablet (10 mg total) by mouth as needed for Migraine. 9 tablet 5    [DISCONTINUED] acetaZOLAMIDE (DIAMOX) 250 MG tablet Take 1 tablet (250 mg total) by mouth 2 (two) times daily. (Patient not taking: Reported on 2024) 180 tablet 3    [] methylPREDNISolone tablet 4 mg       [] methylPREDNISolone tablet 4 mg       [DISCONTINUED] acetaminophen tablet 650 mg       [DISCONTINUED] aspirin chewable tablet 81 mg       [DISCONTINUED] clevidipine (CLEVIPREX) 25 mg/50 mL infusion       [DISCONTINUED] clopidogreL tablet 75 mg       [DISCONTINUED] hydrALAZINE injection 10 mg       [DISCONTINUED] labetaloL injection 10 mg       [DISCONTINUED] melatonin tablet 6 mg       [DISCONTINUED]  methylPREDNISolone tablet 4 mg       [DISCONTINUED] methylPREDNISolone tablet 4 mg       [DISCONTINUED] methylPREDNISolone tablet 4 mg       [DISCONTINUED] methylPREDNISolone tablet 4 mg       [DISCONTINUED] methylPREDNISolone tablet 4 mg       [DISCONTINUED] methylPREDNISolone tablet 4 mg       [DISCONTINUED] methylPREDNISolone tablet 4 mg       [DISCONTINUED] methylPREDNISolone tablet 4 mg       [DISCONTINUED] methylPREDNISolone tablet 4 mg       [DISCONTINUED] methylPREDNISolone tablet 4 mg       [DISCONTINUED] methylPREDNISolone tablet 4 mg       [DISCONTINUED] methylPREDNISolone tablet 8 mg       [DISCONTINUED] ondansetron disintegrating tablet 8 mg       [DISCONTINUED] ondansetron injection 4 mg        No facility-administered encounter medications on file as of 6/5/2024.      Objective:   /78 (BP Location: Left arm)   Pulse 72   Wt 88 kg (194 lb)   BMI 32.28 kg/m²        Physical Exam  General:  Alert and oriented  NAD  No overt edema    Cognition and Comprehension:  Speech and language intact  Follows commands    Cranial nerves:   CN 2_ Visual fields (full to confrontation both eyes)  CN 3, 4, 6_ Intact, RUSLAN, no nystagmus  CN 5_facial tactile sensation intact  CN 7_no face asymmetry; normal eye closure and smile  CN 8_hearing intact to spoken voice  CN 9, 10, 11_voice normal, shoulder shrug ok; deltoids not fatigable   CN 12 tongue_protrudes mid line    Muscle Strength and Tone:  Normal upper extremity tone  Normal lower extremity tone  Normal upper extremity strength  Normal lower extremity strength    Reflexes:  Normal and symmetric    Sensation:  Intact to light touch and temperature    Coordination and Gait:  Coordination and gait are normal       Assessment & Plan:      1. IIH (idiopathic intracranial hypertension)  Assessment & Plan:  Now s/p right transverse sinus stenet by Dr. Thao.  Has follow up with his clinic next month      2. Migraine without aura and without status migrainosus,  not intractable  Overview:  Previous patient of Dr. Navarro.  She started having migraines in her teens and they became really severe after the birth of her 2nd child, but they ultimately resolved in later adulthood.  In October 2022, she began having daily headaches that were so severe they would wake her up at night.  She began taking Tylenol around the clock 3 to 5 times a day.  She saw Dr. Joiner in December 2022 and an MRI brain showed an empty sella.  She was directed to have an ophthalmologic evaluation which she did and there were no reported abnormalities at that time.  She was admitted to [facility] in September 2023 for a stroke workup as she had left-sided weakness associated with a severe headache.  She saw Dr. Navarro in hospital follow-up who started her on Topamax and she ultimately underwent a lumbar puncture (Nov 2023) to evaluate opening pressure which was measured as 21.  She was subsequently started on Diamox and titrated up to 1000 mg b.i.d., but severity of head pain persisted and she had side effects of severe diarrhea and dizziness with Diamox.  She was also tried on gabapentin and zonegran with no relief.  Care was transferred to [facility] team in Dec 2023.  She has been weaned off Diamox and has also stopped daily Tylenol.  She is now on Nurtec every other day with Maxalt p.r.n. as rescue.    Assessment & Plan:  -continue nurtec QOD as it is helping  -tylenol prn for more dull headahces.  Rizatriptan prn migraine    Orders:  -     rizatriptan (MAXALT) 10 MG tablet; Take 1 tablet (10 mg total) by mouth as needed for Migraine.  Dispense: 9 tablet; Refill: 5          This note was created with the assistance of voice recognition software. There may be transcription errors as a result of using this technology however minimal. Effort has been made to assure accuracy of transcription but any obvious errors or omissions should be clarified with the author of the document.

## 2024-06-13 ENCOUNTER — TELEPHONE (OUTPATIENT)
Dept: NEUROLOGY | Facility: CLINIC | Age: 54
End: 2024-06-13
Payer: COMMERCIAL

## 2024-06-13 DIAGNOSIS — G43.009 MIGRAINE WITHOUT AURA AND WITHOUT STATUS MIGRAINOSUS, NOT INTRACTABLE: Primary | ICD-10-CM

## 2024-06-13 RX ORDER — METHYLPREDNISOLONE 4 MG/1
TABLET ORAL
Qty: 21 EACH | Refills: 0 | Status: SHIPPED | OUTPATIENT
Start: 2024-06-13 | End: 2024-07-04

## 2024-06-13 NOTE — TELEPHONE ENCOUNTER
Patient called in stating she is having a sharp pain on right side of her head accompanied by dizziness. Message was routed to Dr Sanders, pt has been notified that he will give her a call. Pt voiced understanding.

## 2024-06-17 ENCOUNTER — TELEPHONE (OUTPATIENT)
Dept: NEUROLOGY | Facility: CLINIC | Age: 54
End: 2024-06-17
Payer: COMMERCIAL

## 2024-06-17 DIAGNOSIS — G43.009 MIGRAINE WITHOUT AURA AND WITHOUT STATUS MIGRAINOSUS, NOT INTRACTABLE: ICD-10-CM

## 2024-06-17 RX ORDER — RIZATRIPTAN BENZOATE 10 MG/1
10 TABLET ORAL
Qty: 9 TABLET | Refills: 5 | Status: SHIPPED | OUTPATIENT
Start: 2024-06-17 | End: 2024-07-17

## 2024-06-18 ENCOUNTER — OFFICE VISIT (OUTPATIENT)
Dept: URGENT CARE | Facility: CLINIC | Age: 54
End: 2024-06-18
Payer: COMMERCIAL

## 2024-06-18 ENCOUNTER — HOSPITAL ENCOUNTER (EMERGENCY)
Facility: HOSPITAL | Age: 54
Discharge: HOME | End: 2024-06-18
Attending: STUDENT IN AN ORGANIZED HEALTH CARE EDUCATION/TRAINING PROGRAM
Payer: COMMERCIAL

## 2024-06-18 VITALS
WEIGHT: 193 LBS | HEIGHT: 65 IN | BODY MASS INDEX: 32.15 KG/M2 | HEART RATE: 58 BPM | SYSTOLIC BLOOD PRESSURE: 138 MMHG | TEMPERATURE: 99 F | OXYGEN SATURATION: 98 % | RESPIRATION RATE: 18 BRPM | DIASTOLIC BLOOD PRESSURE: 83 MMHG

## 2024-06-18 VITALS
SYSTOLIC BLOOD PRESSURE: 154 MMHG | OXYGEN SATURATION: 98 % | DIASTOLIC BLOOD PRESSURE: 86 MMHG | BODY MASS INDEX: 32.99 KG/M2 | RESPIRATION RATE: 17 BRPM | HEIGHT: 65 IN | WEIGHT: 198 LBS | TEMPERATURE: 98 F | HEART RATE: 63 BPM
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
## 2024-06-18 DIAGNOSIS — G44.201 ACUTE INTRACTABLE TENSION-TYPE HEADACHE: ICD-10-CM

## 2024-06-18 DIAGNOSIS — M54.9 BACK PAIN, UNSPECIFIED BACK LOCATION, UNSPECIFIED BACK PAIN LATERALITY, UNSPECIFIED CHRONICITY: Primary | ICD-10-CM

## 2024-06-18 DIAGNOSIS — R55 SYNCOPE: ICD-10-CM

## 2024-06-18 DIAGNOSIS — S09.90XA INJURY OF HEAD, INITIAL ENCOUNTER: ICD-10-CM
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
## 2024-06-18 LAB
ALBUMIN SERPL-MCNC: 3.4 G/DL (ref 3.5–5)
ALBUMIN/GLOB SERPL: 1 RATIO (ref 1.1–2)
ALP SERPL-CCNC: 72 UNIT/L (ref 40–150)
ALT SERPL-CCNC: 24 UNIT/L (ref 0–55)
ANION GAP SERPL CALC-SCNC: 6 MEQ/L
AST SERPL-CCNC: 40 UNIT/L (ref 5–34)
BACTERIA #/AREA URNS AUTO: ABNORMAL /HPF
BASOPHILS # BLD AUTO: 0.11 X10(3)/MCL
BASOPHILS NFR BLD AUTO: 1.8 %
BILIRUB SERPL-MCNC: 0.3 MG/DL
BILIRUB UR QL STRIP.AUTO: NEGATIVE
BUN SERPL-MCNC: 12.8 MG/DL (ref 9.8–20.1)
CALCIUM SERPL-MCNC: 9 MG/DL (ref 8.4–10.2)
CHLORIDE SERPL-SCNC: 112 MMOL/L (ref 98–107)
CLARITY UR: CLEAR
CO2 SERPL-SCNC: 21 MMOL/L (ref 22–29)
COLOR UR AUTO: COLORLESS
CREAT SERPL-MCNC: 0.78 MG/DL (ref 0.55–1.02)
CREAT/UREA NIT SERPL: 16
D DIMER PPP IA.FEU-MCNC: 0.48 UG/ML FEU (ref 0–0.5)
EOSINOPHIL # BLD AUTO: 0.3 X10(3)/MCL (ref 0–0.9)
EOSINOPHIL NFR BLD AUTO: 4.8 %
ERYTHROCYTE [DISTWIDTH] IN BLOOD BY AUTOMATED COUNT: 18.1 % (ref 11.5–17)
GFR SERPLBLD CREATININE-BSD FMLA CKD-EPI: >60 ML/MIN/1.73/M2
GLOBULIN SER-MCNC: 3.4 GM/DL (ref 2.4–3.5)
GLUCOSE SERPL-MCNC: 127 MG/DL (ref 74–100)
GLUCOSE UR QL STRIP: NORMAL
HCT VFR BLD AUTO: 36.6 % (ref 37–47)
HGB BLD-MCNC: 11.8 G/DL (ref 12–16)
HGB UR QL STRIP: NEGATIVE
IMM GRANULOCYTES # BLD AUTO: 0.01 X10(3)/MCL (ref 0–0.04)
IMM GRANULOCYTES NFR BLD AUTO: 0.2 %
KETONES UR QL STRIP: NEGATIVE
LEUKOCYTE ESTERASE UR QL STRIP: NEGATIVE
LYMPHOCYTES # BLD AUTO: 2.1 X10(3)/MCL (ref 0.6–4.6)
LYMPHOCYTES NFR BLD AUTO: 33.8 %
MCH RBC QN AUTO: 25.5 PG (ref 27–31)
MCHC RBC AUTO-ENTMCNC: 32.2 G/DL (ref 33–36)
MCV RBC AUTO: 79.2 FL (ref 80–94)
MONOCYTES # BLD AUTO: 0.58 X10(3)/MCL (ref 0.1–1.3)
MONOCYTES NFR BLD AUTO: 9.3 %
NEUTROPHILS # BLD AUTO: 3.12 X10(3)/MCL (ref 2.1–9.2)
NEUTROPHILS NFR BLD AUTO: 50.1 %
NITRITE UR QL STRIP: NEGATIVE
NRBC BLD AUTO-RTO: 0 %
PH UR STRIP: 7 [PH]
PLATELET # BLD AUTO: 246 X10(3)/MCL (ref 130–400)
PLATELETS.RETICULATED NFR BLD AUTO: 7.4 % (ref 0.9–11.2)
PMV BLD AUTO: 12 FL (ref 7.4–10.4)
POTASSIUM SERPL-SCNC: 4.9 MMOL/L (ref 3.5–5.1)
PROT SERPL-MCNC: 6.8 GM/DL (ref 6.4–8.3)
PROT UR QL STRIP: ABNORMAL
RBC # BLD AUTO: 4.62 X10(6)/MCL (ref 4.2–5.4)
RBC #/AREA URNS AUTO: ABNORMAL /HPF
SODIUM SERPL-SCNC: 139 MMOL/L (ref 136–145)
SP GR UR STRIP.AUTO: >1.05 (ref 1–1.03)
SQUAMOUS #/AREA URNS LPF: ABNORMAL /HPF
TROPONIN I SERPL-MCNC: <0.01 NG/ML (ref 0–0.04)
TROPONIN I SERPL-MCNC: <0.01 NG/ML (ref 0–0.04)
UROBILINOGEN UR STRIP-ACNC: NORMAL
WBC # BLD AUTO: 6.22 X10(3)/MCL (ref 4.5–11.5)
WBC #/AREA URNS AUTO: ABNORMAL /HPF

## 2024-06-18 PROCEDURE — 84484 ASSAY OF TROPONIN QUANT: CPT | Performed by: PHYSICIAN ASSISTANT

## 2024-06-18 PROCEDURE — 99285 EMERGENCY DEPT VISIT HI MDM: CPT | Mod: 25

## 2024-06-18 PROCEDURE — 84484 ASSAY OF TROPONIN QUANT: CPT | Performed by: STUDENT IN AN ORGANIZED HEALTH CARE EDUCATION/TRAINING PROGRAM

## 2024-06-18 PROCEDURE — 99213 OFFICE O/P EST LOW 20 MIN: CPT | Mod: ,,, | Performed by: FAMILY MEDICINE

## 2024-06-18 PROCEDURE — 80053 COMPREHEN METABOLIC PANEL: CPT | Performed by: PHYSICIAN ASSISTANT

## 2024-06-18 PROCEDURE — 81001 URINALYSIS AUTO W/SCOPE: CPT | Performed by: PHYSICIAN ASSISTANT

## 2024-06-18 PROCEDURE — 85379 FIBRIN DEGRADATION QUANT: CPT | Performed by: STUDENT IN AN ORGANIZED HEALTH CARE EDUCATION/TRAINING PROGRAM

## 2024-06-18 PROCEDURE — 93010 ELECTROCARDIOGRAM REPORT: CPT | Mod: ,,, | Performed by: INTERNAL MEDICINE

## 2024-06-18 PROCEDURE — 85025 COMPLETE CBC W/AUTO DIFF WBC: CPT | Performed by: PHYSICIAN ASSISTANT

## 2024-06-18 PROCEDURE — 25500020 PHARM REV CODE 255: Performed by: STUDENT IN AN ORGANIZED HEALTH CARE EDUCATION/TRAINING PROGRAM

## 2024-06-18 PROCEDURE — 93005 ELECTROCARDIOGRAM TRACING: CPT

## 2024-06-18 RX ORDER — EVOLOCUMAB 140 MG/ML
140 INJECTION, SOLUTION SUBCUTANEOUS
COMMUNITY

## 2024-06-18 RX ORDER — FUROSEMIDE 40 MG/1
TABLET ORAL
COMMUNITY
Start: 2024-02-09

## 2024-06-18 RX ORDER — FERROUS SULFATE 325(65) MG
TABLET ORAL
COMMUNITY
Start: 2024-02-28

## 2024-06-18 RX ORDER — ONDANSETRON 4 MG/1
4 TABLET, ORALLY DISINTEGRATING ORAL EVERY 6 HOURS PRN
Qty: 12 TABLET | Refills: 0 | Status: SHIPPED | OUTPATIENT
Start: 2024-06-18

## 2024-06-18 RX ADMIN — IOHEXOL 50 ML: 350 INJECTION, SOLUTION INTRAVENOUS at 07:06

## 2024-06-18 RX ADMIN — IOHEXOL 90 ML: 350 INJECTION, SOLUTION INTRAVENOUS at 07:06

## 2024-06-18 NOTE — FIRST PROVIDER EVALUATION
"Medical screening examination initiated.  I have conducted a focused provider triage encounter, findings are as follows:    Brief history of present illness:  54-year-old female presents to ED for evaluation after having a syncopal episode on Thursday.  Patient reports falling and possibly hitting her head with right side pain.  Reports bruising to her right side.  History of pseudotumor cerebri with sinus stenting    Vitals:    06/18/24 1302   BP: (!) 145/83   BP Location: Left arm   Pulse: 62   Resp: 20   Temp: 97.9 °F (36.6 °C)   TempSrc: Oral   SpO2: 97%   Weight: 89.8 kg (198 lb)   Height: 5' 5" (1.651 m)       Pertinent physical exam:  Patient is awake and alert and oriented.  Ambulatory to triage.  In no acute distress.      Brief workup plan:  labs, UA, EKG, CT head    Preliminary workup initiated; this workup will be continued and followed by the physician or advanced practice provider that is assigned to the patient when roomed.  "

## 2024-06-18 NOTE — PROGRESS NOTES
"Subjective:      Patient ID: Yandy Chaudhry is a 54 y.o. female.    Vitals:  height is 5' 5" (1.651 m) and weight is 87.5 kg (193 lb). Her oral temperature is 98.5 °F (36.9 °C). Her blood pressure is 138/83 and her pulse is 58 (abnormal). Her respiration is 18 and oxygen saturation is 98%.     Chief Complaint: Injury     Patient is a 54 y.o. female who presents to urgent care with complaints injuries due to a fall that occurred at home x 5 days ago. Pt states that she loss consciousness and injured her flank , right side, and rib cage and that there are several contusions.  Right side is also swollen; does not recall hitting her head. Last vertigo episode occurred in April of 2024. She requests orthostatic bp reading. She is able to ambulate without pain. Denies hip pain, knee pain, shoulder, or wrist pain.       Constitution: Negative for chills, sweating, fatigue and fever.   HENT: Negative.     Neck: neck negative.   Eyes: Negative.    Respiratory: Negative.     Gastrointestinal: Negative.    Musculoskeletal:  Positive for pain and trauma.   Skin:  Positive for bruising. Negative for rash, abrasion and laceration.   Neurological:  Positive for history of vertigo and passing out. Negative for disorientation and altered mental status.   Psychiatric/Behavioral:  Negative for altered mental status, disorientation and confusion.       Objective:     Physical Exam   Constitutional: She is oriented to person, place, and time. She appears well-developed. She is cooperative. No distress.   HENT:   Head: Normocephalic and atraumatic.   Ears:   Right Ear: Tympanic membrane normal.   Left Ear: Tympanic membrane normal.   Nose: Nose normal.   Mouth/Throat: Oropharynx is clear and moist and mucous membranes are normal.   Eyes: Conjunctivae and lids are normal.   Neck: Trachea normal and phonation normal. Neck supple.   Cardiovascular: Normal rate, regular rhythm, normal heart sounds and normal pulses. "   Pulmonary/Chest: Effort normal and breath sounds normal.   Abdominal: Normal appearance and bowel sounds are normal. She exhibits no mass. Soft.   Musculoskeletal:         General: No deformity.   Neurological: no focal deficit. She is alert and oriented to person, place, and time. She has normal strength and normal reflexes. She displays no weakness. No cranial nerve deficit or sensory deficit.   Skin: Skin is warm, dry, intact and not diaphoretic.         Comments: Ecchymosis to right flank area    Psychiatric: Her speech is normal and behavior is normal. Judgment and thought content normal.   Nursing note and vitals reviewed.         Previous History      Review of patient's allergies indicates:  No Known Allergies    Past Medical History:   Diagnosis Date    Acid reflux     Anemia, unspecified     Anxiety     Coronary artery disease     Hyperlipidemia     Hypertension     Migraines     Myocardial infarction     Thrombus     apical    Thyroid disease      Current Outpatient Medications   Medication Instructions    amLODIPine (NORVASC) 5 mg, Oral, Daily    aspirin (ECOTRIN) 81 mg, Oral, Daily    clopidogreL (PLAVIX) 75 mg, Oral, Daily    ferrous sulfate (FEOSOL) 325 mg (65 mg iron) Tab tablet Three times weekly    FLUoxetine 40 mg, Oral, 2 times daily    fluticasone propionate (FLONASE) 50 mcg/actuation nasal spray 1 spray, Each Nostril, 2 times daily, Takes as needed    furosemide (LASIX) 40 MG tablet Oral    gabapentin (NEURONTIN) 600 mg, Oral, 3 times daily    HUMIRA(CF) 40 mg, Subcutaneous, Every 14 days    levothyroxine (SYNTHROID) 75 mcg, Oral, Before breakfast    methocarbamoL (ROBAXIN) 500 mg, Oral, As needed (PRN)    methylPREDNISolone (MEDROL DOSEPACK) 4 mg tablet use as directed    metoprolol tartrate (LOPRESSOR) 25 mg, Oral, 2 times daily    nitroGLYCERIN (NITROSTAT) 0.4 MG SL tablet PLEASE SEE ATTACHED FOR DETAILED DIRECTIONS    ondansetron (ZOFRAN-ODT) 4 mg, Oral, Every 8 hours PRN    oxybutynin  "(DITROPAN-XL) 10 mg, Oral, Daily, As needed    pantoprazole (PROTONIX) 40 mg, Oral, Daily    REPATHA SURECLICK 140 mg, Subcutaneous    rimegepant 75 mg, Oral, Every other day, Place ODT tablet on the tongue; alternatively the ODT tablet may be placed under the tongue    rizatriptan (MAXALT) 10 mg, Oral, As needed (PRN), Take 1 tablet daily as needed,  may repeat in 2 hours not to exceed more than 20 mg in 24 hours.    rOPINIRole (REQUIP) 0.5 mg, Oral, Nightly    rosuvastatin (CRESTOR) 40 mg, Oral, Daily    tiZANidine (ZANAFLEX) 4 mg, Oral, Every 8 hours PRN     Past Surgical History:   Procedure Laterality Date    ANKLE SURGERY Left     ANTERIOR CERVICAL DISCECTOMY W/ FUSION       SECTION       SECTION      CHOLECYSTECTOMY      CORONARY ANGIOPLASTY WITH STENT PLACEMENT      DILATION AND CURETTAGE OF UTERUS      HYSTERECTOMY      INSERTION OF BARE METAL STENT INTO VASCULAR BYPASS GRAFT  2024    LUMBAR PUNCTURE       Family History   Problem Relation Name Age of Onset    Cancer Mother          Pancriatic cancer    Hydrocephalus Mother      No Known Problems Father      No Known Problems Sister      No Known Problems Brother      Hydrocephalus Maternal Grandmother      Parkinsonism Maternal Grandfather         Social History     Tobacco Use    Smoking status: Never     Passive exposure: Never    Smokeless tobacco: Never   Substance Use Topics    Alcohol use: Not Currently     Alcohol/week: 1.0 standard drink of alcohol     Types: 1 Standard drinks or equivalent per week     Comment: once a month    Drug use: Never        Physical Exam      Vital Signs Reviewed   /83 Comment (BP Location): supine  Pulse (!) 58   Temp 98.5 °F (36.9 °C) (Oral)   Resp 18   Ht 5' 5" (1.651 m)   Wt 87.5 kg (193 lb)   SpO2 98%   BMI 32.12 kg/m²        Procedures    Procedures     Labs     Results for orders placed or performed during the hospital encounter of 24   Basic Metabolic Panel   Result " Value Ref Range    Sodium 142 136 - 145 mmol/L    Potassium 3.8 3.5 - 5.1 mmol/L    Chloride 109 (H) 98 - 107 mmol/L    CO2 23 22 - 29 mmol/L    Glucose 102 (H) 74 - 100 mg/dL    Blood Urea Nitrogen 13.3 9.8 - 20.1 mg/dL    Creatinine 0.87 0.55 - 1.02 mg/dL    BUN/Creatinine Ratio 15     Calcium 8.9 8.4 - 10.2 mg/dL    Anion Gap 10.0 mEq/L    eGFR >60 mL/min/1.73/m2   Protime-INR   Result Value Ref Range    PT 13.2 12.5 - 14.5 seconds    INR 1.0 <=1.3   CBC with Differential   Result Value Ref Range    WBC 7.43 4.50 - 11.50 x10(3)/mcL    RBC 4.58 4.20 - 5.40 x10(6)/mcL    Hgb 11.8 (L) 12.0 - 16.0 g/dL    Hct 36.3 (L) 37.0 - 47.0 %    MCV 79.3 (L) 80.0 - 94.0 fL    MCH 25.8 (L) 27.0 - 31.0 pg    MCHC 32.5 (L) 33.0 - 36.0 g/dL    RDW 18.7 (H) 11.5 - 17.0 %    Platelet 235 130 - 400 x10(3)/mcL    MPV 12.1 (H) 7.4 - 10.4 fL    Neut % 51.0 %    Lymph % 31.4 %    Mono % 10.4 %    Eos % 5.4 %    Basophil % 1.7 %    Lymph # 2.33 0.6 - 4.6 x10(3)/mcL    Neut # 3.79 2.1 - 9.2 x10(3)/mcL    Mono # 0.77 0.1 - 1.3 x10(3)/mcL    Eos # 0.40 0 - 0.9 x10(3)/mcL    Baso # 0.13 <=0.2 x10(3)/mcL    IG# 0.01 0 - 0.04 x10(3)/mcL    IG% 0.1 %    NRBC% 0.0 %    IPF 9.2 0.9 - 11.2 %   Comprehensive Metabolic Panel   Result Value Ref Range    Sodium 137 136 - 145 mmol/L    Potassium 4.5 3.5 - 5.1 mmol/L    Chloride 108 (H) 98 - 107 mmol/L    CO2 19 (L) 22 - 29 mmol/L    Glucose 129 (H) 74 - 100 mg/dL    Blood Urea Nitrogen 8.9 (L) 9.8 - 20.1 mg/dL    Creatinine 0.69 0.55 - 1.02 mg/dL    Calcium 9.3 8.4 - 10.2 mg/dL    Protein Total 6.7 6.4 - 8.3 gm/dL    Albumin 3.6 3.5 - 5.0 g/dL    Globulin 3.1 2.4 - 3.5 gm/dL    Albumin/Globulin Ratio 1.2 1.1 - 2.0 ratio    Bilirubin Total 0.5 <=1.5 mg/dL    ALP 82 40 - 150 unit/L    ALT 36 0 - 55 unit/L    AST 41 (H) 5 - 34 unit/L    eGFR >60 mL/min/1.73/m2    Anion Gap 10.0 mEq/L    BUN/Creatinine Ratio 13    Magnesium   Result Value Ref Range    Magnesium Level 2.00 1.60 - 2.60 mg/dL   Phosphorus    Result Value Ref Range    Phosphorus Level 3.2 2.3 - 4.7 mg/dL   CBC with Differential   Result Value Ref Range    WBC 6.10 4.50 - 11.50 x10(3)/mcL    RBC 5.07 4.20 - 5.40 x10(6)/mcL    Hgb 12.8 12.0 - 16.0 g/dL    Hct 40.2 37.0 - 47.0 %    MCV 79.3 (L) 80.0 - 94.0 fL    MCH 25.2 (L) 27.0 - 31.0 pg    MCHC 31.8 (L) 33.0 - 36.0 g/dL    RDW 18.6 (H) 11.5 - 17.0 %    Platelet 242 130 - 400 x10(3)/mcL    MPV 11.9 (H) 7.4 - 10.4 fL    Neut % 85.8 %    Lymph % 12.5 %    Mono % 1.1 %    Eos % 0.0 %    Basophil % 0.3 %    Lymph # 0.76 0.6 - 4.6 x10(3)/mcL    Neut # 5.23 2.1 - 9.2 x10(3)/mcL    Mono # 0.07 (L) 0.1 - 1.3 x10(3)/mcL    Eos # 0.00 0 - 0.9 x10(3)/mcL    Baso # 0.02 <=0.2 x10(3)/mcL    IG# 0.02 0 - 0.04 x10(3)/mcL    IG% 0.3 %    NRBC% 0.0 %    IPF 8.0 0.9 - 11.2 %      Assessment:     1. Back pain, unspecified back location, unspecified back pain laterality, unspecified chronicity    2. Fall, initial encounter    3. Acute intractable tension-type headache        Plan:   X-ray of spine preliminary read without any acute findings. Pending radiologist reading. Due to unknown cause of syncope with unknown possible head injury and prolonged headache recommend immediate ER care. Patient agrees with POC and verbalizes understanding.   As we discussed, it is recommended that you present to the ER now for further evaluation to prevent a delay in care.   Transport via EMS was offered and advised but patient/family declines despite informed risks including death.  Opts for private transport via personal vehicle.      Back pain, unspecified back location, unspecified back pain laterality, unspecified chronicity  -     Cancel: X-Ray Lumbar Complete Including Flex And Ext; Future; Expected date: 06/18/2024  -     XR LUMBAR SPINE 2 OR 3 VIEWS; Future; Expected date: 06/18/2024    Fall, initial encounter    Acute intractable tension-type headache

## 2024-06-18 NOTE — ED PROVIDER NOTES
Encounter Date: 2024    SCRIBE #1 NOTE: I, Santos Martinez, am scribing for, and in the presence of,  Alex Packer MD. I have scribed the following portions of the note - Other sections scribed: HPI, ROS, PE.       History     Chief Complaint   Patient presents with    Fall     Pt co fall, passing on Thursday with right head pain behind eye with dizziness. Pt had stent due to blockage placed in brain on May 23th by Dr. Sanders on same side of hitting head.      54 year old female with a pmhx of anemia, CAD, HLD, HTN, MI, and migraines presents to the ED for a fall that occurred Thursday, .  The patient reports associated symptoms of a headache, loss of consciousness, right-sided pain radiating into her back, paresthesia in her bilateral lower extremities, and dizzines.  The patient reports that she passed out and fell onto her dresser on her right side on Thursday, .  She reports that she is unsure if she hit her head.  She reports that she had an IR stent placed on 24 by Dr. Sanders.  She reports having a similar episode before, but reports that she began feeling better and never saw a provider for it.  She reports that this time her symptoms have not gone away since her fall.  She reports that the dizziness began last night.      PCP: Fabian Phelps MD: [phone]    The history is provided by the patient. No  was used.     Review of patient's allergies indicates:  No Known Allergies  Past Medical History:   Diagnosis Date    Acid reflux     Anemia, unspecified     Anxiety     Coronary artery disease     Hyperlipidemia     Hypertension     Migraines     Myocardial infarction     Thrombus     apical    Thyroid disease      Past Surgical History:   Procedure Laterality Date    ANKLE SURGERY Left     ANTERIOR CERVICAL DISCECTOMY W/ FUSION       SECTION  2003     SECTION  2005    CHOLECYSTECTOMY      CORONARY ANGIOPLASTY WITH STENT PLACEMENT       DILATION AND CURETTAGE OF UTERUS      HYSTERECTOMY      INSERTION OF BARE METAL STENT INTO VASCULAR BYPASS GRAFT  05/20/2024    LUMBAR PUNCTURE       Family History   Problem Relation Name Age of Onset    Cancer Mother          Pancriatic cancer    Hydrocephalus Mother      No Known Problems Father      No Known Problems Sister      No Known Problems Brother      Hydrocephalus Maternal Grandmother      Parkinsonism Maternal Grandfather       Social History     Tobacco Use    Smoking status: Never     Passive exposure: Never    Smokeless tobacco: Never   Substance Use Topics    Alcohol use: Not Currently     Alcohol/week: 1.0 standard drink of alcohol     Types: 1 Standard drinks or equivalent per week     Comment: once a month    Drug use: Never     Review of Systems   Constitutional:  Negative for chills and fever.   HENT:  Negative for congestion, drooling and sore throat.    Eyes:  Negative for pain and visual disturbance.   Respiratory:  Negative for chest tightness, shortness of breath and wheezing.    Cardiovascular:  Negative for chest pain, palpitations and leg swelling.   Gastrointestinal:  Negative for abdominal pain, nausea and vomiting.   Genitourinary:  Negative for dysuria and hematuria.   Musculoskeletal:  Negative for back pain, neck pain and neck stiffness.        Right-sided pain radiating into the back   Skin:  Negative for pallor and rash.   Neurological:  Positive for dizziness, syncope and headaches. Negative for weakness and numbness.        BLE paresthesia   Hematological:  Does not bruise/bleed easily.       Physical Exam     Initial Vitals [06/18/24 1302]   BP Pulse Resp Temp SpO2   (!) 145/83 62 20 97.9 °F (36.6 °C) 97 %      MAP       --         Physical Exam    Nursing note and vitals reviewed.  Constitutional: She appears well-developed and well-nourished. She is not diaphoretic. No distress.   No external signs of trauma   HENT:   Head: Normocephalic.   Nose: Nose normal.    Mouth/Throat: Oropharynx is clear and moist.   Eyes: EOM are normal. Pupils are equal, round, and reactive to light.   Neck: Neck supple.   Normal range of motion.  Cardiovascular:  Regular rhythm.           No murmur heard.  Pulses intact in all extremities   Pulmonary/Chest: Breath sounds normal. No respiratory distress. She has no wheezes. She has no rales.   Tenderness to palpation in the right chest wall   Abdominal: Abdomen is soft. She exhibits no distension.   Tenderness to palpation of the right flank There is no rebound and no guarding.   Musculoskeletal:      Cervical back: Normal range of motion and neck supple.      Comments: Moves all extremities     Neurological: She is alert and oriented to person, place, and time. She has normal strength. No cranial nerve deficit or sensory deficit.   5/5 strength in the upper and lower extremities  Sensation intact   Skin: Skin is warm. Capillary refill takes less than 2 seconds. No rash noted.         ED Course   Procedures  Labs Reviewed   COMPREHENSIVE METABOLIC PANEL - Abnormal; Notable for the following components:       Result Value    Chloride 112 (*)     CO2 21 (*)     Glucose 127 (*)     Albumin 3.4 (*)     Albumin/Globulin Ratio 1.0 (*)     AST 40 (*)     All other components within normal limits   URINALYSIS, REFLEX TO URINE CULTURE - Abnormal; Notable for the following components:    Specific Gravity, UA >1.050 (*)     Protein, UA Trace (*)     All other components within normal limits   CBC WITH DIFFERENTIAL - Abnormal; Notable for the following components:    Hgb 11.8 (*)     Hct 36.6 (*)     MCV 79.2 (*)     MCH 25.5 (*)     MCHC 32.2 (*)     RDW 18.1 (*)     MPV 12.0 (*)     All other components within normal limits   TROPONIN I - Normal   D DIMER, QUANTITATIVE - Normal   TROPONIN I - Normal   CBC W/ AUTO DIFFERENTIAL    Narrative:     The following orders were created for panel order CBC auto differential.  Procedure                                Abnormality         Status                     ---------                               -----------         ------                     CBC with Differential[[id number]]       Abnormal            Final result                 Please view results for these tests on the individual orders.        ECG Results              EKG 12-lead (Final result)        Collection Time Result Time QRS Duration OHS QTC Calculation    06/18/24 12:57:20 06/19/24 07:31:01 90 445                     Final result by Interface, Lab In [facility] (06/19/24 07:31:03)                   Narrative:    Test Reason : R55,    Vent. Rate : 059 BPM     Atrial Rate : 059 BPM     P-R Int : 144 ms          QRS Dur : 090 ms      QT Int : 450 ms       P-R-T Axes : 047 032 039 degrees     QTc Int : 445 ms    Sinus bradycardia  Otherwise normal ECG  When compared with ECG of 13-APR-2024 17:19,  No significant change was found  Confirmed by Marco Perez MD ([id number]) on 6/19/2024 7:30:59 AM    Referred By: [email or web address]   SELF           Confirmed By:Marco Perez MD                                     EKG 12-lead (Final result)  Result time 06/24/24 15:56:03      Final result by Unknown User (06/24/24 15:56:03)                                      Imaging Results              CTA Head and Neck (xpd) (Final result)  Result time 06/18/24 19:49:11      Final result by Cr Jovel MD (06/18/24 19:49:11)                   Impression:      No major branch occlusion or high-grade stenosis identified.      Electronically signed by: Cr Jovel  Date:    06/18/2024  Time:    19:49               Narrative:    EXAMINATION:  CTA HEAD AND NECK (XPD)    CLINICAL HISTORY:  Dizziness, persistent/recurrent, cardiac or vascular cause suspected;    TECHNIQUE:  CT images of the head and neck before and after the administration of IV contrast according to the angiography protocol. Axial, coronal, sagittal, MIP and 3-D reconstructions are reviewed. Dose length product is 1403 mGycm.  Automatic exposure control, adjustment of mA/kV or iterative reconstruction technique was used to limit radiation dose.    Patient experienced extravasation of about 50 mL of IV contrast near the right elbow.  Extravasation communicated to the Emergency Department after completion of the exam.    COMPARISON:  CT 06/18/2024    FINDINGS:  Head CT with contrast:    No interval changes when compared to the previous CT.    No enhancing abnormalities.    If present, stenosis of the carotid bulbs is measured based on NASCET criteria, i.e. area of maximal stenosis compared to the cervical ICA distal to the bulb.    Cervical CTA:    Common Carotid arteries: Patent, no abnormalities.    Carotid Bulbs/ICAs: Mild calcified plaque bilaterally.  Resulting stenosis less than 50% bilaterally.    Vertebral arteries: Patent, no abnormalities.    Intracranial CTA:    Carotid arteries:    Patent, no significant stenosis carotid siphons, A1 and M1 segments.    Vertebrobasilar Circulation:    Basilar artery: Patent, no abnormalities.    Posterior cerebral arteries: Patent bilaterally.  Fetal origin of the left PCA.    Dural venous sinuses: Patent.  Stent noted in the right transverse sinus.    Normal Variants:    ACom:  Patent    PComs: Right PCOM not identified.    Vertebral arteries: Mild left dominance.                                       CT Chest Abdomen Pelvis With IV Contrast (XPD) NO Oral Contrast (Final result)  Result time 06/18/24 19:33:01      Final result by Cr Jovel MD (06/18/24 19:33:01)                   Impression:      No acute traumatic injury identified.    2 new solid nodules in the lower lobes, measuring up to 7 mm.  Three to six-month follow-up chest CT recommended for surveillance of these nodules.      Electronically signed by: Cr Jovel  Date:    06/18/2024  Time:    19:33               Narrative:    EXAMINATION:  CT CHEST ABDOMEN PELVIS WITH IV CONTRAST (XPD)    CLINICAL HISTORY:  fall, right  sided chest pain, right sided abdominal and flank tenderness;    TECHNIQUE:  CT imaging of the chest, abdomen and pelvis after IV contrast. Axial, coronal and sagittal reformatted images reviewed. Dose length product is 562 mGycm. Automatic exposure control, adjustment of mA/kV or iterative reconstruction technique used to limit radiation dose.    COMPARISON:  CT abdomen/pelvis 06/22/2022    FINDINGS:  No mediastinal hematoma or significant pleural/pericardial fluid.  No pneumothorax or traumatic appearing consolidation.  Solid nodule in the left lower lobe has mean diameter of 7.5 mm.  A subpleural solid nodule posteriorly in the right lower lobe has mean diameter of 5.5 mm.  Both of these nodules are new since 2022.    No defined liver or spleen laceration.  Normal pancreas, adrenal glands and kidneys.  Normal bladder.  No mesenteric hematoma, pneumoperitoneum or ascites.    No acute osseous process appreciated.                                       CT Head Without Contrast (Final result)  Result time 06/18/24 13:26:30      Final result by Barber Feldman MD (06/18/24 13:26:30)                   Impression:      No acute intracranial abnormality.      Electronically signed by: Barber Feldman MD  Date:    06/18/2024  Time:    13:26               Narrative:    EXAMINATION:  CT head without contrast    CLINICAL HISTORY:  Head trauma, headaches    TECHNIQUE:  Routine CT of the head was performed without contrast    Total DLP: 972 mGy.cm    Automatic exposure control was utilized to reduce the patient's dose    COMPARISON:  05/23/2024    FINDINGS:  No acute intra-cranial hemorrhage, midline shift, mass effect or extra-axial collection.  There is a vascular stent noted in the right transverse sinus, similar to previous.    The ventricles are normal in size and configuration.    No sulcal effacement.  Normal grey-white matter differentiation.    Visualized osseous structures are unremarkable.  Visualized paranasal  sinuses and mastoid air cells are clear.                                       XR Ribs Min 3 Views w/PA Chest Right (Final result)  Result time 06/18/24 13:28:32      Final result by Barber Feldman MD (06/18/24 13:28:32)                   Impression:      No acute process.      Electronically signed by: Barber Feldman MD  Date:    06/18/2024  Time:    13:28               Narrative:    EXAMINATION:  Chest one view, right ribs two views    CLINICAL HISTORY:  Fall, right rib pain    COMPARISON:  03/04/2024    FINDINGS:  Cardiac silhouette is normal size.  Central vessels are normal.  No confluent airspace disease.  No visible pneumothorax or pleural effusion.  There is no for displaced rib fracture or acute osseous lesion.  There are surgical clips in the right upper quadrant.  There is partially seen anterior fusion hardware in the lower cervical spine.                                       Medications   iohexoL (OMNIPAQUE 350) injection 90 mL (90 mLs Intravenous Given 6/18/24 1907)   iohexoL (OMNIPAQUE 350) injection 50 mL (50 mLs Intravenous Given 6/18/24 1912)     Medical Decision Making  Problems Addressed:  Fall, initial encounter: acute illness or injury  Injury of head, initial encounter: acute illness or injury  Syncope: acute illness or injury    Amount and/or Complexity of Data Reviewed  Radiology: ordered.    Risk  Prescription drug management.    Differential diagnosis (includes but is not limited to):   ICH, CVA, TIA, ACS, arrhythmia, electrolyte abnormalities, dehydration, kidney injury, PE    MDM Narrative  54-year-old female presents for evaluation of a fall several days ago with some dizziness and headache since then.  She states she had a stent placed on May 23rd.  CT scans pending.  Labs pending.  CT of the head and neck pending.  EKG reviewed.  Pain and nausea control as needed.  Cardiac monitor to be continued.    Update:  Labs and imaging reviewed.  No acute pathology identified at this time.   Given her age and risk factors as well as her symptoms, I did offer observation admission for further evaluation and management however patient states he feels well currently would prefer to be discharged home.  I feel this is reasonable given that her fall and symptoms began quite some time ago and she was currently asymptomatic.  She is able to ambulate at her baseline with a steady gait.  She is tolerating oral intake.  She states she has no symptoms at this time.  I have stressed the importance of following up with her specialists for further evaluation and management of her symptoms, she states she understands and will follow up has been discussed.  Strict return precautions also discussed and the patient has verbalized understanding.    Dispo: Discharge    My independent radiology interpretation: as above  Point of care US (independently performed and interpreted):   Decision rules/clinical scoring:     Sepsis Perfusion Assessment:     Amount and/or Complexity of Data Reviewed  Independent historian: none   Summary of history:   External data reviewed: notes from previous ED visits and notes from clinic visits  Summary of data reviewed: Prior records reviewed  Risk and benefits of testing: discussed   Labs: ordered and reviewed  Radiology: ordered and independent interpretation performed (see above or ED course)  ECG/medicine tests: ordered and independent interpretation performed (see above or ED course)  Discussion of management or test interpretation with external provider(s): none   Summary of discussion:     Risk  OTC medications  Prescription drug management   Parenteral controlled substances   Shared decision making     Critical Care  none    Data Reviewed/Counseling: I have personally reviewed the patient's vital signs, nursing notes, and other relevant tests, information, and imaging. I had a detailed discussion regarding the historical points, exam findings, and any diagnostic results supporting the  discharge diagnosis. I personally performed the history, PE, MDM and procedures as documented above and agree with the scribe's documentation.    Portions of this note were dictated using voice recognition software. Although it was reviewed for accuracy, some inherent voice recognition errors may have occurred and may be present in this document.          Scribe Attestation:   Scribe #1: I performed the above scribed service and the documentation accurately describes the services I performed. I attest to the accuracy of the note.    Attending Attestation:           Physician Attestation for Scribe:  Physician Attestation Statement for Scribe #1: I, Alex Packer MD, reviewed documentation, as scribed by Santos Martinez in my presence, and it is both accurate and complete.             ED Course as of 07/08/24 1143   Tue Jun 18, 2024   1315 EKG independently interpreted by me.  EKG: SB @ 59, no STEMI, Qtc 445 [MC]   1500 XR Ribs Min 3 Views w/PA Chest Right  Independently visualized/reviewed by me during the ED visit.  - No displaced rib fx, no PTX [MC]   2027 I have reassessed the patient.  Patient is resting comfortably, no acute distress.  Vital signs stable.  Discussed all results including incidental findings.  Discussed need for follow up and discussed return precautions.  Answered all questions at this time.  Hemodynamically stable for continued outpatient management. Patient verbalized understanding and agreed to plan.    [MC]      ED Course User Index  [MC] Alex Packer MD                           Clinical Impression:  Final diagnoses:  [R55] Syncope  [W19.XXXA] Fall, initial encounter (Primary)  [S09.90XA] Injury of head, initial encounter          ED Disposition Condition    Discharge Stable          ED Prescriptions       Medication Sig Dispense Start Date End Date Auth. Provider    ondansetron (ZOFRAN-ODT) 4 MG TbDL Take 1 tablet (4 mg total) by mouth every 6 (six) hours as needed  (Nausea). 12 tablet 6/18/2024 -- Alex Packer MD          Follow-up Information       Follow up With Specialties Details Why Contact Info    Fabian Phelps MD Internal Medicine Schedule an appointment as soon as possible for a visit   [address]  [facility] [id number] [phone]      Henri Sanders MD Neurology, Interventional Radiology Schedule an appointment as soon as possible for a visit   [address] [place] [address] [address]  [facility] [address]  [phone]      [facility] - Emergency Dept Emergency Medicine  If symptoms worsen [address] [id number] [phone]             Alex Packer MD  07/08/24 1143

## 2024-06-18 NOTE — PATIENT INSTRUCTIONS
X-ray of spine preliminary read without any acute findings. Pending radiologist reading. Due to unknown cause of syncope with unknown possible head injury and prolonged headache recommend immediate ER care. Patient agrees with POC and verbalizes understanding.   As we discussed, it is recommended that you present to the ER now for further evaluation to prevent a delay in care.   Transport via EMS was offered and advised but patient/family declines despite informed risks including death.  Opts for private transport via personal vehicle.

## 2024-06-19 ENCOUNTER — PATIENT MESSAGE (OUTPATIENT)
Dept: NEUROLOGY | Facility: CLINIC | Age: 54
End: 2024-06-19
Payer: COMMERCIAL

## 2024-06-19 LAB
OHS QRS DURATION: 90 MS
OHS QTC CALCULATION: 445 MS

## 2024-06-19 NOTE — DISCHARGE INSTRUCTIONS

## 2024-07-08 ENCOUNTER — TELEPHONE (OUTPATIENT)
Dept: NEUROLOGY | Facility: CLINIC | Age: 54
End: 2024-07-08
Payer: COMMERCIAL

## 2024-07-09 ENCOUNTER — TELEPHONE (OUTPATIENT)
Dept: NEUROLOGY | Facility: CLINIC | Age: 54
End: 2024-07-09
Payer: COMMERCIAL

## 2024-07-09 DIAGNOSIS — G08 DURAL VENOUS SINUS THROMBOSIS: Primary | ICD-10-CM

## 2024-07-09 RX ORDER — TOPIRAMATE 50 MG/1
50 TABLET, FILM COATED ORAL 2 TIMES DAILY
Qty: 60 TABLET | Refills: 11 | Status: SHIPPED | OUTPATIENT
Start: 2024-07-09 | End: 2025-07-09

## 2024-07-09 NOTE — TELEPHONE ENCOUNTER
Attempted to call patient regarding Diagnostic Cerebral Angiogram. Unable to reach. Left detailed message to call back to proceed with scheduling process.

## 2024-07-09 NOTE — TELEPHONE ENCOUNTER
Mri brain w/ w/out, MRV, Topiramate 25 mg BID placed per patient.    Rupa, please schedule patient for angiogram

## 2024-07-09 NOTE — TELEPHONE ENCOUNTER
Called patient to discuss her symptoms. She has been experiencing new type of headache behind her right eye associated with facial spasm on right. States that these symptoms are new that started couple of days ago and are intermittently occurring 8-10 times a day and lasts a few seconds to minutes. Reported significant improvement in her headaches that she had after the procedure prior to this and was functioning at baseline.     Discussed with her about the plan to repeat imaging with MRI brain, MRV head, and a diagnostic cerebral angiogram to evaluate for the stent patency and blood flow. Also will send a prescription for topamax 25 mg bid. Could not tolerate diamox in the past as it made her dizzy.     She is in agreement with the plan.     Henri Sanders MD  Vascular and Interventional Neurology

## 2024-07-10 NOTE — TELEPHONE ENCOUNTER
Patient is now scheduled for Diagnostic Cerebral Angiogram on August 1st. Advised patient that a nurse from [facility] will call day before with pre-op instructions and time. Patient verbalized understanding.

## 2024-07-10 NOTE — ADDENDUM NOTE
Addended by: RAVIN ONEIL on: 7/10/2024 08:54 AM     Modules accepted: Orders    
full weight-bearing
Principal Discharge DX:	Headache  Secondary Diagnosis:	White blood cell abnormality

## 2024-07-12 ENCOUNTER — HOSPITAL ENCOUNTER (OUTPATIENT)
Dept: RADIOLOGY | Facility: HOSPITAL | Age: 54
Discharge: HOME | End: 2024-07-12
Attending: NURSE PRACTITIONER
Payer: COMMERCIAL

## 2024-07-12 DIAGNOSIS — G08 DURAL VENOUS SINUS THROMBOSIS: ICD-10-CM

## 2024-07-12 PROCEDURE — 70546 MR ANGIOGRAPH HEAD W/O&W/DYE: CPT | Mod: TC

## 2024-07-12 PROCEDURE — 25500020 PHARM REV CODE 255: Performed by: NURSE PRACTITIONER

## 2024-07-12 PROCEDURE — 70553 MRI BRAIN STEM W/O & W/DYE: CPT | Mod: TC

## 2024-07-12 PROCEDURE — A9577 INJ MULTIHANCE: HCPCS | Performed by: NURSE PRACTITIONER

## 2024-07-12 RX ADMIN — GADOBENATE DIMEGLUMINE 17 ML: 529 INJECTION, SOLUTION INTRAVENOUS at 03:07

## 2024-07-17 ENCOUNTER — ANESTHESIA EVENT (OUTPATIENT)
Dept: INTERVENTIONAL RADIOLOGY/VASCULAR | Facility: HOSPITAL | Age: 54
End: 2024-07-17
Payer: COMMERCIAL

## 2024-07-17 ENCOUNTER — HOSPITAL ENCOUNTER (OUTPATIENT)
Dept: INTERVENTIONAL RADIOLOGY/VASCULAR | Facility: HOSPITAL | Age: 54
Discharge: HOME | End: 2024-07-17
Attending: PSYCHIATRY & NEUROLOGY | Admitting: PSYCHIATRY & NEUROLOGY
Payer: COMMERCIAL

## 2024-07-17 VITALS
WEIGHT: 196.19 LBS | HEIGHT: 65 IN | SYSTOLIC BLOOD PRESSURE: 133 MMHG | HEART RATE: 74 BPM | TEMPERATURE: 98 F | BODY MASS INDEX: 32.69 KG/M2 | OXYGEN SATURATION: 98 % | DIASTOLIC BLOOD PRESSURE: 95 MMHG

## 2024-07-17 VITALS
DIASTOLIC BLOOD PRESSURE: 87 MMHG | SYSTOLIC BLOOD PRESSURE: 156 MMHG | HEART RATE: 62 BPM | TEMPERATURE: 98 F | OXYGEN SATURATION: 98 %

## 2024-07-17 DIAGNOSIS — G08 DURAL VENOUS SINUS THROMBOSIS: ICD-10-CM

## 2024-07-17 DIAGNOSIS — G08 CEREBRAL VENOUS SINUS THROMBOSIS: ICD-10-CM

## 2024-07-17 LAB
POC PTINR: 1.2 (ref 0.9–1.2)
POC PTWBT: 13.9 SEC (ref 9.7–14.3)
SAMPLE: NORMAL

## 2024-07-17 PROCEDURE — 63600175 PHARM REV CODE 636 W HCPCS: Performed by: NURSE ANESTHETIST, CERTIFIED REGISTERED

## 2024-07-17 PROCEDURE — 25500020 PHARM REV CODE 255: Performed by: PSYCHIATRY & NEUROLOGY

## 2024-07-17 PROCEDURE — 25000003 PHARM REV CODE 250: Performed by: PSYCHIATRY & NEUROLOGY

## 2024-07-17 PROCEDURE — 36224 PLACE CATH CAROTD ART: CPT | Mod: 50

## 2024-07-17 RX ORDER — HYDROMORPHONE HYDROCHLORIDE 2 MG/ML
0.2 INJECTION, SOLUTION INTRAMUSCULAR; INTRAVENOUS; SUBCUTANEOUS EVERY 5 MIN PRN
Status: CANCELLED | OUTPATIENT
Start: 2024-07-17

## 2024-07-17 RX ORDER — ACETAMINOPHEN 500 MG
1000 TABLET ORAL ONCE
Status: CANCELLED | OUTPATIENT
Start: 2024-07-17 | End: 2024-07-17

## 2024-07-17 RX ORDER — ONDANSETRON HYDROCHLORIDE 2 MG/ML
4 INJECTION, SOLUTION INTRAVENOUS ONCE
Status: CANCELLED | OUTPATIENT
Start: 2024-07-17 | End: 2024-07-17

## 2024-07-17 RX ORDER — GLUCAGON 1 MG
1 KIT INJECTION
Status: CANCELLED | OUTPATIENT
Start: 2024-07-17

## 2024-07-17 RX ORDER — HYDROMORPHONE HYDROCHLORIDE 2 MG/ML
0.5 INJECTION, SOLUTION INTRAMUSCULAR; INTRAVENOUS; SUBCUTANEOUS EVERY 5 MIN PRN
Status: CANCELLED | OUTPATIENT
Start: 2024-07-17

## 2024-07-17 RX ORDER — ONDANSETRON 4 MG/1
8 TABLET, ORALLY DISINTEGRATING ORAL EVERY 8 HOURS PRN
Status: DISCONTINUED | OUTPATIENT
Start: 2024-07-17 | End: 2024-07-18 | Stop reason: HOSPADM

## 2024-07-17 RX ORDER — FAMOTIDINE 10 MG/ML
20 INJECTION INTRAVENOUS ONCE
Status: CANCELLED | OUTPATIENT
Start: 2024-07-17 | End: 2024-07-17

## 2024-07-17 RX ORDER — SODIUM CHLORIDE 9 MG/ML
INJECTION, SOLUTION INTRAVENOUS CONTINUOUS
Status: CANCELLED | OUTPATIENT
Start: 2024-07-17

## 2024-07-17 RX ORDER — FENTANYL CITRATE 50 UG/ML
INJECTION, SOLUTION INTRAMUSCULAR; INTRAVENOUS
Status: DISCONTINUED | OUTPATIENT
Start: 2024-07-17 | End: 2024-07-17

## 2024-07-17 RX ORDER — METOCLOPRAMIDE HYDROCHLORIDE 5 MG/ML
10 INJECTION INTRAMUSCULAR; INTRAVENOUS EVERY 10 MIN PRN
Status: CANCELLED | OUTPATIENT
Start: 2024-07-17

## 2024-07-17 RX ORDER — DIPHENHYDRAMINE HYDROCHLORIDE 50 MG/ML
25 INJECTION INTRAMUSCULAR; INTRAVENOUS EVERY 6 HOURS PRN
Status: CANCELLED | OUTPATIENT
Start: 2024-07-17

## 2024-07-17 RX ORDER — METOCLOPRAMIDE HYDROCHLORIDE 5 MG/ML
10 INJECTION INTRAMUSCULAR; INTRAVENOUS ONCE
Status: CANCELLED | OUTPATIENT
Start: 2024-07-17 | End: 2024-07-17

## 2024-07-17 RX ORDER — LIDOCAINE HYDROCHLORIDE 10 MG/ML
1 INJECTION, SOLUTION EPIDURAL; INFILTRATION; INTRACAUDAL; PERINEURAL ONCE
Status: CANCELLED | OUTPATIENT
Start: 2024-07-17 | End: 2024-07-17

## 2024-07-17 RX ORDER — IPRATROPIUM BROMIDE AND ALBUTEROL SULFATE 2.5; .5 MG/3ML; MG/3ML
3 SOLUTION RESPIRATORY (INHALATION) ONCE AS NEEDED
Status: CANCELLED | OUTPATIENT
Start: 2024-07-17 | End: 2035-12-13

## 2024-07-17 RX ORDER — ACETAMINOPHEN 325 MG/1
650 TABLET ORAL EVERY 4 HOURS PRN
Status: DISCONTINUED | OUTPATIENT
Start: 2024-07-17 | End: 2024-07-18 | Stop reason: HOSPADM

## 2024-07-17 RX ORDER — MIDAZOLAM HYDROCHLORIDE 2 MG/2ML
2 INJECTION, SOLUTION INTRAMUSCULAR; INTRAVENOUS ONCE AS NEEDED
Status: CANCELLED | OUTPATIENT
Start: 2024-07-17 | End: 2035-12-13

## 2024-07-17 RX ORDER — GLYCOPYRROLATE 0.2 MG/ML
INJECTION INTRAMUSCULAR; INTRAVENOUS
Status: DISCONTINUED | OUTPATIENT
Start: 2024-07-17 | End: 2024-07-17

## 2024-07-17 RX ORDER — MEPERIDINE HYDROCHLORIDE 25 MG/ML
12.5 INJECTION INTRAMUSCULAR; INTRAVENOUS; SUBCUTANEOUS ONCE
Status: CANCELLED | OUTPATIENT
Start: 2024-07-17 | End: 2024-07-17

## 2024-07-17 RX ORDER — HYDROCODONE BITARTRATE AND ACETAMINOPHEN 5; 325 MG/1; MG/1
1 TABLET ORAL
Status: CANCELLED | OUTPATIENT
Start: 2024-07-17

## 2024-07-17 RX ORDER — LIDOCAINE HYDROCHLORIDE 20 MG/ML
INJECTION, SOLUTION INFILTRATION; PERINEURAL
Status: COMPLETED | OUTPATIENT
Start: 2024-07-17 | End: 2024-07-17

## 2024-07-17 RX ADMIN — IOPAMIDOL 50 ML: 755 INJECTION, SOLUTION INTRAVENOUS at 12:07

## 2024-07-17 RX ADMIN — GLYCOPYRROLATE 0.2 MG: 0.2 INJECTION INTRAMUSCULAR; INTRAVENOUS at 09:07

## 2024-07-17 RX ADMIN — FENTANYL CITRATE 50 MCG: 50 INJECTION, SOLUTION INTRAMUSCULAR; INTRAVENOUS at 08:07

## 2024-07-17 RX ADMIN — LIDOCAINE HYDROCHLORIDE 5 ML: 20 INJECTION, SOLUTION INFILTRATION; PERINEURAL at 09:07

## 2024-07-17 NOTE — ANESTHESIA POSTPROCEDURE EVALUATION
Anesthesia Post Evaluation    Patient: Yandy Chaudhry    Procedure(s) Performed: * No procedures listed *    Final Anesthesia Type: general      Patient location during evaluation: PACU  Patient participation: Yes- Able to Participate  Level of consciousness: awake and alert  Post-procedure vital signs: reviewed and stable  Pain management: adequate  Airway patency: patent  MARIPOSA mitigation strategies: Multimodal analgesia  PONV status at discharge: No PONV  Anesthetic complications: no      Cardiovascular status: hemodynamically stable  Respiratory status: unassisted  Hydration status: euvolemic  Follow-up not needed.              Vitals Value Taken Time   /95 07/17/24 1151   Temp  07/17/24 1200   Pulse 74 07/17/24 1151   Resp  07/17/24 1200   SpO2 98 % 07/17/24 1151         No case tracking events are documented in the log.      Pain/Gifty Score: No data recorded

## 2024-07-17 NOTE — PLAN OF CARE
Orthostatic vitals:   Lying:      BP: 129/78; Pulse 57  Sitting:     BP: 127/74: Pulse 60  Standing: BP: 133/95, Pulse 74

## 2024-07-17 NOTE — PLAN OF CARE
Bed rest completed; VSS, Site WDL post ambulation; Discharge instructions given; Patient verbalizes understanding; IV removed and patient transported via wheelchair to spouse's vehicle. Dressing clean, dry, and intact on discharge.

## 2024-07-17 NOTE — ANESTHESIA PREPROCEDURE EVALUATION
2024  Yandy Chaudhry is a 54 y.o., female presenting for IR PTA intracranial imaging. H/O headaches/migraines and Intracranial hypertension.  Pt. Had same procedure in May w/ insertion of intracranial stent under MAC. States she would like GA for this procedure.        Other Medical History   Coronary artery disease Hypertension   Thyroid disease Hyperlipidemia   Anxiety Anemia, unspecified   Acid reflux Migraines   Thrombus Myocardial infarction     Surgical History    CHOLECYSTECTOMY CORONARY ANGIOPLASTY WITH STENT PLACEMENT   ANTERIOR CERVICAL DISCECTOMY W/ FUSION HYSTERECTOMY   ANKLE SURGERY DILATION AND CURETTAGE OF UTERUS    SECTION  SECTION   LUMBAR PUNCTURE INSERTION OF BARE METAL STENT INTO VASCULAR BYPASS GRAFT       Problem List  Current as of 24 0647  Chest pain Hypertension   IIH (idiopathic intracranial hypertension) Medication overuse headache   Migraine without aura and without status migrainosus, not intractable          Pre-op Assessment    I have reviewed the Patient Summary Reports.     I have reviewed the Nursing Notes. I have reviewed the NPO Status.   I have reviewed the Medications.     Review of Systems  Anesthesia Hx:  No problems with previous Anesthesia                Social:  Non-Smoker       Cardiovascular:     Hypertension  Past MI CAD   CABG/stent                                     Hepatic/GI:     GERD             Neurological:      Headaches                                 Endocrine:        Obesity / BMI > 30      Physical Exam  General: Well nourished, Cooperative, Alert and Oriented    Airway:  Mallampati: IV   Mouth Opening: Small, but > 3cm  TM Distance: Normal  Tongue: Normal  Neck ROM: Normal ROM  Neck: Girth Increased    Dental:  Intact    Chest/Lungs:  Clear to auscultation, Normal Respiratory Rate    Heart:  Rate:  Normal  Rhythm: Regular Rhythm  Sounds: Normal    Abdomen:  Normal, Soft, Nontender        Anesthesia Plan  Type of Anesthesia, risks & benefits discussed:    Anesthesia Type: Gen Supraglottic Airway  Intra-op Monitoring Plan: Standard ASA Monitors  Post Op Pain Control Plan: multimodal analgesia  Induction:  IV  Airway Plan: Direct  Informed Consent: Informed consent signed with the Patient and all parties understand the risks and agree with anesthesia plan.  All questions answered.   ASA Score: 3  Day of Surgery Review of History & Physical: H&P Update referred to the surgeon/provider.    Ready For Surgery From Anesthesia Perspective.     .

## 2024-07-17 NOTE — DISCHARGE INSTRUCTIONS
No driving for 48 hours.   Remove dressing in 24 hours.   You many shower with antibacterial soap and water only. Do not apply ointments, lotions, powders, etc to site.   NO tub bathing or swimming for 5 days.   Keep site clean and dry.  No lifting, pushing, or pulling anything heavier than 10 pounds (a gallon of milk) for 5 days.   Monitor for bleeding and/ or any signs of infection (fever, discharge, redness, pain, etc) at site.   Report to ER for any signs of infection.   If you begin bleeding: lay flat, apply firm, direct pressure to site, and have someone dial 911. (EMERGENCY)

## 2024-07-17 NOTE — H&P
Pre-Operative History and Physical   Interventional Neurology    Yandy Chaudhry is a 54 y.o. female with history of IIH underwent right transverse sinus stenting in May 2024. Post procedure she noted improvement in her IIH symptoms but had some headache which required medrol dose pack. Few weeks later she noted a different type of headache which she described as sharp short lasting painful sensation on the right side accompanied by muscle spasms in the right cheek. She said these episodes lasts for a few seconds at a time and come in bursts. She also reported dizziness and unsteadiness upon standing up. Denies any falls, weakness, numbness, vision or speech changes. On ASA, plavix. No bleeding issues. Unable to tolerate diamox. She had MRI, MRV, CTA which demonstrated patent but diminished concern flow in the right transverse sinus.     ROS:  ROS as described in HPI    Past Medical History:   Diagnosis Date    Acid reflux     Anemia, unspecified     Anxiety     Coronary artery disease     Hyperlipidemia     Hypertension     Migraines     Myocardial infarction     Thrombus     apical    Thyroid disease      Past Surgical History:   Procedure Laterality Date    ANKLE SURGERY Left     ANTERIOR CERVICAL DISCECTOMY W/ FUSION       SECTION       SECTION      CHOLECYSTECTOMY      CORONARY ANGIOPLASTY WITH STENT PLACEMENT      DILATION AND CURETTAGE OF UTERUS      HYSTERECTOMY      INSERTION OF BARE METAL STENT INTO VASCULAR BYPASS GRAFT  2024    LUMBAR PUNCTURE       Family History   Problem Relation Name Age of Onset    Cancer Mother          Pancriatic cancer    Hydrocephalus Mother      No Known Problems Father      No Known Problems Sister      No Known Problems Brother      Hydrocephalus Maternal Grandmother      Parkinsonism Maternal Grandfather       Review of patient's allergies indicates:  No Known Allergies    Current Outpatient Medications:     adalimumab (HUMIRA,CF,)  40 mg/0.4 mL SyKt, Inject 40 mg into the skin every 14 (fourteen) days., Disp: , Rfl:     aspirin (ECOTRIN) 81 MG EC tablet, Take 81 mg by mouth once daily., Disp: , Rfl:     clopidogreL (PLAVIX) 75 mg tablet, Take 1 tablet (75 mg total) by mouth once daily., Disp: 30 tablet, Rfl: 2    FLUoxetine 40 MG capsule, Take 40 mg by mouth 2 (two) times daily., Disp: , Rfl:     gabapentin (NEURONTIN) 600 MG tablet, Take 1 tablet (600 mg total) by mouth 3 (three) times daily., Disp: 90 tablet, Rfl: 11    levothyroxine (SYNTHROID) 75 MCG tablet, Take 75 mcg by mouth before breakfast., Disp: , Rfl:     metoprolol tartrate (LOPRESSOR) 25 MG tablet, Take 25 mg by mouth 2 (two) times daily., Disp: , Rfl:     oxybutynin (DITROPAN-XL) 10 MG 24 hr tablet, Take 10 mg by mouth once daily. As needed, Disp: , Rfl:     pantoprazole (PROTONIX) 40 MG tablet, Take 40 mg by mouth once daily., Disp: , Rfl:     rOPINIRole (REQUIP) 0.5 MG tablet, Take 0.5 mg by mouth every evening., Disp: , Rfl:     rosuvastatin (CRESTOR) 40 MG Tab, Take 40 mg by mouth once daily., Disp: , Rfl:     topiramate (TOPAMAX) 50 MG tablet, Take 1 tablet (50 mg total) by mouth 2 (two) times daily., Disp: 60 tablet, Rfl: 11    evolocumab (REPATHA SURECLICK) 140 mg/mL PnIj, Inject 140 mg into the skin., Disp: , Rfl:     ferrous sulfate (FEOSOL) 325 mg (65 mg iron) Tab tablet, Take by mouth 3 (three) times a week. (Patient not taking: Reported on 6/18/2024), Disp: , Rfl:     fluticasone propionate (FLONASE) 50 mcg/actuation nasal spray, 1 spray by Each Nostril route 2 (two) times daily. Takes as needed, Disp: , Rfl:     furosemide (LASIX) 40 MG tablet, Take by mouth., Disp: , Rfl:     methocarbamoL (ROBAXIN) 500 MG Tab, Take 500 mg by mouth as needed., Disp: , Rfl:     nitroGLYCERIN (NITROSTAT) 0.4 MG SL tablet, PLEASE SEE ATTACHED FOR DETAILED DIRECTIONS, Disp: , Rfl:     ondansetron (ZOFRAN-ODT) 4 MG TbDL, Take 1 tablet (4 mg total) by mouth every 8 (eight) hours as  needed (nausea/vomiting). (Patient not taking: Reported on 6/18/2024), Disp: 45 tablet, Rfl: 3    ondansetron (ZOFRAN-ODT) 4 MG TbDL, Take 1 tablet (4 mg total) by mouth every 6 (six) hours as needed (Nausea)., Disp: 12 tablet, Rfl: 0    rimegepant 75 mg odt, Take 1 tablet (75 mg total) by mouth every other day. Place ODT tablet on the tongue; alternatively the ODT tablet may be placed under the tongue, Disp: 15 tablet, Rfl: 11    rizatriptan (MAXALT) 10 MG tablet, Take 1 tablet (10 mg total) by mouth as needed for Migraine. Take 1 tablet daily as needed,  may repeat in 2 hours not to exceed more than 20 mg in 24 hours., Disp: 9 tablet, Rfl: 5    tiZANidine (ZANAFLEX) 4 MG tablet, Take 4 mg by mouth every 8 (eight) hours as needed., Disp: , Rfl:   Outpatient Medications Marked as Taking for the 7/17/24 encounter (Hospital Encounter) with [facility]   Medication Sig Dispense Refill    adalimumab (HUMIRA,CF,) 40 mg/0.4 mL SyKt Inject 40 mg into the skin every 14 (fourteen) days.      aspirin (ECOTRIN) 81 MG EC tablet Take 81 mg by mouth once daily.      clopidogreL (PLAVIX) 75 mg tablet Take 1 tablet (75 mg total) by mouth once daily. 30 tablet 2    FLUoxetine 40 MG capsule Take 40 mg by mouth 2 (two) times daily.      gabapentin (NEURONTIN) 600 MG tablet Take 1 tablet (600 mg total) by mouth 3 (three) times daily. 90 tablet 11    levothyroxine (SYNTHROID) 75 MCG tablet Take 75 mcg by mouth before breakfast.      metoprolol tartrate (LOPRESSOR) 25 MG tablet Take 25 mg by mouth 2 (two) times daily.      oxybutynin (DITROPAN-XL) 10 MG 24 hr tablet Take 10 mg by mouth once daily. As needed      pantoprazole (PROTONIX) 40 MG tablet Take 40 mg by mouth once daily.      rOPINIRole (REQUIP) 0.5 MG tablet Take 0.5 mg by mouth every evening.      rosuvastatin (CRESTOR) 40 MG Tab Take 40 mg by mouth once daily.      topiramate (TOPAMAX) 50 MG tablet Take 1 tablet (50 mg total) by mouth 2 (two) times daily. 60 tablet 11     Social  History     Tobacco Use    Smoking status: Never     Passive exposure: Never    Smokeless tobacco: Never   Substance Use Topics    Alcohol use: Not Currently     Alcohol/week: 1.0 standard drink of alcohol     Types: 1 Standard drinks or equivalent per week     Comment: once a month    Drug use: Never         Vitals:    07/17/24 0745   BP: (!) 156/87   Pulse: 62   Temp: 97.6 °F (36.4 °C)     Gen NAD  HEENT NC/AT  CV RRR,  Resp clear  GI soft  Ext no C/C/E    Neuro  AAOx4  Speech fluent, appropriate  EOMI, PERRLA, VFF  Normal facial strength, sensation  Tongue and palate midline  Motor 5/5  Sensation intact  Coord intact      Assessment: Yandy Chaudhry is a 54 y.o. female with history of IIH underwent right transverse sinus stenting in May 2024. Due to concern for diminished flow in the right transverse sinus and sinus thrombosis, she presents for a DSA with possible angioplasty/stenting.     Plan:   DSA with possible venous sinus stenting/angioplasty/venous pressure monitoring    I have discussed the risks, benefits, indications, and alternatives of the procedure in detail.  The patient verbalizes understanding.  All questions answered.  Consents signed.  The patient agrees to proceed to proceed as planned.    Henri Sanders MD  Vascular and Interventional Neurology

## 2024-07-17 NOTE — ANESTHESIA POSTPROCEDURE EVALUATION
Anesthesia Post Evaluation    Patient: Yandy Chaudhry    Procedure(s) Performed: * No procedures listed *    [facility] Anesthesia Post Op Evaluation      Vitals Value Taken Time   /95 07/17/24 1151   Temp  07/17/24 1200   Pulse 74 07/17/24 1151   Resp  07/17/24 1200   SpO2 98 % 07/17/24 1151         No case tracking events are documented in the log.      Pain/Gifty Score: No data recorded

## 2024-07-17 NOTE — TRANSFER OF CARE
"Anesthesia Transfer of Care Note    Patient: Yandy Chaudhry    Procedure(s) Performed: * No procedures listed *    Patient location: Cath Lab    Anesthesia Type: MAC    Transport from OR: Transported from OR on room air with adequate spontaneous ventilation    Post pain: adequate analgesia    Post assessment: no apparent anesthetic complications and tolerated procedure well    Post vital signs: stable    Level of consciousness: awake and alert    Nausea/Vomiting: no nausea/vomiting    Complications: none    Transfer of care protocol was followed    Last vitals: Visit Vitals  BP (!) 156/87   Pulse 62   Temp 36.4 °C (97.6 °F)   Ht 5' 5" (1.651 m)   Wt 89 kg (196 lb 3.4 oz)   SpO2 98%   BMI 32.65 kg/m²     "

## 2024-07-17 NOTE — OP NOTE
[facility]                       [address] [place] [address]     PATIENT NAME:Yandy Chaudhry    YOB: 1970      DATE OF SURGERY:7/17/2024      SURGEON:  Henri Sanders MD     PREOPERATIVE DIAGNOSIS:  Concern for right transverse sinus stent thrombosis     POSTOPERATIVE DIAGNOSIS:  no evidence of in-stent thrombosis, stable left transverse sinus stenosis     PROCEDURE PERFORMED:    Cerebral angiogram with catheter insertion in the following arteries:  Right common carotid, right internal carotid, left common carotid, left internal carotid  Interpretation of images  Ultrasound-guided left femoral artery access  Moderate conscious sedation for 30 minutes     LEVEL OF SEDATION:  Conscious sedation.  Sedation administered by independent   trained observer under attending supervision with continuous monitoring of the   patient's level of consciousness and physiologic status.     TOTAL INTRASERVICE SEDATION TIME:  30 minutes.     COMPLICATIONS:  None.    APPROACH:  Left femoral artery      VESSELS SELECTIVELY CATHETERIZED:   Right common carotid artery   Right internal carotid artery   Left common carotid artery  Left internal carotid artery     DEVICES USED:  5 Fr short sheath  5 Fr Sim 2 catheter  035 glidewire  Mynx  Micropuncture kit       INDICATION:  54 y.o. years old female with a history of IIH and bilateral transverse sinus stenosis s/p right transverse sinus stenting May 2024 noted to have different types of sharp headache associated with muscle spasms on right face and dizziness. She presents for a cerebral angiogram for evaluation of right TS stent.    DETAILED DESCRIPTION:      Risks/benefits were thoroughly reviewed with patient/family prior to the procedure.  Risks inclusive but not limited to death, stroke, contrast reaction/nephropathy, access/vascular injury, and other unforeseen risks.   Patient/family provided informed consent.  Patient supine on the angio table, groin prepped and draped in routine fashion.  Moderate conscious sedation was administered along with local anesthesia.    The left femoral artery was sonographically evaluated and judged to be patent.  Real-time ultrasound was used to visualize needle entry into the vessel and a permanent image was stored. Using a micropuncture kit modified Seldinger technique,, an arterial sheath was placed the right femoral artery.  Diagnostic catheter telescoping over the 035 glidewire advanced to the arch and double flushed.  Enumerated vessels were then selectively catheterized and angiograms obtained as listed below.  Multiple cervical and intracranial runs were obtained in AP and lateral projection.  The films were reviewed and determined to be of good diagnostic quality.  Diagnostic catheter and sheath were then withdrawn and hemostasis was obtained with the above closure device.  No immediate complications were noted.  Patient tolerated the procedure well.    Angiograms performed and findings:    left femoral artery:  Sheath placement in the left common femoral artery above the femoral bifurcation.  There is no evidence of stenosis, dissection, atherosclerosis or contrast extravasation at the site of puncture.  Right common carotid artery - cervical:  Unremarkable right common carotid artery and carotid bifurcation.  The internal carotid artery is widely patent distally.  The right external carotid artery and its branches appear unremarkable.    Right internal carotid artery - cerebral:  The distal cervical, petrous, cavernous, supraclinoid segments of the right ICA appear patent.  There is normal flow and branching noted in right JAYCOB and MCA territory.  The capillary and venous phases appear unremarkable. No evidence of right transverse sinus stent thrombosis.   Left common carotid artery - cervical:  Unremarkable left common carotid and carotid  bifurcation.  The left internal carotid artery is widely patent distally the left external carotid artery and its branches appear unremarkable.    Left internal carotid artery - cerebral:  The distal cervical, petrous, cavernous, supraclinoid segments of the left ICA appeared patent.  There is a posterior communicating artery supplying the PCA territory.  There is normal flow and branching noted in the left JAYCOB and MCA territory.  The capillary and venous phases appear unremarkable. No evidence of right transverse sinus stent thrombosis. There is stable left transverse sinus stenosis.       OVERALL IMPRESSION:  No evidence of right transverse sinus stent thrombosis or stenosis  Stable left transverse sinus stenosis        ______________________________  Henri Sanders MD  Vascular and Interventional Neurology

## 2024-07-17 NOTE — BRIEF OP NOTE
Name: Yandy Chaudhry  MRN: [id number]  Age: 54 y.o.  Gender: female    Date of Procedure: 07/17/2024    Pre-operative Diagnosis: Concern for venous sinus thrombosis/in-stent stenosis    Post-operative Diagnosis: no evidence of right transverse sinus stenosis, stable left transverse sinus stenosis    Procedure: Diagnostic cerebral angiogram    Access/Closure: left femoral artery access closed with mynx    Surgeon: Henri Sanders MD    Anesthesia: LA and MAC    EBL: min ml    Description of findings:  - no evidence of right transverse sinus stenosis or in-stent thrombosis  - stable left transverse sinus stenosis    Patient condition:   stable    Implant/specimen(s):  none    Plan:  - -160  - groin precautions  - continue aspirin, plavix x 3 months (until Sept 1)  - follow up in clinic as scheduled in Nov   - discussed with patient and  that symptoms are unlikely due to the right transverse sinus stent. No indication to stent the left transverse sinus at this time. However can be considered if symptoms of IIH continue to persist in the future.   - will defer headache management to Dr Henao's team  - advised to discuss BP management due to concern for orthostatic hypotension.   - orthostatic vitals prior to discharge    Henri Sanders MD  Interventional Neurology

## 2024-07-18 ENCOUNTER — PATIENT MESSAGE (OUTPATIENT)
Dept: NEUROLOGY | Facility: CLINIC | Age: 54
End: 2024-07-18
Payer: COMMERCIAL

## 2024-07-30 ENCOUNTER — PATIENT MESSAGE (OUTPATIENT)
Dept: NEUROLOGY | Facility: CLINIC | Age: 54
End: 2024-07-30
Payer: COMMERCIAL

## 2024-07-31 ENCOUNTER — OFFICE VISIT (OUTPATIENT)
Dept: NEUROLOGY | Facility: CLINIC | Age: 54
End: 2024-07-31
Payer: COMMERCIAL

## 2024-07-31 VITALS
SYSTOLIC BLOOD PRESSURE: 120 MMHG | TEMPERATURE: 97 F | HEART RATE: 62 BPM | DIASTOLIC BLOOD PRESSURE: 89 MMHG | BODY MASS INDEX: 32.65 KG/M2 | HEIGHT: 65 IN

## 2024-07-31 DIAGNOSIS — G43.719 INTRACTABLE CHRONIC MIGRAINE WITHOUT AURA AND WITHOUT STATUS MIGRAINOSUS: ICD-10-CM

## 2024-07-31 DIAGNOSIS — L76.82 PAIN AT SURGICAL INCISION: ICD-10-CM

## 2024-07-31 DIAGNOSIS — G08 DURAL VENOUS SINUS THROMBOSIS: Primary | ICD-10-CM

## 2024-07-31 PROCEDURE — 99214 OFFICE O/P EST MOD 30 MIN: CPT | Mod: S$GLB,,, | Performed by: NURSE PRACTITIONER

## 2024-07-31 PROCEDURE — 99999 PR PBB SHADOW E&M-EST. PATIENT-LVL IV: CPT | Mod: PBBFAC,,, | Performed by: NURSE PRACTITIONER

## 2024-07-31 RX ORDER — METHYLPREDNISOLONE 4 MG/1
TABLET ORAL
Qty: 21 EACH | Refills: 0 | Status: SHIPPED | OUTPATIENT
Start: 2024-07-31 | End: 2024-08-21

## 2024-07-31 RX ORDER — FLUOXETINE HYDROCHLORIDE 20 MG/1
40 CAPSULE ORAL
COMMUNITY

## 2024-07-31 RX ORDER — ADALIMUMAB 40MG/0.4ML
40 KIT SUBCUTANEOUS
COMMUNITY
Start: 2023-08-29

## 2024-07-31 RX ORDER — SUCRALFATE 1 G/10ML
SUSPENSION ORAL
COMMUNITY
Start: 2024-07-22

## 2024-07-31 RX ORDER — METHOTREXATE 2.5 MG/1
10 TABLET ORAL
COMMUNITY
Start: 2024-07-09 | End: 2024-07-31

## 2024-07-31 NOTE — PROGRESS NOTES
Subjective:      Patient ID: Yandy Chaudhry is a 54 y.o. female.    Chief Complaint:  Establish Care (ESTABLISHED PATIENT - patient called regarding lump at site where angiogram was performed. Patient complains of swelling and an aching pain from the site. Patient c/o severe headaches in the frontal lobe and temple./)      History of Present Illness  Patient presents after messaging clinic yesterday with complaints about her angiogram access site. She is currently on ASA/Plavix for sinus stenting. She will be on DAPT for 6 months from original procedure. Most recent cerebral angiogram performed after patient c/o new type of headaches. DSA showed no evidence of right transverse sinus stenosis or in-stent thrombosis, stable left transverse sinus stenosis. Patient denies any fever or bruising. Reports that the site feels swollen and tender. Patient is also complaining of frontal headache. Reports taking nurtec every other day and topiramate as prescribed. Reports headache is to her frontal region. Does not go away. Reports headache began when she started having a psoriasis flare up. Has not been on any recent steroids.             Past Medical History:   Diagnosis Date    Acid reflux     Anemia, unspecified     Anxiety     Coronary artery disease     Hyperlipidemia     Hypertension     Migraines     Myocardial infarction     Thrombus     apical    Thyroid disease        Past Surgical History:   Procedure Laterality Date    ANKLE SURGERY Left     ANTERIOR CERVICAL DISCECTOMY W/ FUSION       SECTION       SECTION      CHOLECYSTECTOMY      CORONARY ANGIOPLASTY WITH STENT PLACEMENT      DILATION AND CURETTAGE OF UTERUS      HYSTERECTOMY      INSERTION OF BARE METAL STENT INTO VASCULAR BYPASS GRAFT  2024    LUMBAR PUNCTURE         Family History   Problem Relation Name Age of Onset    Cancer Mother          Pancriatic cancer    Hydrocephalus Mother      No Known Problems Father       No Known Problems Sister      No Known Problems Brother      Hydrocephalus Maternal Grandmother      Parkinsonism Maternal Grandfather         Social History     Socioeconomic History    Marital status:    Tobacco Use    Smoking status: Never     Passive exposure: Never    Smokeless tobacco: Never   Substance and Sexual Activity    Alcohol use: Not Currently     Alcohol/week: 1.0 standard drink of alcohol     Types: 1 Standard drinks or equivalent per week     Comment: once a month    Drug use: Never     Social Determinants of Health     Financial Resource Strain: Low Risk  (5/24/2024)    Overall Financial Resource Strain (CARDIA)     Difficulty of Paying Living Expenses: Not very hard   Food Insecurity: No Food Insecurity (5/24/2024)    Hunger Vital Sign     Worried About Running Out of Food in the Last Year: Never true     Ran Out of Food in the Last Year: Never true   Transportation Needs: No Transportation Needs (5/24/2024)    TRANSPORTATION NEEDS     Transportation : No   Stress: Stress Concern Present (5/24/2024)    [employer] - Occupational Stress Questionnaire     Feeling of Stress : To some extent   Housing Stability: Low Risk  (5/24/2024)    Housing Stability Vital Sign     Unable to Pay for Housing in the Last Year: No     Homeless in the Last Year: No       Current Outpatient Medications   Medication Sig Dispense Refill    adalimumab (HUMIRA,CF, PEN) 40 mg/0.4 mL PnKt Inject 40 mg into the skin.      aspirin (ECOTRIN) 81 MG EC tablet Take 81 mg by mouth once daily.      clopidogreL (PLAVIX) 75 mg tablet Take 1 tablet (75 mg total) by mouth once daily. 30 tablet 2    FLUoxetine (PROZAC) 20 MG capsule Take 40 mg by mouth.      fluticasone propionate (FLONASE) 50 mcg/actuation nasal spray 1 spray by Each Nostril route 2 (two) times daily. Takes as needed      gabapentin (NEURONTIN) 600 MG tablet Take 1 tablet (600 mg total) by mouth 3 (three) times daily. 90 tablet 11     levothyroxine (SYNTHROID) 75 MCG tablet Take 75 mcg by mouth before breakfast.      methocarbamoL (ROBAXIN) 500 MG Tab Take 500 mg by mouth as needed.      metoprolol tartrate (LOPRESSOR) 25 MG tablet Take 25 mg by mouth 2 (two) times daily.      rOPINIRole (REQUIP) 0.5 MG tablet Take 0.5 mg by mouth every evening.      rosuvastatin (CRESTOR) 40 MG Tab Take 40 mg by mouth once daily.      tiZANidine (ZANAFLEX) 4 MG tablet Take 4 mg by mouth every 8 (eight) hours as needed.      topiramate (TOPAMAX) 50 MG tablet Take 1 tablet (50 mg total) by mouth 2 (two) times daily. 60 tablet 11    evolocumab (REPATHA SURECLICK) 140 mg/mL PnIj Inject 140 mg into the skin. (Patient not taking: Reported on 7/31/2024)      ferrous sulfate (FEOSOL) 325 mg (65 mg iron) Tab tablet Take by mouth 3 (three) times a week. (Patient not taking: Reported on 6/18/2024)      furosemide (LASIX) 40 MG tablet Take by mouth. (Patient not taking: Reported on 7/31/2024)      methylPREDNISolone (MEDROL DOSEPACK) 4 mg tablet use as directed 21 each 0    nitroGLYCERIN (NITROSTAT) 0.4 MG SL tablet PLEASE SEE ATTACHED FOR DETAILED DIRECTIONS (Patient not taking: Reported on 7/31/2024)      ondansetron (ZOFRAN-ODT) 4 MG TbDL Take 1 tablet (4 mg total) by mouth every 8 (eight) hours as needed (nausea/vomiting). (Patient not taking: Reported on 6/18/2024) 45 tablet 3    ondansetron (ZOFRAN-ODT) 4 MG TbDL Take 1 tablet (4 mg total) by mouth every 6 (six) hours as needed (Nausea). (Patient not taking: Reported on 7/31/2024) 12 tablet 0    oxybutynin (DITROPAN-XL) 10 MG 24 hr tablet Take 10 mg by mouth once daily. As needed (Patient not taking: Reported on 7/31/2024)      rimegepant 75 mg odt Take 1 tablet (75 mg total) by mouth every other day. Place ODT tablet on the tongue; alternatively the ODT tablet may be placed under the tongue (Patient not taking: Reported on 7/31/2024) 15 tablet 11    rizatriptan (MAXALT) 10 MG tablet Take 1 tablet (10 mg total) by  "mouth as needed for Migraine. Take 1 tablet daily as needed,  may repeat in 2 hours not to exceed more than 20 mg in 24 hours. 9 tablet 5    sucralfate (CARAFATE) 100 mg/mL suspension Take by mouth. (Patient not taking: Reported on 7/31/2024)       No current facility-administered medications for this visit.       Review of patient's allergies indicates:   Allergen Reactions    Fentanyl       Vitals:    07/31/24 0945 07/31/24 0947   BP: (!) 119/91 120/89   BP Location: Left arm    Patient Position: Sitting    Pulse: 62    Temp: 97.2 °F (36.2 °C)    Height: 5' 5" (1.651 m)           Review of Systems  12 point ROS performed and negative unless otherwise documented in HPI  Objective:     Neurologic Exam     Mental Status   Oriented to person, place, and time.   Level of consciousness: drowsy ,  alert  tearful     Cranial Nerves   Cranial nerves II through XII intact.     Motor Exam   Muscle bulk: normal    Strength   Strength 5/5 throughout.     Sensory Exam   Light touch normal.     Gait, Coordination, and Reflexes     Gait  Gait: normal      Physical Exam  Vitals reviewed.   Pulmonary:      Effort: Pulmonary effort is normal.   Neurological:      Mental Status: She is oriented to person, place, and time.      Cranial Nerves: Cranial nerves 2-12 are intact.      Motor: Motor strength is normal.     Gait: Gait is intact.     Incision site well healed, not warm to touch.    Assessment:     1. Dural venous sinus thrombosis    2. Pain at surgical incision    3. Intractable chronic migraine without aura and without status migrainosus        Plan:   Temp ok today.   Continue ASA/Plavix until September 1, 2024 then continue ASA 81 mg daily only  Reviewed DSA; symptoms of headache and facial spasm not related to transverse sinus stent  Will order prednisone taper; patient's migraines may be induced by psoriasis flare. Advised to follow up with Dr. Henao's team if headaches persist.        "

## 2024-08-05 ENCOUNTER — PATIENT MESSAGE (OUTPATIENT)
Dept: NEUROLOGY | Facility: CLINIC | Age: 54
End: 2024-08-05
Payer: COMMERCIAL

## 2024-08-06 ENCOUNTER — PATIENT MESSAGE (OUTPATIENT)
Dept: NEUROLOGY | Facility: CLINIC | Age: 54
End: 2024-08-06
Payer: COMMERCIAL

## 2024-08-06 DIAGNOSIS — M62.838 MUSCLE SPASM: Primary | ICD-10-CM

## 2024-08-06 RX ORDER — TIZANIDINE 4 MG/1
4 TABLET ORAL EVERY 8 HOURS PRN
Qty: 30 TABLET | Refills: 2 | Status: SHIPPED | OUTPATIENT
Start: 2024-08-06

## 2024-09-04 ENCOUNTER — TELEPHONE (OUTPATIENT)
Dept: NEUROLOGY | Facility: CLINIC | Age: 54
End: 2024-09-04
Payer: COMMERCIAL

## 2024-09-04 NOTE — TELEPHONE ENCOUNTER
States she did see her counselor today Ag De La Rosa and he said to contact us. States she is having thoughts of harming herself. Informed her to contact 911, go to the nearest ER. Meanwhile I will contact [facility].

## 2024-09-04 NOTE — TELEPHONE ENCOUNTER
Ag De La Rosa returned call; states he did meet with the patient earlier today and felt that these thoughts maybe coming from Topiramate. She did informed her to contact her PCP also regarding prescribing her a medication for depression, being he prescribed her Prozac.

## 2024-09-04 NOTE — TELEPHONE ENCOUNTER
States she has been on Topiramate 50 mg bid for about 2 months now. She finds she is having some increase depression and she is having some not so good thoughts She wants to know if there is anything that can be prescribe to reverse her bad thoughts. She is taking prozac 40 mg daily. Please advise. [phone]

## 2024-09-04 NOTE — TELEPHONE ENCOUNTER
Spoke to Tess at [facility]. States to have patient contact them so they can do an intake. Patient was contacted and phone number to [facility] given. States it is not that major. Informed I understand, but to contact them and they will do and intake on her and give her the best advise.

## 2024-09-04 NOTE — TELEPHONE ENCOUNTER
Patient returned called asking if I spoke to Ag De La Rosa her counselor. Informed her I did. He mentioned for her to contact her PCP regarding any changes in Prozac and to contact us regarding the topiramate. She wants to know how should she wean off of topiramate. States she is taking Topiramate 50 mg bid and has been on it for about 2 months. Informed her I will speak with Dr. Henao regarding weaning off of Topiramate.   After speaking with Dr. Henao regarding weaning off of Topiramate. She has been taking Topiramate 50 mg bid for about 2 months; she can just go ahead and stop it. Patient was informed, she can just stop taking it being she has only been on it for about 2 months.

## 2024-09-18 DIAGNOSIS — R91.1 SOLITARY PULMONARY NODULE: Primary | ICD-10-CM

## 2024-11-13 ENCOUNTER — OFFICE VISIT (OUTPATIENT)
Dept: NEUROLOGY | Facility: CLINIC | Age: 54
End: 2024-11-13
Payer: COMMERCIAL

## 2024-11-13 VITALS
SYSTOLIC BLOOD PRESSURE: 156 MMHG | DIASTOLIC BLOOD PRESSURE: 98 MMHG | HEART RATE: 68 BPM | HEIGHT: 65 IN | BODY MASS INDEX: 32.69 KG/M2 | WEIGHT: 196.19 LBS

## 2024-11-13 DIAGNOSIS — R90.89 MAGNETIC RESONANCE IMAGING OF BRAIN ABNORMAL: Primary | ICD-10-CM

## 2024-11-13 DIAGNOSIS — I10 HYPERTENSION, UNSPECIFIED TYPE: ICD-10-CM

## 2024-11-13 DIAGNOSIS — G43.901 MIGRAINE WITH STATUS MIGRAINOSUS, NOT INTRACTABLE, UNSPECIFIED MIGRAINE TYPE: ICD-10-CM

## 2024-11-13 DIAGNOSIS — G08 DURAL VENOUS SINUS THROMBOSIS: ICD-10-CM

## 2024-11-13 PROCEDURE — 99999 PR PBB SHADOW E&M-EST. PATIENT-LVL IV: CPT | Mod: PBBFAC,,, | Performed by: NURSE PRACTITIONER

## 2024-11-13 PROCEDURE — 99214 OFFICE O/P EST MOD 30 MIN: CPT | Mod: S$GLB,,, | Performed by: NURSE PRACTITIONER

## 2024-11-13 NOTE — PROGRESS NOTES
Subjective:      Patient ID: Yandy Chaudhry is a 54 y.o. female.    Chief Complaint:  Dural venous sinus thrombosis (Denies headaches, dizziness, weakness, blurred vision or slurred speech .)      History of Present Illness  Patient presents for follow up of Dural Venous Sinus Thrombosis post stenting. DSA in July showed no evidence of right transverse sinus stenosis or in-stent thrombosis, stable left transverse sinus stenosis.She had MRI brain that showed a possible dilated vein on the brain in July. Today, patient denies any new onset of numbness, tingling or weakness. Denies any difficulty with speech. Reports she currently has a UTI and her BP was elevated at her PCPs office yesterday. States her headaches have been well controlled with current medication regimen. BP rechecked at the end of office visit and was 139/100. She states she was taken off of Topiramate because it decreased the efficacy of her prozac and caused an increase in depression.      Past Medical History:   Diagnosis Date    Acid reflux     Anemia, unspecified     Anxiety     Coronary artery disease     Hyperlipidemia     Hypertension     Migraines     Myocardial infarction     Thrombus     apical    Thyroid disease        Past Surgical History:   Procedure Laterality Date    ANKLE SURGERY Left     ANTERIOR CERVICAL DISCECTOMY W/ FUSION       SECTION       SECTION      CHOLECYSTECTOMY      CORONARY ANGIOPLASTY WITH STENT PLACEMENT      DILATION AND CURETTAGE OF UTERUS      HYSTERECTOMY      INSERTION OF BARE METAL STENT INTO VASCULAR BYPASS GRAFT  2024    LUMBAR PUNCTURE         Family History   Problem Relation Name Age of Onset    Cancer Mother          Pancriatic cancer    Hydrocephalus Mother      No Known Problems Father      No Known Problems Sister      No Known Problems Brother      Hydrocephalus Maternal Grandmother      Parkinsonism Maternal Grandfather          Social History     Socioeconomic History    Marital status:    Tobacco Use    Smoking status: Never     Passive exposure: Never    Smokeless tobacco: Never   Substance and Sexual Activity    Alcohol use: Not Currently     Alcohol/week: 1.0 standard drink of alcohol     Types: 1 Standard drinks or equivalent per week     Comment: once a month    Drug use: Never     Social Drivers of Health     Financial Resource Strain: Low Risk  (5/24/2024)    Overall Financial Resource Strain (CARDIA)     Difficulty of Paying Living Expenses: Not very hard   Food Insecurity: No Food Insecurity (5/24/2024)    Hunger Vital Sign     Worried About Running Out of Food in the Last Year: Never true     Ran Out of Food in the Last Year: Never true   Transportation Needs: No Transportation Needs (5/24/2024)    TRANSPORTATION NEEDS     Transportation : No   Stress: Stress Concern Present (5/24/2024)    [employer] - Occupational Stress Questionnaire     Feeling of Stress : To some extent   Housing Stability: Low Risk  (5/24/2024)    Housing Stability Vital Sign     Unable to Pay for Housing in the Last Year: No     Homeless in the Last Year: No       Current Outpatient Medications   Medication Sig Dispense Refill    aspirin (ECOTRIN) 81 MG EC tablet Take 81 mg by mouth once daily.      clopidogreL (PLAVIX) 75 mg tablet Take 1 tablet (75 mg total) by mouth once daily. 30 tablet 2    evolocumab (REPATHA SURECLICK) 140 mg/mL PnIj Inject 140 mg into the skin.      ferrous sulfate (FEOSOL) 325 mg (65 mg iron) Tab tablet Take by mouth 3 (three) times a week.      FLUoxetine (PROZAC) 20 MG capsule Take 40 mg by mouth.      fluticasone propionate (FLONASE) 50 mcg/actuation nasal spray 1 spray by Each Nostril route 2 (two) times daily. Takes as needed      furosemide (LASIX) 40 MG tablet Take by mouth.      gabapentin (NEURONTIN) 600 MG tablet Take 1 tablet (600 mg total) by mouth 4 (four)  "times daily. 120 tablet 5    levothyroxine (SYNTHROID) 75 MCG tablet Take 75 mcg by mouth before breakfast.      methocarbamoL (ROBAXIN) 500 MG Tab Take 500 mg by mouth as needed.      metoprolol tartrate (LOPRESSOR) 25 MG tablet Take 25 mg by mouth 2 (two) times daily.      nitroGLYCERIN (NITROSTAT) 0.4 MG SL tablet       ondansetron (ZOFRAN-ODT) 4 MG TbDL Take 1 tablet (4 mg total) by mouth every 8 (eight) hours as needed (nausea/vomiting). 45 tablet 3    ondansetron (ZOFRAN-ODT) 4 MG TbDL Take 1 tablet (4 mg total) by mouth every 6 (six) hours as needed (Nausea). 12 tablet 0    oxybutynin (DITROPAN-XL) 10 MG 24 hr tablet Take 10 mg by mouth once daily. As needed      rimegepant 75 mg odt Take 1 tablet (75 mg total) by mouth every other day. Place ODT tablet on the tongue; alternatively the ODT tablet may be placed under the tongue 15 tablet 11    rOPINIRole (REQUIP) 0.5 MG tablet Take 0.5 mg by mouth every evening.      rosuvastatin (CRESTOR) 40 MG Tab Take 40 mg by mouth once daily.      sucralfate (CARAFATE) 100 mg/mL suspension Take by mouth.      tiZANidine (ZANAFLEX) 4 MG tablet Take 1 tablet (4 mg total) by mouth every 8 (eight) hours as needed. 30 tablet 2    topiramate (TOPAMAX) 50 MG tablet Take 1 tablet (50 mg total) by mouth 2 (two) times daily. 60 tablet 11    rizatriptan (MAXALT) 10 MG tablet Take 1 tablet (10 mg total) by mouth as needed for Migraine. Take 1 tablet daily as needed,  may repeat in 2 hours not to exceed more than 20 mg in 24 hours. 9 tablet 5     No current facility-administered medications for this visit.       Review of patient's allergies indicates:   Allergen Reactions    Fentanyl       Vitals:    11/13/24 1145   BP: (!) 156/98   BP Location: Left arm   Patient Position: Sitting   Pulse: 68   Weight: 89 kg (196 lb 3.4 oz)   Height: 5' 5" (1.651 m)          Review of Systems  12 point ROS performed and negative unless otherwise documented in HPI  Objective:      "   Neurologic Exam      Mental Status   Oriented to person, place, and time.   Level of consciousness: drowsy ,  alert  tearful      Cranial Nerves   Cranial nerves II through XII intact.      Motor Exam   Muscle bulk: normal     Strength   Strength 5/5 throughout.      Sensory Exam   Light touch normal.      Gait, Coordination, and Reflexes      Gait  Gait: normal        Physical Exam  Vitals reviewed.   Pulmonary:      Effort: Pulmonary effort is normal.   Neurological:      Mental Status: She is oriented to person, place, and time.      Cranial Nerves: Cranial nerves 2-12 are intact.      Motor: Motor strength is normal.     Gait: Gait is intact.     Assessment:     1. Magnetic resonance imaging of brain abnormal    2. Migraine with status migrainosus, not intractable, unspecified migraine type        Plan:     Mri brain w wout contrast to evaluated dilated vein  Continue headache management per Dr. Henao's team  Advised to continue monitoring BP. Recommend follow up with PCP for med adjustment if it remains elevated

## 2024-12-02 ENCOUNTER — HOSPITAL ENCOUNTER (OUTPATIENT)
Dept: RADIOLOGY | Facility: HOSPITAL | Age: 54
Discharge: HOME | End: 2024-12-02
Attending: NURSE PRACTITIONER
Payer: COMMERCIAL

## 2024-12-02 DIAGNOSIS — R90.89 MAGNETIC RESONANCE IMAGING OF BRAIN ABNORMAL: ICD-10-CM

## 2024-12-02 DIAGNOSIS — G93.2 PSEUDOTUMOR CEREBRI: ICD-10-CM

## 2024-12-02 DIAGNOSIS — G43.901 MIGRAINE WITH STATUS MIGRAINOSUS, NOT INTRACTABLE, UNSPECIFIED MIGRAINE TYPE: ICD-10-CM

## 2024-12-02 DIAGNOSIS — R51.9 CHRONIC DAILY HEADACHE: ICD-10-CM

## 2024-12-02 PROCEDURE — 25500020 PHARM REV CODE 255: Performed by: NURSE PRACTITIONER

## 2024-12-02 PROCEDURE — 70553 MRI BRAIN STEM W/O & W/DYE: CPT | Mod: TC

## 2024-12-02 PROCEDURE — A9577 INJ MULTIHANCE: HCPCS | Performed by: NURSE PRACTITIONER

## 2024-12-02 RX ORDER — RIMEGEPANT SULFATE 75 MG/75MG
TABLET, ORALLY DISINTEGRATING ORAL
Qty: 16 TABLET | Refills: 11 | Status: SHIPPED | OUTPATIENT
Start: 2024-12-02

## 2024-12-02 RX ADMIN — GADOBENATE DIMEGLUMINE 14 ML: 529 INJECTION, SOLUTION INTRAVENOUS at 08:12

## 2024-12-05 ENCOUNTER — OFFICE VISIT (OUTPATIENT)
Dept: NEUROLOGY | Facility: CLINIC | Age: 54
End: 2024-12-05
Payer: COMMERCIAL

## 2024-12-05 VITALS
HEIGHT: 65 IN | SYSTOLIC BLOOD PRESSURE: 151 MMHG | DIASTOLIC BLOOD PRESSURE: 90 MMHG | HEART RATE: 65 BPM | WEIGHT: 189.63 LBS | BODY MASS INDEX: 31.59 KG/M2

## 2024-12-05 DIAGNOSIS — G43.009 MIGRAINE WITHOUT AURA AND WITHOUT STATUS MIGRAINOSUS, NOT INTRACTABLE: Primary | ICD-10-CM

## 2024-12-05 PROCEDURE — 99213 OFFICE O/P EST LOW 20 MIN: CPT | Mod: S$GLB,,, | Performed by: NURSE PRACTITIONER

## 2024-12-05 PROCEDURE — 99999 PR PBB SHADOW E&M-EST. PATIENT-LVL IV: CPT | Mod: PBBFAC,,, | Performed by: NURSE PRACTITIONER

## 2024-12-05 NOTE — PROGRESS NOTES
Subjective:       Patient ID: Yandy Chaudhry is a 54 y.o. female.    Chief Complaint: Migraine (Pt states doing well stable with Nurtec every other day )      History of Present Illness:  Follow-up visit for migraines.  She is doing really well.  She is still on Nurtec every other day as a preventive and is very happy with her control.  She only has to take rizatriptan once or twice a month for rescue.  Rizatriptan is still effective for her.  Denies any medication side effects.    She is now off Topamax.  There was an interaction that was causing a decreased level of fluoxetine and she started having suicidal ideation.  Since stopping the Topamax, suicidal ideation has resolved.            Past Medical History:   Diagnosis Date    Acid reflux     Anemia, unspecified     Anxiety     Coronary artery disease     Hyperlipidemia     Hypertension     Migraines     Myocardial infarction     Thrombus     apical    Thyroid disease        Past Surgical History:   Procedure Laterality Date    ANKLE SURGERY Left     ANTERIOR CERVICAL DISCECTOMY W/ FUSION       SECTION  2003     SECTION      CHOLECYSTECTOMY      CORONARY ANGIOPLASTY WITH STENT PLACEMENT      DILATION AND CURETTAGE OF UTERUS      HYSTERECTOMY      INSERTION OF BARE METAL STENT INTO VASCULAR BYPASS GRAFT  2024    LUMBAR PUNCTURE          Family History   Problem Relation Name Age of Onset    Cancer Mother          Pancriatic cancer    Hydrocephalus Mother      No Known Problems Father      No Known Problems Sister      No Known Problems Brother      Hydrocephalus Maternal Grandmother      Parkinsonism Maternal Grandfather          Social History     Socioeconomic History    Marital status:    Tobacco Use    Smoking status: Never     Passive exposure: Never    Smokeless tobacco: Never   Substance and Sexual Activity    Alcohol use: Not Currently     Alcohol/week: 1.0 standard drink of alcohol     Types: 1 Standard  drinks or equivalent per week     Comment: once a month    Drug use: Never     Social Drivers of Health     Financial Resource Strain: Low Risk  (5/24/2024)    Overall Financial Resource Strain (CARDIA)     Difficulty of Paying Living Expenses: Not very hard   Food Insecurity: No Food Insecurity (5/24/2024)    Hunger Vital Sign     Worried About Running Out of Food in the Last Year: Never true     Ran Out of Food in the Last Year: Never true   Transportation Needs: No Transportation Needs (5/24/2024)    TRANSPORTATION NEEDS     Transportation : No   Stress: Stress Concern Present (5/24/2024)    [place] [employer] - Occupational Stress Questionnaire     Feeling of Stress : To some extent   Housing Stability: Low Risk  (5/24/2024)    Housing Stability Vital Sign     Unable to Pay for Housing in the Last Year: No     Homeless in the Last Year: No        Outpatient Encounter Medications as of 12/5/2024   Medication Sig Dispense Refill    aspirin (ECOTRIN) 81 MG EC tablet Take 81 mg by mouth once daily.      evolocumab (REPATHA SURECLICK) 140 mg/mL PnIj Inject 140 mg into the skin.      FLUoxetine (PROZAC) 20 MG capsule Take 40 mg by mouth once daily.      fluticasone propionate (FLONASE) 50 mcg/actuation nasal spray 1 spray by Each Nostril route 2 (two) times daily. Takes as needed      furosemide (LASIX) 40 MG tablet Take 40 mg by mouth as needed.      gabapentin (NEURONTIN) 600 MG tablet Take 1 tablet (600 mg total) by mouth 4 (four) times daily. 120 tablet 5    levothyroxine (SYNTHROID) 75 MCG tablet Take 75 mcg by mouth before breakfast.      methocarbamoL (ROBAXIN) 500 MG Tab Take 500 mg by mouth as needed.      metoprolol tartrate (LOPRESSOR) 25 MG tablet Take 25 mg by mouth 2 (two) times daily.      nitroGLYCERIN (NITROSTAT) 0.4 MG SL tablet       NURTEC 75 mg odt PLACE 1 TABLET ON TOP OR UNDER TONGUE EVERY OTHER DAY. 16 tablet 11    ondansetron (ZOFRAN-ODT) 4 MG TbDL Take 1 tablet (4 mg  "total) by mouth every 8 (eight) hours as needed (nausea/vomiting). 45 tablet 3    ondansetron (ZOFRAN-ODT) 4 MG TbDL Take 1 tablet (4 mg total) by mouth every 6 (six) hours as needed (Nausea). 12 tablet 0    oxybutynin (DITROPAN-XL) 10 MG 24 hr tablet Take 10 mg by mouth once daily. As needed      rOPINIRole (REQUIP) 0.5 MG tablet Take 0.5 mg by mouth every evening.      rosuvastatin (CRESTOR) 40 MG Tab Take 40 mg by mouth once daily.      sucralfate (CARAFATE) 100 mg/mL suspension Take by mouth.      tiZANidine (ZANAFLEX) 4 MG tablet Take 1 tablet (4 mg total) by mouth every 8 (eight) hours as needed. 30 tablet 2    ferrous sulfate (FEOSOL) 325 mg (65 mg iron) Tab tablet Take by mouth 3 (three) times a week. (Patient not taking: Reported on 12/5/2024)      rizatriptan (MAXALT) 10 MG tablet Take 1 tablet (10 mg total) by mouth as needed for Migraine. Take 1 tablet daily as needed,  may repeat in 2 hours not to exceed more than 20 mg in 24 hours. 9 tablet 5    topiramate (TOPAMAX) 50 MG tablet Take 1 tablet (50 mg total) by mouth 2 (two) times daily. (Patient not taking: Reported on 12/5/2024) 60 tablet 11    [DISCONTINUED] adalimumab (HUMIRA,CF, PEN) 40 mg/0.4 mL PnKt Inject 40 mg into the skin. (Patient not taking: Reported on 11/13/2024)      [DISCONTINUED] clopidogreL (PLAVIX) 75 mg tablet Take 1 tablet (75 mg total) by mouth once daily. 30 tablet 2    [DISCONTINUED] rimegepant 75 mg odt Take 1 tablet (75 mg total) by mouth every other day. Place ODT tablet on the tongue; alternatively the ODT tablet may be placed under the tongue 15 tablet 11     No facility-administered encounter medications on file as of 12/5/2024.      Objective:   BP (!) 151/90   Pulse 65   Ht 5' 5" (1.651 m)   Wt 86 kg (189 lb 9.6 oz)   BMI 31.55 kg/m²        Physical Exam  General:  Alert and oriented  NAD  No overt edema    Cognition and Comprehension:  Speech and language intact  Follows commands    Cranial nerves:   CN 2_ Visual " fields (full to confrontation both eyes)  CN 3, 4, 6_ Intact, RUSLAN, no nystagmus  CN 5_facial tactile sensation intact  CN 7_no face asymmetry; normal eye closure and smile  CN 8_hearing intact to spoken voice  CN 9, 10, 11_voice normal, shoulder shrug ok; deltoids not fatigable   CN 12 tongue_protrudes mid line    Muscle Strength and Tone:  Normal upper extremity tone  Normal lower extremity tone  Normal upper extremity strength  Normal lower extremity strength    Reflexes:  Normal and symmetric    Sensation:  Intact to light touch and temperature    Coordination and Gait:  Coordination and gait are normal       Assessment & Plan:      1. Migraine without aura and without status migrainosus, not intractable  Overview:  Previous patient of Dr. Navarro.  She started having migraines in her teens and they became really severe after the birth of her 2nd child, but they ultimately resolved in later adulthood.  In October 2022, she began having daily headaches that were so severe they would wake her up at night.  She began taking Tylenol around the clock 3 to 5 times a day.  She saw Dr. Joiner in December 2022 and an MRI brain showed an empty sella.  She was directed to have an ophthalmologic evaluation which she did and there were no reported abnormalities at that time.  [place] [facility] [place] workup as she had left-sided weakness associated with a severe headache.  She saw Dr. Navarro in hospital follow-up who started her on Topamax and she ultimately underwent a lumbar puncture (Nov 2023) to evaluate opening pressure which was measured as 21.  She was subsequently started on Diamox and titrated up to 1000 mg b.i.d., but severity of head pain persisted and she had side effects of severe diarrhea and dizziness with Diamox.  She was also tried on gabapentin and zonegran with no relief.  Care was transferred to [facility] team in Dec 2023.  She has been weaned off Diamox and has also  stopped daily Tylenol.  She is now on Nurtec every other day with Maxalt p.r.n. as rescue.    Now off topamax.  Caused suicidal ideation    Assessment & Plan:  -stable, doing really well on nurtec QOD.  Will continue.  Rizatriptan prn            This note was created with the assistance of voice recognition software. There may be transcription errors as a result of using this technology however minimal. Effort has been made to assure accuracy of transcription but any obvious errors or omissions should be clarified with the author of the document.

## 2025-03-11 DIAGNOSIS — R51.9 CHRONIC DAILY HEADACHE: ICD-10-CM

## 2025-03-11 DIAGNOSIS — G93.2 PSEUDOTUMOR CEREBRI: ICD-10-CM

## 2025-03-11 DIAGNOSIS — G43.901 MIGRAINE WITH STATUS MIGRAINOSUS, NOT INTRACTABLE, UNSPECIFIED MIGRAINE TYPE: ICD-10-CM

## 2025-03-11 RX ORDER — RIMEGEPANT SULFATE 75 MG/75MG
75 TABLET, ORALLY DISINTEGRATING ORAL DAILY
Qty: 16 TABLET | Refills: 2 | Status: SHIPPED | OUTPATIENT
Start: 2025-03-11 | End: 2025-03-11

## 2025-03-11 RX ORDER — RIMEGEPANT SULFATE 75 MG/75MG
75 TABLET, ORALLY DISINTEGRATING ORAL DAILY
Qty: 16 TABLET | Refills: 2 | Status: SHIPPED | OUTPATIENT
Start: 2025-03-11

## 2025-03-24 DIAGNOSIS — M62.838 MUSCLE SPASM: ICD-10-CM

## 2025-03-24 RX ORDER — TIZANIDINE 4 MG/1
4 TABLET ORAL EVERY 8 HOURS PRN
Qty: 30 TABLET | Refills: 2 | Status: SHIPPED | OUTPATIENT
Start: 2025-03-24

## 2025-03-31 ENCOUNTER — HOSPITAL ENCOUNTER (OUTPATIENT)
Dept: RADIOLOGY | Facility: HOSPITAL | Age: 55
Discharge: HOME | End: 2025-03-31
Attending: INTERNAL MEDICINE
Payer: COMMERCIAL

## 2025-03-31 DIAGNOSIS — R42 DIZZINESS AND GIDDINESS: ICD-10-CM

## 2025-03-31 DIAGNOSIS — R60.9 EDEMA, UNSPECIFIED TYPE: ICD-10-CM

## 2025-03-31 DIAGNOSIS — I51.7 CARDIOMEGALY: ICD-10-CM

## 2025-03-31 DIAGNOSIS — R06.02 SHORTNESS OF BREATH: ICD-10-CM

## 2025-03-31 DIAGNOSIS — I34.0 MITRAL VALVE INSUFFICIENCY, UNSPECIFIED ETIOLOGY: ICD-10-CM

## 2025-03-31 PROCEDURE — 71046 X-RAY EXAM CHEST 2 VIEWS: CPT | Mod: TC

## 2025-05-18 DIAGNOSIS — R51.9 CHRONIC DAILY HEADACHE: ICD-10-CM

## 2025-05-18 DIAGNOSIS — G93.2 PSEUDOTUMOR CEREBRI: ICD-10-CM

## 2025-05-18 DIAGNOSIS — G43.901 MIGRAINE WITH STATUS MIGRAINOSUS, NOT INTRACTABLE, UNSPECIFIED MIGRAINE TYPE: ICD-10-CM

## 2025-05-19 RX ORDER — GABAPENTIN 600 MG/1
TABLET ORAL
Qty: 120 TABLET | Refills: 5 | Status: SHIPPED | OUTPATIENT
Start: 2025-05-19

## 2025-06-25 DIAGNOSIS — M62.838 MUSCLE SPASM: ICD-10-CM

## 2025-06-25 RX ORDER — TIZANIDINE 4 MG/1
4 TABLET ORAL EVERY 8 HOURS PRN
Qty: 30 TABLET | Refills: 2 | Status: SHIPPED | OUTPATIENT
Start: 2025-06-25

## 2025-07-30 ENCOUNTER — OFFICE VISIT (OUTPATIENT)
Dept: URGENT CARE | Facility: CLINIC | Age: 55
End: 2025-07-30
Payer: COMMERCIAL

## 2025-07-30 VITALS
RESPIRATION RATE: 16 BRPM | BODY MASS INDEX: 29.99 KG/M2 | OXYGEN SATURATION: 98 % | DIASTOLIC BLOOD PRESSURE: 69 MMHG | TEMPERATURE: 99 F | WEIGHT: 180 LBS | HEIGHT: 65 IN | SYSTOLIC BLOOD PRESSURE: 126 MMHG | HEART RATE: 65 BPM

## 2025-07-30 DIAGNOSIS — H66.90 ACUTE OTITIS MEDIA, UNSPECIFIED OTITIS MEDIA TYPE: Primary | ICD-10-CM

## 2025-07-30 PROCEDURE — 96372 THER/PROPH/DIAG INJ SC/IM: CPT | Mod: ,,, | Performed by: FAMILY MEDICINE

## 2025-07-30 PROCEDURE — 99213 OFFICE O/P EST LOW 20 MIN: CPT | Mod: 25,,, | Performed by: FAMILY MEDICINE

## 2025-07-30 RX ORDER — VORTIOXETINE 10 MG/1
1 TABLET, FILM COATED ORAL
COMMUNITY

## 2025-07-30 RX ORDER — BIMEKIZUMAB 320 MG/2ML
INJECTION, SOLUTION SUBCUTANEOUS
COMMUNITY

## 2025-07-30 RX ORDER — PANTOPRAZOLE SODIUM 40 MG/1
40 TABLET, DELAYED RELEASE ORAL
COMMUNITY

## 2025-07-30 RX ORDER — DEXAMETHASONE SODIUM PHOSPHATE 10 MG/ML
5 INJECTION INTRAMUSCULAR; INTRAVENOUS
Status: COMPLETED | OUTPATIENT
Start: 2025-07-30 | End: 2025-07-30

## 2025-07-30 RX ORDER — AMOXICILLIN AND CLAVULANATE POTASSIUM 875; 125 MG/1; MG/1
1 TABLET, FILM COATED ORAL EVERY 12 HOURS
Qty: 14 TABLET | Refills: 0 | Status: SHIPPED | OUTPATIENT
Start: 2025-07-30 | End: 2025-08-06

## 2025-07-30 RX ADMIN — DEXAMETHASONE SODIUM PHOSPHATE 5 MG: 10 INJECTION INTRAMUSCULAR; INTRAVENOUS at 03:07

## 2025-07-30 NOTE — PATIENT INSTRUCTIONS
Assessment/Plan:   Acute otitis media, unspecified otitis media type  -     dexAMETHasone injection 5 mg  -     amoxicillin-clavulanate 875-125mg (AUGMENTIN) 875-125 mg per tablet; Take 1 tablet by mouth every 12 (twelve) hours. for 7 days  Dispense: 14 tablet; Refill: 0  Significant pain on examination of the right ear.  Justification and medical decision-making for administration of injectable steroid as above.  As well as her duration of symptoms being 2 weeks.  Justifies oral/systemic antibiotic usage.  Follow up with PCP/return to clinic if symptoms persist or worsen.         Education and counseling done face to face regarding medical conditions and plan. Contact office if new symptoms develop. Should any symptoms ever significantly worsen seek emergency medical attention/go to ER. Follow up at least yearly for wellness or sooner PRN. Nurse to call patient with any results. The patient is receptive, expresses understanding and is agreeable to plan. All questions have been answered.

## 2025-07-30 NOTE — PROGRESS NOTES
"Patient ID: Yandy Chaudhry is a 55 y.o. female.  Chief Complaint: Nasal Congestion    HPI:   Patient presents here today for above reason.     Patient is a 55 y.o. female who presents to urgent care with complaints of a possible sinus infection - describes nasal congestion, sinus pressure/drainage, sharp pain "behind right cheek" x over the last 2 weeks. Alleviating factors include Sudafed with moderate amount of relief. Patient denies any confirmed fever or any other symptoms at this time.     Past Medical History:  Past Medical History:   Diagnosis Date    Acid reflux     Anemia, unspecified     Anxiety     Coronary artery disease     Hyperlipidemia     Hypertension     Migraines     Myocardial infarction     Thrombus     apical    Thyroid disease      Past Surgical History:   Procedure Laterality Date    ANKLE SURGERY Left     ANTERIOR CERVICAL DISCECTOMY W/ FUSION       SECTION       SECTION      CHOLECYSTECTOMY      CORONARY ANGIOPLASTY WITH STENT PLACEMENT      DILATION AND CURETTAGE OF UTERUS      HYSTERECTOMY      INSERTION OF BARE METAL STENT INTO VASCULAR BYPASS GRAFT  2024    LUMBAR PUNCTURE       Review of patient's allergies indicates:   Allergen Reactions    Fentanyl      Current Outpatient Medications   Medication Instructions    amoxicillin-clavulanate 875-125mg (AUGMENTIN) 875-125 mg per tablet 1 tablet, Oral, Every 12 hours    aspirin (ECOTRIN) 81 mg, Daily    bimekizumab-bkzx (BIMZELX) 320 mg/2 mL Syrg Inject into the skin.    fluticasone propionate (FLONASE) 50 mcg/actuation nasal spray 1 spray, 2 times daily    gabapentin (NEURONTIN) 600 MG tablet TAKE 1 TABLET BY MOUTH 4 TIMES DAILY    levothyroxine (SYNTHROID) 75 mcg, Before breakfast    methocarbamoL (ROBAXIN) 500 mg, As needed (PRN)    metoprolol tartrate (LOPRESSOR) 25 mg, 2 times daily    nitroGLYCERIN (NITROSTAT) 0.4 MG SL tablet     NURTEC 75 mg, Oral, Daily, Place ODT tablet on the tongue; " "alternatively the ODT tablet may be placed under the tongue    oxybutynin (DITROPAN-XL) 10 mg, Daily    pantoprazole (PROTONIX) 40 mg    REPATHA SURECLICK 140 mg    rOPINIRole (REQUIP) 0.5 mg, Nightly    rosuvastatin (CRESTOR) 40 mg, Daily    sucralfate (CARAFATE) 100 mg/mL suspension Take by mouth.    tiZANidine (ZANAFLEX) 4 mg, Oral, Every 8 hours PRN    TRINTELLIX 10 mg Tab 1 tablet     Social History[1]    ROS:   Review of Systems  12 point review of systems conducted, negative except as stated in the history of present illness. See HPI for details.   Vitals/PE:   Visit Vitals  /69   Pulse 65   Temp 98.5 °F (36.9 °C)   Resp 16   Ht 5' 5" (1.651 m)   Wt 81.6 kg (180 lb)   SpO2 98%   BMI 29.95 kg/m²     Physical Exam  Vitals and nursing note reviewed.   Constitutional:       Appearance: She is ill-appearing. She is not toxic-appearing or diaphoretic.   HENT:      Right Ear: Swelling and tenderness present. Tympanic membrane is injected, erythematous and bulging.      Left Ear: Tympanic membrane normal.      Nose: Mucosal edema and congestion present.      Right Turbinates: Enlarged and swollen.      Left Turbinates: Not enlarged or swollen.      Right Sinus: Maxillary sinus tenderness and frontal sinus tenderness present.      Left Sinus: No maxillary sinus tenderness or frontal sinus tenderness.      Mouth/Throat:      Pharynx: Posterior oropharyngeal erythema present.   Eyes:      Pupils: Pupils are equal, round, and reactive to light.   Cardiovascular:      Rate and Rhythm: Normal rate.      Pulses: Normal pulses.   Pulmonary:      Effort: Pulmonary effort is normal.   Skin:     General: Skin is warm.      Capillary Refill: Capillary refill takes less than 2 seconds.   Neurological:      General: No focal deficit present.      Mental Status: She is alert and oriented to person, place, and time.   Psychiatric:         Mood and Affect: Mood normal.         Results for orders placed or performed in visit on " 03/31/25   Comprehensive Metabolic Panel    Collection Time: 03/31/25 10:30 AM   Result Value Ref Range    Sodium 138 136 - 145 mmol/L    Potassium 4.2 3.5 - 5.1 mmol/L    Chloride 107 98 - 107 mmol/L    CO2 26 22 - 29 mmol/L    Glucose 92 74 - 100 mg/dL    Blood Urea Nitrogen 8.5 (L) 9.8 - 20.1 mg/dL    Creatinine 0.68 0.55 - 1.02 mg/dL    Calcium 9.2 8.4 - 10.2 mg/dL    Protein Total 6.6 6.4 - 8.3 gm/dL    Albumin 3.5 3.5 - 5.0 g/dL    Globulin 3.1 2.4 - 3.5 gm/dL    Albumin/Globulin Ratio 1.1 1.1 - 2.0 ratio    Bilirubin Total 0.6 <=1.5 mg/dL    ALP 60 40 - 150 unit/L    ALT 33 0 - 55 unit/L    AST 44 11 - 45 unit/L    eGFR >60 mL/min/1.73/m2    Anion Gap 5.0 mEq/L    BUN/Creatinine Ratio 13    Lipid Panel    Collection Time: 03/31/25 10:30 AM   Result Value Ref Range    Cholesterol Total 164 <=200 mg/dL    HDL Cholesterol 78 (H) 35 - 60 mg/dL    Triglyceride 70 37 - 140 mg/dL    Cholesterol/HDL Ratio 2 0 - 5    Very Low Density Lipoprotein 14     LDL Cholesterol 72.00 50.00 - 140.00 mg/dL   CBC Without Differential    Collection Time: 03/31/25 10:30 AM   Result Value Ref Range    WBC 5.53 4.50 - 11.50 x10(3)/mcL    RBC 5.16 4.20 - 5.40 x10(6)/mcL    Hgb 13.9 12.0 - 16.0 g/dL    Hct 44.4 37.0 - 47.0 %    MCV 86.0 80.0 - 94.0 fL    MCH 26.9 (L) 27.0 - 31.0 pg    MCHC 31.3 (L) 33.0 - 36.0 g/dL    RDW 19.2 (H) 11.5 - 17.0 %    Platelet 221 130 - 400 x10(3)/mcL    MPV 12.6 (H) 7.4 - 10.4 fL    NRBC% 0.0 %     Assessment/Plan:   Acute otitis media, unspecified otitis media type  -     dexAMETHasone injection 5 mg  -     amoxicillin-clavulanate 875-125mg (AUGMENTIN) 875-125 mg per tablet; Take 1 tablet by mouth every 12 (twelve) hours. for 7 days  Dispense: 14 tablet; Refill: 0  Significant pain on examination of the right ear.  Justification and medical decision-making for administration of injectable steroid as above.  As well as her duration of symptoms being 2 weeks.  Justifies oral/systemic antibiotic usage.   Follow up with PCP/return to clinic if symptoms persist or worsen.         Education and counseling done face to face regarding medical conditions and plan. Contact office if new symptoms develop. Should any symptoms ever significantly worsen seek emergency medical attention/go to ER. Follow up at least yearly for wellness or sooner PRN. Nurse to call patient with any results. The patient is receptive, expresses understanding and is agreeable to plan. All questions have been answered.             [1]   Social History  Socioeconomic History    Marital status:    Tobacco Use    Smoking status: Never     Passive exposure: Never    Smokeless tobacco: Never   Substance and Sexual Activity    Alcohol use: Yes     Alcohol/week: 1.0 standard drink of alcohol     Types: 1 Standard drinks or equivalent per week     Comment: once a month    Drug use: Never     Social Drivers of Health     Financial Resource Strain: Low Risk  (5/24/2024)    Overall Financial Resource Strain (CARDIA)     Difficulty of Paying Living Expenses: Not very hard   Food Insecurity: No Food Insecurity (6/26/2025)    [facility]    Hunger Vital Sign     Worried About Running Out of Food in the Last Year: Never true     Ran Out of Food in the Last Year: Never true   Transportation Needs: No Transportation Needs (6/26/2025)    Received from [facility]    PRAPARE - Transportation     Lack of Transportation (Medical): No     Lack of Transportation (Non-Medical): No   Stress: Stress Concern Present (5/24/2024)    [place] [employer] - Occupational Stress Questionnaire     Feeling of Stress : To some extent   Housing Stability: Low Risk  (6/26/2025)    [facility]    Housing Stability  Vital Sign     Unable to Pay for Housing in the Last Year: No     Number of Times Moved in the Last Year: 0     Homeless in the Last Year: No

## 2025-07-30 NOTE — PROGRESS NOTES
Subjective:      Patient ID: Yandy Chaudhry is a 55 y.o. female.    Vitals:  vitals were not taken for this visit.     Chief Complaint: Nasal Congestion (Think I might have a sinus infection - sharp pain behind right cheek, pressure, congestion, drainage - Entered by patient)     Patient is a 55 y.o. female who presents to urgent care with complaints of *** x*** {DAYS, WEEKS, MONTHS:468869}. Alleviating factors include *** with {Desc; mild/moderate/copious:34190} relief. Patient denies ***.      ROS   Objective:     Physical Exam    Assessment:     No diagnosis found.    Plan:       There are no diagnoses linked to this encounter.